# Patient Record
Sex: MALE | Race: WHITE | NOT HISPANIC OR LATINO
[De-identification: names, ages, dates, MRNs, and addresses within clinical notes are randomized per-mention and may not be internally consistent; named-entity substitution may affect disease eponyms.]

---

## 2017-02-21 ENCOUNTER — APPOINTMENT (OUTPATIENT)
Dept: GASTROENTEROLOGY | Facility: CLINIC | Age: 72
End: 2017-02-21

## 2017-02-22 ENCOUNTER — APPOINTMENT (OUTPATIENT)
Dept: ELECTROPHYSIOLOGY | Facility: CLINIC | Age: 72
End: 2017-02-22

## 2017-05-23 ENCOUNTER — APPOINTMENT (OUTPATIENT)
Dept: ELECTROPHYSIOLOGY | Facility: CLINIC | Age: 72
End: 2017-05-23

## 2017-06-29 ENCOUNTER — APPOINTMENT (OUTPATIENT)
Dept: NEUROLOGY | Facility: CLINIC | Age: 72
End: 2017-06-29

## 2017-06-29 VITALS — WEIGHT: 212 LBS | BODY MASS INDEX: 30.42 KG/M2

## 2017-06-29 VITALS
BODY MASS INDEX: 30.78 KG/M2 | HEART RATE: 69 BPM | HEIGHT: 70 IN | WEIGHT: 215 LBS | SYSTOLIC BLOOD PRESSURE: 107 MMHG | DIASTOLIC BLOOD PRESSURE: 71 MMHG

## 2017-06-29 DIAGNOSIS — N40.0 BENIGN PROSTATIC HYPERPLASIA WITHOUT LOWER URINARY TRACT SYMPMS: ICD-10-CM

## 2017-06-29 DIAGNOSIS — Z82.49 FAMILY HISTORY OF ISCHEMIC HEART DISEASE AND OTHER DISEASES OF THE CIRCULATORY SYSTEM: ICD-10-CM

## 2017-06-30 PROBLEM — Z82.49 FAMILY HISTORY OF CORONARY ARTERY DISEASE: Status: ACTIVE | Noted: 2017-06-30

## 2017-06-30 PROBLEM — N40.0 BENIGN PROSTATIC HYPERPLASIA: Status: ACTIVE | Noted: 2017-06-30

## 2017-06-30 PROBLEM — Z82.49 FAMILY HISTORY OF ESSENTIAL HYPERTENSION: Status: ACTIVE | Noted: 2017-06-30

## 2017-07-26 ENCOUNTER — APPOINTMENT (OUTPATIENT)
Dept: GASTROENTEROLOGY | Facility: CLINIC | Age: 72
End: 2017-07-26

## 2017-07-26 VITALS
BODY MASS INDEX: 30.06 KG/M2 | OXYGEN SATURATION: 96 % | HEIGHT: 70 IN | TEMPERATURE: 97.4 F | WEIGHT: 210 LBS | RESPIRATION RATE: 14 BRPM | DIASTOLIC BLOOD PRESSURE: 80 MMHG | SYSTOLIC BLOOD PRESSURE: 110 MMHG | HEART RATE: 79 BPM

## 2017-07-26 DIAGNOSIS — Z80.0 FAMILY HISTORY OF MALIGNANT NEOPLASM OF DIGESTIVE ORGANS: ICD-10-CM

## 2017-07-26 DIAGNOSIS — K22.70 BARRETT'S ESOPHAGUS W/OUT DYSPLASIA: ICD-10-CM

## 2017-07-26 DIAGNOSIS — K63.5 POLYP OF COLON: ICD-10-CM

## 2017-07-26 DIAGNOSIS — D12.6 BENIGN NEOPLASM OF COLON, UNSPECIFIED: ICD-10-CM

## 2017-07-26 DIAGNOSIS — Z12.11 ENCOUNTER FOR SCREENING FOR MALIGNANT NEOPLASM OF COLON: ICD-10-CM

## 2017-08-01 ENCOUNTER — MESSAGE (OUTPATIENT)
Age: 72
End: 2017-08-01

## 2017-08-04 ENCOUNTER — APPOINTMENT (OUTPATIENT)
Dept: NEUROLOGY | Facility: CLINIC | Age: 72
End: 2017-08-04
Payer: MEDICARE

## 2017-08-04 DIAGNOSIS — Z00.00 ENCOUNTER FOR GENERAL ADULT MEDICAL EXAMINATION W/OUT ABNORMAL FINDINGS: ICD-10-CM

## 2017-08-04 PROCEDURE — 99213 OFFICE O/P EST LOW 20 MIN: CPT

## 2017-08-30 ENCOUNTER — APPOINTMENT (OUTPATIENT)
Dept: ELECTROPHYSIOLOGY | Facility: CLINIC | Age: 72
End: 2017-08-30
Payer: MEDICARE

## 2017-08-30 PROCEDURE — 93284 PRGRMG EVAL IMPLANTABLE DFB: CPT

## 2017-08-30 PROCEDURE — 93290 INTERROG DEV EVAL ICPMS IP: CPT | Mod: 26

## 2017-08-30 RX ORDER — BACITRACIN 500 [USP'U]/G
500 OINTMENT OPHTHALMIC
Qty: 35 | Refills: 0 | Status: DISCONTINUED | COMMUNITY
Start: 2017-07-05 | End: 2017-08-30

## 2017-08-30 RX ORDER — CARVEDILOL 12.5 MG/1
12.5 TABLET, FILM COATED ORAL TWICE DAILY
Refills: 0 | Status: ACTIVE | COMMUNITY

## 2017-10-23 ENCOUNTER — MESSAGE (OUTPATIENT)
Age: 72
End: 2017-10-23

## 2017-10-24 ENCOUNTER — MEDICATION RENEWAL (OUTPATIENT)
Age: 72
End: 2017-10-24

## 2017-10-24 RX ORDER — SODIUM SULFATE, POTASSIUM SULFATE, MAGNESIUM SULFATE 17.5; 3.13; 1.6 G/ML; G/ML; G/ML
17.5-3.13-1.6 SOLUTION, CONCENTRATE ORAL
Qty: 1 | Refills: 0 | Status: ACTIVE | COMMUNITY
Start: 2017-10-24 | End: 1900-01-01

## 2017-10-24 RX ORDER — POLYETHYLENE GLYCOL 3350 17 G/17G
17 POWDER, FOR SOLUTION ORAL
Qty: 1 | Refills: 0 | Status: ACTIVE | COMMUNITY
Start: 2017-10-24 | End: 1900-01-01

## 2017-10-24 RX ORDER — POLYETHYLENE GLYCOL 3350, SODIUM SULFATE, SODIUM CHLORIDE, POTASSIUM CHLORIDE, ASCORBIC ACID, SODIUM ASCORBATE 7.5-2.691G
100 KIT ORAL
Qty: 1 | Refills: 0 | Status: DISCONTINUED | COMMUNITY
Start: 2017-10-23 | End: 2017-10-24

## 2017-10-30 ENCOUNTER — OUTPATIENT (OUTPATIENT)
Dept: OUTPATIENT SERVICES | Facility: HOSPITAL | Age: 72
LOS: 1 days | Discharge: HOME | End: 2017-10-30
Payer: MEDICARE

## 2017-10-30 ENCOUNTER — RESULT REVIEW (OUTPATIENT)
Age: 72
End: 2017-10-30

## 2017-10-30 ENCOUNTER — APPOINTMENT (OUTPATIENT)
Dept: GASTROENTEROLOGY | Facility: HOSPITAL | Age: 72
End: 2017-10-30

## 2017-10-30 DIAGNOSIS — Z95.810 PRESENCE OF AUTOMATIC (IMPLANTABLE) CARDIAC DEFIBRILLATOR: Chronic | ICD-10-CM

## 2017-10-30 DIAGNOSIS — K22.70 BARRETT'S ESOPHAGUS WITHOUT DYSPLASIA: ICD-10-CM

## 2017-10-30 PROCEDURE — 43239 EGD BIOPSY SINGLE/MULTIPLE: CPT | Mod: GC

## 2017-10-30 PROCEDURE — 88305 TISSUE EXAM BY PATHOLOGIST: CPT | Mod: 26

## 2017-10-30 PROCEDURE — 45380 COLONOSCOPY AND BIOPSY: CPT | Mod: GC

## 2017-10-30 PROCEDURE — 88312 SPECIAL STAINS GROUP 1: CPT | Mod: 26

## 2017-11-03 LAB — SURGICAL PATHOLOGY STUDY: SIGNIFICANT CHANGE UP

## 2017-12-05 ENCOUNTER — APPOINTMENT (OUTPATIENT)
Dept: ELECTROPHYSIOLOGY | Facility: CLINIC | Age: 72
End: 2017-12-05

## 2017-12-05 ENCOUNTER — APPOINTMENT (OUTPATIENT)
Dept: ELECTROPHYSIOLOGY | Facility: CLINIC | Age: 72
End: 2017-12-05
Payer: MEDICARE

## 2017-12-05 PROCEDURE — 93296 REM INTERROG EVL PM/IDS: CPT

## 2017-12-05 PROCEDURE — 93295 DEV INTERROG REMOTE 1/2/MLT: CPT

## 2017-12-15 ENCOUNTER — APPOINTMENT (OUTPATIENT)
Dept: NEUROLOGY | Facility: CLINIC | Age: 72
End: 2017-12-15
Payer: MEDICARE

## 2017-12-15 VITALS
HEART RATE: 89 BPM | SYSTOLIC BLOOD PRESSURE: 120 MMHG | BODY MASS INDEX: 28.63 KG/M2 | HEIGHT: 70 IN | WEIGHT: 200 LBS | DIASTOLIC BLOOD PRESSURE: 80 MMHG

## 2017-12-15 PROCEDURE — 99214 OFFICE O/P EST MOD 30 MIN: CPT

## 2018-02-16 ENCOUNTER — APPOINTMENT (OUTPATIENT)
Dept: ENDOCRINOLOGY | Facility: CLINIC | Age: 73
End: 2018-02-16

## 2018-03-14 ENCOUNTER — APPOINTMENT (OUTPATIENT)
Dept: ELECTROPHYSIOLOGY | Facility: CLINIC | Age: 73
End: 2018-03-14
Payer: MEDICARE

## 2018-03-14 PROCEDURE — 93290 INTERROG DEV EVAL ICPMS IP: CPT

## 2018-03-14 PROCEDURE — 93284 PRGRMG EVAL IMPLANTABLE DFB: CPT

## 2018-03-14 RX ORDER — DOXYCYCLINE HYCLATE 100 MG/1
100 CAPSULE ORAL
Qty: 14 | Refills: 0 | Status: DISCONTINUED | COMMUNITY
Start: 2017-01-24 | End: 2018-03-14

## 2018-03-14 RX ORDER — PANTOPRAZOLE 40 MG/1
40 TABLET, DELAYED RELEASE ORAL
Qty: 90 | Refills: 3 | Status: DISCONTINUED | COMMUNITY
Start: 2017-07-26 | End: 2018-03-14

## 2018-04-18 ENCOUNTER — APPOINTMENT (OUTPATIENT)
Dept: NEUROLOGY | Facility: CLINIC | Age: 73
End: 2018-04-18
Payer: MEDICARE

## 2018-04-18 VITALS
SYSTOLIC BLOOD PRESSURE: 104 MMHG | WEIGHT: 204 LBS | DIASTOLIC BLOOD PRESSURE: 67 MMHG | BODY MASS INDEX: 29.2 KG/M2 | HEIGHT: 70 IN | HEART RATE: 84 BPM

## 2018-04-18 PROCEDURE — 99214 OFFICE O/P EST MOD 30 MIN: CPT

## 2018-07-09 ENCOUNTER — APPOINTMENT (OUTPATIENT)
Dept: ELECTROPHYSIOLOGY | Facility: CLINIC | Age: 73
End: 2018-07-09
Payer: MEDICARE

## 2018-07-09 PROCEDURE — 93295 DEV INTERROG REMOTE 1/2/MLT: CPT

## 2018-07-09 PROCEDURE — 93296 REM INTERROG EVL PM/IDS: CPT

## 2018-07-27 PROBLEM — Z80.0 FAMILY HISTORY OF COLON CANCER: Status: INACTIVE | Noted: 2017-07-26

## 2018-08-21 ENCOUNTER — APPOINTMENT (OUTPATIENT)
Dept: NEUROLOGY | Facility: CLINIC | Age: 73
End: 2018-08-21
Payer: MEDICARE

## 2018-08-21 VITALS
HEART RATE: 78 BPM | DIASTOLIC BLOOD PRESSURE: 78 MMHG | BODY MASS INDEX: 29.35 KG/M2 | WEIGHT: 205 LBS | HEIGHT: 70 IN | SYSTOLIC BLOOD PRESSURE: 119 MMHG

## 2018-08-21 PROCEDURE — 99214 OFFICE O/P EST MOD 30 MIN: CPT

## 2018-09-12 ENCOUNTER — APPOINTMENT (OUTPATIENT)
Dept: ELECTROPHYSIOLOGY | Facility: CLINIC | Age: 73
End: 2018-09-12
Payer: MEDICARE

## 2018-09-12 PROCEDURE — 93290 INTERROG DEV EVAL ICPMS IP: CPT

## 2018-09-12 PROCEDURE — 93284 PRGRMG EVAL IMPLANTABLE DFB: CPT

## 2019-01-15 ENCOUNTER — APPOINTMENT (OUTPATIENT)
Dept: ELECTROPHYSIOLOGY | Facility: CLINIC | Age: 74
End: 2019-01-15
Payer: MEDICARE

## 2019-01-15 PROCEDURE — 93295 DEV INTERROG REMOTE 1/2/MLT: CPT

## 2019-01-15 PROCEDURE — 93296 REM INTERROG EVL PM/IDS: CPT

## 2019-02-26 ENCOUNTER — APPOINTMENT (OUTPATIENT)
Dept: NEUROLOGY | Facility: CLINIC | Age: 74
End: 2019-02-26
Payer: MEDICARE

## 2019-02-26 VITALS
DIASTOLIC BLOOD PRESSURE: 80 MMHG | HEART RATE: 89 BPM | BODY MASS INDEX: 30.78 KG/M2 | SYSTOLIC BLOOD PRESSURE: 125 MMHG | WEIGHT: 215 LBS | HEIGHT: 70 IN

## 2019-02-26 PROCEDURE — 99214 OFFICE O/P EST MOD 30 MIN: CPT

## 2019-02-27 NOTE — DISCUSSION/SUMMARY
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 I attribute it to be pre diabetic h status and since it is nonprogressive and carefully watch him and was advised that in the event of any change to contact me immediately and blood sugar control was emphasized with moderate exercise vitamin intake and proper nutrition. He will return back for followup in approximately 6 months.

## 2019-02-27 NOTE — PHYSICAL EXAM
[General Appearance - Alert] : alert [General Appearance - In No Acute Distress] : in no acute distress [Oriented To Time, Place, And Person] : oriented to person, place, and time [Impaired Insight] : insight and judgment were intact [Affect] : the affect was normal [Person] : oriented to person [Place] : oriented to place [Time] : oriented to time [Concentration Intact] : normal concentrating ability [Visual Intact] : visual attention was ~T not ~L decreased [Naming Objects] : no difficulty naming common objects [Repeating Phrases] : no difficulty repeating a phrase [Writing A Sentence] : no difficulty writing a sentence [Fluency] : fluency intact [Comprehension] : comprehension intact [Reading] : reading intact [Past History] : adequate knowledge of personal past history [Cranial Nerves Optic (II)] : visual acuity intact bilaterally,  visual fields full to confrontation, pupils equal round and reactive to light [Cranial Nerves Oculomotor (III)] : extraocular motion intact [Cranial Nerves Trigeminal (V)] : facial sensation intact symmetrically [Cranial Nerves Facial (VII)] : face symmetrical [Cranial Nerves Vestibulocochlear (VIII)] : hearing was intact bilaterally [Cranial Nerves Glossopharyngeal (IX)] : tongue and palate midline [Cranial Nerves Accessory (XI - Cranial And Spinal)] : head turning and shoulder shrug symmetric [Cranial Nerves Hypoglossal (XII)] : there was no tongue deviation with protrusion [Motor Tone] : muscle tone was normal in all four extremities [Motor Strength] : muscle strength was normal in all four extremities [No Muscle Atrophy] : normal bulk in all four extremities [Sensation Tactile Decrease] : light touch was intact [Abnormal Walk] : normal gait [Balance] : balance was intact [2+] : Patella left 2+ [0] : Ankle jerk left 0 [FreeTextEntry1] : Gen. examination-vital signs are recorded and unremarkable. There is no carotid bruit, thyromegaly or lymphadenopathy. Chest is clear heart sounds are normal. Pedal pulsations are normal and there is no leg edema. There are no perfect changes in the feet. There is no Tinel's sign behind the medial malleolus and no plantar tenderness.\par \par Neurological examination is completely normal and his ankle reflexes are trace. Sensations are normal to fine touch pinprick position and vibration sense in the feet and there is no hyperpathia hyperalgesia or allodynia. [Past-pointing] : there was no past-pointing [Tremor] : no tremor present [Plantar Reflex Right Only] : normal on the right [Plantar Reflex Left Only] : normal on the left

## 2019-02-27 NOTE — HISTORY OF PRESENT ILLNESS
[FreeTextEntry1] : Mr. Joy returns for six-month followup evaluation and is essentially asymptomatic and his symptoms in the plantar distal surface and distal toes remain essentially unchanged and is attributed to possibly prediabetic status. Meanwhile he denied any low back pain and radicular symptoms or weakness and there is no neuropathic pain and experiences occasional right cough shortness and stretching improves it without any persistent pain. Review of systems is unremarkable. Today he brought his lab tests and I reviewed the entire results and his fasting blood glucose is 158 but he stated that it may not have been a true fasting blood sugar level and his hemoglobin A1c is 5.9 and  remains in a prediabetic status. Blood chemistries are otherwise unremarkable with normal vitamin D, thyroid function tests and CBC.\par \par Review of systems is unremarkable. I reviewed his medications and allergies.

## 2019-02-27 NOTE — REVIEW OF SYSTEMS
[As Noted in HPI] : as noted in HPI [Numbness] : numbness [Tingling] : tingling [Negative] : Endocrine

## 2019-02-27 NOTE — DATA REVIEWED
[de-identified] : I reviewed medical records and the lab tests and noted that his hemoglobin A1c is 5.9 with slightly elevated blood sugar that is CBC and blood chemistries are unremarkable including normal vitamin D level thyroid function tests.

## 2019-03-11 ENCOUNTER — APPOINTMENT (OUTPATIENT)
Dept: ELECTROPHYSIOLOGY | Facility: CLINIC | Age: 74
End: 2019-03-11
Payer: MEDICARE

## 2019-03-11 PROCEDURE — 93284 PRGRMG EVAL IMPLANTABLE DFB: CPT

## 2019-06-11 ENCOUNTER — APPOINTMENT (OUTPATIENT)
Dept: ELECTROPHYSIOLOGY | Facility: CLINIC | Age: 74
End: 2019-06-11
Payer: MEDICARE

## 2019-06-11 PROCEDURE — 93296 REM INTERROG EVL PM/IDS: CPT

## 2019-06-11 PROCEDURE — 93295 DEV INTERROG REMOTE 1/2/MLT: CPT

## 2019-09-16 ENCOUNTER — APPOINTMENT (OUTPATIENT)
Dept: NEUROLOGY | Facility: CLINIC | Age: 74
End: 2019-09-16
Payer: MEDICARE

## 2019-09-16 VITALS — SYSTOLIC BLOOD PRESSURE: 99 MMHG | DIASTOLIC BLOOD PRESSURE: 75 MMHG | HEART RATE: 76 BPM

## 2019-09-16 PROCEDURE — 99214 OFFICE O/P EST MOD 30 MIN: CPT

## 2019-09-17 NOTE — DISCUSSION/SUMMARY
[FreeTextEntry1] : Opinion-the patient has for years history of foot paresthesia without any painful neuropathy and is attributed to possibly diabetes and was advised to have good control and there is no need for any medication says this is no pain and advised good nutrition and vitamin intake and remain under the care of his physicians for cardiac and other issues with good control of diabetes and should there be any progression or any new symptoms or painful paresthesias to contact my office immediately for reevaluation. He understands and will proceed with my advice.

## 2019-09-17 NOTE — HISTORY OF PRESENT ILLNESS
[FreeTextEntry1] : Mr. Joy returns for her usual followup evaluation and there has not been any significant change in his symptoms and occasionally gets numbness in the feet which is reviewed by good fitting shoes without any pain and denied any burning sensation sharp shooting pain focal motor weakness and denied any low back pain or radicular symptoms and no symptoms in the upper extremities. There is no nocturnal burning paresthesias or restless leg syndrome and there are no other neurological symptoms in a detailed review of systems and does his symptoms of chronic non-progressive and not painful. His hemoglobin A1c has been relatively stable to be diabetic and diabetic status. He was prescribed 100 mg of B6 by his internist which she discontinued without any benefit and was advised to continue Centrum Silver once a day. I also discussed the nerve conduction studies that revealed mild neuropathy and noted that his hemoglobin A1c fluctuates from 6.2-6.8.\par \par Review of systems is unremarkable and I reviewed his medications and allergies and is continued medical care.

## 2019-09-17 NOTE — PHYSICAL EXAM
[General Appearance - Alert] : alert [Oriented To Time, Place, And Person] : oriented to person, place, and time [General Appearance - In No Acute Distress] : in no acute distress [Affect] : the affect was normal [Impaired Insight] : insight and judgment were intact [Time] : oriented to time [Person] : oriented to person [Place] : oriented to place [Concentration Intact] : normal concentrating ability [Visual Intact] : visual attention was ~T not ~L decreased [Naming Objects] : no difficulty naming common objects [Writing A Sentence] : no difficulty writing a sentence [Repeating Phrases] : no difficulty repeating a phrase [Fluency] : fluency intact [Comprehension] : comprehension intact [Past History] : adequate knowledge of personal past history [Reading] : reading intact [Cranial Nerves Optic (II)] : visual acuity intact bilaterally,  visual fields full to confrontation, pupils equal round and reactive to light [Cranial Nerves Trigeminal (V)] : facial sensation intact symmetrically [Cranial Nerves Oculomotor (III)] : extraocular motion intact [Cranial Nerves Facial (VII)] : face symmetrical [Cranial Nerves Vestibulocochlear (VIII)] : hearing was intact bilaterally [Cranial Nerves Glossopharyngeal (IX)] : tongue and palate midline [Cranial Nerves Accessory (XI - Cranial And Spinal)] : head turning and shoulder shrug symmetric [Cranial Nerves Hypoglossal (XII)] : there was no tongue deviation with protrusion [Motor Tone] : muscle tone was normal in all four extremities [Motor Strength] : muscle strength was normal in all four extremities [No Muscle Atrophy] : normal bulk in all four extremities [Abnormal Walk] : normal gait [Sensation Tactile Decrease] : light touch was intact [Balance] : balance was intact [2+] : Patella left 2+ [0] : Ankle jerk left 0 [FreeTextEntry1] : Gen. examination is unremarkable. There is no carotid bruit, thyromegaly or lymphadenopathy. Chest is clear heart sounds are normal. Abdomen is soft and there is no tenderness. Pedal pulsations are normal and there is no leg edema. There is no Tinel's sign in the anterior as well as posterior tarsal tunnel area. There is no tenderness over the plantar fascia. Straight leg raising test is negative. There are no trophic changes.\par \par Neurological examination is completely normal except for absent ankle reflexes. [Past-pointing] : there was no past-pointing [Tremor] : no tremor present [Plantar Reflex Right Only] : normal on the right [Plantar Reflex Left Only] : normal on the left

## 2019-09-23 ENCOUNTER — APPOINTMENT (OUTPATIENT)
Dept: ELECTROPHYSIOLOGY | Facility: CLINIC | Age: 74
End: 2019-09-23
Payer: MEDICARE

## 2019-09-23 PROCEDURE — 93284 PRGRMG EVAL IMPLANTABLE DFB: CPT

## 2019-09-23 RX ORDER — ROSUVASTATIN CALCIUM 20 MG/1
20 TABLET, FILM COATED ORAL DAILY
Refills: 0 | Status: ACTIVE | COMMUNITY

## 2019-09-23 RX ORDER — ATORVASTATIN CALCIUM 20 MG/1
20 TABLET, FILM COATED ORAL
Refills: 0 | Status: DISCONTINUED | COMMUNITY
End: 2019-09-23

## 2019-12-23 ENCOUNTER — APPOINTMENT (OUTPATIENT)
Dept: ELECTROPHYSIOLOGY | Facility: CLINIC | Age: 74
End: 2019-12-23
Payer: MEDICARE

## 2019-12-23 PROCEDURE — 93295 DEV INTERROG REMOTE 1/2/MLT: CPT

## 2019-12-23 PROCEDURE — 93296 REM INTERROG EVL PM/IDS: CPT

## 2020-03-16 ENCOUNTER — APPOINTMENT (OUTPATIENT)
Dept: NEUROLOGY | Facility: CLINIC | Age: 75
End: 2020-03-16

## 2020-03-27 ENCOUNTER — APPOINTMENT (OUTPATIENT)
Dept: ELECTROPHYSIOLOGY | Facility: CLINIC | Age: 75
End: 2020-03-27
Payer: MEDICARE

## 2020-03-27 PROCEDURE — 93295 DEV INTERROG REMOTE 1/2/MLT: CPT

## 2020-03-27 PROCEDURE — 93296 REM INTERROG EVL PM/IDS: CPT

## 2020-06-25 ENCOUNTER — APPOINTMENT (OUTPATIENT)
Dept: ELECTROPHYSIOLOGY | Facility: CLINIC | Age: 75
End: 2020-06-25
Payer: MEDICARE

## 2020-06-25 DIAGNOSIS — I50.9 HEART FAILURE, UNSPECIFIED: ICD-10-CM

## 2020-06-25 PROCEDURE — 93290 INTERROG DEV EVAL ICPMS IP: CPT

## 2020-06-25 PROCEDURE — 93284 PRGRMG EVAL IMPLANTABLE DFB: CPT

## 2020-07-24 ENCOUNTER — APPOINTMENT (OUTPATIENT)
Dept: NEUROLOGY | Facility: CLINIC | Age: 75
End: 2020-07-24

## 2020-08-10 ENCOUNTER — APPOINTMENT (OUTPATIENT)
Dept: NEUROLOGY | Facility: CLINIC | Age: 75
End: 2020-08-10

## 2020-09-21 ENCOUNTER — APPOINTMENT (OUTPATIENT)
Dept: NEUROLOGY | Facility: CLINIC | Age: 75
End: 2020-09-21

## 2020-09-25 ENCOUNTER — APPOINTMENT (OUTPATIENT)
Dept: ELECTROPHYSIOLOGY | Facility: CLINIC | Age: 75
End: 2020-09-25
Payer: MEDICARE

## 2020-09-25 PROCEDURE — 93295 DEV INTERROG REMOTE 1/2/MLT: CPT

## 2020-09-25 PROCEDURE — 93296 REM INTERROG EVL PM/IDS: CPT

## 2020-10-08 ENCOUNTER — APPOINTMENT (OUTPATIENT)
Dept: NEUROLOGY | Facility: CLINIC | Age: 75
End: 2020-10-08
Payer: MEDICARE

## 2020-10-08 VITALS
WEIGHT: 210 LBS | DIASTOLIC BLOOD PRESSURE: 68 MMHG | SYSTOLIC BLOOD PRESSURE: 105 MMHG | HEIGHT: 70 IN | BODY MASS INDEX: 30.06 KG/M2 | HEART RATE: 87 BPM

## 2020-10-08 DIAGNOSIS — I42.9 CARDIOMYOPATHY, UNSPECIFIED: ICD-10-CM

## 2020-10-08 PROCEDURE — 99214 OFFICE O/P EST MOD 30 MIN: CPT

## 2020-10-08 NOTE — DATA REVIEWED
[de-identified] : I reviewed his lab tests and it is completely normal mild elevation of hemoglobin A1c of 6.3, elevation of blood glucose whereas all other indices are normal and COVID antibody positive status.  Is entirely asymptomatic other than distal neuropathy due to diabetes.

## 2020-10-08 NOTE — HISTORY OF PRESENT ILLNESS
[FreeTextEntry1] : Mr. Joy returns for follow-up evaluation after an approximately 1 year and was provisionally diagnosed as suffering from mild distal diabetic sensory neuropathy without any focal motor weakness which was not painful and he stated that it remains the same and the numbness is only in the feet without burning paresthesias and there are no other manifestations.  He denied any vision problems has new pair of glasses mild cataract.  There is no history of headache diplopia dysarthria dysphagia dyspnea though he has cardiomyopathy and has a pacemaker and a defibrillator.  I reviewed his medications and allergies and his diabetic control is excellent though sporadic blood glucose has been high.  He was recently evaluated by his internist Dr. Robins and had normal blood testst other than slightly elevated hemoglobin A1c of 6.3 blood glucose whereas all other testing was unremarkable he is COVID-19 antibody positive and recalls that earlier this year he had upper respiratory infection but he self swab was negative.\par \par I reviewed his medications and allergies and there is no interim past medical history.

## 2020-10-08 NOTE — DISCUSSION/SUMMARY
[FreeTextEntry1] : Opinion–the patient has history of mild distal diabetic nonpainful sensory neuropathy and for the last 2 years there has been no history of progression and only fluctuates minimally and was advised to have good control of diabetes vitamin intake good nutrition and exercise and remain under the care of his physicians particularly his cardiologist.  In the event of any pain in the feet he is to call me immediately for appropriate treatment otherwise return back for follow-up in 6 months to 1 year.  Education and counseling was provided.

## 2020-10-08 NOTE — PHYSICAL EXAM
[General Appearance - Alert] : alert [General Appearance - In No Acute Distress] : in no acute distress [Oriented To Time, Place, And Person] : oriented to person, place, and time [Impaired Insight] : insight and judgment were intact [Affect] : the affect was normal [Person] : oriented to person [Place] : oriented to place [Time] : oriented to time [Concentration Intact] : normal concentrating ability [Visual Intact] : visual attention was ~T not ~L decreased [Naming Objects] : no difficulty naming common objects [Repeating Phrases] : no difficulty repeating a phrase [Writing A Sentence] : no difficulty writing a sentence [Fluency] : fluency intact [Comprehension] : comprehension intact [Reading] : reading intact [Past History] : adequate knowledge of personal past history [Cranial Nerves Optic (II)] : visual acuity intact bilaterally,  visual fields full to confrontation, pupils equal round and reactive to light [Cranial Nerves Oculomotor (III)] : extraocular motion intact [Cranial Nerves Trigeminal (V)] : facial sensation intact symmetrically [Cranial Nerves Facial (VII)] : face symmetrical [Cranial Nerves Vestibulocochlear (VIII)] : hearing was intact bilaterally [Cranial Nerves Glossopharyngeal (IX)] : tongue and palate midline [Cranial Nerves Accessory (XI - Cranial And Spinal)] : head turning and shoulder shrug symmetric [Cranial Nerves Hypoglossal (XII)] : there was no tongue deviation with protrusion [Motor Tone] : muscle tone was normal in all four extremities [Motor Strength] : muscle strength was normal in all four extremities [No Muscle Atrophy] : normal bulk in all four extremities [Sensation Tactile Decrease] : light touch was intact [Abnormal Walk] : normal gait [Balance] : balance was intact [2+] : Patella left 2+ [0] : Ankle jerk left 0 [FreeTextEntry1] : General examination is unremarkable.  There is no carotid bruit, thyromegaly or lymphadenopathy.  Heart sounds are normal.  Neck is supple and straight leg raising test is negative.  Dorsalis pedis and posterior tibial artery pulsations are normal.  There is no leg edema and there are no trophic changes in the feet.\par \par Neurological examination is normal except for trace to absent ankle reflexes bilaterally without any discernible sensory loss. [Past-pointing] : there was no past-pointing [Tremor] : no tremor present [Plantar Reflex Right Only] : normal on the right [Plantar Reflex Left Only] : normal on the left

## 2020-10-08 NOTE — REVIEW OF SYSTEMS
[Numbness] : numbness [Tingling] : tingling [As Noted in HPI] : as noted in HPI [Negative] : Heme/Lymph

## 2020-12-15 PROBLEM — Z12.11 ENCOUNTER FOR SCREENING COLONOSCOPY: Status: RESOLVED | Noted: 2017-07-26 | Resolved: 2020-01-01

## 2021-01-01 ENCOUNTER — TRANSCRIPTION ENCOUNTER (OUTPATIENT)
Age: 76
End: 2021-01-01

## 2021-01-01 ENCOUNTER — INPATIENT (INPATIENT)
Facility: HOSPITAL | Age: 76
LOS: 1 days | DRG: 871 | End: 2021-11-12
Attending: STUDENT IN AN ORGANIZED HEALTH CARE EDUCATION/TRAINING PROGRAM | Admitting: STUDENT IN AN ORGANIZED HEALTH CARE EDUCATION/TRAINING PROGRAM
Payer: MEDICARE

## 2021-01-01 ENCOUNTER — OUTPATIENT (OUTPATIENT)
Dept: OUTPATIENT SERVICES | Facility: HOSPITAL | Age: 76
LOS: 1 days | Discharge: HOME | End: 2021-01-01
Payer: MEDICARE

## 2021-01-01 ENCOUNTER — NON-APPOINTMENT (OUTPATIENT)
Age: 76
End: 2021-01-01

## 2021-01-01 ENCOUNTER — RESULT REVIEW (OUTPATIENT)
Age: 76
End: 2021-01-01

## 2021-01-01 ENCOUNTER — INPATIENT (INPATIENT)
Facility: HOSPITAL | Age: 76
LOS: 15 days | Discharge: NURSING, REHAB OR LONG-TERM CARE | End: 2021-09-30
Attending: INTERNAL MEDICINE | Admitting: INTERNAL MEDICINE
Payer: MEDICARE

## 2021-01-01 ENCOUNTER — APPOINTMENT (OUTPATIENT)
Dept: ELECTROPHYSIOLOGY | Facility: CLINIC | Age: 76
End: 2021-01-01
Payer: MEDICARE

## 2021-01-01 ENCOUNTER — OUTPATIENT (OUTPATIENT)
Dept: OUTPATIENT SERVICES | Facility: HOSPITAL | Age: 76
LOS: 1 days | End: 2021-01-01

## 2021-01-01 ENCOUNTER — APPOINTMENT (OUTPATIENT)
Dept: NEUROLOGY | Facility: CLINIC | Age: 76
End: 2021-01-01
Payer: MEDICARE

## 2021-01-01 ENCOUNTER — APPOINTMENT (OUTPATIENT)
Dept: DISASTER EMERGENCY | Facility: CLINIC | Age: 76
End: 2021-01-01

## 2021-01-01 ENCOUNTER — APPOINTMENT (OUTPATIENT)
Dept: ELECTROPHYSIOLOGY | Facility: CLINIC | Age: 76
End: 2021-01-01

## 2021-01-01 ENCOUNTER — INPATIENT (INPATIENT)
Facility: HOSPITAL | Age: 76
LOS: 4 days | Discharge: HOME HEALTH | DRG: 393 | End: 2021-10-11
Attending: INTERNAL MEDICINE | Admitting: INTERNAL MEDICINE
Payer: MEDICARE

## 2021-01-01 ENCOUNTER — APPOINTMENT (OUTPATIENT)
Dept: ULTRASOUND IMAGING | Facility: IMAGING CENTER | Age: 76
End: 2021-01-01

## 2021-01-01 ENCOUNTER — INPATIENT (INPATIENT)
Facility: HOSPITAL | Age: 76
LOS: 3 days | Discharge: HOME | End: 2021-08-09
Attending: INTERNAL MEDICINE | Admitting: INTERNAL MEDICINE
Payer: MEDICARE

## 2021-01-01 VITALS
TEMPERATURE: 99 F | WEIGHT: 212.08 LBS | SYSTOLIC BLOOD PRESSURE: 106 MMHG | DIASTOLIC BLOOD PRESSURE: 76 MMHG | HEIGHT: 70 IN | HEART RATE: 62 BPM | OXYGEN SATURATION: 97 % | RESPIRATION RATE: 14 BRPM

## 2021-01-01 VITALS
HEART RATE: 101 BPM | SYSTOLIC BLOOD PRESSURE: 99 MMHG | OXYGEN SATURATION: 95 % | DIASTOLIC BLOOD PRESSURE: 67 MMHG | TEMPERATURE: 98 F | RESPIRATION RATE: 17 BRPM

## 2021-01-01 VITALS — OXYGEN SATURATION: 94 % | HEART RATE: 109 BPM

## 2021-01-01 VITALS
SYSTOLIC BLOOD PRESSURE: 133 MMHG | DIASTOLIC BLOOD PRESSURE: 85 MMHG | WEIGHT: 213 LBS | HEART RATE: 70 BPM | HEIGHT: 70 IN | BODY MASS INDEX: 30.49 KG/M2

## 2021-01-01 VITALS
SYSTOLIC BLOOD PRESSURE: 116 MMHG | RESPIRATION RATE: 18 BRPM | TEMPERATURE: 98 F | OXYGEN SATURATION: 100 % | DIASTOLIC BLOOD PRESSURE: 79 MMHG | HEART RATE: 86 BPM

## 2021-01-01 VITALS
OXYGEN SATURATION: 98 % | DIASTOLIC BLOOD PRESSURE: 66 MMHG | WEIGHT: 207.01 LBS | TEMPERATURE: 98 F | HEIGHT: 70 IN | SYSTOLIC BLOOD PRESSURE: 102 MMHG | HEART RATE: 80 BPM | RESPIRATION RATE: 20 BRPM

## 2021-01-01 VITALS
SYSTOLIC BLOOD PRESSURE: 122 MMHG | HEART RATE: 80 BPM | TEMPERATURE: 98 F | OXYGEN SATURATION: 98 % | HEIGHT: 70 IN | RESPIRATION RATE: 18 BRPM | DIASTOLIC BLOOD PRESSURE: 77 MMHG

## 2021-01-01 VITALS
WEIGHT: 210 LBS | DIASTOLIC BLOOD PRESSURE: 64 MMHG | SYSTOLIC BLOOD PRESSURE: 99 MMHG | HEART RATE: 61 BPM | BODY MASS INDEX: 30.06 KG/M2 | HEIGHT: 70 IN | OXYGEN SATURATION: 97 %

## 2021-01-01 VITALS — SYSTOLIC BLOOD PRESSURE: 100 MMHG | DIASTOLIC BLOOD PRESSURE: 60 MMHG

## 2021-01-01 VITALS — BODY MASS INDEX: 31.42 KG/M2 | TEMPERATURE: 96.8 F | OXYGEN SATURATION: 96 % | HEIGHT: 70 IN

## 2021-01-01 VITALS
HEART RATE: 95 BPM | OXYGEN SATURATION: 97 % | SYSTOLIC BLOOD PRESSURE: 90 MMHG | RESPIRATION RATE: 18 BRPM | DIASTOLIC BLOOD PRESSURE: 58 MMHG | TEMPERATURE: 98 F

## 2021-01-01 VITALS
SYSTOLIC BLOOD PRESSURE: 119 MMHG | HEIGHT: 70 IN | RESPIRATION RATE: 15 BRPM | DIASTOLIC BLOOD PRESSURE: 96 MMHG | OXYGEN SATURATION: 100 % | HEART RATE: 110 BPM | TEMPERATURE: 98 F

## 2021-01-01 VITALS — HEART RATE: 61 BPM | DIASTOLIC BLOOD PRESSURE: 59 MMHG | SYSTOLIC BLOOD PRESSURE: 98 MMHG

## 2021-01-01 VITALS — WEIGHT: 219 LBS | BODY MASS INDEX: 31.42 KG/M2

## 2021-01-01 VITALS — HEART RATE: 61 BPM | SYSTOLIC BLOOD PRESSURE: 99 MMHG | DIASTOLIC BLOOD PRESSURE: 57 MMHG

## 2021-01-01 VITALS — TEMPERATURE: 97.3 F

## 2021-01-01 VITALS — HEIGHT: 70 IN

## 2021-01-01 DIAGNOSIS — K86.89 OTHER SPECIFIED DISEASES OF PANCREAS: ICD-10-CM

## 2021-01-01 DIAGNOSIS — E11.9 TYPE 2 DIABETES MELLITUS WITHOUT COMPLICATIONS: ICD-10-CM

## 2021-01-01 DIAGNOSIS — D64.9 ANEMIA, UNSPECIFIED: ICD-10-CM

## 2021-01-01 DIAGNOSIS — I10 ESSENTIAL (PRIMARY) HYPERTENSION: ICD-10-CM

## 2021-01-01 DIAGNOSIS — A41.9 SEPSIS, UNSPECIFIED ORGANISM: ICD-10-CM

## 2021-01-01 DIAGNOSIS — I26.99 OTHER PULMONARY EMBOLISM WITHOUT ACUTE COR PULMONALE: ICD-10-CM

## 2021-01-01 DIAGNOSIS — Z95.810 PRESENCE OF AUTOMATIC (IMPLANTABLE) CARDIAC DEFIBRILLATOR: Chronic | ICD-10-CM

## 2021-01-01 DIAGNOSIS — Z95.5 PRESENCE OF CORONARY ANGIOPLASTY IMPLANT AND GRAFT: Chronic | ICD-10-CM

## 2021-01-01 DIAGNOSIS — I50.22 CHRONIC SYSTOLIC (CONGESTIVE) HEART FAILURE: ICD-10-CM

## 2021-01-01 DIAGNOSIS — Z95.810 PRESENCE OF AUTOMATIC (IMPLANTABLE) CARDIAC DEFIBRILLATOR: ICD-10-CM

## 2021-01-01 DIAGNOSIS — R74.01 ELEVATION OF LEVELS OF LIVER TRANSAMINASE LEVELS: ICD-10-CM

## 2021-01-01 DIAGNOSIS — R06.00 DYSPNEA, UNSPECIFIED: ICD-10-CM

## 2021-01-01 DIAGNOSIS — I25.10 ATHEROSCLEROTIC HEART DISEASE OF NATIVE CORONARY ARTERY WITHOUT ANGINA PECTORIS: ICD-10-CM

## 2021-01-01 DIAGNOSIS — R73.9 HYPERGLYCEMIA, UNSPECIFIED: ICD-10-CM

## 2021-01-01 DIAGNOSIS — R94.31 ABNORMAL ELECTROCARDIOGRAM [ECG] [EKG]: ICD-10-CM

## 2021-01-01 DIAGNOSIS — Z29.9 ENCOUNTER FOR PROPHYLACTIC MEASURES, UNSPECIFIED: ICD-10-CM

## 2021-01-01 DIAGNOSIS — I50.23 ACUTE ON CHRONIC SYSTOLIC (CONGESTIVE) HEART FAILURE: ICD-10-CM

## 2021-01-01 DIAGNOSIS — R19.7 DIARRHEA, UNSPECIFIED: ICD-10-CM

## 2021-01-01 DIAGNOSIS — E43 UNSPECIFIED SEVERE PROTEIN-CALORIE MALNUTRITION: ICD-10-CM

## 2021-01-01 DIAGNOSIS — K12.30 ORAL MUCOSITIS (ULCERATIVE), UNSPECIFIED: ICD-10-CM

## 2021-01-01 DIAGNOSIS — E78.49 OTHER HYPERLIPIDEMIA: ICD-10-CM

## 2021-01-01 DIAGNOSIS — C79.9 SECONDARY MALIGNANT NEOPLASM OF UNSPECIFIED SITE: ICD-10-CM

## 2021-01-01 DIAGNOSIS — K66.1 HEMOPERITONEUM: ICD-10-CM

## 2021-01-01 DIAGNOSIS — R13.10 DYSPHAGIA, UNSPECIFIED: ICD-10-CM

## 2021-01-01 DIAGNOSIS — Z86.711 PERSONAL HISTORY OF PULMONARY EMBOLISM: ICD-10-CM

## 2021-01-01 DIAGNOSIS — Z71.89 OTHER SPECIFIED COUNSELING: ICD-10-CM

## 2021-01-01 DIAGNOSIS — E78.5 HYPERLIPIDEMIA, UNSPECIFIED: ICD-10-CM

## 2021-01-01 DIAGNOSIS — E11.42 TYPE 2 DIABETES MELLITUS WITH DIABETIC POLYNEUROPATHY: ICD-10-CM

## 2021-01-01 DIAGNOSIS — Z01.818 ENCOUNTER FOR OTHER PREPROCEDURAL EXAMINATION: ICD-10-CM

## 2021-01-01 DIAGNOSIS — R16.0 HEPATOMEGALY, NOT ELSEWHERE CLASSIFIED: ICD-10-CM

## 2021-01-01 DIAGNOSIS — Z51.5 ENCOUNTER FOR PALLIATIVE CARE: ICD-10-CM

## 2021-01-01 DIAGNOSIS — U07.1 COVID-19: ICD-10-CM

## 2021-01-01 DIAGNOSIS — Z02.9 ENCOUNTER FOR ADMINISTRATIVE EXAMINATIONS, UNSPECIFIED: ICD-10-CM

## 2021-01-01 LAB
-  COAGULASE NEGATIVE STAPHYLOCOCCUS, METHICILLIN RESISTANT: SIGNIFICANT CHANGE UP
A PHAGOCYTOPH DNA BLD QL NAA+PROBE: NEGATIVE — SIGNIFICANT CHANGE UP
A1C WITH ESTIMATED AVERAGE GLUCOSE RESULT: 6.8 % — HIGH (ref 4–5.6)
ALBUMIN SERPL ELPH-MCNC: 2.1 G/DL — LOW (ref 3.3–5)
ALBUMIN SERPL ELPH-MCNC: 2.2 G/DL — LOW (ref 3.3–5)
ALBUMIN SERPL ELPH-MCNC: 2.4 G/DL — LOW (ref 3.3–5)
ALBUMIN SERPL ELPH-MCNC: 2.5 G/DL — LOW (ref 3.3–5)
ALBUMIN SERPL ELPH-MCNC: 2.5 G/DL — LOW (ref 3.3–5)
ALBUMIN SERPL ELPH-MCNC: 2.6 G/DL — LOW (ref 3.3–5)
ALBUMIN SERPL ELPH-MCNC: 2.7 G/DL — LOW (ref 3.3–5)
ALBUMIN SERPL ELPH-MCNC: 2.8 G/DL — LOW (ref 3.3–5)
ALBUMIN SERPL ELPH-MCNC: 2.9 G/DL — LOW (ref 3.3–5)
ALBUMIN SERPL ELPH-MCNC: 3 G/DL — LOW (ref 3.3–5)
ALBUMIN SERPL ELPH-MCNC: 3.1 G/DL — LOW (ref 3.3–5)
ALBUMIN SERPL ELPH-MCNC: 3.6 G/DL — SIGNIFICANT CHANGE UP (ref 3.3–5)
ALBUMIN SERPL ELPH-MCNC: 3.7 G/DL — SIGNIFICANT CHANGE UP (ref 3.3–5)
ALBUMIN SERPL ELPH-MCNC: 3.8 G/DL — SIGNIFICANT CHANGE UP (ref 3.3–5)
ALBUMIN SERPL ELPH-MCNC: 3.8 G/DL — SIGNIFICANT CHANGE UP (ref 3.3–5)
ALBUMIN SERPL ELPH-MCNC: 4.1 G/DL — SIGNIFICANT CHANGE UP (ref 3.3–5)
ALP SERPL-CCNC: 111 U/L — SIGNIFICANT CHANGE UP (ref 40–120)
ALP SERPL-CCNC: 131 U/L — HIGH (ref 40–120)
ALP SERPL-CCNC: 159 U/L — HIGH (ref 40–120)
ALP SERPL-CCNC: 159 U/L — HIGH (ref 40–120)
ALP SERPL-CCNC: 160 U/L — HIGH (ref 40–120)
ALP SERPL-CCNC: 161 U/L — HIGH (ref 40–120)
ALP SERPL-CCNC: 161 U/L — HIGH (ref 40–120)
ALP SERPL-CCNC: 167 U/L — HIGH (ref 40–120)
ALP SERPL-CCNC: 168 U/L — HIGH (ref 40–120)
ALP SERPL-CCNC: 178 U/L — HIGH (ref 40–120)
ALP SERPL-CCNC: 182 U/L — HIGH (ref 40–120)
ALP SERPL-CCNC: 183 U/L — HIGH (ref 40–120)
ALP SERPL-CCNC: 187 U/L — HIGH (ref 40–120)
ALP SERPL-CCNC: 187 U/L — HIGH (ref 40–120)
ALP SERPL-CCNC: 195 U/L — HIGH (ref 40–120)
ALP SERPL-CCNC: 199 U/L — HIGH (ref 40–120)
ALP SERPL-CCNC: 212 U/L — HIGH (ref 40–120)
ALP SERPL-CCNC: 305 U/L — HIGH (ref 40–120)
ALP SERPL-CCNC: 32 U/L — LOW (ref 40–120)
ALP SERPL-CCNC: 341 U/L — HIGH (ref 40–120)
ALP SERPL-CCNC: 77 U/L — SIGNIFICANT CHANGE UP (ref 40–120)
ALP SERPL-CCNC: 81 U/L — SIGNIFICANT CHANGE UP (ref 40–120)
ALP SERPL-CCNC: 83 U/L — SIGNIFICANT CHANGE UP (ref 40–120)
ALT FLD-CCNC: 117 U/L — HIGH (ref 4–41)
ALT FLD-CCNC: 120 U/L — HIGH (ref 4–41)
ALT FLD-CCNC: 123 U/L — HIGH (ref 4–41)
ALT FLD-CCNC: 125 U/L — HIGH (ref 4–41)
ALT FLD-CCNC: 141 U/L — HIGH (ref 4–41)
ALT FLD-CCNC: 143 U/L — HIGH (ref 4–41)
ALT FLD-CCNC: 154 U/L — HIGH (ref 4–41)
ALT FLD-CCNC: 177 U/L — HIGH (ref 4–41)
ALT FLD-CCNC: 179 U/L — HIGH (ref 4–41)
ALT FLD-CCNC: 211 U/L — HIGH (ref 4–41)
ALT FLD-CCNC: 217 U/L — HIGH (ref 4–41)
ALT FLD-CCNC: 30 U/L — SIGNIFICANT CHANGE UP (ref 4–41)
ALT FLD-CCNC: 421 U/L — HIGH (ref 4–41)
ALT FLD-CCNC: 46 U/L — HIGH (ref 4–41)
ALT FLD-CCNC: 46 U/L — HIGH (ref 4–41)
ALT FLD-CCNC: 477 U/L — HIGH (ref 4–41)
ALT FLD-CCNC: 50 U/L — HIGH (ref 4–41)
ALT FLD-CCNC: 523 U/L — HIGH (ref 4–41)
ALT FLD-CCNC: 575 U/L — HIGH (ref 4–41)
ALT FLD-CCNC: 666 U/L — HIGH (ref 4–41)
ALT FLD-CCNC: 75 U/L — HIGH (ref 10–45)
ALT FLD-CCNC: 78 U/L — HIGH (ref 10–45)
ALT FLD-CCNC: 80 U/L — HIGH (ref 10–45)
ANION GAP SERPL CALC-SCNC: 10 MMOL/L — SIGNIFICANT CHANGE UP (ref 5–17)
ANION GAP SERPL CALC-SCNC: 11 MMOL/L — SIGNIFICANT CHANGE UP (ref 5–17)
ANION GAP SERPL CALC-SCNC: 11 MMOL/L — SIGNIFICANT CHANGE UP (ref 7–14)
ANION GAP SERPL CALC-SCNC: 12 MMOL/L — SIGNIFICANT CHANGE UP (ref 5–17)
ANION GAP SERPL CALC-SCNC: 12 MMOL/L — SIGNIFICANT CHANGE UP (ref 5–17)
ANION GAP SERPL CALC-SCNC: 12 MMOL/L — SIGNIFICANT CHANGE UP (ref 7–14)
ANION GAP SERPL CALC-SCNC: 13 MMOL/L — SIGNIFICANT CHANGE UP (ref 5–17)
ANION GAP SERPL CALC-SCNC: 13 MMOL/L — SIGNIFICANT CHANGE UP (ref 5–17)
ANION GAP SERPL CALC-SCNC: 13 MMOL/L — SIGNIFICANT CHANGE UP (ref 7–14)
ANION GAP SERPL CALC-SCNC: 14 MMOL/L — SIGNIFICANT CHANGE UP (ref 7–14)
ANION GAP SERPL CALC-SCNC: 15 MMOL/L — HIGH (ref 7–14)
ANION GAP SERPL CALC-SCNC: 16 MMOL/L — HIGH (ref 7–14)
ANION GAP SERPL CALC-SCNC: 17 MMOL/L — HIGH (ref 7–14)
ANION GAP SERPL CALC-SCNC: 19 MMOL/L — HIGH (ref 5–17)
ANION GAP SERPL CALC-SCNC: 19 MMOL/L — HIGH (ref 7–14)
ANION GAP SERPL CALC-SCNC: 20 MMOL/L — HIGH (ref 5–17)
ANION GAP SERPL CALC-SCNC: 20 MMOL/L — HIGH (ref 7–14)
ANION GAP SERPL CALC-SCNC: 8 MMOL/L — SIGNIFICANT CHANGE UP (ref 7–14)
ANION GAP SERPL CALC-SCNC: 9 MMOL/L — SIGNIFICANT CHANGE UP (ref 7–14)
ANISOCYTOSIS BLD QL: SLIGHT — SIGNIFICANT CHANGE UP
ANISOCYTOSIS BLD QL: SLIGHT — SIGNIFICANT CHANGE UP
APPEARANCE UR: ABNORMAL
APTT BLD: 108.6 SEC — HIGH (ref 27–36.3)
APTT BLD: 26.7 SEC — LOW (ref 27.5–35.5)
APTT BLD: 28.5 SEC — SIGNIFICANT CHANGE UP (ref 27–36.3)
APTT BLD: 31 SEC — SIGNIFICANT CHANGE UP (ref 27–36.3)
APTT BLD: 31.9 SEC — SIGNIFICANT CHANGE UP (ref 27.5–35.5)
APTT BLD: 65 SEC — HIGH (ref 27–36.3)
APTT BLD: 71.7 SEC — HIGH (ref 27–36.3)
APTT BLD: 76.4 SEC — HIGH (ref 27–36.3)
APTT BLD: 86.4 SEC — HIGH (ref 27–36.3)
APTT BLD: >200 SEC — CRITICAL HIGH (ref 27–36.3)
AST SERPL-CCNC: 101 U/L — HIGH (ref 4–40)
AST SERPL-CCNC: 146 U/L — HIGH (ref 4–40)
AST SERPL-CCNC: 154 U/L — HIGH (ref 4–40)
AST SERPL-CCNC: 18 U/L — SIGNIFICANT CHANGE UP (ref 4–40)
AST SERPL-CCNC: 201 U/L — HIGH (ref 4–40)
AST SERPL-CCNC: 210 U/L — HIGH (ref 4–40)
AST SERPL-CCNC: 29 U/L — SIGNIFICANT CHANGE UP (ref 4–40)
AST SERPL-CCNC: 31 U/L — SIGNIFICANT CHANGE UP (ref 4–40)
AST SERPL-CCNC: 31 U/L — SIGNIFICANT CHANGE UP (ref 4–40)
AST SERPL-CCNC: 361 U/L — HIGH (ref 4–40)
AST SERPL-CCNC: 50 U/L — HIGH (ref 10–40)
AST SERPL-CCNC: 52 U/L — HIGH (ref 10–40)
AST SERPL-CCNC: 556 U/L — HIGH (ref 4–40)
AST SERPL-CCNC: 598 U/L — HIGH (ref 4–40)
AST SERPL-CCNC: 610 U/L — HIGH (ref 4–40)
AST SERPL-CCNC: 66 U/L — HIGH (ref 10–40)
AST SERPL-CCNC: 80 U/L — HIGH (ref 4–40)
AST SERPL-CCNC: 82 U/L — HIGH (ref 4–40)
AST SERPL-CCNC: 83 U/L — HIGH (ref 4–40)
AST SERPL-CCNC: 87 U/L — HIGH (ref 4–40)
AST SERPL-CCNC: 90 U/L — HIGH (ref 4–40)
AST SERPL-CCNC: 92 U/L — HIGH (ref 4–40)
AST SERPL-CCNC: 99 U/L — HIGH (ref 4–40)
B-OH-BUTYR SERPL-SCNC: 0.2 MMOL/L — SIGNIFICANT CHANGE UP (ref 0–0.4)
B-OH-BUTYR SERPL-SCNC: 0.3 MMOL/L — SIGNIFICANT CHANGE UP
B-OH-BUTYR SERPL-SCNC: 0.7 MMOL/L — HIGH (ref 0–0.4)
BASE EXCESS BLDV CALC-SCNC: -11.7 MMOL/L — LOW (ref -2–2)
BASE EXCESS BLDV CALC-SCNC: 3.2 MMOL/L — HIGH (ref -2–3)
BASOPHILS # BLD AUTO: 0 K/UL — SIGNIFICANT CHANGE UP (ref 0–0.2)
BASOPHILS # BLD AUTO: 0.01 K/UL — SIGNIFICANT CHANGE UP (ref 0–0.2)
BASOPHILS # BLD AUTO: 0.01 K/UL — SIGNIFICANT CHANGE UP (ref 0–0.2)
BASOPHILS # BLD AUTO: 0.02 K/UL — SIGNIFICANT CHANGE UP (ref 0–0.2)
BASOPHILS # BLD AUTO: 0.03 K/UL — SIGNIFICANT CHANGE UP (ref 0–0.2)
BASOPHILS # BLD AUTO: 0.06 K/UL — SIGNIFICANT CHANGE UP (ref 0–0.2)
BASOPHILS # BLD AUTO: 0.07 K/UL — SIGNIFICANT CHANGE UP (ref 0–0.2)
BASOPHILS # BLD AUTO: 0.07 K/UL — SIGNIFICANT CHANGE UP (ref 0–0.2)
BASOPHILS # BLD AUTO: 0.11 K/UL — SIGNIFICANT CHANGE UP (ref 0–0.2)
BASOPHILS # BLD AUTO: 0.23 K/UL — HIGH (ref 0–0.2)
BASOPHILS NFR BLD AUTO: 0 % — SIGNIFICANT CHANGE UP (ref 0–2)
BASOPHILS NFR BLD AUTO: 0.1 % — SIGNIFICANT CHANGE UP (ref 0–2)
BASOPHILS NFR BLD AUTO: 0.2 % — SIGNIFICANT CHANGE UP (ref 0–2)
BASOPHILS NFR BLD AUTO: 0.3 % — SIGNIFICANT CHANGE UP (ref 0–2)
BASOPHILS NFR BLD AUTO: 0.3 % — SIGNIFICANT CHANGE UP (ref 0–2)
BASOPHILS NFR BLD AUTO: 0.7 % — SIGNIFICANT CHANGE UP (ref 0–2)
BASOPHILS NFR BLD AUTO: 0.7 % — SIGNIFICANT CHANGE UP (ref 0–2)
BASOPHILS NFR BLD AUTO: 0.9 % — SIGNIFICANT CHANGE UP (ref 0–2)
BASOPHILS NFR BLD AUTO: 0.9 % — SIGNIFICANT CHANGE UP (ref 0–2)
BASOPHILS NFR BLD AUTO: 1 % — SIGNIFICANT CHANGE UP (ref 0–2)
BASOPHILS NFR BLD AUTO: 1.7 % — SIGNIFICANT CHANGE UP (ref 0–2)
BASOPHILS NFR BLD AUTO: 2.2 % — HIGH (ref 0–2)
BASOPHILS NFR BLD AUTO: 2.3 % — HIGH (ref 0–2)
BILIRUB DIRECT SERPL-MCNC: 0.5 MG/DL — HIGH (ref 0–0.2)
BILIRUB INDIRECT FLD-MCNC: 0.6 MG/DL — SIGNIFICANT CHANGE UP (ref 0–1)
BILIRUB SERPL-MCNC: 0.3 MG/DL — SIGNIFICANT CHANGE UP (ref 0.2–1.2)
BILIRUB SERPL-MCNC: 0.4 MG/DL — SIGNIFICANT CHANGE UP (ref 0.2–1.2)
BILIRUB SERPL-MCNC: 0.4 MG/DL — SIGNIFICANT CHANGE UP (ref 0.2–1.2)
BILIRUB SERPL-MCNC: 0.5 MG/DL — SIGNIFICANT CHANGE UP (ref 0.2–1.2)
BILIRUB SERPL-MCNC: 0.7 MG/DL — SIGNIFICANT CHANGE UP (ref 0.2–1.2)
BILIRUB SERPL-MCNC: 0.8 MG/DL — SIGNIFICANT CHANGE UP (ref 0.2–1.2)
BILIRUB SERPL-MCNC: 0.9 MG/DL — SIGNIFICANT CHANGE UP (ref 0.2–1.2)
BILIRUB SERPL-MCNC: 0.9 MG/DL — SIGNIFICANT CHANGE UP (ref 0.2–1.2)
BILIRUB SERPL-MCNC: 1 MG/DL — SIGNIFICANT CHANGE UP (ref 0.2–1.2)
BILIRUB SERPL-MCNC: 1.1 MG/DL — SIGNIFICANT CHANGE UP (ref 0.2–1.2)
BILIRUB SERPL-MCNC: 1.2 MG/DL — SIGNIFICANT CHANGE UP (ref 0.2–1.2)
BILIRUB UR-MCNC: NEGATIVE — SIGNIFICANT CHANGE UP
BLASTS # FLD: 0 % — SIGNIFICANT CHANGE UP (ref 0–0)
BLD GP AB SCN SERPL QL: NEGATIVE — SIGNIFICANT CHANGE UP
BLOOD GAS VENOUS COMPREHENSIVE RESULT: SIGNIFICANT CHANGE UP
BLOOD GAS VENOUS COMPREHENSIVE RESULT: SIGNIFICANT CHANGE UP
BODY SURFACE AREA CALCULATION: 1.73 M2 — SIGNIFICANT CHANGE UP
BUN SERPL-MCNC: 18 MG/DL — SIGNIFICANT CHANGE UP (ref 7–23)
BUN SERPL-MCNC: 20 MG/DL — SIGNIFICANT CHANGE UP (ref 7–23)
BUN SERPL-MCNC: 21 MG/DL — SIGNIFICANT CHANGE UP (ref 7–23)
BUN SERPL-MCNC: 22 MG/DL — SIGNIFICANT CHANGE UP (ref 7–23)
BUN SERPL-MCNC: 23 MG/DL — SIGNIFICANT CHANGE UP (ref 7–23)
BUN SERPL-MCNC: 25 MG/DL — HIGH (ref 7–23)
BUN SERPL-MCNC: 25 MG/DL — HIGH (ref 7–23)
BUN SERPL-MCNC: 26 MG/DL — HIGH (ref 7–23)
BUN SERPL-MCNC: 28 MG/DL — HIGH (ref 7–23)
BUN SERPL-MCNC: 28 MG/DL — HIGH (ref 7–23)
BUN SERPL-MCNC: 29 MG/DL — HIGH (ref 7–23)
BUN SERPL-MCNC: 30 MG/DL — HIGH (ref 7–23)
BUN SERPL-MCNC: 30 MG/DL — HIGH (ref 7–23)
BUN SERPL-MCNC: 31 MG/DL — HIGH (ref 7–23)
BUN SERPL-MCNC: 32 MG/DL — HIGH (ref 7–23)
BUN SERPL-MCNC: 33 MG/DL — HIGH (ref 7–23)
BUN SERPL-MCNC: 34 MG/DL — HIGH (ref 7–23)
BUN SERPL-MCNC: 35 MG/DL — HIGH (ref 7–23)
BUN SERPL-MCNC: 36 MG/DL — HIGH (ref 7–23)
BUN SERPL-MCNC: 36 MG/DL — HIGH (ref 7–23)
BUN SERPL-MCNC: 37 MG/DL — HIGH (ref 7–23)
BUN SERPL-MCNC: 38 MG/DL — HIGH (ref 7–23)
BUN SERPL-MCNC: 39 MG/DL — HIGH (ref 7–23)
BUN SERPL-MCNC: 41 MG/DL — HIGH (ref 7–23)
BUN SERPL-MCNC: 41 MG/DL — HIGH (ref 7–23)
BUN SERPL-MCNC: 42 MG/DL — HIGH (ref 7–23)
BUN SERPL-MCNC: 43 MG/DL — HIGH (ref 7–23)
BUN SERPL-MCNC: 45 MG/DL — HIGH (ref 7–23)
BUN SERPL-MCNC: 46 MG/DL — HIGH (ref 7–23)
C DIFF BY PCR RESULT: SIGNIFICANT CHANGE UP
C DIFF TOX GENS STL QL NAA+PROBE: SIGNIFICANT CHANGE UP
C PEPTIDE SERPL-MCNC: 3.9 NG/ML — SIGNIFICANT CHANGE UP (ref 1.1–4.4)
CA-I SERPL-SCNC: 1.26 MMOL/L — SIGNIFICANT CHANGE UP (ref 1.15–1.33)
CALCIUM SERPL-MCNC: 6.5 MG/DL — CRITICAL LOW (ref 8.4–10.5)
CALCIUM SERPL-MCNC: 6.5 MG/DL — CRITICAL LOW (ref 8.4–10.5)
CALCIUM SERPL-MCNC: 6.6 MG/DL — LOW (ref 8.4–10.5)
CALCIUM SERPL-MCNC: 6.6 MG/DL — LOW (ref 8.4–10.5)
CALCIUM SERPL-MCNC: 6.7 MG/DL — LOW (ref 8.4–10.5)
CALCIUM SERPL-MCNC: 6.7 MG/DL — LOW (ref 8.4–10.5)
CALCIUM SERPL-MCNC: 6.8 MG/DL — LOW (ref 8.4–10.5)
CALCIUM SERPL-MCNC: 7.3 MG/DL — LOW (ref 8.4–10.5)
CALCIUM SERPL-MCNC: 7.3 MG/DL — LOW (ref 8.4–10.5)
CALCIUM SERPL-MCNC: 7.4 MG/DL — LOW (ref 8.4–10.5)
CALCIUM SERPL-MCNC: 7.5 MG/DL — LOW (ref 8.4–10.5)
CALCIUM SERPL-MCNC: 7.6 MG/DL — LOW (ref 8.4–10.5)
CALCIUM SERPL-MCNC: 7.7 MG/DL — LOW (ref 8.4–10.5)
CALCIUM SERPL-MCNC: 7.7 MG/DL — LOW (ref 8.4–10.5)
CALCIUM SERPL-MCNC: 7.8 MG/DL — LOW (ref 8.4–10.5)
CALCIUM SERPL-MCNC: 7.8 MG/DL — LOW (ref 8.4–10.5)
CALCIUM SERPL-MCNC: 7.9 MG/DL — LOW (ref 8.4–10.5)
CALCIUM SERPL-MCNC: 7.9 MG/DL — LOW (ref 8.4–10.5)
CALCIUM SERPL-MCNC: 8 MG/DL — LOW (ref 8.4–10.5)
CALCIUM SERPL-MCNC: 8.1 MG/DL — LOW (ref 8.4–10.5)
CALCIUM SERPL-MCNC: 8.1 MG/DL — LOW (ref 8.4–10.5)
CALCIUM SERPL-MCNC: 8.2 MG/DL — LOW (ref 8.4–10.5)
CALCIUM SERPL-MCNC: 8.3 MG/DL — LOW (ref 8.4–10.5)
CALCIUM SERPL-MCNC: 8.3 MG/DL — LOW (ref 8.4–10.5)
CALCIUM SERPL-MCNC: 8.4 MG/DL — SIGNIFICANT CHANGE UP (ref 8.4–10.5)
CALCIUM SERPL-MCNC: 8.6 MG/DL — SIGNIFICANT CHANGE UP (ref 8.4–10.5)
CALCIUM SERPL-MCNC: 8.9 MG/DL — SIGNIFICANT CHANGE UP (ref 8.4–10.5)
CALCIUM SERPL-MCNC: 9 MG/DL — SIGNIFICANT CHANGE UP (ref 8.4–10.5)
CALCIUM SERPL-MCNC: 9 MG/DL — SIGNIFICANT CHANGE UP (ref 8.4–10.5)
CALCIUM SERPL-MCNC: 9.1 MG/DL — SIGNIFICANT CHANGE UP (ref 8.4–10.5)
CALCIUM SERPL-MCNC: 9.3 MG/DL — SIGNIFICANT CHANGE UP (ref 8.4–10.5)
CALCIUM SERPL-MCNC: 9.5 MG/DL — SIGNIFICANT CHANGE UP (ref 8.4–10.5)
CALCIUM SERPL-MCNC: 9.7 MG/DL — SIGNIFICANT CHANGE UP (ref 8.4–10.5)
CALCIUM SERPL-MCNC: 9.7 MG/DL — SIGNIFICANT CHANGE UP (ref 8.4–10.5)
CANCER AG19-9 SERPL-ACNC: HIGH U/ML
CEA SERPL-MCNC: 4472 NG/ML — HIGH (ref 1–3.8)
CEA SERPL-MCNC: 4649 NG/ML — HIGH (ref 1–3.8)
CHLORIDE BLDV-SCNC: 93 MMOL/L — LOW (ref 96–108)
CHLORIDE BLDV-SCNC: 95 MMOL/L — LOW (ref 96–108)
CHLORIDE SERPL-SCNC: 100 MMOL/L — SIGNIFICANT CHANGE UP (ref 98–107)
CHLORIDE SERPL-SCNC: 86 MMOL/L — LOW (ref 96–108)
CHLORIDE SERPL-SCNC: 87 MMOL/L — LOW (ref 98–107)
CHLORIDE SERPL-SCNC: 88 MMOL/L — LOW (ref 98–107)
CHLORIDE SERPL-SCNC: 89 MMOL/L — LOW (ref 98–107)
CHLORIDE SERPL-SCNC: 90 MMOL/L — LOW (ref 96–108)
CHLORIDE SERPL-SCNC: 90 MMOL/L — LOW (ref 96–108)
CHLORIDE SERPL-SCNC: 90 MMOL/L — LOW (ref 98–107)
CHLORIDE SERPL-SCNC: 91 MMOL/L — LOW (ref 96–108)
CHLORIDE SERPL-SCNC: 91 MMOL/L — LOW (ref 98–107)
CHLORIDE SERPL-SCNC: 92 MMOL/L — LOW (ref 98–107)
CHLORIDE SERPL-SCNC: 93 MMOL/L — LOW (ref 96–108)
CHLORIDE SERPL-SCNC: 93 MMOL/L — LOW (ref 98–107)
CHLORIDE SERPL-SCNC: 94 MMOL/L — LOW (ref 96–108)
CHLORIDE SERPL-SCNC: 94 MMOL/L — LOW (ref 96–108)
CHLORIDE SERPL-SCNC: 95 MMOL/L — LOW (ref 96–108)
CHLORIDE SERPL-SCNC: 95 MMOL/L — LOW (ref 96–108)
CHLORIDE SERPL-SCNC: 95 MMOL/L — LOW (ref 98–107)
CHLORIDE SERPL-SCNC: 95 MMOL/L — LOW (ref 98–107)
CHLORIDE SERPL-SCNC: 96 MMOL/L — LOW (ref 98–107)
CHLORIDE SERPL-SCNC: 97 MMOL/L — LOW (ref 98–107)
CHLORIDE SERPL-SCNC: 97 MMOL/L — LOW (ref 98–107)
CHLORIDE SERPL-SCNC: 98 MMOL/L — SIGNIFICANT CHANGE UP (ref 98–107)
CHLORIDE SERPL-SCNC: 99 MMOL/L — SIGNIFICANT CHANGE UP (ref 98–107)
CK SERPL-CCNC: 232 U/L — HIGH (ref 30–200)
CO2 BLDV-SCNC: 21 MMOL/L — LOW (ref 22–26)
CO2 BLDV-SCNC: 29.9 MMOL/L — HIGH (ref 22–26)
CO2 SERPL-SCNC: 17 MMOL/L — LOW (ref 22–31)
CO2 SERPL-SCNC: 18 MMOL/L — LOW (ref 22–31)
CO2 SERPL-SCNC: 19 MMOL/L — LOW (ref 22–31)
CO2 SERPL-SCNC: 19 MMOL/L — LOW (ref 22–31)
CO2 SERPL-SCNC: 20 MMOL/L — LOW (ref 22–31)
CO2 SERPL-SCNC: 21 MMOL/L — LOW (ref 22–31)
CO2 SERPL-SCNC: 22 MMOL/L — SIGNIFICANT CHANGE UP (ref 22–31)
CO2 SERPL-SCNC: 23 MMOL/L — SIGNIFICANT CHANGE UP (ref 22–31)
CO2 SERPL-SCNC: 24 MMOL/L — SIGNIFICANT CHANGE UP (ref 22–31)
CO2 SERPL-SCNC: 25 MMOL/L — SIGNIFICANT CHANGE UP (ref 22–31)
CO2 SERPL-SCNC: 26 MMOL/L — SIGNIFICANT CHANGE UP (ref 22–31)
CO2 SERPL-SCNC: 27 MMOL/L — SIGNIFICANT CHANGE UP (ref 22–31)
CO2 SERPL-SCNC: 28 MMOL/L — SIGNIFICANT CHANGE UP (ref 22–31)
CO2 SERPL-SCNC: 29 MMOL/L — SIGNIFICANT CHANGE UP (ref 22–31)
CO2 SERPL-SCNC: 31 MMOL/L — SIGNIFICANT CHANGE UP (ref 22–31)
COLLECT DURATION TIME UR: SIGNIFICANT CHANGE UP
COLOR SPEC: ABNORMAL
COVID-19 SPIKE DOMAIN AB INTERP: POSITIVE
COVID-19 SPIKE DOMAIN ANTIBODY RESULT: >250 U/ML — HIGH
CREAT CL ?TM UR+SERPL-VRATE: SIGNIFICANT CHANGE UP (ref 85–125)
CREAT SERPL-MCNC: 0.58 MG/DL — SIGNIFICANT CHANGE UP (ref 0.5–1.3)
CREAT SERPL-MCNC: 0.61 MG/DL — SIGNIFICANT CHANGE UP (ref 0.5–1.3)
CREAT SERPL-MCNC: 0.62 MG/DL — SIGNIFICANT CHANGE UP (ref 0.5–1.3)
CREAT SERPL-MCNC: 0.65 MG/DL — SIGNIFICANT CHANGE UP (ref 0.5–1.3)
CREAT SERPL-MCNC: 0.65 MG/DL — SIGNIFICANT CHANGE UP (ref 0.5–1.3)
CREAT SERPL-MCNC: 0.68 MG/DL — SIGNIFICANT CHANGE UP (ref 0.5–1.3)
CREAT SERPL-MCNC: 0.7 MG/DL — SIGNIFICANT CHANGE UP (ref 0.5–1.3)
CREAT SERPL-MCNC: 0.74 MG/DL — SIGNIFICANT CHANGE UP (ref 0.5–1.3)
CREAT SERPL-MCNC: 0.77 MG/DL — SIGNIFICANT CHANGE UP (ref 0.5–1.3)
CREAT SERPL-MCNC: 0.78 MG/DL — SIGNIFICANT CHANGE UP (ref 0.5–1.3)
CREAT SERPL-MCNC: 0.81 MG/DL — SIGNIFICANT CHANGE UP (ref 0.5–1.3)
CREAT SERPL-MCNC: 0.82 MG/DL — SIGNIFICANT CHANGE UP (ref 0.5–1.3)
CREAT SERPL-MCNC: 0.91 MG/DL — SIGNIFICANT CHANGE UP (ref 0.5–1.3)
CREAT SERPL-MCNC: 0.91 MG/DL — SIGNIFICANT CHANGE UP (ref 0.5–1.3)
CREAT SERPL-MCNC: 0.93 MG/DL — SIGNIFICANT CHANGE UP (ref 0.5–1.3)
CREAT SERPL-MCNC: 0.96 MG/DL — SIGNIFICANT CHANGE UP (ref 0.5–1.3)
CREAT SERPL-MCNC: 0.98 MG/DL — SIGNIFICANT CHANGE UP (ref 0.5–1.3)
CREAT SERPL-MCNC: 1 MG/DL — SIGNIFICANT CHANGE UP (ref 0.5–1.3)
CREAT SERPL-MCNC: 1.01 MG/DL — SIGNIFICANT CHANGE UP (ref 0.5–1.3)
CREAT SERPL-MCNC: 1.02 MG/DL — SIGNIFICANT CHANGE UP (ref 0.5–1.3)
CREAT SERPL-MCNC: 1.03 MG/DL — SIGNIFICANT CHANGE UP (ref 0.5–1.3)
CREAT SERPL-MCNC: 1.05 MG/DL — SIGNIFICANT CHANGE UP (ref 0.5–1.3)
CREAT SERPL-MCNC: 1.07 MG/DL — SIGNIFICANT CHANGE UP (ref 0.5–1.3)
CREAT SERPL-MCNC: 1.07 MG/DL — SIGNIFICANT CHANGE UP (ref 0.5–1.3)
CREAT SERPL-MCNC: 1.08 MG/DL — SIGNIFICANT CHANGE UP (ref 0.5–1.3)
CREAT SERPL-MCNC: 1.08 MG/DL — SIGNIFICANT CHANGE UP (ref 0.5–1.3)
CREAT SERPL-MCNC: 1.09 MG/DL — SIGNIFICANT CHANGE UP (ref 0.5–1.3)
CREAT SERPL-MCNC: 1.09 MG/DL — SIGNIFICANT CHANGE UP (ref 0.5–1.3)
CREAT SERPL-MCNC: 1.1 MG/DL — SIGNIFICANT CHANGE UP (ref 0.5–1.3)
CREAT SERPL-MCNC: 1.1 MG/DL — SIGNIFICANT CHANGE UP (ref 0.5–1.3)
CREAT SERPL-MCNC: 1.12 MG/DL — SIGNIFICANT CHANGE UP (ref 0.5–1.3)
CREAT SERPL-MCNC: 1.13 MG/DL — SIGNIFICANT CHANGE UP (ref 0.5–1.3)
CREAT SERPL-MCNC: 1.14 MG/DL — SIGNIFICANT CHANGE UP (ref 0.5–1.3)
CREAT SERPL-MCNC: 1.16 MG/DL — SIGNIFICANT CHANGE UP (ref 0.5–1.3)
CREAT SERPL-MCNC: 1.24 MG/DL — SIGNIFICANT CHANGE UP (ref 0.5–1.3)
CREAT SERPL-MCNC: 1.41 MG/DL — HIGH (ref 0.5–1.3)
CREAT SERPL-MCNC: 1.58 MG/DL — HIGH (ref 0.5–1.3)
CRP SERPL-MCNC: 22 MG/L — HIGH (ref 0–4)
CULTURE RESULTS: SIGNIFICANT CHANGE UP
D DIMER BLD IA.RAPID-MCNC: 2208 NG/ML DDU — HIGH
D DIMER BLD IA.RAPID-MCNC: 5929 NG/ML DDU — HIGH
DIFF PNL FLD: NEGATIVE — SIGNIFICANT CHANGE UP
DIGOXIN SERPL-MCNC: 0.7 NG/ML — LOW (ref 0.8–2)
E CHAFFEENSIS DNA BLD QL NAA+PROBE: NEGATIVE — SIGNIFICANT CHANGE UP
E EWINGII DNA SPEC QL NAA+PROBE: NEGATIVE — SIGNIFICANT CHANGE UP
EHRLICHIA DNA SPEC QL NAA+PROBE: NEGATIVE — SIGNIFICANT CHANGE UP
ELLIPTOCYTES BLD QL SMEAR: SLIGHT — SIGNIFICANT CHANGE UP
EOSINOPHIL # BLD AUTO: 0 K/UL — SIGNIFICANT CHANGE UP (ref 0–0.5)
EOSINOPHIL # BLD AUTO: 0.02 K/UL — SIGNIFICANT CHANGE UP (ref 0–0.5)
EOSINOPHIL # BLD AUTO: 0.02 K/UL — SIGNIFICANT CHANGE UP (ref 0–0.5)
EOSINOPHIL # BLD AUTO: 0.03 K/UL — SIGNIFICANT CHANGE UP (ref 0–0.5)
EOSINOPHIL # BLD AUTO: 0.2 K/UL — SIGNIFICANT CHANGE UP (ref 0–0.5)
EOSINOPHIL # BLD AUTO: 0.29 K/UL — SIGNIFICANT CHANGE UP (ref 0–0.5)
EOSINOPHIL NFR BLD AUTO: 0 % — SIGNIFICANT CHANGE UP (ref 0–6)
EOSINOPHIL NFR BLD AUTO: 0.1 % — SIGNIFICANT CHANGE UP (ref 0–6)
EOSINOPHIL NFR BLD AUTO: 0.1 % — SIGNIFICANT CHANGE UP (ref 0–6)
EOSINOPHIL NFR BLD AUTO: 0.9 % — SIGNIFICANT CHANGE UP (ref 0–6)
EOSINOPHIL NFR BLD AUTO: 2.2 % — SIGNIFICANT CHANGE UP (ref 0–6)
EOSINOPHIL NFR BLD AUTO: 3.1 % — SIGNIFICANT CHANGE UP (ref 0–6)
ESTIMATED AVERAGE GLUCOSE: 148 — SIGNIFICANT CHANGE UP
FERRITIN SERPL-MCNC: 1819 NG/ML — HIGH (ref 30–400)
FERRITIN SERPL-MCNC: 4653 NG/ML — HIGH (ref 30–400)
FERRITIN SERPL-MCNC: 5039 NG/ML — HIGH (ref 30–400)
FOLATE SERPL-MCNC: 7 NG/ML — SIGNIFICANT CHANGE UP
GAS PNL BLDA: SIGNIFICANT CHANGE UP
GAS PNL BLDA: SIGNIFICANT CHANGE UP
GAS PNL BLDV: 123 MMOL/L — LOW (ref 136–145)
GAS PNL BLDV: 128 MMOL/L — LOW (ref 136–145)
GAS PNL BLDV: SIGNIFICANT CHANGE UP
GIANT PLATELETS BLD QL SMEAR: PRESENT — SIGNIFICANT CHANGE UP
GIANT PLATELETS BLD QL SMEAR: PRESENT — SIGNIFICANT CHANGE UP
GLUCOSE BLDC GLUCOMTR-MCNC: 138 MG/DL — HIGH (ref 70–99)
GLUCOSE BLDC GLUCOMTR-MCNC: 140 MG/DL — HIGH (ref 70–99)
GLUCOSE BLDC GLUCOMTR-MCNC: 141 MG/DL — HIGH (ref 70–99)
GLUCOSE BLDC GLUCOMTR-MCNC: 146 MG/DL — HIGH (ref 70–99)
GLUCOSE BLDC GLUCOMTR-MCNC: 148 MG/DL — HIGH (ref 70–99)
GLUCOSE BLDC GLUCOMTR-MCNC: 153 MG/DL — HIGH (ref 70–99)
GLUCOSE BLDC GLUCOMTR-MCNC: 153 MG/DL — HIGH (ref 70–99)
GLUCOSE BLDC GLUCOMTR-MCNC: 155 MG/DL — HIGH (ref 70–99)
GLUCOSE BLDC GLUCOMTR-MCNC: 156 MG/DL — HIGH (ref 70–99)
GLUCOSE BLDC GLUCOMTR-MCNC: 158 MG/DL — HIGH (ref 70–99)
GLUCOSE BLDC GLUCOMTR-MCNC: 161 MG/DL — HIGH (ref 70–99)
GLUCOSE BLDC GLUCOMTR-MCNC: 162 MG/DL — HIGH (ref 70–99)
GLUCOSE BLDC GLUCOMTR-MCNC: 163 MG/DL — HIGH (ref 70–99)
GLUCOSE BLDC GLUCOMTR-MCNC: 165 MG/DL — HIGH (ref 70–99)
GLUCOSE BLDC GLUCOMTR-MCNC: 165 MG/DL — HIGH (ref 70–99)
GLUCOSE BLDC GLUCOMTR-MCNC: 166 MG/DL — HIGH (ref 70–99)
GLUCOSE BLDC GLUCOMTR-MCNC: 166 MG/DL — HIGH (ref 70–99)
GLUCOSE BLDC GLUCOMTR-MCNC: 168 MG/DL — HIGH (ref 70–99)
GLUCOSE BLDC GLUCOMTR-MCNC: 170 MG/DL — HIGH (ref 70–99)
GLUCOSE BLDC GLUCOMTR-MCNC: 171 MG/DL — HIGH (ref 70–99)
GLUCOSE BLDC GLUCOMTR-MCNC: 172 MG/DL — HIGH (ref 70–99)
GLUCOSE BLDC GLUCOMTR-MCNC: 173 MG/DL — HIGH (ref 70–99)
GLUCOSE BLDC GLUCOMTR-MCNC: 174 MG/DL — HIGH (ref 70–99)
GLUCOSE BLDC GLUCOMTR-MCNC: 175 MG/DL — HIGH (ref 70–99)
GLUCOSE BLDC GLUCOMTR-MCNC: 176 MG/DL — HIGH (ref 70–99)
GLUCOSE BLDC GLUCOMTR-MCNC: 177 MG/DL — HIGH (ref 70–99)
GLUCOSE BLDC GLUCOMTR-MCNC: 179 MG/DL — HIGH (ref 70–99)
GLUCOSE BLDC GLUCOMTR-MCNC: 180 MG/DL — HIGH (ref 70–99)
GLUCOSE BLDC GLUCOMTR-MCNC: 181 MG/DL — HIGH (ref 70–99)
GLUCOSE BLDC GLUCOMTR-MCNC: 182 MG/DL — HIGH (ref 70–99)
GLUCOSE BLDC GLUCOMTR-MCNC: 186 MG/DL — HIGH (ref 70–99)
GLUCOSE BLDC GLUCOMTR-MCNC: 189 MG/DL — HIGH (ref 70–99)
GLUCOSE BLDC GLUCOMTR-MCNC: 191 MG/DL — HIGH (ref 70–99)
GLUCOSE BLDC GLUCOMTR-MCNC: 192 MG/DL — HIGH (ref 70–99)
GLUCOSE BLDC GLUCOMTR-MCNC: 192 MG/DL — HIGH (ref 70–99)
GLUCOSE BLDC GLUCOMTR-MCNC: 199 MG/DL — HIGH (ref 70–99)
GLUCOSE BLDC GLUCOMTR-MCNC: 200 MG/DL — HIGH (ref 70–99)
GLUCOSE BLDC GLUCOMTR-MCNC: 202 MG/DL — HIGH (ref 70–99)
GLUCOSE BLDC GLUCOMTR-MCNC: 202 MG/DL — HIGH (ref 70–99)
GLUCOSE BLDC GLUCOMTR-MCNC: 206 MG/DL — HIGH (ref 70–99)
GLUCOSE BLDC GLUCOMTR-MCNC: 207 MG/DL — HIGH (ref 70–99)
GLUCOSE BLDC GLUCOMTR-MCNC: 209 MG/DL — HIGH (ref 70–99)
GLUCOSE BLDC GLUCOMTR-MCNC: 211 MG/DL — HIGH (ref 70–99)
GLUCOSE BLDC GLUCOMTR-MCNC: 211 MG/DL — HIGH (ref 70–99)
GLUCOSE BLDC GLUCOMTR-MCNC: 218 MG/DL — HIGH (ref 70–99)
GLUCOSE BLDC GLUCOMTR-MCNC: 219 MG/DL — HIGH (ref 70–99)
GLUCOSE BLDC GLUCOMTR-MCNC: 222 MG/DL — HIGH (ref 70–99)
GLUCOSE BLDC GLUCOMTR-MCNC: 222 MG/DL — HIGH (ref 70–99)
GLUCOSE BLDC GLUCOMTR-MCNC: 224 MG/DL — HIGH (ref 70–99)
GLUCOSE BLDC GLUCOMTR-MCNC: 228 MG/DL — HIGH (ref 70–99)
GLUCOSE BLDC GLUCOMTR-MCNC: 233 MG/DL — HIGH (ref 70–99)
GLUCOSE BLDC GLUCOMTR-MCNC: 241 MG/DL — HIGH (ref 70–99)
GLUCOSE BLDC GLUCOMTR-MCNC: 247 MG/DL — HIGH (ref 70–99)
GLUCOSE BLDC GLUCOMTR-MCNC: 254 MG/DL — HIGH (ref 70–99)
GLUCOSE BLDC GLUCOMTR-MCNC: 254 MG/DL — HIGH (ref 70–99)
GLUCOSE BLDC GLUCOMTR-MCNC: 256 MG/DL — HIGH (ref 70–99)
GLUCOSE BLDC GLUCOMTR-MCNC: 267 MG/DL — HIGH (ref 70–99)
GLUCOSE BLDC GLUCOMTR-MCNC: 268 MG/DL — HIGH (ref 70–99)
GLUCOSE BLDC GLUCOMTR-MCNC: 277 MG/DL — HIGH (ref 70–99)
GLUCOSE BLDC GLUCOMTR-MCNC: 277 MG/DL — HIGH (ref 70–99)
GLUCOSE BLDV-MCNC: 125 MG/DL — HIGH (ref 70–99)
GLUCOSE BLDV-MCNC: 210 MG/DL — HIGH (ref 70–99)
GLUCOSE SERPL-MCNC: 109 MG/DL — HIGH (ref 70–99)
GLUCOSE SERPL-MCNC: 120 MG/DL — HIGH (ref 70–99)
GLUCOSE SERPL-MCNC: 122 MG/DL — HIGH (ref 70–99)
GLUCOSE SERPL-MCNC: 125 MG/DL — HIGH (ref 70–99)
GLUCOSE SERPL-MCNC: 128 MG/DL — HIGH (ref 70–99)
GLUCOSE SERPL-MCNC: 131 MG/DL — HIGH (ref 70–99)
GLUCOSE SERPL-MCNC: 134 MG/DL — HIGH (ref 70–99)
GLUCOSE SERPL-MCNC: 135 MG/DL — HIGH (ref 70–99)
GLUCOSE SERPL-MCNC: 136 MG/DL — HIGH (ref 70–99)
GLUCOSE SERPL-MCNC: 140 MG/DL — HIGH (ref 70–99)
GLUCOSE SERPL-MCNC: 140 MG/DL — HIGH (ref 70–99)
GLUCOSE SERPL-MCNC: 143 MG/DL — HIGH (ref 70–99)
GLUCOSE SERPL-MCNC: 146 MG/DL — HIGH (ref 70–99)
GLUCOSE SERPL-MCNC: 155 MG/DL — HIGH (ref 70–99)
GLUCOSE SERPL-MCNC: 156 MG/DL — HIGH (ref 70–99)
GLUCOSE SERPL-MCNC: 157 MG/DL — HIGH (ref 70–99)
GLUCOSE SERPL-MCNC: 160 MG/DL — HIGH (ref 70–99)
GLUCOSE SERPL-MCNC: 161 MG/DL — HIGH (ref 70–99)
GLUCOSE SERPL-MCNC: 171 MG/DL — HIGH (ref 70–99)
GLUCOSE SERPL-MCNC: 173 MG/DL — HIGH (ref 70–99)
GLUCOSE SERPL-MCNC: 173 MG/DL — HIGH (ref 70–99)
GLUCOSE SERPL-MCNC: 174 MG/DL — HIGH (ref 70–99)
GLUCOSE SERPL-MCNC: 175 MG/DL — HIGH (ref 70–99)
GLUCOSE SERPL-MCNC: 179 MG/DL — HIGH (ref 70–99)
GLUCOSE SERPL-MCNC: 179 MG/DL — HIGH (ref 70–99)
GLUCOSE SERPL-MCNC: 180 MG/DL — HIGH (ref 70–99)
GLUCOSE SERPL-MCNC: 193 MG/DL — HIGH (ref 70–99)
GLUCOSE SERPL-MCNC: 197 MG/DL — HIGH (ref 70–99)
GLUCOSE SERPL-MCNC: 198 MG/DL — HIGH (ref 70–99)
GLUCOSE SERPL-MCNC: 198 MG/DL — HIGH (ref 70–99)
GLUCOSE SERPL-MCNC: 201 MG/DL — HIGH (ref 70–99)
GLUCOSE SERPL-MCNC: 205 MG/DL — HIGH (ref 70–99)
GLUCOSE SERPL-MCNC: 207 MG/DL — HIGH (ref 70–99)
GLUCOSE SERPL-MCNC: 209 MG/DL — HIGH (ref 70–99)
GLUCOSE SERPL-MCNC: 211 MG/DL — HIGH (ref 70–99)
GLUCOSE SERPL-MCNC: 214 MG/DL — HIGH (ref 70–99)
GLUCOSE SERPL-MCNC: 219 MG/DL — HIGH (ref 70–99)
GLUCOSE SERPL-MCNC: 237 MG/DL — HIGH (ref 70–99)
GLUCOSE SERPL-MCNC: 245 MG/DL — HIGH (ref 70–99)
GLUCOSE SERPL-MCNC: 262 MG/DL — HIGH (ref 70–99)
GLUCOSE SERPL-MCNC: 95 MG/DL — SIGNIFICANT CHANGE UP (ref 70–99)
GLUCOSE UR QL: NEGATIVE — SIGNIFICANT CHANGE UP
GRAM STN FLD: SIGNIFICANT CHANGE UP
HAPTOGLOB SERPL-MCNC: 153 MG/DL — SIGNIFICANT CHANGE UP (ref 34–200)
HAV IGM SER-ACNC: SIGNIFICANT CHANGE UP
HBV CORE IGM SER-ACNC: SIGNIFICANT CHANGE UP
HBV SURFACE AG SER-ACNC: SIGNIFICANT CHANGE UP
HCO3 BLDV-SCNC: 19 MMOL/L — LOW (ref 22–29)
HCO3 BLDV-SCNC: 28 MMOL/L — SIGNIFICANT CHANGE UP (ref 22–29)
HCT VFR BLD CALC: 22.8 % — LOW (ref 39–50)
HCT VFR BLD CALC: 24 % — LOW (ref 39–50)
HCT VFR BLD CALC: 24.9 % — LOW (ref 39–50)
HCT VFR BLD CALC: 25.6 % — LOW (ref 39–50)
HCT VFR BLD CALC: 26 % — LOW (ref 39–50)
HCT VFR BLD CALC: 26.2 % — LOW (ref 39–50)
HCT VFR BLD CALC: 26.5 % — LOW (ref 39–50)
HCT VFR BLD CALC: 26.6 % — LOW (ref 39–50)
HCT VFR BLD CALC: 27.4 % — LOW (ref 39–50)
HCT VFR BLD CALC: 27.5 % — LOW (ref 39–50)
HCT VFR BLD CALC: 27.6 % — LOW (ref 39–50)
HCT VFR BLD CALC: 27.8 % — LOW (ref 39–50)
HCT VFR BLD CALC: 28.4 % — LOW (ref 39–50)
HCT VFR BLD CALC: 28.8 % — LOW (ref 39–50)
HCT VFR BLD CALC: 28.8 % — LOW (ref 39–50)
HCT VFR BLD CALC: 29 % — LOW (ref 39–50)
HCT VFR BLD CALC: 29.2 % — LOW (ref 39–50)
HCT VFR BLD CALC: 29.3 % — LOW (ref 39–50)
HCT VFR BLD CALC: 29.8 % — LOW (ref 39–50)
HCT VFR BLD CALC: 30 % — LOW (ref 39–50)
HCT VFR BLD CALC: 30.2 % — LOW (ref 39–50)
HCT VFR BLD CALC: 30.9 % — LOW (ref 39–50)
HCT VFR BLD CALC: 31.4 % — LOW (ref 39–50)
HCT VFR BLD CALC: 32.7 % — LOW (ref 39–50)
HCT VFR BLD CALC: 35.9 % — LOW (ref 39–50)
HCT VFR BLD CALC: 39.6 % — SIGNIFICANT CHANGE UP (ref 39–50)
HCT VFR BLD CALC: 40.5 % — SIGNIFICANT CHANGE UP (ref 39–50)
HCT VFR BLD CALC: 40.9 % — SIGNIFICANT CHANGE UP (ref 39–50)
HCT VFR BLD CALC: 41.3 % — SIGNIFICANT CHANGE UP (ref 39–50)
HCT VFR BLD CALC: 41.6 % — SIGNIFICANT CHANGE UP (ref 39–50)
HCT VFR BLD CALC: 42 % — SIGNIFICANT CHANGE UP (ref 39–50)
HCT VFR BLD CALC: 42.8 % — SIGNIFICANT CHANGE UP (ref 39–50)
HCT VFR BLD CALC: 43.6 % — SIGNIFICANT CHANGE UP (ref 39–50)
HCT VFR BLD CALC: 46.4 % — SIGNIFICANT CHANGE UP (ref 39–50)
HCT VFR BLD CALC: 49.6 % — SIGNIFICANT CHANGE UP (ref 39–50)
HCT VFR BLDA CALC: 41 % — SIGNIFICANT CHANGE UP (ref 39–51)
HCT VFR BLDA CALC: 44 % — SIGNIFICANT CHANGE UP (ref 39–51)
HCV AB S/CO SERPL IA: 0.07 S/CO — SIGNIFICANT CHANGE UP (ref 0–0.99)
HCV AB SERPL-IMP: SIGNIFICANT CHANGE UP
HGB BLD CALC-MCNC: 13.6 G/DL — SIGNIFICANT CHANGE UP (ref 12.6–17.4)
HGB BLD CALC-MCNC: 14.5 G/DL — SIGNIFICANT CHANGE UP (ref 13–17)
HGB BLD-MCNC: 10 G/DL — LOW (ref 13–17)
HGB BLD-MCNC: 10.2 G/DL — LOW (ref 13–17)
HGB BLD-MCNC: 10.3 G/DL — LOW (ref 13–17)
HGB BLD-MCNC: 10.3 G/DL — LOW (ref 13–17)
HGB BLD-MCNC: 10.4 G/DL — LOW (ref 13–17)
HGB BLD-MCNC: 10.8 G/DL — LOW (ref 13–17)
HGB BLD-MCNC: 11.2 G/DL — LOW (ref 13–17)
HGB BLD-MCNC: 13.1 G/DL — SIGNIFICANT CHANGE UP (ref 13–17)
HGB BLD-MCNC: 13.2 G/DL — SIGNIFICANT CHANGE UP (ref 13–17)
HGB BLD-MCNC: 13.3 G/DL — SIGNIFICANT CHANGE UP (ref 13–17)
HGB BLD-MCNC: 13.3 G/DL — SIGNIFICANT CHANGE UP (ref 13–17)
HGB BLD-MCNC: 13.5 G/DL — SIGNIFICANT CHANGE UP (ref 13–17)
HGB BLD-MCNC: 13.7 G/DL — SIGNIFICANT CHANGE UP (ref 13–17)
HGB BLD-MCNC: 13.9 G/DL — SIGNIFICANT CHANGE UP (ref 13–17)
HGB BLD-MCNC: 14.1 G/DL — SIGNIFICANT CHANGE UP (ref 13–17)
HGB BLD-MCNC: 14.4 G/DL — SIGNIFICANT CHANGE UP (ref 13–17)
HGB BLD-MCNC: 15.7 G/DL — SIGNIFICANT CHANGE UP (ref 13–17)
HGB BLD-MCNC: 7 G/DL — CRITICAL LOW (ref 13–17)
HGB BLD-MCNC: 8.1 G/DL — LOW (ref 13–17)
HGB BLD-MCNC: 8.4 G/DL — LOW (ref 13–17)
HGB BLD-MCNC: 8.7 G/DL — LOW (ref 13–17)
HGB BLD-MCNC: 9 G/DL — LOW (ref 13–17)
HGB BLD-MCNC: 9 G/DL — LOW (ref 13–17)
HGB BLD-MCNC: 9.1 G/DL — LOW (ref 13–17)
HGB BLD-MCNC: 9.1 G/DL — LOW (ref 13–17)
HGB BLD-MCNC: 9.2 G/DL — LOW (ref 13–17)
HGB BLD-MCNC: 9.3 G/DL — LOW (ref 13–17)
HGB BLD-MCNC: 9.4 G/DL — LOW (ref 13–17)
HGB BLD-MCNC: 9.5 G/DL — LOW (ref 13–17)
HGB BLD-MCNC: 9.6 G/DL — LOW (ref 13–17)
HGB BLD-MCNC: 9.7 G/DL — LOW (ref 13–17)
HGB BLD-MCNC: 9.7 G/DL — LOW (ref 13–17)
HGB BLD-MCNC: 9.8 G/DL — LOW (ref 13–17)
HOROWITZ INDEX BLDV+IHG-RTO: SIGNIFICANT CHANGE UP
HSV+VZV DNA SPEC QL NAA+PROBE: SIGNIFICANT CHANGE UP
HYPOCHROMIA BLD QL: SLIGHT — SIGNIFICANT CHANGE UP
IANC: 0.03 K/UL — LOW (ref 1.5–8.5)
IANC: 0.03 K/UL — LOW (ref 1.5–8.5)
IANC: 0.08 K/UL — LOW (ref 1.5–8.5)
IANC: 0.08 K/UL — LOW (ref 1.5–8.5)
IANC: 0.25 K/UL — LOW (ref 1.5–8.5)
IANC: 0.85 K/UL — LOW (ref 1.5–8.5)
IANC: 1.42 K/UL — LOW (ref 1.5–8.5)
IANC: 15.31 K/UL — HIGH (ref 1.5–8.5)
IANC: 16.6 K/UL — HIGH (ref 1.5–8.5)
IANC: 2.28 K/UL — SIGNIFICANT CHANGE UP (ref 1.5–8.5)
IANC: 2.33 K/UL — SIGNIFICANT CHANGE UP (ref 1.5–8.5)
IANC: 30.47 K/UL — HIGH (ref 1.5–8.5)
IANC: 30.92 K/UL — HIGH (ref 1.5–8.5)
IANC: 6.07 K/UL — SIGNIFICANT CHANGE UP (ref 1.5–8.5)
IANC: 6.2 K/UL — SIGNIFICANT CHANGE UP (ref 1.5–8.5)
IANC: 8.89 K/UL — HIGH (ref 1.5–8.5)
IMM GRANULOCYTES NFR BLD AUTO: 0 % — SIGNIFICANT CHANGE UP (ref 0–1.5)
IMM GRANULOCYTES NFR BLD AUTO: 0.3 % — SIGNIFICANT CHANGE UP (ref 0–1.5)
IMM GRANULOCYTES NFR BLD AUTO: 0.4 % — SIGNIFICANT CHANGE UP (ref 0–1.5)
IMM GRANULOCYTES NFR BLD AUTO: 1 % — SIGNIFICANT CHANGE UP (ref 0–1.5)
IMM GRANULOCYTES NFR BLD AUTO: 1.3 % — SIGNIFICANT CHANGE UP (ref 0–1.5)
IMM GRANULOCYTES NFR BLD AUTO: 1.3 % — SIGNIFICANT CHANGE UP (ref 0–1.5)
IMM GRANULOCYTES NFR BLD AUTO: 1.7 % — HIGH (ref 0–1.5)
IMM GRANULOCYTES NFR BLD AUTO: 1.9 % — HIGH (ref 0–1.5)
IMM GRANULOCYTES NFR BLD AUTO: 1.9 % — HIGH (ref 0–1.5)
IMM GRANULOCYTES NFR BLD AUTO: 12.8 % — HIGH (ref 0–1.5)
IMM GRANULOCYTES NFR BLD AUTO: 13.7 % — HIGH (ref 0–1.5)
IMM GRANULOCYTES NFR BLD AUTO: 16.7 % — HIGH (ref 0–1.5)
IMM GRANULOCYTES NFR BLD AUTO: 22.8 % — HIGH (ref 0–1.5)
IMM GRANULOCYTES NFR BLD AUTO: 4.6 % — HIGH (ref 0–1.5)
IMM GRANULOCYTES NFR BLD AUTO: 5.3 % — HIGH (ref 0–1.5)
IMM GRANULOCYTES NFR BLD AUTO: 5.6 % — HIGH (ref 0–1.5)
INR BLD: 1.25 RATIO — HIGH (ref 0.88–1.16)
INR BLD: 1.32 RATIO — HIGH (ref 0.88–1.16)
INR BLD: 1.5 RATIO — HIGH (ref 0.88–1.16)
INR BLD: 2.54 RATIO — HIGH (ref 0.88–1.16)
IRON SATN MFR SERPL: 31 % — SIGNIFICANT CHANGE UP (ref 16–55)
IRON SATN MFR SERPL: 46 % — SIGNIFICANT CHANGE UP (ref 14–50)
IRON SATN MFR SERPL: 60 UG/DL — SIGNIFICANT CHANGE UP (ref 45–165)
IRON SATN MFR SERPL: 67 % — HIGH (ref 14–50)
IRON SATN MFR SERPL: 85 UG/DL — SIGNIFICANT CHANGE UP (ref 45–165)
IRON SATN MFR SERPL: 98 UG/DL — SIGNIFICANT CHANGE UP (ref 45–165)
KETONES UR-MCNC: NEGATIVE — SIGNIFICANT CHANGE UP
LACTATE BLDV-MCNC: 1 MMOL/L — SIGNIFICANT CHANGE UP (ref 0.5–2)
LACTATE BLDV-MCNC: 1.5 MMOL/L — SIGNIFICANT CHANGE UP (ref 0.5–2)
LACTATE BLDV-MCNC: 8.6 MMOL/L — CRITICAL HIGH (ref 0.7–2)
LACTATE SERPL-SCNC: 1.5 MMOL/L — SIGNIFICANT CHANGE UP (ref 0.5–2)
LACTATE SERPL-SCNC: 2.4 MMOL/L — HIGH (ref 0.5–2)
LDH SERPL L TO P-CCNC: 429 U/L — HIGH (ref 50–242)
LDH SERPL L TO P-CCNC: 452 U/L — HIGH (ref 50–242)
LEGIONELLA AG UR QL: NEGATIVE — SIGNIFICANT CHANGE UP
LEUKOCYTE ESTERASE UR-ACNC: NEGATIVE — SIGNIFICANT CHANGE UP
LIDOCAIN IGE QN: 54 U/L — SIGNIFICANT CHANGE UP (ref 7–60)
LIDOCAIN IGE QN: 55 U/L — SIGNIFICANT CHANGE UP (ref 7–60)
LYMPHOCYTES # BLD AUTO: 0.02 K/UL — LOW (ref 1–3.3)
LYMPHOCYTES # BLD AUTO: 0.07 K/UL — LOW (ref 1–3.3)
LYMPHOCYTES # BLD AUTO: 0.08 K/UL — LOW (ref 1–3.3)
LYMPHOCYTES # BLD AUTO: 0.1 K/UL — LOW (ref 1–3.3)
LYMPHOCYTES # BLD AUTO: 0.12 K/UL — LOW (ref 1–3.3)
LYMPHOCYTES # BLD AUTO: 0.15 K/UL — LOW (ref 1–3.3)
LYMPHOCYTES # BLD AUTO: 0.16 K/UL — LOW (ref 1–3.3)
LYMPHOCYTES # BLD AUTO: 0.19 K/UL — LOW (ref 1–3.3)
LYMPHOCYTES # BLD AUTO: 0.59 K/UL — LOW (ref 1–3.3)
LYMPHOCYTES # BLD AUTO: 0.6 K/UL — LOW (ref 1–3.3)
LYMPHOCYTES # BLD AUTO: 0.63 K/UL — LOW (ref 1–3.3)
LYMPHOCYTES # BLD AUTO: 0.68 K/UL — LOW (ref 1–3.3)
LYMPHOCYTES # BLD AUTO: 0.83 K/UL — LOW (ref 1–3.3)
LYMPHOCYTES # BLD AUTO: 0.9 % — LOW (ref 13–44)
LYMPHOCYTES # BLD AUTO: 0.9 K/UL — LOW (ref 1–3.3)
LYMPHOCYTES # BLD AUTO: 1.18 K/UL — SIGNIFICANT CHANGE UP (ref 1–3.3)
LYMPHOCYTES # BLD AUTO: 1.26 K/UL — SIGNIFICANT CHANGE UP (ref 1–3.3)
LYMPHOCYTES # BLD AUTO: 1.46 K/UL — SIGNIFICANT CHANGE UP (ref 1–3.3)
LYMPHOCYTES # BLD AUTO: 1.52 K/UL — SIGNIFICANT CHANGE UP (ref 1–3.3)
LYMPHOCYTES # BLD AUTO: 1.8 K/UL — SIGNIFICANT CHANGE UP (ref 1–3.3)
LYMPHOCYTES # BLD AUTO: 10.7 % — LOW (ref 13–44)
LYMPHOCYTES # BLD AUTO: 14.4 % — SIGNIFICANT CHANGE UP (ref 13–44)
LYMPHOCYTES # BLD AUTO: 16.4 % — SIGNIFICANT CHANGE UP (ref 13–44)
LYMPHOCYTES # BLD AUTO: 18.8 % — SIGNIFICANT CHANGE UP (ref 13–44)
LYMPHOCYTES # BLD AUTO: 2.08 K/UL — SIGNIFICANT CHANGE UP (ref 1–3.3)
LYMPHOCYTES # BLD AUTO: 2.4 % — LOW (ref 13–44)
LYMPHOCYTES # BLD AUTO: 2.6 % — LOW (ref 13–44)
LYMPHOCYTES # BLD AUTO: 21.9 % — SIGNIFICANT CHANGE UP (ref 13–44)
LYMPHOCYTES # BLD AUTO: 26.3 % — SIGNIFICANT CHANGE UP (ref 13–44)
LYMPHOCYTES # BLD AUTO: 3.4 % — LOW (ref 13–44)
LYMPHOCYTES # BLD AUTO: 3.7 % — LOW (ref 13–44)
LYMPHOCYTES # BLD AUTO: 32.8 % — SIGNIFICANT CHANGE UP (ref 13–44)
LYMPHOCYTES # BLD AUTO: 4 % — LOW (ref 13–44)
LYMPHOCYTES # BLD AUTO: 4.6 % — LOW (ref 13–44)
LYMPHOCYTES # BLD AUTO: 51.4 % — HIGH (ref 13–44)
LYMPHOCYTES # BLD AUTO: 58.3 % — HIGH (ref 13–44)
LYMPHOCYTES # BLD AUTO: 6.2 % — LOW (ref 13–44)
LYMPHOCYTES # BLD AUTO: 6.4 % — LOW (ref 13–44)
LYMPHOCYTES # BLD AUTO: 6.9 % — LOW (ref 13–44)
LYMPHOCYTES # BLD AUTO: 66.7 % — HIGH (ref 13–44)
LYMPHOCYTES # SPEC AUTO: 3 % — HIGH (ref 0–0)
MACROCYTES BLD QL: SLIGHT — SIGNIFICANT CHANGE UP
MAGNESIUM SERPL-MCNC: 1.7 MG/DL — SIGNIFICANT CHANGE UP (ref 1.6–2.6)
MAGNESIUM SERPL-MCNC: 1.8 MG/DL — SIGNIFICANT CHANGE UP (ref 1.6–2.6)
MAGNESIUM SERPL-MCNC: 1.9 MG/DL — SIGNIFICANT CHANGE UP (ref 1.6–2.6)
MAGNESIUM SERPL-MCNC: 2 MG/DL — SIGNIFICANT CHANGE UP (ref 1.6–2.6)
MAGNESIUM SERPL-MCNC: 2.1 MG/DL — SIGNIFICANT CHANGE UP (ref 1.6–2.6)
MAGNESIUM SERPL-MCNC: 2.1 MG/DL — SIGNIFICANT CHANGE UP (ref 1.6–2.6)
MAGNESIUM SERPL-MCNC: 2.2 MG/DL — SIGNIFICANT CHANGE UP (ref 1.6–2.6)
MAGNESIUM SERPL-MCNC: 2.3 MG/DL — SIGNIFICANT CHANGE UP (ref 1.6–2.6)
MAGNESIUM SERPL-MCNC: 2.4 MG/DL — SIGNIFICANT CHANGE UP (ref 1.6–2.6)
MAGNESIUM SERPL-MCNC: 2.5 MG/DL — SIGNIFICANT CHANGE UP (ref 1.6–2.6)
MAGNESIUM SERPL-MCNC: 2.5 MG/DL — SIGNIFICANT CHANGE UP (ref 1.6–2.6)
MAGNESIUM SERPL-MCNC: 2.6 MG/DL — SIGNIFICANT CHANGE UP (ref 1.6–2.6)
MAGNESIUM SERPL-MCNC: 2.7 MG/DL — HIGH (ref 1.6–2.6)
MAGNESIUM SERPL-MCNC: 2.7 MG/DL — HIGH (ref 1.6–2.6)
MANUAL SMEAR VERIFICATION: SIGNIFICANT CHANGE UP
MCHC RBC-ENTMCNC: 26.9 PG — LOW (ref 27–34)
MCHC RBC-ENTMCNC: 27.1 PG — SIGNIFICANT CHANGE UP (ref 27–34)
MCHC RBC-ENTMCNC: 27.2 PG — SIGNIFICANT CHANGE UP (ref 27–34)
MCHC RBC-ENTMCNC: 27.3 PG — SIGNIFICANT CHANGE UP (ref 27–34)
MCHC RBC-ENTMCNC: 27.3 PG — SIGNIFICANT CHANGE UP (ref 27–34)
MCHC RBC-ENTMCNC: 27.4 PG — SIGNIFICANT CHANGE UP (ref 27–34)
MCHC RBC-ENTMCNC: 27.5 PG — SIGNIFICANT CHANGE UP (ref 27–34)
MCHC RBC-ENTMCNC: 27.6 PG — SIGNIFICANT CHANGE UP (ref 27–34)
MCHC RBC-ENTMCNC: 27.7 PG — SIGNIFICANT CHANGE UP (ref 27–34)
MCHC RBC-ENTMCNC: 27.8 PG — SIGNIFICANT CHANGE UP (ref 27–34)
MCHC RBC-ENTMCNC: 27.8 PG — SIGNIFICANT CHANGE UP (ref 27–34)
MCHC RBC-ENTMCNC: 27.9 PG — SIGNIFICANT CHANGE UP (ref 27–34)
MCHC RBC-ENTMCNC: 27.9 PG — SIGNIFICANT CHANGE UP (ref 27–34)
MCHC RBC-ENTMCNC: 28.1 PG — SIGNIFICANT CHANGE UP (ref 27–34)
MCHC RBC-ENTMCNC: 28.3 PG — SIGNIFICANT CHANGE UP (ref 27–34)
MCHC RBC-ENTMCNC: 28.4 PG — SIGNIFICANT CHANGE UP (ref 27–34)
MCHC RBC-ENTMCNC: 28.5 PG — SIGNIFICANT CHANGE UP (ref 27–34)
MCHC RBC-ENTMCNC: 28.8 PG — SIGNIFICANT CHANGE UP (ref 27–34)
MCHC RBC-ENTMCNC: 29.3 PG — SIGNIFICANT CHANGE UP (ref 27–34)
MCHC RBC-ENTMCNC: 29.3 PG — SIGNIFICANT CHANGE UP (ref 27–34)
MCHC RBC-ENTMCNC: 30.6 GM/DL — LOW (ref 32–36)
MCHC RBC-ENTMCNC: 30.7 GM/DL — LOW (ref 32–36)
MCHC RBC-ENTMCNC: 31 GM/DL — LOW (ref 32–36)
MCHC RBC-ENTMCNC: 31.2 GM/DL — LOW (ref 32–36)
MCHC RBC-ENTMCNC: 31.4 GM/DL — LOW (ref 32–36)
MCHC RBC-ENTMCNC: 31.7 GM/DL — LOW (ref 32–36)
MCHC RBC-ENTMCNC: 32.3 GM/DL — SIGNIFICANT CHANGE UP (ref 32–36)
MCHC RBC-ENTMCNC: 32.3 GM/DL — SIGNIFICANT CHANGE UP (ref 32–36)
MCHC RBC-ENTMCNC: 32.5 GM/DL — SIGNIFICANT CHANGE UP (ref 32–36)
MCHC RBC-ENTMCNC: 32.5 GM/DL — SIGNIFICANT CHANGE UP (ref 32–36)
MCHC RBC-ENTMCNC: 32.7 GM/DL — SIGNIFICANT CHANGE UP (ref 32–36)
MCHC RBC-ENTMCNC: 32.8 GM/DL — SIGNIFICANT CHANGE UP (ref 32–36)
MCHC RBC-ENTMCNC: 32.8 GM/DL — SIGNIFICANT CHANGE UP (ref 32–36)
MCHC RBC-ENTMCNC: 32.9 GM/DL — SIGNIFICANT CHANGE UP (ref 32–36)
MCHC RBC-ENTMCNC: 32.9 GM/DL — SIGNIFICANT CHANGE UP (ref 32–36)
MCHC RBC-ENTMCNC: 33 GM/DL — SIGNIFICANT CHANGE UP (ref 32–36)
MCHC RBC-ENTMCNC: 33.1 GM/DL — SIGNIFICANT CHANGE UP (ref 32–36)
MCHC RBC-ENTMCNC: 33.3 GM/DL — SIGNIFICANT CHANGE UP (ref 32–36)
MCHC RBC-ENTMCNC: 33.3 GM/DL — SIGNIFICANT CHANGE UP (ref 32–36)
MCHC RBC-ENTMCNC: 33.6 GM/DL — SIGNIFICANT CHANGE UP (ref 32–36)
MCHC RBC-ENTMCNC: 33.8 GM/DL — SIGNIFICANT CHANGE UP (ref 32–36)
MCHC RBC-ENTMCNC: 33.8 GM/DL — SIGNIFICANT CHANGE UP (ref 32–36)
MCHC RBC-ENTMCNC: 34 GM/DL — SIGNIFICANT CHANGE UP (ref 32–36)
MCHC RBC-ENTMCNC: 34 GM/DL — SIGNIFICANT CHANGE UP (ref 32–36)
MCHC RBC-ENTMCNC: 34.2 GM/DL — SIGNIFICANT CHANGE UP (ref 32–36)
MCHC RBC-ENTMCNC: 34.3 GM/DL — SIGNIFICANT CHANGE UP (ref 32–36)
MCHC RBC-ENTMCNC: 34.4 GM/DL — SIGNIFICANT CHANGE UP (ref 32–36)
MCHC RBC-ENTMCNC: 34.4 GM/DL — SIGNIFICANT CHANGE UP (ref 32–36)
MCHC RBC-ENTMCNC: 34.5 GM/DL — SIGNIFICANT CHANGE UP (ref 32–36)
MCHC RBC-ENTMCNC: 34.8 GM/DL — SIGNIFICANT CHANGE UP (ref 32–36)
MCHC RBC-ENTMCNC: 35 GM/DL — SIGNIFICANT CHANGE UP (ref 32–36)
MCHC RBC-ENTMCNC: 35.1 GM/DL — SIGNIFICANT CHANGE UP (ref 32–36)
MCHC RBC-ENTMCNC: 35.3 GM/DL — SIGNIFICANT CHANGE UP (ref 32–36)
MCHC RBC-ENTMCNC: 35.5 GM/DL — SIGNIFICANT CHANGE UP (ref 32–36)
MCV RBC AUTO: 78.4 FL — LOW (ref 80–100)
MCV RBC AUTO: 78.5 FL — LOW (ref 80–100)
MCV RBC AUTO: 78.6 FL — LOW (ref 80–100)
MCV RBC AUTO: 78.7 FL — LOW (ref 80–100)
MCV RBC AUTO: 78.7 FL — LOW (ref 80–100)
MCV RBC AUTO: 79.7 FL — LOW (ref 80–100)
MCV RBC AUTO: 79.8 FL — LOW (ref 80–100)
MCV RBC AUTO: 80.1 FL — SIGNIFICANT CHANGE UP (ref 80–100)
MCV RBC AUTO: 80.1 FL — SIGNIFICANT CHANGE UP (ref 80–100)
MCV RBC AUTO: 80.2 FL — SIGNIFICANT CHANGE UP (ref 80–100)
MCV RBC AUTO: 80.5 FL — SIGNIFICANT CHANGE UP (ref 80–100)
MCV RBC AUTO: 80.5 FL — SIGNIFICANT CHANGE UP (ref 80–100)
MCV RBC AUTO: 80.7 FL — SIGNIFICANT CHANGE UP (ref 80–100)
MCV RBC AUTO: 81.3 FL — SIGNIFICANT CHANGE UP (ref 80–100)
MCV RBC AUTO: 81.3 FL — SIGNIFICANT CHANGE UP (ref 80–100)
MCV RBC AUTO: 81.4 FL — SIGNIFICANT CHANGE UP (ref 80–100)
MCV RBC AUTO: 81.4 FL — SIGNIFICANT CHANGE UP (ref 80–100)
MCV RBC AUTO: 82.4 FL — SIGNIFICANT CHANGE UP (ref 80–100)
MCV RBC AUTO: 82.5 FL — SIGNIFICANT CHANGE UP (ref 80–100)
MCV RBC AUTO: 82.8 FL — SIGNIFICANT CHANGE UP (ref 80–100)
MCV RBC AUTO: 82.8 FL — SIGNIFICANT CHANGE UP (ref 80–100)
MCV RBC AUTO: 83 FL — SIGNIFICANT CHANGE UP (ref 80–100)
MCV RBC AUTO: 84.3 FL — SIGNIFICANT CHANGE UP (ref 80–100)
MCV RBC AUTO: 84.6 FL — SIGNIFICANT CHANGE UP (ref 80–100)
MCV RBC AUTO: 84.7 FL — SIGNIFICANT CHANGE UP (ref 80–100)
MCV RBC AUTO: 85.3 FL — SIGNIFICANT CHANGE UP (ref 80–100)
MCV RBC AUTO: 86.1 FL — SIGNIFICANT CHANGE UP (ref 80–100)
MCV RBC AUTO: 86.5 FL — SIGNIFICANT CHANGE UP (ref 80–100)
MCV RBC AUTO: 87 FL — SIGNIFICANT CHANGE UP (ref 80–100)
MCV RBC AUTO: 87 FL — SIGNIFICANT CHANGE UP (ref 80–100)
MCV RBC AUTO: 87.8 FL — SIGNIFICANT CHANGE UP (ref 80–100)
MCV RBC AUTO: 88.4 FL — SIGNIFICANT CHANGE UP (ref 80–100)
MCV RBC AUTO: 89.5 FL — SIGNIFICANT CHANGE UP (ref 80–100)
MCV RBC AUTO: 90.4 FL — SIGNIFICANT CHANGE UP (ref 80–100)
MCV RBC AUTO: 90.9 FL — SIGNIFICANT CHANGE UP (ref 80–100)
MCV RBC AUTO: 91.1 FL — SIGNIFICANT CHANGE UP (ref 80–100)
MCV RBC AUTO: 92.4 FL — SIGNIFICANT CHANGE UP (ref 80–100)
METAMYELOCYTES # FLD: 6 % — HIGH (ref 0–1)
METHOD TYPE: SIGNIFICANT CHANGE UP
MONOCYTES # BLD AUTO: 0 K/UL — SIGNIFICANT CHANGE UP (ref 0–0.9)
MONOCYTES # BLD AUTO: 0.01 K/UL — SIGNIFICANT CHANGE UP (ref 0–0.9)
MONOCYTES # BLD AUTO: 0.02 K/UL — SIGNIFICANT CHANGE UP (ref 0–0.9)
MONOCYTES # BLD AUTO: 0.03 K/UL — SIGNIFICANT CHANGE UP (ref 0–0.9)
MONOCYTES # BLD AUTO: 0.09 K/UL — SIGNIFICANT CHANGE UP (ref 0–0.9)
MONOCYTES # BLD AUTO: 0.1 K/UL — SIGNIFICANT CHANGE UP (ref 0–0.9)
MONOCYTES # BLD AUTO: 0.48 K/UL — SIGNIFICANT CHANGE UP (ref 0–0.9)
MONOCYTES # BLD AUTO: 0.7 K/UL — SIGNIFICANT CHANGE UP (ref 0–0.9)
MONOCYTES # BLD AUTO: 0.96 K/UL — HIGH (ref 0–0.9)
MONOCYTES # BLD AUTO: 0.96 K/UL — HIGH (ref 0–0.9)
MONOCYTES # BLD AUTO: 0.99 K/UL — HIGH (ref 0–0.9)
MONOCYTES # BLD AUTO: 1.05 K/UL — HIGH (ref 0–0.9)
MONOCYTES # BLD AUTO: 1.08 K/UL — HIGH (ref 0–0.9)
MONOCYTES # BLD AUTO: 1.57 K/UL — HIGH (ref 0–0.9)
MONOCYTES # BLD AUTO: 1.75 K/UL — HIGH (ref 0–0.9)
MONOCYTES # BLD AUTO: 1.83 K/UL — HIGH (ref 0–0.9)
MONOCYTES # BLD AUTO: 2.55 K/UL — HIGH (ref 0–0.9)
MONOCYTES # BLD AUTO: 3.18 K/UL — HIGH (ref 0–0.9)
MONOCYTES NFR BLD AUTO: 0 % — LOW (ref 2–14)
MONOCYTES NFR BLD AUTO: 1 % — LOW (ref 2–14)
MONOCYTES NFR BLD AUTO: 1.2 % — LOW (ref 2–14)
MONOCYTES NFR BLD AUTO: 10.1 % — SIGNIFICANT CHANGE UP (ref 2–14)
MONOCYTES NFR BLD AUTO: 10.8 % — SIGNIFICANT CHANGE UP (ref 2–14)
MONOCYTES NFR BLD AUTO: 11.1 % — SIGNIFICANT CHANGE UP (ref 2–14)
MONOCYTES NFR BLD AUTO: 12.5 % — SIGNIFICANT CHANGE UP (ref 2–14)
MONOCYTES NFR BLD AUTO: 12.9 % — SIGNIFICANT CHANGE UP (ref 2–14)
MONOCYTES NFR BLD AUTO: 2.6 % — SIGNIFICANT CHANGE UP (ref 2–14)
MONOCYTES NFR BLD AUTO: 2.8 % — SIGNIFICANT CHANGE UP (ref 2–14)
MONOCYTES NFR BLD AUTO: 21.9 % — HIGH (ref 2–14)
MONOCYTES NFR BLD AUTO: 27 % — HIGH (ref 2–14)
MONOCYTES NFR BLD AUTO: 3.5 % — SIGNIFICANT CHANGE UP (ref 2–14)
MONOCYTES NFR BLD AUTO: 4.4 % — SIGNIFICANT CHANGE UP (ref 2–14)
MONOCYTES NFR BLD AUTO: 5 % — SIGNIFICANT CHANGE UP (ref 2–14)
MONOCYTES NFR BLD AUTO: 5.2 % — SIGNIFICANT CHANGE UP (ref 2–14)
MONOCYTES NFR BLD AUTO: 6 % — SIGNIFICANT CHANGE UP (ref 2–14)
MONOCYTES NFR BLD AUTO: 6.1 % — SIGNIFICANT CHANGE UP (ref 2–14)
MONOCYTES NFR BLD AUTO: 6.9 % — SIGNIFICANT CHANGE UP (ref 2–14)
MONOCYTES NFR BLD AUTO: 7.6 % — SIGNIFICANT CHANGE UP (ref 2–14)
MYELOCYTES NFR BLD: 1.7 % — HIGH (ref 0–0)
MYELOCYTES NFR BLD: 14.2 % — HIGH (ref 0–0)
NEUTROPHILS # BLD AUTO: 0.03 K/UL — LOW (ref 1.8–7.4)
NEUTROPHILS # BLD AUTO: 0.03 K/UL — LOW (ref 1.8–7.4)
NEUTROPHILS # BLD AUTO: 0.08 K/UL — LOW (ref 1.8–7.4)
NEUTROPHILS # BLD AUTO: 0.13 K/UL — LOW (ref 1.8–7.4)
NEUTROPHILS # BLD AUTO: 0.25 K/UL — LOW (ref 1.8–7.4)
NEUTROPHILS # BLD AUTO: 0.85 K/UL — LOW (ref 1.8–7.4)
NEUTROPHILS # BLD AUTO: 1.42 K/UL — LOW (ref 1.8–7.4)
NEUTROPHILS # BLD AUTO: 12.01 K/UL — HIGH (ref 1.8–7.4)
NEUTROPHILS # BLD AUTO: 14.3 K/UL — HIGH (ref 1.8–7.4)
NEUTROPHILS # BLD AUTO: 15.31 K/UL — HIGH (ref 1.8–7.4)
NEUTROPHILS # BLD AUTO: 15.56 K/UL — HIGH (ref 1.8–7.4)
NEUTROPHILS # BLD AUTO: 16.21 K/UL — HIGH (ref 1.8–7.4)
NEUTROPHILS # BLD AUTO: 2.25 K/UL — SIGNIFICANT CHANGE UP (ref 1.8–7.4)
NEUTROPHILS # BLD AUTO: 2.28 K/UL — SIGNIFICANT CHANGE UP (ref 1.8–7.4)
NEUTROPHILS # BLD AUTO: 21.64 K/UL — HIGH (ref 1.8–7.4)
NEUTROPHILS # BLD AUTO: 30.92 K/UL — HIGH (ref 1.8–7.4)
NEUTROPHILS # BLD AUTO: 32.5 K/UL — HIGH (ref 1.8–7.4)
NEUTROPHILS # BLD AUTO: 6.07 K/UL — SIGNIFICANT CHANGE UP (ref 1.8–7.4)
NEUTROPHILS # BLD AUTO: 6.2 K/UL — SIGNIFICANT CHANGE UP (ref 1.8–7.4)
NEUTROPHILS # BLD AUTO: 8.89 K/UL — HIGH (ref 1.8–7.4)
NEUTROPHILS NFR BLD AUTO: 16.6 % — LOW (ref 43–77)
NEUTROPHILS NFR BLD AUTO: 21.6 % — LOW (ref 43–77)
NEUTROPHILS NFR BLD AUTO: 25 % — LOW (ref 43–77)
NEUTROPHILS NFR BLD AUTO: 44.3 % — SIGNIFICANT CHANGE UP (ref 43–77)
NEUTROPHILS NFR BLD AUTO: 55.2 % — SIGNIFICANT CHANGE UP (ref 43–77)
NEUTROPHILS NFR BLD AUTO: 60 % — SIGNIFICANT CHANGE UP (ref 43–77)
NEUTROPHILS NFR BLD AUTO: 60.4 % — SIGNIFICANT CHANGE UP (ref 43–77)
NEUTROPHILS NFR BLD AUTO: 62.6 % — SIGNIFICANT CHANGE UP (ref 43–77)
NEUTROPHILS NFR BLD AUTO: 63.9 % — SIGNIFICANT CHANGE UP (ref 43–77)
NEUTROPHILS NFR BLD AUTO: 65.8 % — SIGNIFICANT CHANGE UP (ref 43–77)
NEUTROPHILS NFR BLD AUTO: 69.5 % — SIGNIFICANT CHANGE UP (ref 43–77)
NEUTROPHILS NFR BLD AUTO: 81.7 % — HIGH (ref 43–77)
NEUTROPHILS NFR BLD AUTO: 84.8 % — HIGH (ref 43–77)
NEUTROPHILS NFR BLD AUTO: 86.3 % — HIGH (ref 43–77)
NEUTROPHILS NFR BLD AUTO: 87.7 % — HIGH (ref 43–77)
NEUTROPHILS NFR BLD AUTO: 87.7 % — HIGH (ref 43–77)
NEUTROPHILS NFR BLD AUTO: 88.4 % — HIGH (ref 43–77)
NEUTROPHILS NFR BLD AUTO: 88.8 % — HIGH (ref 43–77)
NEUTROPHILS NFR BLD AUTO: 90.9 % — HIGH (ref 43–77)
NEUTROPHILS NFR BLD AUTO: 91.7 % — HIGH (ref 43–77)
NEUTS BAND # BLD: 1.7 % — SIGNIFICANT CHANGE UP (ref 0–6)
NEUTS BAND # BLD: 1.7 % — SIGNIFICANT CHANGE UP (ref 0–6)
NITRITE UR-MCNC: NEGATIVE — SIGNIFICANT CHANGE UP
NRBC # BLD: 0 /100 WBCS — SIGNIFICANT CHANGE UP
NRBC # BLD: 0 /100 WBCS — SIGNIFICANT CHANGE UP (ref 0–0)
NRBC # BLD: 1 /100 WBCS — HIGH (ref 0–0)
NRBC # BLD: 10 /100 WBCS — SIGNIFICANT CHANGE UP
NRBC # BLD: 11 /100 — HIGH (ref 0–0)
NRBC # BLD: 2 /100 WBCS — HIGH (ref 0–0)
NRBC # BLD: 2 /100 WBCS — HIGH (ref 0–0)
NRBC # BLD: 2 /100 WBCS — SIGNIFICANT CHANGE UP
NRBC # BLD: 4 /100 WBCS — SIGNIFICANT CHANGE UP
NRBC # BLD: 4 /100 WBCS — SIGNIFICANT CHANGE UP
NRBC # BLD: 5 /100 WBCS — SIGNIFICANT CHANGE UP
NRBC # BLD: 6 /100 WBCS — SIGNIFICANT CHANGE UP
NRBC # BLD: 7 /100 WBCS — SIGNIFICANT CHANGE UP
NRBC # BLD: 8 /100 WBCS — SIGNIFICANT CHANGE UP
NRBC # BLD: 9 /100 — HIGH (ref 0–0)
NRBC # FLD: 0 K/UL — SIGNIFICANT CHANGE UP
NRBC # FLD: 0.21 K/UL — HIGH
NRBC # FLD: 0.65 K/UL — HIGH
NRBC # FLD: 0.73 K/UL — HIGH
NRBC # FLD: 1.32 K/UL — HIGH
NRBC # FLD: 1.5 K/UL — HIGH
NRBC # FLD: 1.53 K/UL — HIGH
NRBC # FLD: 1.94 K/UL — HIGH
NRBC # FLD: 2.32 K/UL — HIGH
NT-PROBNP SERPL-SCNC: 1084 PG/ML — HIGH
NT-PROBNP SERPL-SCNC: 1126 PG/ML — HIGH
NT-PROBNP SERPL-SCNC: 152 PG/ML — SIGNIFICANT CHANGE UP
NT-PROBNP SERPL-SCNC: 3835 PG/ML — HIGH (ref 0–300)
NT-PROBNP SERPL-SCNC: 733 PG/ML — HIGH
OB PNL STL: POSITIVE
OB PNL STL: POSITIVE
ORGANISM # SPEC MICROSCOPIC CNT: SIGNIFICANT CHANGE UP
ORGANISM # SPEC MICROSCOPIC CNT: SIGNIFICANT CHANGE UP
OSMOLALITY SERPL: 271 MOSM/KG — LOW (ref 275–295)
OSMOLALITY SERPL: 275 MOSM/KG — SIGNIFICANT CHANGE UP (ref 275–295)
OSMOLALITY SERPL: 278 MOSM/KG — SIGNIFICANT CHANGE UP (ref 275–295)
OSMOLALITY UR: 625 MOS/KG — SIGNIFICANT CHANGE UP (ref 300–900)
OSMOLALITY UR: 774 MOSM/KG — SIGNIFICANT CHANGE UP (ref 50–1200)
OSMOLALITY UR: 797 MOSM/KG — SIGNIFICANT CHANGE UP (ref 50–1200)
OSMOLALITY UR: 825 MOSM/KG — SIGNIFICANT CHANGE UP (ref 50–1200)
PCO2 BLDV: 45 MMHG — SIGNIFICANT CHANGE UP (ref 42–55)
PCO2 BLDV: 64 MMHG — HIGH (ref 42–55)
PH BLDV: 7.08 — CRITICAL LOW (ref 7.32–7.43)
PH BLDV: 7.41 — SIGNIFICANT CHANGE UP (ref 7.32–7.43)
PH UR: 6 — SIGNIFICANT CHANGE UP (ref 5–8)
PHOSPHATE SERPL-MCNC: 1.8 MG/DL — LOW (ref 2.5–4.5)
PHOSPHATE SERPL-MCNC: 1.9 MG/DL — LOW (ref 2.5–4.5)
PHOSPHATE SERPL-MCNC: 2 MG/DL — LOW (ref 2.5–4.5)
PHOSPHATE SERPL-MCNC: 2 MG/DL — LOW (ref 2.5–4.5)
PHOSPHATE SERPL-MCNC: 2.1 MG/DL — LOW (ref 2.5–4.5)
PHOSPHATE SERPL-MCNC: 2.2 MG/DL — LOW (ref 2.5–4.5)
PHOSPHATE SERPL-MCNC: 2.2 MG/DL — LOW (ref 2.5–4.5)
PHOSPHATE SERPL-MCNC: 2.3 MG/DL — LOW (ref 2.5–4.5)
PHOSPHATE SERPL-MCNC: 2.4 MG/DL — LOW (ref 2.5–4.5)
PHOSPHATE SERPL-MCNC: 2.5 MG/DL — SIGNIFICANT CHANGE UP (ref 2.5–4.5)
PHOSPHATE SERPL-MCNC: 2.5 MG/DL — SIGNIFICANT CHANGE UP (ref 2.5–4.5)
PHOSPHATE SERPL-MCNC: 2.6 MG/DL — SIGNIFICANT CHANGE UP (ref 2.5–4.5)
PHOSPHATE SERPL-MCNC: 2.8 MG/DL — SIGNIFICANT CHANGE UP (ref 2.5–4.5)
PHOSPHATE SERPL-MCNC: 3 MG/DL — SIGNIFICANT CHANGE UP (ref 2.5–4.5)
PHOSPHATE SERPL-MCNC: 3.1 MG/DL — SIGNIFICANT CHANGE UP (ref 2.5–4.5)
PHOSPHATE SERPL-MCNC: 3.2 MG/DL — SIGNIFICANT CHANGE UP (ref 2.5–4.5)
PHOSPHATE SERPL-MCNC: 8.6 MG/DL — HIGH (ref 2.5–4.5)
PLAT MORPH BLD: ABNORMAL
PLAT MORPH BLD: NORMAL — SIGNIFICANT CHANGE UP
PLATELET # BLD AUTO: 100 K/UL — LOW (ref 150–400)
PLATELET # BLD AUTO: 102 K/UL — LOW (ref 150–400)
PLATELET # BLD AUTO: 112 K/UL — LOW (ref 150–400)
PLATELET # BLD AUTO: 115 K/UL — LOW (ref 150–400)
PLATELET # BLD AUTO: 116 K/UL — LOW (ref 150–400)
PLATELET # BLD AUTO: 134 K/UL — LOW (ref 150–400)
PLATELET # BLD AUTO: 143 K/UL — LOW (ref 150–400)
PLATELET # BLD AUTO: 149 K/UL — LOW (ref 150–400)
PLATELET # BLD AUTO: 163 K/UL — SIGNIFICANT CHANGE UP (ref 150–400)
PLATELET # BLD AUTO: 166 K/UL — SIGNIFICANT CHANGE UP (ref 150–400)
PLATELET # BLD AUTO: 177 K/UL — SIGNIFICANT CHANGE UP (ref 150–400)
PLATELET # BLD AUTO: 183 K/UL — SIGNIFICANT CHANGE UP (ref 150–400)
PLATELET # BLD AUTO: 191 K/UL — SIGNIFICANT CHANGE UP (ref 150–400)
PLATELET # BLD AUTO: 203 K/UL — SIGNIFICANT CHANGE UP (ref 150–400)
PLATELET # BLD AUTO: 203 K/UL — SIGNIFICANT CHANGE UP (ref 150–400)
PLATELET # BLD AUTO: 211 K/UL — SIGNIFICANT CHANGE UP (ref 150–400)
PLATELET # BLD AUTO: 215 K/UL — SIGNIFICANT CHANGE UP (ref 150–400)
PLATELET # BLD AUTO: 218 K/UL — SIGNIFICANT CHANGE UP (ref 150–400)
PLATELET # BLD AUTO: 222 K/UL — SIGNIFICANT CHANGE UP (ref 150–400)
PLATELET # BLD AUTO: 222 K/UL — SIGNIFICANT CHANGE UP (ref 150–400)
PLATELET # BLD AUTO: 224 K/UL — SIGNIFICANT CHANGE UP (ref 150–400)
PLATELET # BLD AUTO: 226 K/UL — SIGNIFICANT CHANGE UP (ref 150–400)
PLATELET # BLD AUTO: 229 K/UL — SIGNIFICANT CHANGE UP (ref 150–400)
PLATELET # BLD AUTO: 229 K/UL — SIGNIFICANT CHANGE UP (ref 150–400)
PLATELET # BLD AUTO: 231 K/UL — SIGNIFICANT CHANGE UP (ref 150–400)
PLATELET # BLD AUTO: 233 K/UL — SIGNIFICANT CHANGE UP (ref 150–400)
PLATELET # BLD AUTO: 245 K/UL — SIGNIFICANT CHANGE UP (ref 150–400)
PLATELET # BLD AUTO: 260 K/UL — SIGNIFICANT CHANGE UP (ref 150–400)
PLATELET # BLD AUTO: 299 K/UL — SIGNIFICANT CHANGE UP (ref 150–400)
PLATELET # BLD AUTO: 40 K/UL — LOW (ref 150–400)
PLATELET # BLD AUTO: 45 K/UL — LOW (ref 150–400)
PLATELET # BLD AUTO: 53 K/UL — LOW (ref 150–400)
PLATELET # BLD AUTO: 59 K/UL — LOW (ref 150–400)
PLATELET # BLD AUTO: 60 K/UL — LOW (ref 150–400)
PLATELET # BLD AUTO: 61 K/UL — LOW (ref 150–400)
PLATELET # BLD AUTO: 79 K/UL — LOW (ref 150–400)
PLATELET # BLD AUTO: 80 K/UL — LOW (ref 150–400)
PLATELET COUNT - ESTIMATE: ABNORMAL
PLATELET COUNT - ESTIMATE: NORMAL — SIGNIFICANT CHANGE UP
PO2 BLDV: 23 MMHG — SIGNIFICANT CHANGE UP
PO2 BLDV: 31 MMHG — SIGNIFICANT CHANGE UP (ref 25–45)
POIKILOCYTOSIS BLD QL AUTO: SLIGHT — SIGNIFICANT CHANGE UP
POIKILOCYTOSIS BLD QL AUTO: SLIGHT — SIGNIFICANT CHANGE UP
POLYCHROMASIA BLD QL SMEAR: SIGNIFICANT CHANGE UP
POLYCHROMASIA BLD QL SMEAR: SLIGHT — SIGNIFICANT CHANGE UP
POTASSIUM BLDV-SCNC: 4 MMOL/L — SIGNIFICANT CHANGE UP (ref 3.5–5.1)
POTASSIUM BLDV-SCNC: 5.9 MMOL/L — HIGH (ref 3.5–5.1)
POTASSIUM SERPL-MCNC: 2.7 MMOL/L — CRITICAL LOW (ref 3.5–5.3)
POTASSIUM SERPL-MCNC: 2.8 MMOL/L — CRITICAL LOW (ref 3.5–5.3)
POTASSIUM SERPL-MCNC: 2.8 MMOL/L — CRITICAL LOW (ref 3.5–5.3)
POTASSIUM SERPL-MCNC: 2.9 MMOL/L — CRITICAL LOW (ref 3.5–5.3)
POTASSIUM SERPL-MCNC: 3.1 MMOL/L — LOW (ref 3.5–5.3)
POTASSIUM SERPL-MCNC: 3.2 MMOL/L — LOW (ref 3.5–5.3)
POTASSIUM SERPL-MCNC: 3.3 MMOL/L — LOW (ref 3.5–5.3)
POTASSIUM SERPL-MCNC: 3.4 MMOL/L — LOW (ref 3.5–5.3)
POTASSIUM SERPL-MCNC: 3.5 MMOL/L — SIGNIFICANT CHANGE UP (ref 3.5–5.3)
POTASSIUM SERPL-MCNC: 3.6 MMOL/L — SIGNIFICANT CHANGE UP (ref 3.5–5.3)
POTASSIUM SERPL-MCNC: 3.7 MMOL/L — SIGNIFICANT CHANGE UP (ref 3.5–5.3)
POTASSIUM SERPL-MCNC: 3.7 MMOL/L — SIGNIFICANT CHANGE UP (ref 3.5–5.3)
POTASSIUM SERPL-MCNC: 3.8 MMOL/L — SIGNIFICANT CHANGE UP (ref 3.5–5.3)
POTASSIUM SERPL-MCNC: 3.9 MMOL/L — SIGNIFICANT CHANGE UP (ref 3.5–5.3)
POTASSIUM SERPL-MCNC: 4 MMOL/L — SIGNIFICANT CHANGE UP (ref 3.5–5.3)
POTASSIUM SERPL-MCNC: 4.1 MMOL/L — SIGNIFICANT CHANGE UP (ref 3.5–5.3)
POTASSIUM SERPL-MCNC: 4.2 MMOL/L — SIGNIFICANT CHANGE UP (ref 3.5–5.3)
POTASSIUM SERPL-MCNC: 4.3 MMOL/L — SIGNIFICANT CHANGE UP (ref 3.5–5.3)
POTASSIUM SERPL-MCNC: 4.4 MMOL/L — SIGNIFICANT CHANGE UP (ref 3.5–5.3)
POTASSIUM SERPL-MCNC: 4.5 MMOL/L — SIGNIFICANT CHANGE UP (ref 3.5–5.3)
POTASSIUM SERPL-MCNC: 4.6 MMOL/L — SIGNIFICANT CHANGE UP (ref 3.5–5.3)
POTASSIUM SERPL-MCNC: 4.7 MMOL/L — SIGNIFICANT CHANGE UP (ref 3.5–5.3)
POTASSIUM SERPL-MCNC: 5 MMOL/L — SIGNIFICANT CHANGE UP (ref 3.5–5.3)
POTASSIUM SERPL-MCNC: 6.4 MMOL/L — CRITICAL HIGH (ref 3.5–5.3)
POTASSIUM SERPL-MCNC: 6.7 MMOL/L — CRITICAL HIGH (ref 3.5–5.3)
POTASSIUM SERPL-SCNC: 2.7 MMOL/L — CRITICAL LOW (ref 3.5–5.3)
POTASSIUM SERPL-SCNC: 2.8 MMOL/L — CRITICAL LOW (ref 3.5–5.3)
POTASSIUM SERPL-SCNC: 2.8 MMOL/L — CRITICAL LOW (ref 3.5–5.3)
POTASSIUM SERPL-SCNC: 2.9 MMOL/L — CRITICAL LOW (ref 3.5–5.3)
POTASSIUM SERPL-SCNC: 3.1 MMOL/L — LOW (ref 3.5–5.3)
POTASSIUM SERPL-SCNC: 3.2 MMOL/L — LOW (ref 3.5–5.3)
POTASSIUM SERPL-SCNC: 3.3 MMOL/L — LOW (ref 3.5–5.3)
POTASSIUM SERPL-SCNC: 3.4 MMOL/L — LOW (ref 3.5–5.3)
POTASSIUM SERPL-SCNC: 3.5 MMOL/L — SIGNIFICANT CHANGE UP (ref 3.5–5.3)
POTASSIUM SERPL-SCNC: 3.6 MMOL/L — SIGNIFICANT CHANGE UP (ref 3.5–5.3)
POTASSIUM SERPL-SCNC: 3.7 MMOL/L — SIGNIFICANT CHANGE UP (ref 3.5–5.3)
POTASSIUM SERPL-SCNC: 3.7 MMOL/L — SIGNIFICANT CHANGE UP (ref 3.5–5.3)
POTASSIUM SERPL-SCNC: 3.8 MMOL/L — SIGNIFICANT CHANGE UP (ref 3.5–5.3)
POTASSIUM SERPL-SCNC: 3.9 MMOL/L — SIGNIFICANT CHANGE UP (ref 3.5–5.3)
POTASSIUM SERPL-SCNC: 4 MMOL/L — SIGNIFICANT CHANGE UP (ref 3.5–5.3)
POTASSIUM SERPL-SCNC: 4.1 MMOL/L — SIGNIFICANT CHANGE UP (ref 3.5–5.3)
POTASSIUM SERPL-SCNC: 4.2 MMOL/L — SIGNIFICANT CHANGE UP (ref 3.5–5.3)
POTASSIUM SERPL-SCNC: 4.3 MMOL/L — SIGNIFICANT CHANGE UP (ref 3.5–5.3)
POTASSIUM SERPL-SCNC: 4.4 MMOL/L — SIGNIFICANT CHANGE UP (ref 3.5–5.3)
POTASSIUM SERPL-SCNC: 4.5 MMOL/L — SIGNIFICANT CHANGE UP (ref 3.5–5.3)
POTASSIUM SERPL-SCNC: 4.6 MMOL/L — SIGNIFICANT CHANGE UP (ref 3.5–5.3)
POTASSIUM SERPL-SCNC: 4.7 MMOL/L — SIGNIFICANT CHANGE UP (ref 3.5–5.3)
POTASSIUM SERPL-SCNC: 5 MMOL/L — SIGNIFICANT CHANGE UP (ref 3.5–5.3)
POTASSIUM SERPL-SCNC: 6.4 MMOL/L — CRITICAL HIGH (ref 3.5–5.3)
POTASSIUM SERPL-SCNC: 6.7 MMOL/L — CRITICAL HIGH (ref 3.5–5.3)
PROMYELOCYTES # FLD: 0.9 % — HIGH (ref 0–0)
PROT SERPL-MCNC: 4.7 G/DL — LOW (ref 6–8.3)
PROT SERPL-MCNC: 5 G/DL — LOW (ref 6–8.3)
PROT SERPL-MCNC: 5.1 G/DL — LOW (ref 6–8.3)
PROT SERPL-MCNC: 5.1 G/DL — LOW (ref 6–8.3)
PROT SERPL-MCNC: 5.2 G/DL — LOW (ref 6–8.3)
PROT SERPL-MCNC: 5.2 G/DL — LOW (ref 6–8.3)
PROT SERPL-MCNC: 5.3 G/DL — LOW (ref 6–8.3)
PROT SERPL-MCNC: 5.4 G/DL — LOW (ref 6–8.3)
PROT SERPL-MCNC: 5.4 G/DL — LOW (ref 6–8.3)
PROT SERPL-MCNC: 5.5 G/DL — LOW (ref 6–8.3)
PROT SERPL-MCNC: 5.6 G/DL — LOW (ref 6–8.3)
PROT SERPL-MCNC: 5.7 G/DL — LOW (ref 6–8.3)
PROT SERPL-MCNC: 5.7 G/DL — LOW (ref 6–8.3)
PROT SERPL-MCNC: 5.8 G/DL — LOW (ref 6–8.3)
PROT SERPL-MCNC: 5.8 G/DL — LOW (ref 6–8.3)
PROT SERPL-MCNC: 5.9 G/DL — LOW (ref 6–8.3)
PROT SERPL-MCNC: 6.4 G/DL — SIGNIFICANT CHANGE UP (ref 6–8.3)
PROT SERPL-MCNC: 6.5 G/DL — SIGNIFICANT CHANGE UP (ref 6–8.3)
PROT SERPL-MCNC: 6.6 G/DL — SIGNIFICANT CHANGE UP (ref 6–8.3)
PROT SERPL-MCNC: 7 G/DL — SIGNIFICANT CHANGE UP (ref 6–8.3)
PROT SERPL-MCNC: 7.1 G/DL — SIGNIFICANT CHANGE UP (ref 6–8.3)
PROT UR-MCNC: ABNORMAL
PROTHROM AB SERPL-ACNC: 14.1 SEC — HIGH (ref 10.6–13.6)
PROTHROM AB SERPL-ACNC: 15 SEC — HIGH (ref 10.6–13.6)
PROTHROM AB SERPL-ACNC: 17.6 SEC — HIGH (ref 10.6–13.6)
PROTHROM AB SERPL-ACNC: 27.9 SEC — HIGH (ref 10.6–13.6)
RAPID RVP RESULT: SIGNIFICANT CHANGE UP
RBC # BLD: 2.58 M/UL — LOW (ref 4.2–5.8)
RBC # BLD: 2.88 M/UL — LOW (ref 4.2–5.8)
RBC # BLD: 3.06 M/UL — LOW (ref 4.2–5.8)
RBC # BLD: 3.12 M/UL — LOW (ref 4.2–5.8)
RBC # BLD: 3.15 M/UL — LOW (ref 4.2–5.8)
RBC # BLD: 3.26 M/UL — LOW (ref 4.2–5.8)
RBC # BLD: 3.27 M/UL — LOW (ref 4.2–5.8)
RBC # BLD: 3.28 M/UL — LOW (ref 4.2–5.8)
RBC # BLD: 3.31 M/UL — LOW (ref 4.2–5.8)
RBC # BLD: 3.33 M/UL — LOW (ref 4.2–5.8)
RBC # BLD: 3.39 M/UL — LOW (ref 4.2–5.8)
RBC # BLD: 3.39 M/UL — LOW (ref 4.2–5.8)
RBC # BLD: 3.4 M/UL — LOW (ref 4.2–5.8)
RBC # BLD: 3.43 M/UL — LOW (ref 4.2–5.8)
RBC # BLD: 3.43 M/UL — LOW (ref 4.2–5.8)
RBC # BLD: 3.45 M/UL — LOW (ref 4.2–5.8)
RBC # BLD: 3.48 M/UL — LOW (ref 4.2–5.8)
RBC # BLD: 3.49 M/UL — LOW (ref 4.2–5.8)
RBC # BLD: 3.51 M/UL — LOW (ref 4.2–5.8)
RBC # BLD: 3.53 M/UL — LOW (ref 4.2–5.8)
RBC # BLD: 3.57 M/UL — LOW (ref 4.2–5.8)
RBC # BLD: 3.6 M/UL — LOW (ref 4.2–5.8)
RBC # BLD: 3.66 M/UL — LOW (ref 4.2–5.8)
RBC # BLD: 3.69 M/UL — LOW (ref 4.2–5.8)
RBC # BLD: 3.77 M/UL — LOW (ref 4.2–5.8)
RBC # BLD: 3.94 M/UL — LOW (ref 4.2–5.8)
RBC # BLD: 3.94 M/UL — LOW (ref 4.2–5.8)
RBC # BLD: 4.63 M/UL — SIGNIFICANT CHANGE UP (ref 4.2–5.8)
RBC # BLD: 4.78 M/UL — SIGNIFICANT CHANGE UP (ref 4.2–5.8)
RBC # BLD: 4.79 M/UL — SIGNIFICANT CHANGE UP (ref 4.2–5.8)
RBC # BLD: 4.83 M/UL — SIGNIFICANT CHANGE UP (ref 4.2–5.8)
RBC # BLD: 5.01 M/UL — SIGNIFICANT CHANGE UP (ref 4.2–5.8)
RBC # BLD: 5.04 M/UL — SIGNIFICANT CHANGE UP (ref 4.2–5.8)
RBC # BLD: 5.07 M/UL — SIGNIFICANT CHANGE UP (ref 4.2–5.8)
RBC # BLD: 5.13 M/UL — SIGNIFICANT CHANGE UP (ref 4.2–5.8)
RBC # BLD: 5.17 M/UL — SIGNIFICANT CHANGE UP (ref 4.2–5.8)
RBC # BLD: 5.7 M/UL — SIGNIFICANT CHANGE UP (ref 4.2–5.8)
RBC # FLD: 13.5 % — SIGNIFICANT CHANGE UP (ref 10.3–14.5)
RBC # FLD: 13.6 % — SIGNIFICANT CHANGE UP (ref 10.3–14.5)
RBC # FLD: 13.6 % — SIGNIFICANT CHANGE UP (ref 10.3–14.5)
RBC # FLD: 13.7 % — SIGNIFICANT CHANGE UP (ref 10.3–14.5)
RBC # FLD: 13.8 % — SIGNIFICANT CHANGE UP (ref 10.3–14.5)
RBC # FLD: 13.9 % — SIGNIFICANT CHANGE UP (ref 10.3–14.5)
RBC # FLD: 14 % — SIGNIFICANT CHANGE UP (ref 10.3–14.5)
RBC # FLD: 14.1 % — SIGNIFICANT CHANGE UP (ref 10.3–14.5)
RBC # FLD: 15.1 % — HIGH (ref 10.3–14.5)
RBC # FLD: 16.3 % — HIGH (ref 10.3–14.5)
RBC # FLD: 17 % — HIGH (ref 10.3–14.5)
RBC # FLD: 17 % — HIGH (ref 10.3–14.5)
RBC # FLD: 17.4 % — HIGH (ref 10.3–14.5)
RBC # FLD: 18.5 % — HIGH (ref 10.3–14.5)
RBC # FLD: 19.4 % — HIGH (ref 10.3–14.5)
RBC # FLD: 19.6 % — HIGH (ref 10.3–14.5)
RBC # FLD: 19.9 % — HIGH (ref 10.3–14.5)
RBC # FLD: 20.4 % — HIGH (ref 10.3–14.5)
RBC # FLD: 20.4 % — HIGH (ref 10.3–14.5)
RBC # FLD: 20.5 % — HIGH (ref 10.3–14.5)
RBC # FLD: 20.7 % — HIGH (ref 10.3–14.5)
RBC # FLD: 21.3 % — HIGH (ref 10.3–14.5)
RBC # FLD: 21.6 % — HIGH (ref 10.3–14.5)
RBC BLD AUTO: ABNORMAL
RBC BLD AUTO: ABNORMAL
RH IG SCN BLD-IMP: POSITIVE — SIGNIFICANT CHANGE UP
SAO2 % BLDV: 34.7 % — SIGNIFICANT CHANGE UP
SAO2 % BLDV: 36.6 % — LOW (ref 67–88)
SARS-COV-2 IGG+IGM SERPL QL IA: >250 U/ML — HIGH
SARS-COV-2 IGG+IGM SERPL QL IA: POSITIVE
SARS-COV-2 N GENE NPH QL NAA+PROBE: NOT DETECTED
SARS-COV-2 RNA SPEC QL NAA+PROBE: DETECTED
SARS-COV-2 RNA SPEC QL NAA+PROBE: SIGNIFICANT CHANGE UP
SODIUM SERPL-SCNC: 122 MMOL/L — LOW (ref 135–145)
SODIUM SERPL-SCNC: 123 MMOL/L — LOW (ref 135–145)
SODIUM SERPL-SCNC: 124 MMOL/L — LOW (ref 135–145)
SODIUM SERPL-SCNC: 125 MMOL/L — LOW (ref 135–145)
SODIUM SERPL-SCNC: 126 MMOL/L — LOW (ref 135–145)
SODIUM SERPL-SCNC: 127 MMOL/L — LOW (ref 135–145)
SODIUM SERPL-SCNC: 127 MMOL/L — LOW (ref 135–145)
SODIUM SERPL-SCNC: 128 MMOL/L — LOW (ref 135–145)
SODIUM SERPL-SCNC: 129 MMOL/L — LOW (ref 135–145)
SODIUM SERPL-SCNC: 130 MMOL/L — LOW (ref 135–145)
SODIUM SERPL-SCNC: 130 MMOL/L — LOW (ref 135–145)
SODIUM SERPL-SCNC: 131 MMOL/L — LOW (ref 135–145)
SODIUM SERPL-SCNC: 132 MMOL/L — LOW (ref 135–145)
SODIUM SERPL-SCNC: 133 MMOL/L — LOW (ref 135–145)
SODIUM SERPL-SCNC: 137 MMOL/L — SIGNIFICANT CHANGE UP (ref 135–145)
SODIUM SERPL-SCNC: 139 MMOL/L — SIGNIFICANT CHANGE UP (ref 135–145)
SODIUM UR-SCNC: 34 MMOL/L — SIGNIFICANT CHANGE UP
SODIUM UR-SCNC: <20 MMOL/L — SIGNIFICANT CHANGE UP
SODIUM UR-SCNC: <20 MMOL/L — SIGNIFICANT CHANGE UP
SP GR SPEC: 1.03 — SIGNIFICANT CHANGE UP (ref 1–1.05)
SPECIMEN SOURCE: SIGNIFICANT CHANGE UP
TIBC SERPL-MCNC: 147 UG/DL — LOW (ref 220–430)
TIBC SERPL-MCNC: 186 UG/DL — LOW (ref 220–430)
TIBC SERPL-MCNC: 196 UG/DL — LOW (ref 220–430)
TOTAL VOLUME - 24 HOUR: SIGNIFICANT CHANGE UP
TROPONIN T, HIGH SENSITIVITY RESULT: 13 NG/L — SIGNIFICANT CHANGE UP
TROPONIN T, HIGH SENSITIVITY RESULT: 13 NG/L — SIGNIFICANT CHANGE UP
TROPONIN T, HIGH SENSITIVITY RESULT: 135 NG/L — HIGH (ref 0–51)
TROPONIN T, HIGH SENSITIVITY RESULT: 60 NG/L — HIGH (ref 0–51)
TROPONIN T, HIGH SENSITIVITY RESULT: 90 NG/L — HIGH (ref 0–51)
UIBC SERPL-MCNC: 101 UG/DL — LOW (ref 110–370)
UIBC SERPL-MCNC: 135 UG/DL — SIGNIFICANT CHANGE UP (ref 110–370)
UIBC SERPL-MCNC: 49 UG/DL — LOW (ref 110–370)
URATE SERPL-MCNC: 5.6 MG/DL — SIGNIFICANT CHANGE UP (ref 3.4–8.8)
URINE CREATININE CALCULATION: SIGNIFICANT CHANGE UP G/24 H (ref 1–2)
UROBILINOGEN FLD QL: ABNORMAL
VIT B12 SERPL-MCNC: >2000 PG/ML — HIGH (ref 232–1245)
WBC # BLD: 0.12 K/UL — CRITICAL LOW (ref 3.8–10.5)
WBC # BLD: 0.18 K/UL — CRITICAL LOW (ref 3.8–10.5)
WBC # BLD: 0.23 K/UL — CRITICAL LOW (ref 3.8–10.5)
WBC # BLD: 0.37 K/UL — CRITICAL LOW (ref 3.8–10.5)
WBC # BLD: 0.38 K/UL — CRITICAL LOW (ref 3.8–10.5)
WBC # BLD: 1.04 K/UL — LOW (ref 3.8–10.5)
WBC # BLD: 12.94 K/UL — HIGH (ref 3.8–10.5)
WBC # BLD: 13.75 K/UL — HIGH (ref 3.8–10.5)
WBC # BLD: 14.15 K/UL — HIGH (ref 3.8–10.5)
WBC # BLD: 14.15 K/UL — HIGH (ref 3.8–10.5)
WBC # BLD: 14.16 K/UL — HIGH (ref 3.8–10.5)
WBC # BLD: 14.31 K/UL — HIGH (ref 3.8–10.5)
WBC # BLD: 14.32 K/UL — HIGH (ref 3.8–10.5)
WBC # BLD: 14.47 K/UL — HIGH (ref 3.8–10.5)
WBC # BLD: 15.85 K/UL — HIGH (ref 3.8–10.5)
WBC # BLD: 16.11 K/UL — HIGH (ref 3.8–10.5)
WBC # BLD: 16.97 K/UL — HIGH (ref 3.8–10.5)
WBC # BLD: 2.52 K/UL — LOW (ref 3.8–10.5)
WBC # BLD: 2.58 K/UL — LOW (ref 3.8–10.5)
WBC # BLD: 22.85 K/UL — HIGH (ref 3.8–10.5)
WBC # BLD: 23.82 K/UL — HIGH (ref 3.8–10.5)
WBC # BLD: 24.61 K/UL — HIGH (ref 3.8–10.5)
WBC # BLD: 25.5 K/UL — HIGH (ref 3.8–10.5)
WBC # BLD: 26.11 K/UL — HIGH (ref 3.8–10.5)
WBC # BLD: 3.2 K/UL — LOW (ref 3.8–10.5)
WBC # BLD: 35.13 K/UL — HIGH (ref 3.8–10.5)
WBC # BLD: 35.24 K/UL — HIGH (ref 3.8–10.5)
WBC # BLD: 36.79 K/UL — HIGH (ref 3.8–10.5)
WBC # BLD: 36.93 K/UL — HIGH (ref 3.8–10.5)
WBC # BLD: 6.65 K/UL — SIGNIFICANT CHANGE UP (ref 3.8–10.5)
WBC # BLD: 7.67 K/UL — SIGNIFICANT CHANGE UP (ref 3.8–10.5)
WBC # BLD: 7.88 K/UL — SIGNIFICANT CHANGE UP (ref 3.8–10.5)
WBC # BLD: 7.9 K/UL — SIGNIFICANT CHANGE UP (ref 3.8–10.5)
WBC # BLD: 8.03 K/UL — SIGNIFICANT CHANGE UP (ref 3.8–10.5)
WBC # BLD: 8.92 K/UL — SIGNIFICANT CHANGE UP (ref 3.8–10.5)
WBC # BLD: 9.39 K/UL — SIGNIFICANT CHANGE UP (ref 3.8–10.5)
WBC # BLD: 9.5 K/UL — SIGNIFICANT CHANGE UP (ref 3.8–10.5)
WBC # FLD AUTO: 0.12 K/UL — CRITICAL LOW (ref 3.8–10.5)
WBC # FLD AUTO: 0.18 K/UL — CRITICAL LOW (ref 3.8–10.5)
WBC # FLD AUTO: 0.23 K/UL — CRITICAL LOW (ref 3.8–10.5)
WBC # FLD AUTO: 0.37 K/UL — CRITICAL LOW (ref 3.8–10.5)
WBC # FLD AUTO: 0.38 K/UL — CRITICAL LOW (ref 3.8–10.5)
WBC # FLD AUTO: 1.04 K/UL — LOW (ref 3.8–10.5)
WBC # FLD AUTO: 12.94 K/UL — HIGH (ref 3.8–10.5)
WBC # FLD AUTO: 13.75 K/UL — HIGH (ref 3.8–10.5)
WBC # FLD AUTO: 14.15 K/UL — HIGH (ref 3.8–10.5)
WBC # FLD AUTO: 14.15 K/UL — HIGH (ref 3.8–10.5)
WBC # FLD AUTO: 14.16 K/UL — HIGH (ref 3.8–10.5)
WBC # FLD AUTO: 14.31 K/UL — HIGH (ref 3.8–10.5)
WBC # FLD AUTO: 14.32 K/UL — HIGH (ref 3.8–10.5)
WBC # FLD AUTO: 14.47 K/UL — HIGH (ref 3.8–10.5)
WBC # FLD AUTO: 15.85 K/UL — HIGH (ref 3.8–10.5)
WBC # FLD AUTO: 16.11 K/UL — HIGH (ref 3.8–10.5)
WBC # FLD AUTO: 16.97 K/UL — HIGH (ref 3.8–10.5)
WBC # FLD AUTO: 2.52 K/UL — LOW (ref 3.8–10.5)
WBC # FLD AUTO: 2.58 K/UL — LOW (ref 3.8–10.5)
WBC # FLD AUTO: 22.85 K/UL — HIGH (ref 3.8–10.5)
WBC # FLD AUTO: 23.82 K/UL — HIGH (ref 3.8–10.5)
WBC # FLD AUTO: 24.61 K/UL — HIGH (ref 3.8–10.5)
WBC # FLD AUTO: 25.5 K/UL — HIGH (ref 3.8–10.5)
WBC # FLD AUTO: 26.11 K/UL — HIGH (ref 3.8–10.5)
WBC # FLD AUTO: 3.2 K/UL — LOW (ref 3.8–10.5)
WBC # FLD AUTO: 35.13 K/UL — HIGH (ref 3.8–10.5)
WBC # FLD AUTO: 35.24 K/UL — HIGH (ref 3.8–10.5)
WBC # FLD AUTO: 36.79 K/UL — HIGH (ref 3.8–10.5)
WBC # FLD AUTO: 36.93 K/UL — HIGH (ref 3.8–10.5)
WBC # FLD AUTO: 6.65 K/UL — SIGNIFICANT CHANGE UP (ref 3.8–10.5)
WBC # FLD AUTO: 7.67 K/UL — SIGNIFICANT CHANGE UP (ref 3.8–10.5)
WBC # FLD AUTO: 7.88 K/UL — SIGNIFICANT CHANGE UP (ref 3.8–10.5)
WBC # FLD AUTO: 7.9 K/UL — SIGNIFICANT CHANGE UP (ref 3.8–10.5)
WBC # FLD AUTO: 8.03 K/UL — SIGNIFICANT CHANGE UP (ref 3.8–10.5)
WBC # FLD AUTO: 8.92 K/UL — SIGNIFICANT CHANGE UP (ref 3.8–10.5)
WBC # FLD AUTO: 9.39 K/UL — SIGNIFICANT CHANGE UP (ref 3.8–10.5)
WBC # FLD AUTO: 9.5 K/UL — SIGNIFICANT CHANGE UP (ref 3.8–10.5)

## 2021-01-01 PROCEDURE — 99291 CRITICAL CARE FIRST HOUR: CPT | Mod: 25

## 2021-01-01 PROCEDURE — 99232 SBSQ HOSP IP/OBS MODERATE 35: CPT | Mod: GC

## 2021-01-01 PROCEDURE — 83935 ASSAY OF URINE OSMOLALITY: CPT

## 2021-01-01 PROCEDURE — 85610 PROTHROMBIN TIME: CPT

## 2021-01-01 PROCEDURE — 99024 POSTOP FOLLOW-UP VISIT: CPT

## 2021-01-01 PROCEDURE — 99291 CRITICAL CARE FIRST HOUR: CPT | Mod: CS,25,GC

## 2021-01-01 PROCEDURE — 99232 SBSQ HOSP IP/OBS MODERATE 35: CPT

## 2021-01-01 PROCEDURE — 36600 WITHDRAWAL OF ARTERIAL BLOOD: CPT

## 2021-01-01 PROCEDURE — 99222 1ST HOSP IP/OBS MODERATE 55: CPT

## 2021-01-01 PROCEDURE — 93000 ELECTROCARDIOGRAM COMPLETE: CPT

## 2021-01-01 PROCEDURE — 99223 1ST HOSP IP/OBS HIGH 75: CPT

## 2021-01-01 PROCEDURE — 99215 OFFICE O/P EST HI 40 MIN: CPT

## 2021-01-01 PROCEDURE — 86769 SARS-COV-2 COVID-19 ANTIBODY: CPT

## 2021-01-01 PROCEDURE — 85018 HEMOGLOBIN: CPT

## 2021-01-01 PROCEDURE — 92610 EVALUATE SWALLOWING FUNCTION: CPT

## 2021-01-01 PROCEDURE — 85730 THROMBOPLASTIN TIME PARTIAL: CPT

## 2021-01-01 PROCEDURE — 85379 FIBRIN DEGRADATION QUANT: CPT

## 2021-01-01 PROCEDURE — 84295 ASSAY OF SERUM SODIUM: CPT

## 2021-01-01 PROCEDURE — 74018 RADEX ABDOMEN 1 VIEW: CPT | Mod: 26

## 2021-01-01 PROCEDURE — 99497 ADVNCD CARE PLAN 30 MIN: CPT | Mod: 25

## 2021-01-01 PROCEDURE — 99285 EMERGENCY DEPT VISIT HI MDM: CPT

## 2021-01-01 PROCEDURE — 93284 PRGRMG EVAL IMPLANTABLE DFB: CPT | Mod: 26

## 2021-01-01 PROCEDURE — 82746 ASSAY OF FOLIC ACID SERUM: CPT

## 2021-01-01 PROCEDURE — 74176 CT ABD & PELVIS W/O CONTRAST: CPT | Mod: QQ

## 2021-01-01 PROCEDURE — 84100 ASSAY OF PHOSPHORUS: CPT

## 2021-01-01 PROCEDURE — 87150 DNA/RNA AMPLIFIED PROBE: CPT

## 2021-01-01 PROCEDURE — 99233 SBSQ HOSP IP/OBS HIGH 50: CPT | Mod: GC

## 2021-01-01 PROCEDURE — 87798 DETECT AGENT NOS DNA AMP: CPT

## 2021-01-01 PROCEDURE — 84484 ASSAY OF TROPONIN QUANT: CPT

## 2021-01-01 PROCEDURE — 93284 PRGRMG EVAL IMPLANTABLE DFB: CPT

## 2021-01-01 PROCEDURE — 82728 ASSAY OF FERRITIN: CPT

## 2021-01-01 PROCEDURE — 99233 SBSQ HOSP IP/OBS HIGH 50: CPT

## 2021-01-01 PROCEDURE — 94660 CPAP INITIATION&MGMT: CPT

## 2021-01-01 PROCEDURE — 74177 CT ABD & PELVIS W/CONTRAST: CPT | Mod: 26

## 2021-01-01 PROCEDURE — 71045 X-RAY EXAM CHEST 1 VIEW: CPT | Mod: 26

## 2021-01-01 PROCEDURE — 74176 CT ABD & PELVIS W/O CONTRAST: CPT | Mod: 26,QQ

## 2021-01-01 PROCEDURE — 93296 REM INTERROG EVL PM/IDS: CPT

## 2021-01-01 PROCEDURE — 99285 EMERGENCY DEPT VISIT HI MDM: CPT | Mod: CS

## 2021-01-01 PROCEDURE — 86900 BLOOD TYPING SEROLOGIC ABO: CPT

## 2021-01-01 PROCEDURE — 83540 ASSAY OF IRON: CPT

## 2021-01-01 PROCEDURE — 87635 SARS-COV-2 COVID-19 AMP PRB: CPT

## 2021-01-01 PROCEDURE — 86923 COMPATIBILITY TEST ELECTRIC: CPT

## 2021-01-01 PROCEDURE — 82962 GLUCOSE BLOOD TEST: CPT

## 2021-01-01 PROCEDURE — 74018 RADEX ABDOMEN 1 VIEW: CPT

## 2021-01-01 PROCEDURE — 87186 SC STD MICRODIL/AGAR DIL: CPT

## 2021-01-01 PROCEDURE — G1004: CPT

## 2021-01-01 PROCEDURE — 71275 CT ANGIOGRAPHY CHEST: CPT | Mod: 26,MA

## 2021-01-01 PROCEDURE — 74177 CT ABD & PELVIS W/CONTRAST: CPT | Mod: MG

## 2021-01-01 PROCEDURE — 93005 ELECTROCARDIOGRAM TRACING: CPT

## 2021-01-01 PROCEDURE — 99291 CRITICAL CARE FIRST HOUR: CPT

## 2021-01-01 PROCEDURE — 93295 DEV INTERROG REMOTE 1/2/MLT: CPT

## 2021-01-01 PROCEDURE — 82272 OCCULT BLD FECES 1-3 TESTS: CPT

## 2021-01-01 PROCEDURE — 83690 ASSAY OF LIPASE: CPT

## 2021-01-01 PROCEDURE — 82550 ASSAY OF CK (CPK): CPT

## 2021-01-01 PROCEDURE — 87529 HSV DNA AMP PROBE: CPT

## 2021-01-01 PROCEDURE — 83735 ASSAY OF MAGNESIUM: CPT

## 2021-01-01 PROCEDURE — 84300 ASSAY OF URINE SODIUM: CPT

## 2021-01-01 PROCEDURE — 85025 COMPLETE CBC W/AUTO DIFF WBC: CPT

## 2021-01-01 PROCEDURE — 99223 1ST HOSP IP/OBS HIGH 75: CPT | Mod: 25

## 2021-01-01 PROCEDURE — 83010 ASSAY OF HAPTOGLOBIN QUANT: CPT

## 2021-01-01 PROCEDURE — 93970 EXTREMITY STUDY: CPT | Mod: 26

## 2021-01-01 PROCEDURE — 93306 TTE W/DOPPLER COMPLETE: CPT | Mod: 26

## 2021-01-01 PROCEDURE — 83550 IRON BINDING TEST: CPT

## 2021-01-01 PROCEDURE — 86140 C-REACTIVE PROTEIN: CPT

## 2021-01-01 PROCEDURE — 71045 X-RAY EXAM CHEST 1 VIEW: CPT

## 2021-01-01 PROCEDURE — U0003: CPT

## 2021-01-01 PROCEDURE — 93010 ELECTROCARDIOGRAM REPORT: CPT

## 2021-01-01 PROCEDURE — 96375 TX/PRO/DX INJ NEW DRUG ADDON: CPT

## 2021-01-01 PROCEDURE — 96374 THER/PROPH/DIAG INJ IV PUSH: CPT | Mod: XU

## 2021-01-01 PROCEDURE — 94003 VENT MGMT INPAT SUBQ DAY: CPT

## 2021-01-01 PROCEDURE — 82607 VITAMIN B-12: CPT

## 2021-01-01 PROCEDURE — 82010 KETONE BODYS QUAN: CPT

## 2021-01-01 PROCEDURE — 71275 CT ANGIOGRAPHY CHEST: CPT | Mod: 26

## 2021-01-01 PROCEDURE — 80162 ASSAY OF DIGOXIN TOTAL: CPT

## 2021-01-01 PROCEDURE — 80048 BASIC METABOLIC PNL TOTAL CA: CPT

## 2021-01-01 PROCEDURE — 85027 COMPLETE CBC AUTOMATED: CPT

## 2021-01-01 PROCEDURE — 87077 CULTURE AEROBIC IDENTIFY: CPT

## 2021-01-01 PROCEDURE — 36556 INSERT NON-TUNNEL CV CATH: CPT | Mod: GC

## 2021-01-01 PROCEDURE — 0225U NFCT DS DNA&RNA 21 SARSCOV2: CPT

## 2021-01-01 PROCEDURE — 82330 ASSAY OF CALCIUM: CPT

## 2021-01-01 PROCEDURE — P9016: CPT

## 2021-01-01 PROCEDURE — 74177 CT ABD & PELVIS W/CONTRAST: CPT | Mod: 26,MG

## 2021-01-01 PROCEDURE — 86850 RBC ANTIBODY SCREEN: CPT

## 2021-01-01 PROCEDURE — 36415 COLL VENOUS BLD VENIPUNCTURE: CPT

## 2021-01-01 PROCEDURE — 99213 OFFICE O/P EST LOW 20 MIN: CPT

## 2021-01-01 PROCEDURE — 97163 PT EVAL HIGH COMPLEX 45 MIN: CPT

## 2021-01-01 PROCEDURE — U0005: CPT

## 2021-01-01 PROCEDURE — 31500 INSERT EMERGENCY AIRWAY: CPT | Mod: GC

## 2021-01-01 PROCEDURE — 76604 US EXAM CHEST: CPT | Mod: 26

## 2021-01-01 PROCEDURE — 83605 ASSAY OF LACTIC ACID: CPT

## 2021-01-01 PROCEDURE — 99233 SBSQ HOSP IP/OBS HIGH 50: CPT | Mod: 25

## 2021-01-01 PROCEDURE — 82435 ASSAY OF BLOOD CHLORIDE: CPT

## 2021-01-01 PROCEDURE — 83615 LACTATE (LD) (LDH) ENZYME: CPT

## 2021-01-01 PROCEDURE — 84132 ASSAY OF SERUM POTASSIUM: CPT

## 2021-01-01 PROCEDURE — 93000 ELECTROCARDIOGRAM COMPLETE: CPT | Mod: 59

## 2021-01-01 PROCEDURE — 80053 COMPREHEN METABOLIC PANEL: CPT

## 2021-01-01 PROCEDURE — 99223 1ST HOSP IP/OBS HIGH 75: CPT | Mod: GC

## 2021-01-01 PROCEDURE — 87040 BLOOD CULTURE FOR BACTERIA: CPT

## 2021-01-01 PROCEDURE — 86901 BLOOD TYPING SEROLOGIC RH(D): CPT

## 2021-01-01 PROCEDURE — 85014 HEMATOCRIT: CPT

## 2021-01-01 PROCEDURE — 93308 TTE F-UP OR LMTD: CPT | Mod: 26

## 2021-01-01 PROCEDURE — 76705 ECHO EXAM OF ABDOMEN: CPT | Mod: 26

## 2021-01-01 PROCEDURE — 82803 BLOOD GASES ANY COMBINATION: CPT

## 2021-01-01 PROCEDURE — 84550 ASSAY OF BLOOD/URIC ACID: CPT

## 2021-01-01 PROCEDURE — 71275 CT ANGIOGRAPHY CHEST: CPT | Mod: MA

## 2021-01-01 PROCEDURE — 71045 X-RAY EXAM CHEST 1 VIEW: CPT | Mod: 26,77

## 2021-01-01 PROCEDURE — 94002 VENT MGMT INPAT INIT DAY: CPT

## 2021-01-01 PROCEDURE — 99292 CRITICAL CARE ADDL 30 MIN: CPT | Mod: 25

## 2021-01-01 PROCEDURE — 83880 ASSAY OF NATRIURETIC PEPTIDE: CPT

## 2021-01-01 PROCEDURE — 33264 RMVL & RPLCMT DFB GEN MLT LD: CPT

## 2021-01-01 PROCEDURE — 93975 VASCULAR STUDY: CPT | Mod: 26

## 2021-01-01 PROCEDURE — 36430 TRANSFUSION BLD/BLD COMPNT: CPT

## 2021-01-01 PROCEDURE — 82947 ASSAY GLUCOSE BLOOD QUANT: CPT

## 2021-01-01 RX ORDER — SODIUM CHLORIDE 0.65 %
1 AEROSOL, SPRAY (ML) NASAL THREE TIMES A DAY
Refills: 0 | Status: DISCONTINUED | OUTPATIENT
Start: 2021-01-01 | End: 2021-01-01

## 2021-01-01 RX ORDER — POTASSIUM CHLORIDE 20 MEQ
40 PACKET (EA) ORAL EVERY 4 HOURS
Refills: 0 | Status: COMPLETED | OUTPATIENT
Start: 2021-01-01 | End: 2021-01-01

## 2021-01-01 RX ORDER — INFLUENZA VIRUS VACCINE 15; 15; 15; 15 UG/.5ML; UG/.5ML; UG/.5ML; UG/.5ML
0.5 SUSPENSION INTRAMUSCULAR ONCE
Refills: 0 | Status: DISCONTINUED | OUTPATIENT
Start: 2021-01-01 | End: 2021-01-01

## 2021-01-01 RX ORDER — POTASSIUM CHLORIDE 20 MEQ
10 PACKET (EA) ORAL
Refills: 0 | Status: COMPLETED | OUTPATIENT
Start: 2021-01-01 | End: 2021-01-01

## 2021-01-01 RX ORDER — INFLUENZA VIRUS VACCINE 15; 15; 15; 15 UG/.5ML; UG/.5ML; UG/.5ML; UG/.5ML
0.7 SUSPENSION INTRAMUSCULAR ONCE
Refills: 0 | Status: DISCONTINUED | OUTPATIENT
Start: 2021-01-01 | End: 2021-01-01

## 2021-01-01 RX ORDER — CALCIUM CARBONATE 500(1250)
1 TABLET ORAL
Refills: 0 | Status: DISCONTINUED | OUTPATIENT
Start: 2021-01-01 | End: 2021-01-01

## 2021-01-01 RX ORDER — NYSTATIN 500MM UNIT
5 POWDER (EA) MISCELLANEOUS
Qty: 0 | Refills: 0 | DISCHARGE
Start: 2021-01-01

## 2021-01-01 RX ORDER — ETOMIDATE 2 MG/ML
30 INJECTION INTRAVENOUS ONCE
Refills: 0 | Status: COMPLETED | OUTPATIENT
Start: 2021-01-01 | End: 2021-01-01

## 2021-01-01 RX ORDER — MULTIVIT-MIN/FERROUS GLUCONATE 9 MG/15 ML
1 LIQUID (ML) ORAL DAILY
Refills: 0 | Status: DISCONTINUED | OUTPATIENT
Start: 2021-01-01 | End: 2021-01-01

## 2021-01-01 RX ORDER — CALCIUM GLUCONATE 100 MG/ML
1 VIAL (ML) INTRAVENOUS ONCE
Refills: 0 | Status: COMPLETED | OUTPATIENT
Start: 2021-01-01 | End: 2021-01-01

## 2021-01-01 RX ORDER — BUMETANIDE 0.25 MG/ML
0.5 INJECTION INTRAMUSCULAR; INTRAVENOUS
Qty: 20 | Refills: 0 | Status: DISCONTINUED | OUTPATIENT
Start: 2021-01-01 | End: 2021-01-01

## 2021-01-01 RX ORDER — POTASSIUM CHLORIDE 20 MEQ
40 PACKET (EA) ORAL ONCE
Refills: 0 | Status: DISCONTINUED | OUTPATIENT
Start: 2021-01-01 | End: 2021-01-01

## 2021-01-01 RX ORDER — FILGRASTIM 480MCG/1.6
480 VIAL (ML) INJECTION DAILY
Refills: 0 | Status: COMPLETED | OUTPATIENT
Start: 2021-01-01 | End: 2021-01-01

## 2021-01-01 RX ORDER — NYSTATIN 500MM UNIT
500000 POWDER (EA) MISCELLANEOUS
Refills: 0 | Status: DISCONTINUED | OUTPATIENT
Start: 2021-01-01 | End: 2021-01-01

## 2021-01-01 RX ORDER — FENTANYL CITRATE 50 UG/ML
100 INJECTION INTRAVENOUS ONCE
Refills: 0 | Status: DISCONTINUED | OUTPATIENT
Start: 2021-01-01 | End: 2021-01-01

## 2021-01-01 RX ORDER — METOPROLOL TARTRATE 50 MG
2.5 TABLET ORAL ONCE
Refills: 0 | Status: COMPLETED | OUTPATIENT
Start: 2021-01-01 | End: 2021-01-01

## 2021-01-01 RX ORDER — FUROSEMIDE 40 MG
40 TABLET ORAL
Refills: 0 | Status: DISCONTINUED | OUTPATIENT
Start: 2021-01-01 | End: 2021-01-01

## 2021-01-01 RX ORDER — INSULIN LISPRO 100/ML
VIAL (ML) SUBCUTANEOUS AT BEDTIME
Refills: 0 | Status: DISCONTINUED | OUTPATIENT
Start: 2021-01-01 | End: 2021-01-01

## 2021-01-01 RX ORDER — LANOLIN ALCOHOL/MO/W.PET/CERES
3 CREAM (GRAM) TOPICAL AT BEDTIME
Refills: 0 | Status: DISCONTINUED | OUTPATIENT
Start: 2021-01-01 | End: 2021-01-01

## 2021-01-01 RX ORDER — DRONABINOL 2.5 MG
2.5 CAPSULE ORAL AT BEDTIME
Refills: 0 | Status: DISCONTINUED | OUTPATIENT
Start: 2021-01-01 | End: 2021-01-01

## 2021-01-01 RX ORDER — LANOLIN ALCOHOL/MO/W.PET/CERES
1 CREAM (GRAM) TOPICAL
Qty: 0 | Refills: 0 | DISCHARGE
Start: 2021-01-01

## 2021-01-01 RX ORDER — POTASSIUM CHLORIDE 20 MEQ
40 PACKET (EA) ORAL ONCE
Refills: 0 | Status: COMPLETED | OUTPATIENT
Start: 2021-01-01 | End: 2021-01-01

## 2021-01-01 RX ORDER — ONDANSETRON 8 MG/1
1 TABLET, FILM COATED ORAL
Qty: 0 | Refills: 0 | DISCHARGE

## 2021-01-01 RX ORDER — ACETAMINOPHEN 500 MG
1000 TABLET ORAL ONCE
Refills: 0 | Status: COMPLETED | OUTPATIENT
Start: 2021-01-01 | End: 2021-01-01

## 2021-01-01 RX ORDER — SODIUM CHLORIDE 9 MG/ML
1000 INJECTION, SOLUTION INTRAVENOUS
Refills: 0 | Status: DISCONTINUED | OUTPATIENT
Start: 2021-01-01 | End: 2021-01-01

## 2021-01-01 RX ORDER — METOCLOPRAMIDE HCL 10 MG
5 TABLET ORAL ONCE
Refills: 0 | Status: DISCONTINUED | OUTPATIENT
Start: 2021-01-01 | End: 2021-01-01

## 2021-01-01 RX ORDER — INSULIN GLARGINE 100 [IU]/ML
3 INJECTION, SOLUTION SUBCUTANEOUS AT BEDTIME
Refills: 0 | Status: DISCONTINUED | OUTPATIENT
Start: 2021-01-01 | End: 2021-01-01

## 2021-01-01 RX ORDER — PANTOPRAZOLE SODIUM 20 MG/1
0.5 TABLET, DELAYED RELEASE ORAL
Qty: 0 | Refills: 0 | DISCHARGE

## 2021-01-01 RX ORDER — SODIUM CHLORIDE 0.65 %
1 AEROSOL, SPRAY (ML) NASAL
Refills: 0 | Status: DISCONTINUED | OUTPATIENT
Start: 2021-01-01 | End: 2021-01-01

## 2021-01-01 RX ORDER — METOPROLOL TARTRATE 50 MG
0.5 TABLET ORAL
Qty: 0 | Refills: 0 | DISCHARGE

## 2021-01-01 RX ORDER — PIPERACILLIN AND TAZOBACTAM 4; .5 G/20ML; G/20ML
3.38 INJECTION, POWDER, LYOPHILIZED, FOR SOLUTION INTRAVENOUS ONCE
Refills: 0 | Status: COMPLETED | OUTPATIENT
Start: 2021-01-01 | End: 2021-01-01

## 2021-01-01 RX ORDER — DEXAMETHASONE 0.5 MG/5ML
10 ELIXIR ORAL
Qty: 0 | Refills: 0 | DISCHARGE

## 2021-01-01 RX ORDER — GABAPENTIN 400 MG/1
100 CAPSULE ORAL AT BEDTIME
Refills: 0 | Status: DISCONTINUED | OUTPATIENT
Start: 2021-01-01 | End: 2021-01-01

## 2021-01-01 RX ORDER — SODIUM CHLORIDE 9 MG/ML
1000 INJECTION INTRAMUSCULAR; INTRAVENOUS; SUBCUTANEOUS
Refills: 0 | Status: DISCONTINUED | OUTPATIENT
Start: 2021-01-01 | End: 2021-01-01

## 2021-01-01 RX ORDER — PANTOPRAZOLE SODIUM 20 MG/1
1 TABLET, DELAYED RELEASE ORAL
Qty: 0 | Refills: 0 | DISCHARGE

## 2021-01-01 RX ORDER — ACETAMINOPHEN 500 MG
2 TABLET ORAL
Qty: 0 | Refills: 0 | DISCHARGE
Start: 2021-01-01

## 2021-01-01 RX ORDER — PANTOPRAZOLE SODIUM 20 MG/1
40 TABLET, DELAYED RELEASE ORAL
Refills: 0 | Status: DISCONTINUED | OUTPATIENT
Start: 2021-01-01 | End: 2021-01-01

## 2021-01-01 RX ORDER — CALCIUM CARBONATE 500(1250)
1 TABLET ORAL THREE TIMES A DAY
Refills: 0 | Status: DISCONTINUED | OUTPATIENT
Start: 2021-01-01 | End: 2021-01-01

## 2021-01-01 RX ORDER — INSULIN GLARGINE 100 [IU]/ML
5 INJECTION, SOLUTION SUBCUTANEOUS
Qty: 0 | Refills: 0 | DISCHARGE

## 2021-01-01 RX ORDER — SODIUM CHLORIDE 0.65 %
1 AEROSOL, SPRAY (ML) NASAL
Qty: 0 | Refills: 0 | DISCHARGE
Start: 2021-01-01

## 2021-01-01 RX ORDER — PIPERACILLIN AND TAZOBACTAM 4; .5 G/20ML; G/20ML
3.38 INJECTION, POWDER, LYOPHILIZED, FOR SOLUTION INTRAVENOUS EVERY 8 HOURS
Refills: 0 | Status: DISCONTINUED | OUTPATIENT
Start: 2021-01-01 | End: 2021-01-01

## 2021-01-01 RX ORDER — BACLOFEN 100 %
1 POWDER (GRAM) MISCELLANEOUS
Qty: 0 | Refills: 0 | DISCHARGE

## 2021-01-01 RX ORDER — SENNA PLUS 8.6 MG/1
2 TABLET ORAL AT BEDTIME
Refills: 0 | Status: DISCONTINUED | OUTPATIENT
Start: 2021-01-01 | End: 2021-01-01

## 2021-01-01 RX ORDER — HEPARIN SODIUM 5000 [USP'U]/ML
INJECTION INTRAVENOUS; SUBCUTANEOUS
Qty: 25000 | Refills: 0 | Status: DISCONTINUED | OUTPATIENT
Start: 2021-01-01 | End: 2021-01-01

## 2021-01-01 RX ORDER — GLUCAGON INJECTION, SOLUTION 0.5 MG/.1ML
1 INJECTION, SOLUTION SUBCUTANEOUS ONCE
Refills: 0 | Status: DISCONTINUED | OUTPATIENT
Start: 2021-01-01 | End: 2021-01-01

## 2021-01-01 RX ORDER — NOREPINEPHRINE BITARTRATE/D5W 8 MG/250ML
0.1 PLASTIC BAG, INJECTION (ML) INTRAVENOUS
Qty: 8 | Refills: 0 | Status: DISCONTINUED | OUTPATIENT
Start: 2021-01-01 | End: 2021-01-01

## 2021-01-01 RX ORDER — ALBUMIN HUMAN 25 %
50 VIAL (ML) INTRAVENOUS ONCE
Refills: 0 | Status: COMPLETED | OUTPATIENT
Start: 2021-01-01 | End: 2021-01-01

## 2021-01-01 RX ORDER — DEXTROSE 50 % IN WATER 50 %
25 SYRINGE (ML) INTRAVENOUS ONCE
Refills: 0 | Status: DISCONTINUED | OUTPATIENT
Start: 2021-01-01 | End: 2021-01-01

## 2021-01-01 RX ORDER — PANTOPRAZOLE SODIUM 20 MG/1
40 TABLET, DELAYED RELEASE ORAL ONCE
Refills: 0 | Status: DISCONTINUED | OUTPATIENT
Start: 2021-01-01 | End: 2021-01-01

## 2021-01-01 RX ORDER — APIXABAN 2.5 MG/1
5 TABLET, FILM COATED ORAL EVERY 12 HOURS
Refills: 0 | Status: DISCONTINUED | OUTPATIENT
Start: 2021-01-01 | End: 2021-01-01

## 2021-01-01 RX ORDER — CHLORHEXIDINE GLUCONATE 213 G/1000ML
1 SOLUTION TOPICAL
Refills: 0 | Status: DISCONTINUED | OUTPATIENT
Start: 2021-01-01 | End: 2021-01-01

## 2021-01-01 RX ORDER — METOPROLOL TARTRATE 50 MG
12.5 TABLET ORAL DAILY
Refills: 0 | Status: DISCONTINUED | OUTPATIENT
Start: 2021-01-01 | End: 2021-01-01

## 2021-01-01 RX ORDER — SODIUM CHLORIDE 9 MG/ML
1 INJECTION INTRAMUSCULAR; INTRAVENOUS; SUBCUTANEOUS
Refills: 0 | Status: DISCONTINUED | OUTPATIENT
Start: 2021-01-01 | End: 2021-01-01

## 2021-01-01 RX ORDER — POTASSIUM CHLORIDE 20 MEQ
20 PACKET (EA) ORAL ONCE
Refills: 0 | Status: COMPLETED | OUTPATIENT
Start: 2021-01-01 | End: 2021-01-01

## 2021-01-01 RX ORDER — DEXAMETHASONE 0.5 MG/5ML
1 ELIXIR ORAL
Refills: 0 | Status: DISCONTINUED | OUTPATIENT
Start: 2021-01-01 | End: 2021-01-01

## 2021-01-01 RX ORDER — FUROSEMIDE 40 MG
20 TABLET ORAL ONCE
Refills: 0 | Status: COMPLETED | OUTPATIENT
Start: 2021-01-01 | End: 2021-01-01

## 2021-01-01 RX ORDER — FINASTERIDE 5 MG/1
5 TABLET, FILM COATED ORAL DAILY
Refills: 0 | Status: DISCONTINUED | OUTPATIENT
Start: 2021-01-01 | End: 2021-01-01

## 2021-01-01 RX ORDER — ENOXAPARIN SODIUM 100 MG/ML
40 INJECTION SUBCUTANEOUS
Qty: 1 | Refills: 0
Start: 2021-01-01 | End: 2021-01-01

## 2021-01-01 RX ORDER — ASPIRIN/CALCIUM CARB/MAGNESIUM 324 MG
81 TABLET ORAL DAILY
Refills: 0 | Status: DISCONTINUED | OUTPATIENT
Start: 2021-01-01 | End: 2021-01-01

## 2021-01-01 RX ORDER — FENTANYL CITRATE 50 UG/ML
50 INJECTION INTRAVENOUS ONCE
Refills: 0 | Status: DISCONTINUED | OUTPATIENT
Start: 2021-01-01 | End: 2021-01-01

## 2021-01-01 RX ORDER — VANCOMYCIN HCL 1 G
1000 VIAL (EA) INTRAVENOUS ONCE
Refills: 0 | Status: COMPLETED | OUTPATIENT
Start: 2021-01-01 | End: 2021-01-01

## 2021-01-01 RX ORDER — CHLORHEXIDINE GLUCONATE 213 G/1000ML
15 SOLUTION TOPICAL EVERY 12 HOURS
Refills: 0 | Status: DISCONTINUED | OUTPATIENT
Start: 2021-01-01 | End: 2021-01-01

## 2021-01-01 RX ORDER — CARVEDILOL PHOSPHATE 80 MG/1
6.25 CAPSULE, EXTENDED RELEASE ORAL EVERY 12 HOURS
Refills: 0 | Status: DISCONTINUED | OUTPATIENT
Start: 2021-01-01 | End: 2021-01-01

## 2021-01-01 RX ORDER — SODIUM CHLORIDE 0.65 %
2 AEROSOL, SPRAY (ML) NASAL
Qty: 0 | Refills: 0 | DISCHARGE
Start: 2021-01-01

## 2021-01-01 RX ORDER — CARVEDILOL PHOSPHATE 80 MG/1
1 CAPSULE, EXTENDED RELEASE ORAL
Qty: 0 | Refills: 0 | DISCHARGE

## 2021-01-01 RX ORDER — TOLVAPTAN 15 MG/1
15 TABLET ORAL ONCE
Refills: 0 | Status: DISCONTINUED | OUTPATIENT
Start: 2021-01-01 | End: 2021-01-01

## 2021-01-01 RX ORDER — INSULIN GLARGINE 100 [IU]/ML
5 INJECTION, SOLUTION SUBCUTANEOUS AT BEDTIME
Refills: 0 | Status: DISCONTINUED | OUTPATIENT
Start: 2021-01-01 | End: 2021-01-01

## 2021-01-01 RX ORDER — FENTANYL CITRATE 50 UG/ML
0.5 INJECTION INTRAVENOUS
Qty: 5000 | Refills: 0 | Status: DISCONTINUED | OUTPATIENT
Start: 2021-01-01 | End: 2021-01-01

## 2021-01-01 RX ORDER — FINASTERIDE 5 MG/1
1 TABLET, FILM COATED ORAL
Qty: 0 | Refills: 0 | DISCHARGE

## 2021-01-01 RX ORDER — ROSUVASTATIN CALCIUM 5 MG/1
1 TABLET ORAL
Qty: 0 | Refills: 0 | DISCHARGE

## 2021-01-01 RX ORDER — INSULIN LISPRO 100/ML
VIAL (ML) SUBCUTANEOUS
Refills: 0 | Status: DISCONTINUED | OUTPATIENT
Start: 2021-01-01 | End: 2021-01-01

## 2021-01-01 RX ORDER — ONDANSETRON 8 MG/1
4 TABLET, FILM COATED ORAL ONCE
Refills: 0 | Status: COMPLETED | OUTPATIENT
Start: 2021-01-01 | End: 2021-01-01

## 2021-01-01 RX ORDER — ATORVASTATIN CALCIUM 80 MG/1
80 TABLET, FILM COATED ORAL AT BEDTIME
Refills: 0 | Status: DISCONTINUED | OUTPATIENT
Start: 2021-01-01 | End: 2021-01-01

## 2021-01-01 RX ORDER — LOSARTAN POTASSIUM 100 MG/1
0.5 TABLET, FILM COATED ORAL
Qty: 0 | Refills: 0 | DISCHARGE

## 2021-01-01 RX ORDER — VANCOMYCIN HCL 1 G
125 VIAL (EA) INTRAVENOUS EVERY 6 HOURS
Refills: 0 | Status: DISCONTINUED | OUTPATIENT
Start: 2021-01-01 | End: 2021-01-01

## 2021-01-01 RX ORDER — ENOXAPARIN SODIUM 100 MG/ML
100 INJECTION SUBCUTANEOUS
Qty: 1 | Refills: 0
Start: 2021-01-01 | End: 2021-01-01

## 2021-01-01 RX ORDER — PANTOPRAZOLE SODIUM 20 MG/1
40 TABLET, DELAYED RELEASE ORAL DAILY
Refills: 0 | Status: DISCONTINUED | OUTPATIENT
Start: 2021-01-01 | End: 2021-01-01

## 2021-01-01 RX ORDER — FUROSEMIDE 40 MG
80 TABLET ORAL
Refills: 0 | Status: DISCONTINUED | OUTPATIENT
Start: 2021-01-01 | End: 2021-01-01

## 2021-01-01 RX ORDER — ENOXAPARIN SODIUM 100 MG/ML
70 INJECTION SUBCUTANEOUS
Refills: 0 | Status: DISCONTINUED | OUTPATIENT
Start: 2021-01-01 | End: 2021-01-01

## 2021-01-01 RX ORDER — SODIUM,POTASSIUM PHOSPHATES 278-250MG
1 POWDER IN PACKET (EA) ORAL
Refills: 0 | Status: COMPLETED | OUTPATIENT
Start: 2021-01-01 | End: 2021-01-01

## 2021-01-01 RX ORDER — SODIUM BICARBONATE 1 MEQ/ML
50 SYRINGE (ML) INTRAVENOUS ONCE
Refills: 0 | Status: COMPLETED | OUTPATIENT
Start: 2021-01-01 | End: 2021-01-01

## 2021-01-01 RX ORDER — CEFEPIME 1 G/1
1000 INJECTION, POWDER, FOR SOLUTION INTRAMUSCULAR; INTRAVENOUS EVERY 12 HOURS
Refills: 0 | Status: DISCONTINUED | OUTPATIENT
Start: 2021-01-01 | End: 2021-01-01

## 2021-01-01 RX ORDER — DEXTROSE 50 % IN WATER 50 %
12.5 SYRINGE (ML) INTRAVENOUS ONCE
Refills: 0 | Status: DISCONTINUED | OUTPATIENT
Start: 2021-01-01 | End: 2021-01-01

## 2021-01-01 RX ORDER — SIMETHICONE 80 MG/1
80 TABLET, CHEWABLE ORAL ONCE
Refills: 0 | Status: COMPLETED | OUTPATIENT
Start: 2021-01-01 | End: 2021-01-01

## 2021-01-01 RX ORDER — CARVEDILOL PHOSPHATE 80 MG/1
12.5 CAPSULE, EXTENDED RELEASE ORAL EVERY 12 HOURS
Refills: 0 | Status: DISCONTINUED | OUTPATIENT
Start: 2021-01-01 | End: 2021-01-01

## 2021-01-01 RX ORDER — DRONABINOL 2.5 MG
2.5 CAPSULE ORAL DAILY
Refills: 0 | Status: DISCONTINUED | OUTPATIENT
Start: 2021-01-01 | End: 2021-01-01

## 2021-01-01 RX ORDER — LOSARTAN POTASSIUM 100 MG/1
12.5 TABLET, FILM COATED ORAL DAILY
Refills: 0 | Status: DISCONTINUED | OUTPATIENT
Start: 2021-01-01 | End: 2021-01-01

## 2021-01-01 RX ORDER — INSULIN LISPRO 100/ML
0 VIAL (ML) SUBCUTANEOUS
Qty: 0 | Refills: 0 | DISCHARGE

## 2021-01-01 RX ORDER — INSULIN LISPRO 100/ML
3 VIAL (ML) SUBCUTANEOUS
Refills: 0 | Status: DISCONTINUED | OUTPATIENT
Start: 2021-01-01 | End: 2021-01-01

## 2021-01-01 RX ORDER — PROPOFOL 10 MG/ML
10 INJECTION, EMULSION INTRAVENOUS
Qty: 500 | Refills: 0 | Status: DISCONTINUED | OUTPATIENT
Start: 2021-01-01 | End: 2021-01-01

## 2021-01-01 RX ORDER — CEFEPIME 1 G/1
2000 INJECTION, POWDER, FOR SOLUTION INTRAMUSCULAR; INTRAVENOUS ONCE
Refills: 0 | Status: COMPLETED | OUTPATIENT
Start: 2021-01-01 | End: 2021-01-01

## 2021-01-01 RX ORDER — LIPASE/PROTEASE/AMYLASE 16-48-48K
1 CAPSULE,DELAYED RELEASE (ENTERIC COATED) ORAL
Qty: 0 | Refills: 0 | DISCHARGE
Start: 2021-01-01

## 2021-01-01 RX ORDER — POTASSIUM PHOSPHATE, MONOBASIC POTASSIUM PHOSPHATE, DIBASIC 236; 224 MG/ML; MG/ML
15 INJECTION, SOLUTION INTRAVENOUS ONCE
Refills: 0 | Status: COMPLETED | OUTPATIENT
Start: 2021-01-01 | End: 2021-01-01

## 2021-01-01 RX ORDER — FENTANYL CITRATE 50 UG/ML
0.5 INJECTION INTRAVENOUS
Qty: 2500 | Refills: 0 | Status: DISCONTINUED | OUTPATIENT
Start: 2021-01-01 | End: 2021-01-01

## 2021-01-01 RX ORDER — HEPARIN SODIUM 5000 [USP'U]/ML
4000 INJECTION INTRAVENOUS; SUBCUTANEOUS EVERY 6 HOURS
Refills: 0 | Status: DISCONTINUED | OUTPATIENT
Start: 2021-01-01 | End: 2021-01-01

## 2021-01-01 RX ORDER — ONDANSETRON 8 MG/1
4 TABLET, FILM COATED ORAL EVERY 8 HOURS
Refills: 0 | Status: DISCONTINUED | OUTPATIENT
Start: 2021-01-01 | End: 2021-01-01

## 2021-01-01 RX ORDER — MULTIVIT-MIN/FERROUS GLUCONATE 9 MG/15 ML
1 LIQUID (ML) ORAL
Qty: 0 | Refills: 0 | DISCHARGE

## 2021-01-01 RX ORDER — ENOXAPARIN SODIUM 100 MG/ML
140 INJECTION SUBCUTANEOUS DAILY
Refills: 0 | Status: DISCONTINUED | OUTPATIENT
Start: 2021-01-01 | End: 2021-01-01

## 2021-01-01 RX ORDER — APIXABAN 2.5 MG/1
2 TABLET, FILM COATED ORAL
Qty: 28 | Refills: 0
Start: 2021-01-01 | End: 2021-01-01

## 2021-01-01 RX ORDER — ALBUMIN HUMAN 25 %
50 VIAL (ML) INTRAVENOUS EVERY 6 HOURS
Refills: 0 | Status: DISCONTINUED | OUTPATIENT
Start: 2021-01-01 | End: 2021-01-01

## 2021-01-01 RX ORDER — BUMETANIDE 0.25 MG/ML
2 INJECTION INTRAMUSCULAR; INTRAVENOUS ONCE
Refills: 0 | Status: COMPLETED | OUTPATIENT
Start: 2021-01-01 | End: 2021-01-01

## 2021-01-01 RX ORDER — SPIRONOLACT/HYDROCHLOROTHIAZID 25 MG-25MG
1 TABLET ORAL
Qty: 0 | Refills: 0 | DISCHARGE

## 2021-01-01 RX ORDER — ACETAMINOPHEN 500 MG
650 TABLET ORAL EVERY 6 HOURS
Refills: 0 | Status: DISCONTINUED | OUTPATIENT
Start: 2021-01-01 | End: 2021-01-01

## 2021-01-01 RX ORDER — LOSARTAN POTASSIUM 100 MG/1
1 TABLET, FILM COATED ORAL
Qty: 0 | Refills: 0 | DISCHARGE

## 2021-01-01 RX ORDER — ENOXAPARIN SODIUM 100 MG/ML
100 INJECTION SUBCUTANEOUS EVERY 12 HOURS
Refills: 0 | Status: DISCONTINUED | OUTPATIENT
Start: 2021-01-01 | End: 2021-01-01

## 2021-01-01 RX ORDER — SODIUM CHLORIDE 9 MG/ML
3 INJECTION INTRAMUSCULAR; INTRAVENOUS; SUBCUTANEOUS EVERY 8 HOURS
Refills: 0 | Status: DISCONTINUED | OUTPATIENT
Start: 2021-01-01 | End: 2021-01-01

## 2021-01-01 RX ORDER — LIPASE/PROTEASE/AMYLASE 16-48-48K
1 CAPSULE,DELAYED RELEASE (ENTERIC COATED) ORAL
Refills: 0 | Status: DISCONTINUED | OUTPATIENT
Start: 2021-01-01 | End: 2021-01-01

## 2021-01-01 RX ORDER — SODIUM CHLORIDE 9 MG/ML
1 INJECTION INTRAMUSCULAR; INTRAVENOUS; SUBCUTANEOUS
Qty: 0 | Refills: 0 | DISCHARGE

## 2021-01-01 RX ORDER — HEPARIN SODIUM 5000 [USP'U]/ML
8000 INJECTION INTRAVENOUS; SUBCUTANEOUS ONCE
Refills: 0 | Status: DISCONTINUED | OUTPATIENT
Start: 2021-01-01 | End: 2021-01-01

## 2021-01-01 RX ORDER — MAGNESIUM SULFATE 500 MG/ML
2 VIAL (ML) INJECTION ONCE
Refills: 0 | Status: COMPLETED | OUTPATIENT
Start: 2021-01-01 | End: 2021-01-01

## 2021-01-01 RX ORDER — SODIUM CHLORIDE 9 MG/ML
500 INJECTION, SOLUTION INTRAVENOUS
Refills: 0 | Status: DISCONTINUED | OUTPATIENT
Start: 2021-01-01 | End: 2021-01-01

## 2021-01-01 RX ORDER — OXYMETAZOLINE HYDROCHLORIDE 0.5 MG/ML
1 SPRAY NASAL EVERY 12 HOURS
Refills: 0 | Status: DISCONTINUED | OUTPATIENT
Start: 2021-01-01 | End: 2021-01-01

## 2021-01-01 RX ORDER — POTASSIUM CHLORIDE 20 MEQ
20 PACKET (EA) ORAL
Refills: 0 | Status: DISCONTINUED | OUTPATIENT
Start: 2021-01-01 | End: 2021-01-01

## 2021-01-01 RX ORDER — GABAPENTIN 400 MG/1
300 CAPSULE ORAL AT BEDTIME
Refills: 0 | Status: DISCONTINUED | OUTPATIENT
Start: 2021-01-01 | End: 2021-01-01

## 2021-01-01 RX ORDER — CALCIUM CARBONATE 500(1250)
1 TABLET ORAL
Qty: 0 | Refills: 0 | DISCHARGE
Start: 2021-01-01

## 2021-01-01 RX ORDER — ASCORBIC ACID 60 MG
500 TABLET,CHEWABLE ORAL DAILY
Refills: 0 | Status: DISCONTINUED | OUTPATIENT
Start: 2021-01-01 | End: 2021-01-01

## 2021-01-01 RX ORDER — DEXAMETHASONE 0.5 MG/5ML
1 ELIXIR ORAL
Qty: 0 | Refills: 0 | DISCHARGE
Start: 2021-01-01

## 2021-01-01 RX ORDER — MAGNESIUM SULFATE 500 MG/ML
1 VIAL (ML) INJECTION ONCE
Refills: 0 | Status: COMPLETED | OUTPATIENT
Start: 2021-01-01 | End: 2021-01-01

## 2021-01-01 RX ORDER — POTASSIUM PHOSPHATE, MONOBASIC POTASSIUM PHOSPHATE, DIBASIC 236; 224 MG/ML; MG/ML
30 INJECTION, SOLUTION INTRAVENOUS ONCE
Refills: 0 | Status: COMPLETED | OUTPATIENT
Start: 2021-01-01 | End: 2021-01-01

## 2021-01-01 RX ORDER — POTASSIUM CHLORIDE 20 MEQ
80 PACKET (EA) ORAL ONCE
Refills: 0 | Status: DISCONTINUED | OUTPATIENT
Start: 2021-01-01 | End: 2021-01-01

## 2021-01-01 RX ORDER — HEPARIN SODIUM 5000 [USP'U]/ML
8000 INJECTION INTRAVENOUS; SUBCUTANEOUS EVERY 6 HOURS
Refills: 0 | Status: DISCONTINUED | OUTPATIENT
Start: 2021-01-01 | End: 2021-01-01

## 2021-01-01 RX ORDER — ASCORBIC ACID 60 MG
1 TABLET,CHEWABLE ORAL
Qty: 0 | Refills: 0 | DISCHARGE
Start: 2021-01-01

## 2021-01-01 RX ORDER — DRONABINOL 2.5 MG
1 CAPSULE ORAL
Qty: 0 | Refills: 0 | DISCHARGE
Start: 2021-01-01

## 2021-01-01 RX ORDER — BACLOFEN 100 %
1 POWDER (GRAM) MISCELLANEOUS
Qty: 0 | Refills: 0 | DISCHARGE
Start: 2021-01-01

## 2021-01-01 RX ORDER — SIMETHICONE 80 MG/1
80 TABLET, CHEWABLE ORAL
Refills: 0 | Status: DISCONTINUED | OUTPATIENT
Start: 2021-01-01 | End: 2021-01-01

## 2021-01-01 RX ORDER — BUMETANIDE 0.25 MG/ML
1 INJECTION INTRAMUSCULAR; INTRAVENOUS ONCE
Refills: 0 | Status: COMPLETED | OUTPATIENT
Start: 2021-01-01 | End: 2021-01-01

## 2021-01-01 RX ORDER — FUROSEMIDE 40 MG
40 TABLET ORAL ONCE
Refills: 0 | Status: COMPLETED | OUTPATIENT
Start: 2021-01-01 | End: 2021-01-01

## 2021-01-01 RX ORDER — APIXABAN 2.5 MG/1
1 TABLET, FILM COATED ORAL
Qty: 0 | Refills: 0 | DISCHARGE

## 2021-01-01 RX ORDER — ROCURONIUM BROMIDE 10 MG/ML
80 VIAL (ML) INTRAVENOUS ONCE
Refills: 0 | Status: COMPLETED | OUTPATIENT
Start: 2021-01-01 | End: 2021-01-01

## 2021-01-01 RX ORDER — LIDOCAINE 4 G/100G
15 CREAM TOPICAL EVERY 4 HOURS
Refills: 0 | Status: DISCONTINUED | OUTPATIENT
Start: 2021-01-01 | End: 2021-01-01

## 2021-01-01 RX ORDER — LIPASE/PROTEASE/AMYLASE 16-48-48K
2 CAPSULE,DELAYED RELEASE (ENTERIC COATED) ORAL
Refills: 0 | Status: DISCONTINUED | OUTPATIENT
Start: 2021-01-01 | End: 2021-01-01

## 2021-01-01 RX ORDER — SODIUM CHLORIDE 9 MG/ML
1000 INJECTION INTRAMUSCULAR; INTRAVENOUS; SUBCUTANEOUS ONCE
Refills: 0 | Status: COMPLETED | OUTPATIENT
Start: 2021-01-01 | End: 2021-01-01

## 2021-01-01 RX ORDER — DEXTROSE 50 % IN WATER 50 %
15 SYRINGE (ML) INTRAVENOUS ONCE
Refills: 0 | Status: DISCONTINUED | OUTPATIENT
Start: 2021-01-01 | End: 2021-01-01

## 2021-01-01 RX ORDER — MORPHINE SULFATE 50 MG/1
2 CAPSULE, EXTENDED RELEASE ORAL EVERY 4 HOURS
Refills: 0 | Status: DISCONTINUED | OUTPATIENT
Start: 2021-01-01 | End: 2021-01-01

## 2021-01-01 RX ORDER — PIPERACILLIN AND TAZOBACTAM 4; .5 G/20ML; G/20ML
3.38 INJECTION, POWDER, LYOPHILIZED, FOR SOLUTION INTRAVENOUS ONCE
Refills: 0 | Status: DISCONTINUED | OUTPATIENT
Start: 2021-01-01 | End: 2021-01-01

## 2021-01-01 RX ORDER — LIDOCAINE 4 G/100G
15 CREAM TOPICAL
Qty: 0 | Refills: 0 | DISCHARGE
Start: 2021-01-01

## 2021-01-01 RX ORDER — FENTANYL CITRATE 50 UG/ML
50 INJECTION INTRAVENOUS EVERY 6 HOURS
Refills: 0 | Status: DISCONTINUED | OUTPATIENT
Start: 2021-01-01 | End: 2021-01-01

## 2021-01-01 RX ORDER — LIDOCAINE 4 G/100G
1 CREAM TOPICAL ONCE
Refills: 0 | Status: COMPLETED | OUTPATIENT
Start: 2021-01-01 | End: 2021-01-01

## 2021-01-01 RX ORDER — CARVEDILOL PHOSPHATE 80 MG/1
3.12 CAPSULE, EXTENDED RELEASE ORAL EVERY 12 HOURS
Refills: 0 | Status: DISCONTINUED | OUTPATIENT
Start: 2021-01-01 | End: 2021-01-01

## 2021-01-01 RX ORDER — SODIUM,POTASSIUM PHOSPHATES 278-250MG
1 POWDER IN PACKET (EA) ORAL ONCE
Refills: 0 | Status: COMPLETED | OUTPATIENT
Start: 2021-01-01 | End: 2021-01-01

## 2021-01-01 RX ORDER — INSULIN HUMAN 100 [IU]/ML
5 INJECTION, SOLUTION SUBCUTANEOUS ONCE
Refills: 0 | Status: COMPLETED | OUTPATIENT
Start: 2021-01-01 | End: 2021-01-01

## 2021-01-01 RX ORDER — HYDROCORTISONE 20 MG
100 TABLET ORAL EVERY 8 HOURS
Refills: 0 | Status: DISCONTINUED | OUTPATIENT
Start: 2021-01-01 | End: 2021-01-01

## 2021-01-01 RX ORDER — DEXTROSE 50 % IN WATER 50 %
30 SYRINGE (ML) INTRAVENOUS ONCE
Refills: 0 | Status: COMPLETED | OUTPATIENT
Start: 2021-01-01 | End: 2021-01-01

## 2021-01-01 RX ORDER — DIPHENOXYLATE HCL/ATROPINE 2.5-.025MG
1 TABLET ORAL DAILY
Refills: 0 | Status: DISCONTINUED | OUTPATIENT
Start: 2021-01-01 | End: 2021-01-01

## 2021-01-01 RX ORDER — ALBUMIN HUMAN 25 %
50 VIAL (ML) INTRAVENOUS EVERY 6 HOURS
Refills: 0 | Status: COMPLETED | OUTPATIENT
Start: 2021-01-01 | End: 2021-01-01

## 2021-01-01 RX ORDER — POTASSIUM CHLORIDE 20 MEQ
10 PACKET (EA) ORAL ONCE
Refills: 0 | Status: COMPLETED | OUTPATIENT
Start: 2021-01-01 | End: 2021-01-01

## 2021-01-01 RX ORDER — LOSARTAN POTASSIUM 100 MG/1
50 TABLET, FILM COATED ORAL DAILY
Refills: 0 | Status: DISCONTINUED | OUTPATIENT
Start: 2021-01-01 | End: 2021-01-01

## 2021-01-01 RX ORDER — GABAPENTIN 400 MG/1
1 CAPSULE ORAL
Qty: 0 | Refills: 0 | DISCHARGE
Start: 2021-01-01

## 2021-01-01 RX ORDER — BACLOFEN 100 %
5 POWDER (GRAM) MISCELLANEOUS THREE TIMES A DAY
Refills: 0 | Status: DISCONTINUED | OUTPATIENT
Start: 2021-01-01 | End: 2021-01-01

## 2021-01-01 RX ORDER — UREA 15 G
2 POWDER IN PACKET (EA) ORAL
Qty: 0 | Refills: 0 | DISCHARGE
Start: 2021-01-01

## 2021-01-01 RX ORDER — DOBUTAMINE HCL 250MG/20ML
1.25 VIAL (ML) INTRAVENOUS
Qty: 500 | Refills: 0 | Status: DISCONTINUED | OUTPATIENT
Start: 2021-01-01 | End: 2021-01-01

## 2021-01-01 RX ORDER — UREA 15 G
30 POWDER IN PACKET (EA) ORAL
Refills: 0 | Status: DISCONTINUED | OUTPATIENT
Start: 2021-01-01 | End: 2021-01-01

## 2021-01-01 RX ORDER — POLYETHYLENE GLYCOL 3350 17 G/17G
17 POWDER, FOR SOLUTION ORAL DAILY
Refills: 0 | Status: DISCONTINUED | OUTPATIENT
Start: 2021-01-01 | End: 2021-01-01

## 2021-01-01 RX ORDER — ENOXAPARIN SODIUM 100 MG/ML
140 INJECTION SUBCUTANEOUS
Qty: 1 | Refills: 0
Start: 2021-01-01 | End: 2021-01-01

## 2021-01-01 RX ORDER — FINASTERIDE 5 MG/1
5 TABLET, FILM COATED ORAL AT BEDTIME
Refills: 0 | Status: DISCONTINUED | OUTPATIENT
Start: 2021-01-01 | End: 2021-01-01

## 2021-01-01 RX ORDER — DIPHENHYDRAMINE HYDROCHLORIDE AND LIDOCAINE HYDROCHLORIDE AND ALUMINUM HYDROXIDE AND MAGNESIUM HYDRO
30 KIT EVERY 6 HOURS
Refills: 0 | Status: DISCONTINUED | OUTPATIENT
Start: 2021-01-01 | End: 2021-01-01

## 2021-01-01 RX ORDER — INSULIN LISPRO 100/ML
3 VIAL (ML) SUBCUTANEOUS
Qty: 0 | Refills: 0 | DISCHARGE

## 2021-01-01 RX ORDER — DOBUTAMINE HCL 250MG/20ML
2.6 VIAL (ML) INTRAVENOUS
Qty: 500 | Refills: 0 | Status: DISCONTINUED | OUTPATIENT
Start: 2021-01-01 | End: 2021-01-01

## 2021-01-01 RX ORDER — VANCOMYCIN HCL 1 G
1400 VIAL (EA) INTRAVENOUS ONCE
Refills: 0 | Status: COMPLETED | OUTPATIENT
Start: 2021-01-01 | End: 2021-01-01

## 2021-01-01 RX ORDER — LOSARTAN POTASSIUM 100 MG/1
25 TABLET, FILM COATED ORAL DAILY
Refills: 0 | Status: DISCONTINUED | OUTPATIENT
Start: 2021-01-01 | End: 2021-01-01

## 2021-01-01 RX ADMIN — Medication 100 MILLIEQUIVALENT(S): at 12:39

## 2021-01-01 RX ADMIN — Medication 100 MILLIEQUIVALENT(S): at 22:58

## 2021-01-01 RX ADMIN — ENOXAPARIN SODIUM 70 MILLIGRAM(S): 100 INJECTION SUBCUTANEOUS at 17:39

## 2021-01-01 RX ADMIN — LOSARTAN POTASSIUM 50 MILLIGRAM(S): 100 TABLET, FILM COATED ORAL at 14:27

## 2021-01-01 RX ADMIN — CARVEDILOL PHOSPHATE 6.25 MILLIGRAM(S): 80 CAPSULE, EXTENDED RELEASE ORAL at 06:29

## 2021-01-01 RX ADMIN — Medication 5 MILLIGRAM(S): at 14:00

## 2021-01-01 RX ADMIN — Medication 1 CAPSULE(S): at 17:35

## 2021-01-01 RX ADMIN — Medication 1 TABLET(S): at 06:42

## 2021-01-01 RX ADMIN — Medication 40 MILLIGRAM(S): at 11:55

## 2021-01-01 RX ADMIN — PANTOPRAZOLE SODIUM 40 MILLIGRAM(S): 20 TABLET, DELAYED RELEASE ORAL at 06:40

## 2021-01-01 RX ADMIN — Medication 1 CAPSULE(S): at 09:23

## 2021-01-01 RX ADMIN — Medication 2.5 MILLIGRAM(S): at 21:52

## 2021-01-01 RX ADMIN — Medication 100 MILLIEQUIVALENT(S): at 21:07

## 2021-01-01 RX ADMIN — Medication 400 MILLIGRAM(S): at 06:55

## 2021-01-01 RX ADMIN — FENTANYL CITRATE 50 MICROGRAM(S): 50 INJECTION INTRAVENOUS at 07:08

## 2021-01-01 RX ADMIN — INSULIN GLARGINE 5 UNIT(S): 100 INJECTION, SOLUTION SUBCUTANEOUS at 22:36

## 2021-01-01 RX ADMIN — Medication 2: at 19:09

## 2021-01-01 RX ADMIN — DIPHENHYDRAMINE HYDROCHLORIDE AND LIDOCAINE HYDROCHLORIDE AND ALUMINUM HYDROXIDE AND MAGNESIUM HYDRO 30 MILLILITER(S): KIT at 23:53

## 2021-01-01 RX ADMIN — PIPERACILLIN AND TAZOBACTAM 25 GRAM(S): 4; .5 INJECTION, POWDER, LYOPHILIZED, FOR SOLUTION INTRAVENOUS at 02:26

## 2021-01-01 RX ADMIN — Medication 2: at 12:30

## 2021-01-01 RX ADMIN — Medication 1: at 09:52

## 2021-01-01 RX ADMIN — Medication 650 MILLIGRAM(S): at 15:26

## 2021-01-01 RX ADMIN — ATORVASTATIN CALCIUM 80 MILLIGRAM(S): 80 TABLET, FILM COATED ORAL at 22:02

## 2021-01-01 RX ADMIN — ATORVASTATIN CALCIUM 80 MILLIGRAM(S): 80 TABLET, FILM COATED ORAL at 21:56

## 2021-01-01 RX ADMIN — Medication 5 MILLIGRAM(S): at 06:28

## 2021-01-01 RX ADMIN — Medication 50 MILLILITER(S): at 18:17

## 2021-01-01 RX ADMIN — Medication 2 CAPSULE(S): at 13:04

## 2021-01-01 RX ADMIN — Medication 500000 UNIT(S): at 06:32

## 2021-01-01 RX ADMIN — PIPERACILLIN AND TAZOBACTAM 25 GRAM(S): 4; .5 INJECTION, POWDER, LYOPHILIZED, FOR SOLUTION INTRAVENOUS at 03:23

## 2021-01-01 RX ADMIN — Medication 1 CAPSULE(S): at 12:58

## 2021-01-01 RX ADMIN — Medication 30 GRAM(S): at 05:59

## 2021-01-01 RX ADMIN — SODIUM CHLORIDE 1 GRAM(S): 9 INJECTION INTRAMUSCULAR; INTRAVENOUS; SUBCUTANEOUS at 06:40

## 2021-01-01 RX ADMIN — FINASTERIDE 5 MILLIGRAM(S): 5 TABLET, FILM COATED ORAL at 12:52

## 2021-01-01 RX ADMIN — DIPHENHYDRAMINE HYDROCHLORIDE AND LIDOCAINE HYDROCHLORIDE AND ALUMINUM HYDROXIDE AND MAGNESIUM HYDRO 30 MILLILITER(S): KIT at 07:22

## 2021-01-01 RX ADMIN — LIDOCAINE 1 PATCH: 4 CREAM TOPICAL at 14:49

## 2021-01-01 RX ADMIN — Medication 81 MILLIGRAM(S): at 12:50

## 2021-01-01 RX ADMIN — Medication 1 MILLIGRAM(S): at 12:22

## 2021-01-01 RX ADMIN — Medication 5 MILLIGRAM(S): at 07:05

## 2021-01-01 RX ADMIN — Medication 6: at 13:10

## 2021-01-01 RX ADMIN — SIMETHICONE 80 MILLIGRAM(S): 80 TABLET, CHEWABLE ORAL at 12:27

## 2021-01-01 RX ADMIN — INSULIN GLARGINE 5 UNIT(S): 100 INJECTION, SOLUTION SUBCUTANEOUS at 21:29

## 2021-01-01 RX ADMIN — Medication 1 CAPSULE(S): at 09:27

## 2021-01-01 RX ADMIN — DIPHENHYDRAMINE HYDROCHLORIDE AND LIDOCAINE HYDROCHLORIDE AND ALUMINUM HYDROXIDE AND MAGNESIUM HYDRO 30 MILLILITER(S): KIT at 11:59

## 2021-01-01 RX ADMIN — Medication 500000 UNIT(S): at 06:27

## 2021-01-01 RX ADMIN — Medication 50 MILLILITER(S): at 23:33

## 2021-01-01 RX ADMIN — Medication 5 MILLIGRAM(S): at 22:56

## 2021-01-01 RX ADMIN — ATORVASTATIN CALCIUM 80 MILLIGRAM(S): 80 TABLET, FILM COATED ORAL at 23:52

## 2021-01-01 RX ADMIN — Medication 480 MICROGRAM(S): at 15:03

## 2021-01-01 RX ADMIN — Medication 5 MILLIGRAM(S): at 21:27

## 2021-01-01 RX ADMIN — Medication 500000 UNIT(S): at 17:23

## 2021-01-01 RX ADMIN — ONDANSETRON 4 MILLIGRAM(S): 8 TABLET, FILM COATED ORAL at 03:52

## 2021-01-01 RX ADMIN — DIPHENHYDRAMINE HYDROCHLORIDE AND LIDOCAINE HYDROCHLORIDE AND ALUMINUM HYDROXIDE AND MAGNESIUM HYDRO 30 MILLILITER(S): KIT at 23:31

## 2021-01-01 RX ADMIN — Medication 5 MILLIGRAM(S): at 13:04

## 2021-01-01 RX ADMIN — INSULIN GLARGINE 3 UNIT(S): 100 INJECTION, SOLUTION SUBCUTANEOUS at 22:45

## 2021-01-01 RX ADMIN — CARVEDILOL PHOSPHATE 6.25 MILLIGRAM(S): 80 CAPSULE, EXTENDED RELEASE ORAL at 19:38

## 2021-01-01 RX ADMIN — Medication 80 MILLIGRAM(S): at 07:06

## 2021-01-01 RX ADMIN — Medication 7.5 MICROGRAM(S)/KG/MIN: at 07:04

## 2021-01-01 RX ADMIN — Medication 650 MILLIGRAM(S): at 01:39

## 2021-01-01 RX ADMIN — Medication 1 CAPSULE(S): at 21:32

## 2021-01-01 RX ADMIN — FENTANYL CITRATE 100 MICROGRAM(S): 50 INJECTION INTRAVENOUS at 12:52

## 2021-01-01 RX ADMIN — DIPHENHYDRAMINE HYDROCHLORIDE AND LIDOCAINE HYDROCHLORIDE AND ALUMINUM HYDROXIDE AND MAGNESIUM HYDRO 30 MILLILITER(S): KIT at 18:26

## 2021-01-01 RX ADMIN — BUMETANIDE 2.5 MG/HR: 0.25 INJECTION INTRAMUSCULAR; INTRAVENOUS at 21:59

## 2021-01-01 RX ADMIN — Medication 100 GRAM(S): at 04:35

## 2021-01-01 RX ADMIN — Medication 1 TABLET(S): at 17:18

## 2021-01-01 RX ADMIN — Medication 1 TABLET(S): at 11:19

## 2021-01-01 RX ADMIN — Medication 5 MILLIGRAM(S): at 06:24

## 2021-01-01 RX ADMIN — Medication 19.7 MICROGRAM(S)/KG/MIN: at 21:15

## 2021-01-01 RX ADMIN — Medication 2: at 17:54

## 2021-01-01 RX ADMIN — Medication 3: at 12:36

## 2021-01-01 RX ADMIN — INSULIN GLARGINE 5 UNIT(S): 100 INJECTION, SOLUTION SUBCUTANEOUS at 22:56

## 2021-01-01 RX ADMIN — Medication 1 TABLET(S): at 13:46

## 2021-01-01 RX ADMIN — FINASTERIDE 5 MILLIGRAM(S): 5 TABLET, FILM COATED ORAL at 22:02

## 2021-01-01 RX ADMIN — FINASTERIDE 5 MILLIGRAM(S): 5 TABLET, FILM COATED ORAL at 22:01

## 2021-01-01 RX ADMIN — DIPHENHYDRAMINE HYDROCHLORIDE AND LIDOCAINE HYDROCHLORIDE AND ALUMINUM HYDROXIDE AND MAGNESIUM HYDRO 30 MILLILITER(S): KIT at 18:12

## 2021-01-01 RX ADMIN — ENOXAPARIN SODIUM 70 MILLIGRAM(S): 100 INJECTION SUBCUTANEOUS at 06:49

## 2021-01-01 RX ADMIN — PANTOPRAZOLE SODIUM 40 MILLIGRAM(S): 20 TABLET, DELAYED RELEASE ORAL at 05:28

## 2021-01-01 RX ADMIN — DIPHENHYDRAMINE HYDROCHLORIDE AND LIDOCAINE HYDROCHLORIDE AND ALUMINUM HYDROXIDE AND MAGNESIUM HYDRO 30 MILLILITER(S): KIT at 06:21

## 2021-01-01 RX ADMIN — Medication 2: at 12:07

## 2021-01-01 RX ADMIN — Medication 100 MILLIEQUIVALENT(S): at 22:23

## 2021-01-01 RX ADMIN — ATORVASTATIN CALCIUM 80 MILLIGRAM(S): 80 TABLET, FILM COATED ORAL at 22:00

## 2021-01-01 RX ADMIN — Medication 1 MILLIGRAM(S): at 11:43

## 2021-01-01 RX ADMIN — HEPARIN SODIUM 1300 UNIT(S)/HR: 5000 INJECTION INTRAVENOUS; SUBCUTANEOUS at 08:35

## 2021-01-01 RX ADMIN — Medication 100 MILLIEQUIVALENT(S): at 05:35

## 2021-01-01 RX ADMIN — INSULIN HUMAN 5 UNIT(S): 100 INJECTION, SOLUTION SUBCUTANEOUS at 04:33

## 2021-01-01 RX ADMIN — Medication 125 MILLIGRAM(S): at 05:00

## 2021-01-01 RX ADMIN — SODIUM CHLORIDE 100 MILLILITER(S): 9 INJECTION INTRAMUSCULAR; INTRAVENOUS; SUBCUTANEOUS at 02:34

## 2021-01-01 RX ADMIN — Medication 2 CAPSULE(S): at 22:19

## 2021-01-01 RX ADMIN — Medication 1 CAPSULE(S): at 13:07

## 2021-01-01 RX ADMIN — FENTANYL CITRATE 2.63 MICROGRAM(S)/KG/HR: 50 INJECTION INTRAVENOUS at 21:14

## 2021-01-01 RX ADMIN — CEFEPIME 100 MILLIGRAM(S): 1 INJECTION, POWDER, FOR SOLUTION INTRAMUSCULAR; INTRAVENOUS at 23:30

## 2021-01-01 RX ADMIN — Medication 12.5 MILLIGRAM(S): at 08:28

## 2021-01-01 RX ADMIN — Medication 1 MILLIGRAM(S): at 06:20

## 2021-01-01 RX ADMIN — DIPHENHYDRAMINE HYDROCHLORIDE AND LIDOCAINE HYDROCHLORIDE AND ALUMINUM HYDROXIDE AND MAGNESIUM HYDRO 30 MILLILITER(S): KIT at 23:28

## 2021-01-01 RX ADMIN — Medication 40 MILLIEQUIVALENT(S): at 20:02

## 2021-01-01 RX ADMIN — Medication 10 MILLIGRAM(S): at 05:59

## 2021-01-01 RX ADMIN — Medication 1 CAPSULE(S): at 22:39

## 2021-01-01 RX ADMIN — GABAPENTIN 300 MILLIGRAM(S): 400 CAPSULE ORAL at 21:52

## 2021-01-01 RX ADMIN — PANTOPRAZOLE SODIUM 40 MILLIGRAM(S): 20 TABLET, DELAYED RELEASE ORAL at 12:41

## 2021-01-01 RX ADMIN — LOSARTAN POTASSIUM 25 MILLIGRAM(S): 100 TABLET, FILM COATED ORAL at 06:52

## 2021-01-01 RX ADMIN — Medication 1 MILLIGRAM(S): at 11:00

## 2021-01-01 RX ADMIN — Medication 2 CAPSULE(S): at 08:30

## 2021-01-01 RX ADMIN — Medication 20 MILLIEQUIVALENT(S): at 21:22

## 2021-01-01 RX ADMIN — Medication 1: at 09:24

## 2021-01-01 RX ADMIN — Medication 1 MILLIGRAM(S): at 06:00

## 2021-01-01 RX ADMIN — Medication 60 MILLIGRAM(S): at 06:27

## 2021-01-01 RX ADMIN — ENOXAPARIN SODIUM 70 MILLIGRAM(S): 100 INJECTION SUBCUTANEOUS at 06:32

## 2021-01-01 RX ADMIN — DIPHENHYDRAMINE HYDROCHLORIDE AND LIDOCAINE HYDROCHLORIDE AND ALUMINUM HYDROXIDE AND MAGNESIUM HYDRO 30 MILLILITER(S): KIT at 11:54

## 2021-01-01 RX ADMIN — DIPHENHYDRAMINE HYDROCHLORIDE AND LIDOCAINE HYDROCHLORIDE AND ALUMINUM HYDROXIDE AND MAGNESIUM HYDRO 30 MILLILITER(S): KIT at 19:20

## 2021-01-01 RX ADMIN — Medication 1 MILLIGRAM(S): at 23:42

## 2021-01-01 RX ADMIN — PANTOPRAZOLE SODIUM 40 MILLIGRAM(S): 20 TABLET, DELAYED RELEASE ORAL at 13:00

## 2021-01-01 RX ADMIN — Medication 1 CAPSULE(S): at 19:20

## 2021-01-01 RX ADMIN — Medication 2 CAPSULE(S): at 12:05

## 2021-01-01 RX ADMIN — Medication 50 MILLILITER(S): at 05:18

## 2021-01-01 RX ADMIN — Medication 100 MILLIEQUIVALENT(S): at 12:48

## 2021-01-01 RX ADMIN — Medication 650 MILLIGRAM(S): at 09:54

## 2021-01-01 RX ADMIN — Medication 2 CAPSULE(S): at 18:24

## 2021-01-01 RX ADMIN — ENOXAPARIN SODIUM 70 MILLIGRAM(S): 100 INJECTION SUBCUTANEOUS at 19:10

## 2021-01-01 RX ADMIN — Medication 2 CAPSULE(S): at 10:03

## 2021-01-01 RX ADMIN — LOSARTAN POTASSIUM 50 MILLIGRAM(S): 100 TABLET, FILM COATED ORAL at 05:28

## 2021-01-01 RX ADMIN — Medication 500000 UNIT(S): at 17:18

## 2021-01-01 RX ADMIN — Medication 1 TABLET(S): at 18:24

## 2021-01-01 RX ADMIN — PANTOPRAZOLE SODIUM 40 MILLIGRAM(S): 20 TABLET, DELAYED RELEASE ORAL at 06:31

## 2021-01-01 RX ADMIN — ONDANSETRON 4 MILLIGRAM(S): 8 TABLET, FILM COATED ORAL at 14:00

## 2021-01-01 RX ADMIN — OXYMETAZOLINE HYDROCHLORIDE 1 SPRAY(S): 0.5 SPRAY NASAL at 06:51

## 2021-01-01 RX ADMIN — Medication 1 CAPSULE(S): at 14:00

## 2021-01-01 RX ADMIN — Medication 5 MILLIGRAM(S): at 13:08

## 2021-01-01 RX ADMIN — Medication 5 MILLIGRAM(S): at 22:04

## 2021-01-01 RX ADMIN — Medication 2.5 MILLIGRAM(S): at 22:55

## 2021-01-01 RX ADMIN — FINASTERIDE 5 MILLIGRAM(S): 5 TABLET, FILM COATED ORAL at 21:10

## 2021-01-01 RX ADMIN — Medication 1 TABLET(S): at 17:57

## 2021-01-01 RX ADMIN — Medication 1 CAPSULE(S): at 09:53

## 2021-01-01 RX ADMIN — CHLORHEXIDINE GLUCONATE 15 MILLILITER(S): 213 SOLUTION TOPICAL at 17:18

## 2021-01-01 RX ADMIN — Medication 1: at 09:27

## 2021-01-01 RX ADMIN — DIPHENHYDRAMINE HYDROCHLORIDE AND LIDOCAINE HYDROCHLORIDE AND ALUMINUM HYDROXIDE AND MAGNESIUM HYDRO 30 MILLILITER(S): KIT at 18:56

## 2021-01-01 RX ADMIN — SODIUM CHLORIDE 100 MILLILITER(S): 9 INJECTION INTRAMUSCULAR; INTRAVENOUS; SUBCUTANEOUS at 18:19

## 2021-01-01 RX ADMIN — Medication 125 MILLIGRAM(S): at 17:27

## 2021-01-01 RX ADMIN — CARVEDILOL PHOSPHATE 12.5 MILLIGRAM(S): 80 CAPSULE, EXTENDED RELEASE ORAL at 14:27

## 2021-01-01 RX ADMIN — Medication 7.5 MICROGRAM(S)/KG/MIN: at 09:31

## 2021-01-01 RX ADMIN — Medication 1 MILLIGRAM(S): at 23:20

## 2021-01-01 RX ADMIN — Medication 2: at 09:18

## 2021-01-01 RX ADMIN — FINASTERIDE 5 MILLIGRAM(S): 5 TABLET, FILM COATED ORAL at 12:33

## 2021-01-01 RX ADMIN — Medication 1 TABLET(S): at 17:30

## 2021-01-01 RX ADMIN — Medication 100 MILLIEQUIVALENT(S): at 16:02

## 2021-01-01 RX ADMIN — Medication 1 CAPSULE(S): at 12:27

## 2021-01-01 RX ADMIN — Medication 4: at 18:15

## 2021-01-01 RX ADMIN — Medication 50 MILLILITER(S): at 00:01

## 2021-01-01 RX ADMIN — Medication 3: at 12:57

## 2021-01-01 RX ADMIN — Medication 500000 UNIT(S): at 17:31

## 2021-01-01 RX ADMIN — PIPERACILLIN AND TAZOBACTAM 25 GRAM(S): 4; .5 INJECTION, POWDER, LYOPHILIZED, FOR SOLUTION INTRAVENOUS at 17:22

## 2021-01-01 RX ADMIN — Medication 500000 UNIT(S): at 17:17

## 2021-01-01 RX ADMIN — Medication 20 MILLIGRAM(S): at 15:45

## 2021-01-01 RX ADMIN — Medication 2: at 18:30

## 2021-01-01 RX ADMIN — FINASTERIDE 5 MILLIGRAM(S): 5 TABLET, FILM COATED ORAL at 22:56

## 2021-01-01 RX ADMIN — HEPARIN SODIUM 1300 UNIT(S)/HR: 5000 INJECTION INTRAVENOUS; SUBCUTANEOUS at 08:59

## 2021-01-01 RX ADMIN — Medication 5 MILLIGRAM(S): at 16:01

## 2021-01-01 RX ADMIN — SIMETHICONE 80 MILLIGRAM(S): 80 TABLET, CHEWABLE ORAL at 17:31

## 2021-01-01 RX ADMIN — Medication 30 MILLILITER(S): at 23:10

## 2021-01-01 RX ADMIN — FINASTERIDE 5 MILLIGRAM(S): 5 TABLET, FILM COATED ORAL at 11:42

## 2021-01-01 RX ADMIN — PIPERACILLIN AND TAZOBACTAM 25 GRAM(S): 4; .5 INJECTION, POWDER, LYOPHILIZED, FOR SOLUTION INTRAVENOUS at 09:39

## 2021-01-01 RX ADMIN — Medication 5 MILLIGRAM(S): at 06:32

## 2021-01-01 RX ADMIN — ONDANSETRON 4 MILLIGRAM(S): 8 TABLET, FILM COATED ORAL at 00:44

## 2021-01-01 RX ADMIN — Medication 5 MILLIGRAM(S): at 06:57

## 2021-01-01 RX ADMIN — Medication 30 MILLILITER(S): at 01:07

## 2021-01-01 RX ADMIN — Medication 50 MILLILITER(S): at 12:11

## 2021-01-01 RX ADMIN — Medication 100 MILLIEQUIVALENT(S): at 03:14

## 2021-01-01 RX ADMIN — HEPARIN SODIUM 8000 UNIT(S): 5000 INJECTION INTRAVENOUS; SUBCUTANEOUS at 22:19

## 2021-01-01 RX ADMIN — Medication 1 MILLIGRAM(S): at 18:38

## 2021-01-01 RX ADMIN — Medication 2: at 13:35

## 2021-01-01 RX ADMIN — PIPERACILLIN AND TAZOBACTAM 25 GRAM(S): 4; .5 INJECTION, POWDER, LYOPHILIZED, FOR SOLUTION INTRAVENOUS at 17:27

## 2021-01-01 RX ADMIN — Medication 5 MILLIGRAM(S): at 22:38

## 2021-01-01 RX ADMIN — Medication 1 MILLIGRAM(S): at 06:50

## 2021-01-01 RX ADMIN — Medication 1 TABLET(S): at 07:07

## 2021-01-01 RX ADMIN — Medication 5 MILLIGRAM(S): at 12:50

## 2021-01-01 RX ADMIN — CEFEPIME 100 MILLIGRAM(S): 1 INJECTION, POWDER, FOR SOLUTION INTRAMUSCULAR; INTRAVENOUS at 05:51

## 2021-01-01 RX ADMIN — Medication 650 MILLIGRAM(S): at 10:05

## 2021-01-01 RX ADMIN — Medication 12.5 MILLIGRAM(S): at 06:50

## 2021-01-01 RX ADMIN — GABAPENTIN 100 MILLIGRAM(S): 400 CAPSULE ORAL at 22:38

## 2021-01-01 RX ADMIN — Medication 50 MILLILITER(S): at 21:15

## 2021-01-01 RX ADMIN — ENOXAPARIN SODIUM 70 MILLIGRAM(S): 100 INJECTION SUBCUTANEOUS at 17:52

## 2021-01-01 RX ADMIN — INSULIN GLARGINE 3 UNIT(S): 100 INJECTION, SOLUTION SUBCUTANEOUS at 20:46

## 2021-01-01 RX ADMIN — PANTOPRAZOLE SODIUM 40 MILLIGRAM(S): 20 TABLET, DELAYED RELEASE ORAL at 06:52

## 2021-01-01 RX ADMIN — ONDANSETRON 4 MILLIGRAM(S): 8 TABLET, FILM COATED ORAL at 02:14

## 2021-01-01 RX ADMIN — Medication 650 MILLIGRAM(S): at 23:01

## 2021-01-01 RX ADMIN — Medication 1: at 07:46

## 2021-01-01 RX ADMIN — Medication 5 MILLIGRAM(S): at 13:16

## 2021-01-01 RX ADMIN — Medication 5 MILLIGRAM(S): at 00:51

## 2021-01-01 RX ADMIN — CEFEPIME 100 MILLIGRAM(S): 1 INJECTION, POWDER, FOR SOLUTION INTRAMUSCULAR; INTRAVENOUS at 17:04

## 2021-01-01 RX ADMIN — Medication 100 MILLIEQUIVALENT(S): at 10:47

## 2021-01-01 RX ADMIN — OXYMETAZOLINE HYDROCHLORIDE 1 SPRAY(S): 0.5 SPRAY NASAL at 19:11

## 2021-01-01 RX ADMIN — Medication 1 TABLET(S): at 12:00

## 2021-01-01 RX ADMIN — ONDANSETRON 4 MILLIGRAM(S): 8 TABLET, FILM COATED ORAL at 09:42

## 2021-01-01 RX ADMIN — Medication 1 CAPSULE(S): at 10:37

## 2021-01-01 RX ADMIN — Medication 2: at 09:27

## 2021-01-01 RX ADMIN — Medication 650 MILLIGRAM(S): at 17:11

## 2021-01-01 RX ADMIN — Medication 40 MILLIEQUIVALENT(S): at 12:50

## 2021-01-01 RX ADMIN — Medication 1 TABLET(S): at 03:37

## 2021-01-01 RX ADMIN — Medication 480 MICROGRAM(S): at 17:11

## 2021-01-01 RX ADMIN — Medication 81 MILLIGRAM(S): at 12:59

## 2021-01-01 RX ADMIN — Medication 500000 UNIT(S): at 06:09

## 2021-01-01 RX ADMIN — Medication 100 MILLIEQUIVALENT(S): at 20:01

## 2021-01-01 RX ADMIN — Medication 1 CAPSULE(S): at 18:26

## 2021-01-01 RX ADMIN — SIMETHICONE 80 MILLIGRAM(S): 80 TABLET, CHEWABLE ORAL at 23:09

## 2021-01-01 RX ADMIN — ENOXAPARIN SODIUM 70 MILLIGRAM(S): 100 INJECTION SUBCUTANEOUS at 19:19

## 2021-01-01 RX ADMIN — Medication 2 CAPSULE(S): at 08:51

## 2021-01-01 RX ADMIN — FINASTERIDE 5 MILLIGRAM(S): 5 TABLET, FILM COATED ORAL at 22:57

## 2021-01-01 RX ADMIN — CARVEDILOL PHOSPHATE 12.5 MILLIGRAM(S): 80 CAPSULE, EXTENDED RELEASE ORAL at 18:19

## 2021-01-01 RX ADMIN — Medication 1 TABLET(S): at 15:01

## 2021-01-01 RX ADMIN — ONDANSETRON 4 MILLIGRAM(S): 8 TABLET, FILM COATED ORAL at 17:16

## 2021-01-01 RX ADMIN — Medication 500000 UNIT(S): at 06:39

## 2021-01-01 RX ADMIN — Medication 1 TABLET(S): at 12:46

## 2021-01-01 RX ADMIN — Medication 2.5 MILLIGRAM(S): at 21:32

## 2021-01-01 RX ADMIN — Medication 5 MILLIGRAM(S): at 21:10

## 2021-01-01 RX ADMIN — Medication 125 MILLIGRAM(S): at 21:53

## 2021-01-01 RX ADMIN — ATORVASTATIN CALCIUM 80 MILLIGRAM(S): 80 TABLET, FILM COATED ORAL at 21:53

## 2021-01-01 RX ADMIN — Medication 50 MILLILITER(S): at 13:08

## 2021-01-01 RX ADMIN — Medication 1 MILLIGRAM(S): at 17:23

## 2021-01-01 RX ADMIN — Medication 2 CAPSULE(S): at 21:32

## 2021-01-01 RX ADMIN — CARVEDILOL PHOSPHATE 12.5 MILLIGRAM(S): 80 CAPSULE, EXTENDED RELEASE ORAL at 17:09

## 2021-01-01 RX ADMIN — Medication 500 MILLIGRAM(S): at 11:25

## 2021-01-01 RX ADMIN — Medication 80 MILLIGRAM(S): at 22:43

## 2021-01-01 RX ADMIN — Medication 1 TABLET(S): at 18:00

## 2021-01-01 RX ADMIN — ENOXAPARIN SODIUM 70 MILLIGRAM(S): 100 INJECTION SUBCUTANEOUS at 18:49

## 2021-01-01 RX ADMIN — PIPERACILLIN AND TAZOBACTAM 25 GRAM(S): 4; .5 INJECTION, POWDER, LYOPHILIZED, FOR SOLUTION INTRAVENOUS at 17:31

## 2021-01-01 RX ADMIN — DIPHENHYDRAMINE HYDROCHLORIDE AND LIDOCAINE HYDROCHLORIDE AND ALUMINUM HYDROXIDE AND MAGNESIUM HYDRO 30 MILLILITER(S): KIT at 12:54

## 2021-01-01 RX ADMIN — Medication 1 TABLET(S): at 12:33

## 2021-01-01 RX ADMIN — Medication 80 MILLIGRAM(S): at 05:16

## 2021-01-01 RX ADMIN — OXYMETAZOLINE HYDROCHLORIDE 1 SPRAY(S): 0.5 SPRAY NASAL at 17:52

## 2021-01-01 RX ADMIN — FINASTERIDE 5 MILLIGRAM(S): 5 TABLET, FILM COATED ORAL at 11:30

## 2021-01-01 RX ADMIN — Medication 50 GRAM(S): at 11:14

## 2021-01-01 RX ADMIN — Medication 81 MILLIGRAM(S): at 12:06

## 2021-01-01 RX ADMIN — Medication 81 MILLIGRAM(S): at 15:01

## 2021-01-01 RX ADMIN — Medication 40 MILLIGRAM(S): at 06:29

## 2021-01-01 RX ADMIN — Medication 300 MILLIGRAM(S): at 03:34

## 2021-01-01 RX ADMIN — Medication 81 MILLIGRAM(S): at 12:26

## 2021-01-01 RX ADMIN — ENOXAPARIN SODIUM 70 MILLIGRAM(S): 100 INJECTION SUBCUTANEOUS at 05:20

## 2021-01-01 RX ADMIN — Medication 1 CAPSULE(S): at 21:28

## 2021-01-01 RX ADMIN — Medication 5 MILLIGRAM(S): at 21:56

## 2021-01-01 RX ADMIN — ONDANSETRON 4 MILLIGRAM(S): 8 TABLET, FILM COATED ORAL at 03:59

## 2021-01-01 RX ADMIN — Medication 1 CAPSULE(S): at 22:56

## 2021-01-01 RX ADMIN — Medication 100 MILLIEQUIVALENT(S): at 14:40

## 2021-01-01 RX ADMIN — Medication 20 MILLIEQUIVALENT(S): at 12:36

## 2021-01-01 RX ADMIN — ONDANSETRON 4 MILLIGRAM(S): 8 TABLET, FILM COATED ORAL at 15:09

## 2021-01-01 RX ADMIN — ATORVASTATIN CALCIUM 80 MILLIGRAM(S): 80 TABLET, FILM COATED ORAL at 23:09

## 2021-01-01 RX ADMIN — ATORVASTATIN CALCIUM 80 MILLIGRAM(S): 80 TABLET, FILM COATED ORAL at 22:13

## 2021-01-01 RX ADMIN — Medication 1 CAPSULE(S): at 17:33

## 2021-01-01 RX ADMIN — SODIUM CHLORIDE 250 MILLILITER(S): 9 INJECTION, SOLUTION INTRAVENOUS at 22:11

## 2021-01-01 RX ADMIN — Medication 2.5 MILLIGRAM(S): at 22:38

## 2021-01-01 RX ADMIN — GABAPENTIN 100 MILLIGRAM(S): 400 CAPSULE ORAL at 22:02

## 2021-01-01 RX ADMIN — APIXABAN 5 MILLIGRAM(S): 2.5 TABLET, FILM COATED ORAL at 21:52

## 2021-01-01 RX ADMIN — ATORVASTATIN CALCIUM 80 MILLIGRAM(S): 80 TABLET, FILM COATED ORAL at 21:09

## 2021-01-01 RX ADMIN — CARVEDILOL PHOSPHATE 6.25 MILLIGRAM(S): 80 CAPSULE, EXTENDED RELEASE ORAL at 06:04

## 2021-01-01 RX ADMIN — Medication 1: at 12:10

## 2021-01-01 RX ADMIN — Medication 30 GRAM(S): at 06:56

## 2021-01-01 RX ADMIN — Medication 2: at 12:54

## 2021-01-01 RX ADMIN — Medication 1 TABLET(S): at 11:52

## 2021-01-01 RX ADMIN — Medication 81 MILLIGRAM(S): at 11:19

## 2021-01-01 RX ADMIN — PIPERACILLIN AND TAZOBACTAM 25 GRAM(S): 4; .5 INJECTION, POWDER, LYOPHILIZED, FOR SOLUTION INTRAVENOUS at 10:12

## 2021-01-01 RX ADMIN — CARVEDILOL PHOSPHATE 12.5 MILLIGRAM(S): 80 CAPSULE, EXTENDED RELEASE ORAL at 22:00

## 2021-01-01 RX ADMIN — Medication 1 MILLIGRAM(S): at 22:02

## 2021-01-01 RX ADMIN — ENOXAPARIN SODIUM 70 MILLIGRAM(S): 100 INJECTION SUBCUTANEOUS at 17:48

## 2021-01-01 RX ADMIN — Medication 650 MILLIGRAM(S): at 02:00

## 2021-01-01 RX ADMIN — PIPERACILLIN AND TAZOBACTAM 25 GRAM(S): 4; .5 INJECTION, POWDER, LYOPHILIZED, FOR SOLUTION INTRAVENOUS at 10:37

## 2021-01-01 RX ADMIN — Medication 50 MILLILITER(S): at 15:45

## 2021-01-01 RX ADMIN — ENOXAPARIN SODIUM 70 MILLIGRAM(S): 100 INJECTION SUBCUTANEOUS at 18:22

## 2021-01-01 RX ADMIN — Medication 30 MILLILITER(S): at 04:36

## 2021-01-01 RX ADMIN — Medication 1: at 17:21

## 2021-01-01 RX ADMIN — Medication 100 GRAM(S): at 07:04

## 2021-01-01 RX ADMIN — DIPHENHYDRAMINE HYDROCHLORIDE AND LIDOCAINE HYDROCHLORIDE AND ALUMINUM HYDROXIDE AND MAGNESIUM HYDRO 30 MILLILITER(S): KIT at 18:07

## 2021-01-01 RX ADMIN — Medication 2: at 12:46

## 2021-01-01 RX ADMIN — DIPHENHYDRAMINE HYDROCHLORIDE AND LIDOCAINE HYDROCHLORIDE AND ALUMINUM HYDROXIDE AND MAGNESIUM HYDRO 30 MILLILITER(S): KIT at 05:40

## 2021-01-01 RX ADMIN — Medication 1: at 18:09

## 2021-01-01 RX ADMIN — CARVEDILOL PHOSPHATE 12.5 MILLIGRAM(S): 80 CAPSULE, EXTENDED RELEASE ORAL at 05:46

## 2021-01-01 RX ADMIN — Medication 30 GRAM(S): at 17:23

## 2021-01-01 RX ADMIN — Medication 2 CAPSULE(S): at 08:48

## 2021-01-01 RX ADMIN — PIPERACILLIN AND TAZOBACTAM 25 GRAM(S): 4; .5 INJECTION, POWDER, LYOPHILIZED, FOR SOLUTION INTRAVENOUS at 01:39

## 2021-01-01 RX ADMIN — Medication 1 CAPSULE(S): at 09:36

## 2021-01-01 RX ADMIN — Medication 1 CAPSULE(S): at 09:31

## 2021-01-01 RX ADMIN — PANTOPRAZOLE SODIUM 40 MILLIGRAM(S): 20 TABLET, DELAYED RELEASE ORAL at 12:06

## 2021-01-01 RX ADMIN — Medication 100 MILLIEQUIVALENT(S): at 04:16

## 2021-01-01 RX ADMIN — PANTOPRAZOLE SODIUM 40 MILLIGRAM(S): 20 TABLET, DELAYED RELEASE ORAL at 12:52

## 2021-01-01 RX ADMIN — CHLORHEXIDINE GLUCONATE 15 MILLILITER(S): 213 SOLUTION TOPICAL at 18:01

## 2021-01-01 RX ADMIN — Medication 1: at 17:14

## 2021-01-01 RX ADMIN — Medication 5 MILLIGRAM(S): at 13:17

## 2021-01-01 RX ADMIN — PIPERACILLIN AND TAZOBACTAM 200 GRAM(S): 4; .5 INJECTION, POWDER, LYOPHILIZED, FOR SOLUTION INTRAVENOUS at 02:41

## 2021-01-01 RX ADMIN — SIMETHICONE 80 MILLIGRAM(S): 80 TABLET, CHEWABLE ORAL at 05:44

## 2021-01-01 RX ADMIN — Medication 50 MILLIEQUIVALENT(S): at 07:00

## 2021-01-01 RX ADMIN — Medication 1: at 08:55

## 2021-01-01 RX ADMIN — ENOXAPARIN SODIUM 70 MILLIGRAM(S): 100 INJECTION SUBCUTANEOUS at 06:07

## 2021-01-01 RX ADMIN — DIPHENHYDRAMINE HYDROCHLORIDE AND LIDOCAINE HYDROCHLORIDE AND ALUMINUM HYDROXIDE AND MAGNESIUM HYDRO 30 MILLILITER(S): KIT at 00:00

## 2021-01-01 RX ADMIN — DIPHENHYDRAMINE HYDROCHLORIDE AND LIDOCAINE HYDROCHLORIDE AND ALUMINUM HYDROXIDE AND MAGNESIUM HYDRO 30 MILLILITER(S): KIT at 23:06

## 2021-01-01 RX ADMIN — Medication 3 UNIT(S): at 09:21

## 2021-01-01 RX ADMIN — Medication 2 CAPSULE(S): at 21:52

## 2021-01-01 RX ADMIN — POLYETHYLENE GLYCOL 3350 17 GRAM(S): 17 POWDER, FOR SOLUTION ORAL at 12:27

## 2021-01-01 RX ADMIN — Medication 1 MILLIGRAM(S): at 09:19

## 2021-01-01 RX ADMIN — Medication 12.5 MILLIGRAM(S): at 06:28

## 2021-01-01 RX ADMIN — Medication 125 MILLIGRAM(S): at 13:50

## 2021-01-01 RX ADMIN — Medication 1 MILLIGRAM(S): at 06:54

## 2021-01-01 RX ADMIN — ENOXAPARIN SODIUM 70 MILLIGRAM(S): 100 INJECTION SUBCUTANEOUS at 06:48

## 2021-01-01 RX ADMIN — Medication 20 MILLIGRAM(S): at 06:53

## 2021-01-01 RX ADMIN — FINASTERIDE 5 MILLIGRAM(S): 5 TABLET, FILM COATED ORAL at 22:03

## 2021-01-01 RX ADMIN — Medication 81 MILLIGRAM(S): at 12:45

## 2021-01-01 RX ADMIN — Medication 80 MILLIGRAM(S): at 06:04

## 2021-01-01 RX ADMIN — Medication 1 MILLIGRAM(S): at 17:30

## 2021-01-01 RX ADMIN — APIXABAN 5 MILLIGRAM(S): 2.5 TABLET, FILM COATED ORAL at 10:37

## 2021-01-01 RX ADMIN — Medication 2 CAPSULE(S): at 22:53

## 2021-01-01 RX ADMIN — Medication 125 MILLIGRAM(S): at 10:20

## 2021-01-01 RX ADMIN — Medication 1: at 13:05

## 2021-01-01 RX ADMIN — Medication 30 GRAM(S): at 22:52

## 2021-01-01 RX ADMIN — Medication 1 CAPSULE(S): at 22:04

## 2021-01-01 RX ADMIN — Medication 12.5 MILLIGRAM(S): at 06:40

## 2021-01-01 RX ADMIN — BUMETANIDE 2.5 MG/HR: 0.25 INJECTION INTRAMUSCULAR; INTRAVENOUS at 14:13

## 2021-01-01 RX ADMIN — Medication 7.5 MICROGRAM(S)/KG/MIN: at 11:17

## 2021-01-01 RX ADMIN — Medication 1: at 09:30

## 2021-01-01 RX ADMIN — Medication 5 MILLIGRAM(S): at 22:03

## 2021-01-01 RX ADMIN — Medication 100 MILLIEQUIVALENT(S): at 14:08

## 2021-01-01 RX ADMIN — ATORVASTATIN CALCIUM 80 MILLIGRAM(S): 80 TABLET, FILM COATED ORAL at 22:03

## 2021-01-01 RX ADMIN — Medication 1 PACKET(S): at 11:18

## 2021-01-01 RX ADMIN — Medication 3 UNIT(S): at 17:54

## 2021-01-01 RX ADMIN — SIMETHICONE 80 MILLIGRAM(S): 80 TABLET, CHEWABLE ORAL at 10:39

## 2021-01-01 RX ADMIN — FINASTERIDE 5 MILLIGRAM(S): 5 TABLET, FILM COATED ORAL at 23:19

## 2021-01-01 RX ADMIN — PIPERACILLIN AND TAZOBACTAM 25 GRAM(S): 4; .5 INJECTION, POWDER, LYOPHILIZED, FOR SOLUTION INTRAVENOUS at 19:20

## 2021-01-01 RX ADMIN — PIPERACILLIN AND TAZOBACTAM 25 GRAM(S): 4; .5 INJECTION, POWDER, LYOPHILIZED, FOR SOLUTION INTRAVENOUS at 09:41

## 2021-01-01 RX ADMIN — BUMETANIDE 2.5 MG/HR: 0.25 INJECTION INTRAMUSCULAR; INTRAVENOUS at 14:36

## 2021-01-01 RX ADMIN — Medication 81 MILLIGRAM(S): at 14:27

## 2021-01-01 RX ADMIN — Medication 1 MILLIGRAM(S): at 13:00

## 2021-01-01 RX ADMIN — Medication 2.5 MILLIGRAM(S): at 17:32

## 2021-01-01 RX ADMIN — ENOXAPARIN SODIUM 70 MILLIGRAM(S): 100 INJECTION SUBCUTANEOUS at 17:26

## 2021-01-01 RX ADMIN — ONDANSETRON 4 MILLIGRAM(S): 8 TABLET, FILM COATED ORAL at 14:44

## 2021-01-01 RX ADMIN — DIPHENHYDRAMINE HYDROCHLORIDE AND LIDOCAINE HYDROCHLORIDE AND ALUMINUM HYDROXIDE AND MAGNESIUM HYDRO 30 MILLILITER(S): KIT at 17:24

## 2021-01-01 RX ADMIN — Medication 250 MILLIGRAM(S): at 08:12

## 2021-01-01 RX ADMIN — Medication 3 UNIT(S): at 09:52

## 2021-01-01 RX ADMIN — Medication 1 TABLET(S): at 06:50

## 2021-01-01 RX ADMIN — Medication 1 MILLIGRAM(S): at 19:39

## 2021-01-01 RX ADMIN — ENOXAPARIN SODIUM 70 MILLIGRAM(S): 100 INJECTION SUBCUTANEOUS at 05:22

## 2021-01-01 RX ADMIN — Medication 10 MILLIGRAM(S): at 06:32

## 2021-01-01 RX ADMIN — LOSARTAN POTASSIUM 50 MILLIGRAM(S): 100 TABLET, FILM COATED ORAL at 05:46

## 2021-01-01 RX ADMIN — SODIUM CHLORIDE 1000 MILLILITER(S): 9 INJECTION INTRAMUSCULAR; INTRAVENOUS; SUBCUTANEOUS at 00:58

## 2021-01-01 RX ADMIN — Medication 1: at 08:16

## 2021-01-01 RX ADMIN — Medication 1 TABLET(S): at 06:54

## 2021-01-01 RX ADMIN — Medication 1 TABLET(S): at 13:01

## 2021-01-01 RX ADMIN — Medication 1 TABLET(S): at 06:08

## 2021-01-01 RX ADMIN — Medication 81 MILLIGRAM(S): at 14:32

## 2021-01-01 RX ADMIN — FINASTERIDE 5 MILLIGRAM(S): 5 TABLET, FILM COATED ORAL at 23:10

## 2021-01-01 RX ADMIN — Medication 1 CAPSULE(S): at 12:51

## 2021-01-01 RX ADMIN — LOSARTAN POTASSIUM 25 MILLIGRAM(S): 100 TABLET, FILM COATED ORAL at 06:40

## 2021-01-01 RX ADMIN — Medication 500000 UNIT(S): at 18:24

## 2021-01-01 RX ADMIN — Medication 2.5 MILLIGRAM(S): at 23:04

## 2021-01-01 RX ADMIN — Medication 30 MILLILITER(S): at 18:02

## 2021-01-01 RX ADMIN — GABAPENTIN 100 MILLIGRAM(S): 400 CAPSULE ORAL at 21:32

## 2021-01-01 RX ADMIN — Medication 1 CAPSULE(S): at 22:02

## 2021-01-01 RX ADMIN — Medication 2.5 MILLIGRAM(S): at 21:26

## 2021-01-01 RX ADMIN — FINASTERIDE 5 MILLIGRAM(S): 5 TABLET, FILM COATED ORAL at 21:53

## 2021-01-01 RX ADMIN — CARVEDILOL PHOSPHATE 6.25 MILLIGRAM(S): 80 CAPSULE, EXTENDED RELEASE ORAL at 17:31

## 2021-01-01 RX ADMIN — DIPHENHYDRAMINE HYDROCHLORIDE AND LIDOCAINE HYDROCHLORIDE AND ALUMINUM HYDROXIDE AND MAGNESIUM HYDRO 30 MILLILITER(S): KIT at 13:10

## 2021-01-01 RX ADMIN — BUMETANIDE 1 MILLIGRAM(S): 0.25 INJECTION INTRAMUSCULAR; INTRAVENOUS at 14:35

## 2021-01-01 RX ADMIN — ATORVASTATIN CALCIUM 80 MILLIGRAM(S): 80 TABLET, FILM COATED ORAL at 22:04

## 2021-01-01 RX ADMIN — Medication 100 MILLIEQUIVALENT(S): at 11:51

## 2021-01-01 RX ADMIN — Medication 81 MILLIGRAM(S): at 13:01

## 2021-01-01 RX ADMIN — LIDOCAINE 1 PATCH: 4 CREAM TOPICAL at 04:36

## 2021-01-01 RX ADMIN — PIPERACILLIN AND TAZOBACTAM 25 GRAM(S): 4; .5 INJECTION, POWDER, LYOPHILIZED, FOR SOLUTION INTRAVENOUS at 09:03

## 2021-01-01 RX ADMIN — Medication 1 CAPSULE(S): at 12:03

## 2021-01-01 RX ADMIN — Medication 1 TABLET(S): at 13:02

## 2021-01-01 RX ADMIN — Medication 7.5 MICROGRAM(S)/KG/MIN: at 21:59

## 2021-01-01 RX ADMIN — Medication 1 TABLET(S): at 12:07

## 2021-01-01 RX ADMIN — ENOXAPARIN SODIUM 70 MILLIGRAM(S): 100 INJECTION SUBCUTANEOUS at 11:12

## 2021-01-01 RX ADMIN — Medication 650 MILLIGRAM(S): at 10:50

## 2021-01-01 RX ADMIN — CARVEDILOL PHOSPHATE 6.25 MILLIGRAM(S): 80 CAPSULE, EXTENDED RELEASE ORAL at 06:31

## 2021-01-01 RX ADMIN — Medication 1 TABLET(S): at 05:59

## 2021-01-01 RX ADMIN — Medication 1: at 13:06

## 2021-01-01 RX ADMIN — PANTOPRAZOLE SODIUM 40 MILLIGRAM(S): 20 TABLET, DELAYED RELEASE ORAL at 22:44

## 2021-01-01 RX ADMIN — FENTANYL CITRATE 2.63 MICROGRAM(S)/KG/HR: 50 INJECTION INTRAVENOUS at 16:37

## 2021-01-01 RX ADMIN — Medication 650 MILLIGRAM(S): at 10:24

## 2021-01-01 RX ADMIN — Medication 1: at 18:14

## 2021-01-01 RX ADMIN — DIPHENHYDRAMINE HYDROCHLORIDE AND LIDOCAINE HYDROCHLORIDE AND ALUMINUM HYDROXIDE AND MAGNESIUM HYDRO 30 MILLILITER(S): KIT at 07:04

## 2021-01-01 RX ADMIN — ENOXAPARIN SODIUM 70 MILLIGRAM(S): 100 INJECTION SUBCUTANEOUS at 17:37

## 2021-01-01 RX ADMIN — Medication 100 MILLIEQUIVALENT(S): at 16:30

## 2021-01-01 RX ADMIN — Medication 1 TABLET(S): at 14:00

## 2021-01-01 RX ADMIN — Medication 1 MILLIGRAM(S): at 00:09

## 2021-01-01 RX ADMIN — Medication 500 MILLIGRAM(S): at 12:33

## 2021-01-01 RX ADMIN — Medication 1 TABLET(S): at 02:59

## 2021-01-01 RX ADMIN — Medication 19.7 MICROGRAM(S)/KG/MIN: at 07:57

## 2021-01-01 RX ADMIN — INSULIN GLARGINE 3 UNIT(S): 100 INJECTION, SOLUTION SUBCUTANEOUS at 22:38

## 2021-01-01 RX ADMIN — Medication 5 MILLIGRAM(S): at 06:49

## 2021-01-01 RX ADMIN — Medication 50 MILLILITER(S): at 06:05

## 2021-01-01 RX ADMIN — Medication 1 CAPSULE(S): at 11:54

## 2021-01-01 RX ADMIN — Medication 1 MILLIGRAM(S): at 12:06

## 2021-01-01 RX ADMIN — Medication 2.5 MILLIGRAM(S): at 22:19

## 2021-01-01 RX ADMIN — APIXABAN 5 MILLIGRAM(S): 2.5 TABLET, FILM COATED ORAL at 11:26

## 2021-01-01 RX ADMIN — Medication 650 MILLIGRAM(S): at 16:10

## 2021-01-01 RX ADMIN — Medication 125 MILLIGRAM(S): at 13:01

## 2021-01-01 RX ADMIN — DIPHENHYDRAMINE HYDROCHLORIDE AND LIDOCAINE HYDROCHLORIDE AND ALUMINUM HYDROXIDE AND MAGNESIUM HYDRO 30 MILLILITER(S): KIT at 12:11

## 2021-01-01 RX ADMIN — Medication 30 MILLILITER(S): at 21:33

## 2021-01-01 RX ADMIN — ENOXAPARIN SODIUM 70 MILLIGRAM(S): 100 INJECTION SUBCUTANEOUS at 06:56

## 2021-01-01 RX ADMIN — Medication 3 MILLIGRAM(S): at 21:27

## 2021-01-01 RX ADMIN — Medication 50 MILLILITER(S): at 11:17

## 2021-01-01 RX ADMIN — Medication 40 MILLIGRAM(S): at 19:19

## 2021-01-01 RX ADMIN — Medication 5 MILLIGRAM(S): at 23:19

## 2021-01-01 RX ADMIN — Medication 1 MILLIGRAM(S): at 23:30

## 2021-01-01 RX ADMIN — ENOXAPARIN SODIUM 70 MILLIGRAM(S): 100 INJECTION SUBCUTANEOUS at 18:10

## 2021-01-01 RX ADMIN — Medication 1 SPRAY(S): at 00:47

## 2021-01-01 RX ADMIN — Medication 1 MILLIGRAM(S): at 07:05

## 2021-01-01 RX ADMIN — FINASTERIDE 5 MILLIGRAM(S): 5 TABLET, FILM COATED ORAL at 22:38

## 2021-01-01 RX ADMIN — ENOXAPARIN SODIUM 70 MILLIGRAM(S): 100 INJECTION SUBCUTANEOUS at 17:30

## 2021-01-01 RX ADMIN — SIMETHICONE 80 MILLIGRAM(S): 80 TABLET, CHEWABLE ORAL at 17:09

## 2021-01-01 RX ADMIN — PIPERACILLIN AND TAZOBACTAM 25 GRAM(S): 4; .5 INJECTION, POWDER, LYOPHILIZED, FOR SOLUTION INTRAVENOUS at 11:12

## 2021-01-01 RX ADMIN — CHLORHEXIDINE GLUCONATE 15 MILLILITER(S): 213 SOLUTION TOPICAL at 06:58

## 2021-01-01 RX ADMIN — Medication 50 MILLILITER(S): at 15:46

## 2021-01-01 RX ADMIN — Medication 3: at 18:23

## 2021-01-01 RX ADMIN — OXYMETAZOLINE HYDROCHLORIDE 1 SPRAY(S): 0.5 SPRAY NASAL at 06:27

## 2021-01-01 RX ADMIN — Medication 5 MILLIGRAM(S): at 22:57

## 2021-01-01 RX ADMIN — ATORVASTATIN CALCIUM 80 MILLIGRAM(S): 80 TABLET, FILM COATED ORAL at 23:32

## 2021-01-01 RX ADMIN — GABAPENTIN 100 MILLIGRAM(S): 400 CAPSULE ORAL at 22:03

## 2021-01-01 RX ADMIN — CARVEDILOL PHOSPHATE 12.5 MILLIGRAM(S): 80 CAPSULE, EXTENDED RELEASE ORAL at 05:28

## 2021-01-01 RX ADMIN — Medication 125 MILLIGRAM(S): at 22:13

## 2021-01-01 RX ADMIN — Medication 80 MILLIGRAM(S): at 07:05

## 2021-01-01 RX ADMIN — Medication 1 CAPSULE(S): at 09:33

## 2021-01-01 RX ADMIN — Medication 40 MILLIGRAM(S): at 06:08

## 2021-01-01 RX ADMIN — PIPERACILLIN AND TAZOBACTAM 25 GRAM(S): 4; .5 INJECTION, POWDER, LYOPHILIZED, FOR SOLUTION INTRAVENOUS at 03:00

## 2021-01-01 RX ADMIN — Medication 5 MILLIGRAM(S): at 06:50

## 2021-01-01 RX ADMIN — Medication 1 TABLET(S): at 19:10

## 2021-01-01 RX ADMIN — Medication 1 CAPSULE(S): at 21:55

## 2021-01-01 RX ADMIN — Medication 80 MILLIGRAM(S): at 18:11

## 2021-01-01 RX ADMIN — Medication 30 GRAM(S): at 06:09

## 2021-01-01 RX ADMIN — Medication 5 MILLIGRAM(S): at 13:39

## 2021-01-01 RX ADMIN — Medication 1: at 16:37

## 2021-01-01 RX ADMIN — CARVEDILOL PHOSPHATE 3.12 MILLIGRAM(S): 80 CAPSULE, EXTENDED RELEASE ORAL at 05:19

## 2021-01-01 RX ADMIN — ENOXAPARIN SODIUM 70 MILLIGRAM(S): 100 INJECTION SUBCUTANEOUS at 07:05

## 2021-01-01 RX ADMIN — Medication 1 CAPSULE(S): at 22:03

## 2021-01-01 RX ADMIN — Medication 1 CAPSULE(S): at 18:10

## 2021-01-01 RX ADMIN — Medication 5 MILLIGRAM(S): at 05:19

## 2021-01-01 RX ADMIN — Medication 1 CAPSULE(S): at 19:10

## 2021-01-01 RX ADMIN — ENOXAPARIN SODIUM 70 MILLIGRAM(S): 100 INJECTION SUBCUTANEOUS at 17:33

## 2021-01-01 RX ADMIN — Medication 1 TABLET(S): at 12:31

## 2021-01-01 RX ADMIN — FINASTERIDE 5 MILLIGRAM(S): 5 TABLET, FILM COATED ORAL at 21:56

## 2021-01-01 RX ADMIN — PIPERACILLIN AND TAZOBACTAM 25 GRAM(S): 4; .5 INJECTION, POWDER, LYOPHILIZED, FOR SOLUTION INTRAVENOUS at 01:10

## 2021-01-01 RX ADMIN — Medication 1 SPRAY(S): at 13:08

## 2021-01-01 RX ADMIN — Medication 5 MILLIGRAM(S): at 06:04

## 2021-01-01 RX ADMIN — DIPHENHYDRAMINE HYDROCHLORIDE AND LIDOCAINE HYDROCHLORIDE AND ALUMINUM HYDROXIDE AND MAGNESIUM HYDRO 30 MILLILITER(S): KIT at 07:31

## 2021-01-01 RX ADMIN — ENOXAPARIN SODIUM 70 MILLIGRAM(S): 100 INJECTION SUBCUTANEOUS at 06:05

## 2021-01-01 RX ADMIN — ATORVASTATIN CALCIUM 80 MILLIGRAM(S): 80 TABLET, FILM COATED ORAL at 22:54

## 2021-01-01 RX ADMIN — ENOXAPARIN SODIUM 70 MILLIGRAM(S): 100 INJECTION SUBCUTANEOUS at 05:15

## 2021-01-01 RX ADMIN — Medication 50 MILLILITER(S): at 02:09

## 2021-01-01 RX ADMIN — FINASTERIDE 5 MILLIGRAM(S): 5 TABLET, FILM COATED ORAL at 14:27

## 2021-01-01 RX ADMIN — ENOXAPARIN SODIUM 70 MILLIGRAM(S): 100 INJECTION SUBCUTANEOUS at 22:13

## 2021-01-01 RX ADMIN — Medication 12.5 MILLIGRAM(S): at 06:53

## 2021-01-01 RX ADMIN — Medication 5 MILLIGRAM(S): at 06:08

## 2021-01-01 RX ADMIN — Medication 100 MILLIEQUIVALENT(S): at 16:56

## 2021-01-01 RX ADMIN — Medication 1 TABLET(S): at 12:48

## 2021-01-01 RX ADMIN — Medication 1 TABLET(S): at 06:33

## 2021-01-01 RX ADMIN — Medication 40 MILLIGRAM(S): at 02:25

## 2021-01-01 RX ADMIN — LOSARTAN POTASSIUM 12.5 MILLIGRAM(S): 100 TABLET, FILM COATED ORAL at 06:28

## 2021-01-01 RX ADMIN — INSULIN GLARGINE 5 UNIT(S): 100 INJECTION, SOLUTION SUBCUTANEOUS at 23:33

## 2021-01-01 RX ADMIN — Medication 1 MILLIGRAM(S): at 13:10

## 2021-01-01 RX ADMIN — ONDANSETRON 4 MILLIGRAM(S): 8 TABLET, FILM COATED ORAL at 11:19

## 2021-01-01 RX ADMIN — ETOMIDATE 30 MILLIGRAM(S): 2 INJECTION INTRAVENOUS at 07:04

## 2021-01-01 RX ADMIN — ENOXAPARIN SODIUM 70 MILLIGRAM(S): 100 INJECTION SUBCUTANEOUS at 18:25

## 2021-01-01 RX ADMIN — DIPHENHYDRAMINE HYDROCHLORIDE AND LIDOCAINE HYDROCHLORIDE AND ALUMINUM HYDROXIDE AND MAGNESIUM HYDRO 30 MILLILITER(S): KIT at 06:07

## 2021-01-01 RX ADMIN — PIPERACILLIN AND TAZOBACTAM 25 GRAM(S): 4; .5 INJECTION, POWDER, LYOPHILIZED, FOR SOLUTION INTRAVENOUS at 18:48

## 2021-01-01 RX ADMIN — Medication 500000 UNIT(S): at 05:59

## 2021-01-01 RX ADMIN — Medication 100 MILLIEQUIVALENT(S): at 23:50

## 2021-01-01 RX ADMIN — Medication 500000 UNIT(S): at 23:08

## 2021-01-01 RX ADMIN — Medication 6: at 18:52

## 2021-01-01 RX ADMIN — Medication 2 CAPSULE(S): at 13:46

## 2021-01-01 RX ADMIN — Medication 81 MILLIGRAM(S): at 12:03

## 2021-01-01 RX ADMIN — LOSARTAN POTASSIUM 50 MILLIGRAM(S): 100 TABLET, FILM COATED ORAL at 05:59

## 2021-01-01 RX ADMIN — Medication 81 MILLIGRAM(S): at 14:00

## 2021-01-01 RX ADMIN — ENOXAPARIN SODIUM 70 MILLIGRAM(S): 100 INJECTION SUBCUTANEOUS at 06:24

## 2021-01-01 RX ADMIN — PANTOPRAZOLE SODIUM 40 MILLIGRAM(S): 20 TABLET, DELAYED RELEASE ORAL at 07:03

## 2021-01-01 RX ADMIN — HEPARIN SODIUM 1800 UNIT(S)/HR: 5000 INJECTION INTRAVENOUS; SUBCUTANEOUS at 12:37

## 2021-01-01 RX ADMIN — Medication 40 MILLIEQUIVALENT(S): at 11:43

## 2021-01-01 RX ADMIN — Medication 1 MILLIGRAM(S): at 01:16

## 2021-01-01 RX ADMIN — Medication 1 CAPSULE(S): at 17:37

## 2021-01-01 RX ADMIN — Medication 80 MILLIGRAM(S): at 06:20

## 2021-01-01 RX ADMIN — Medication 1 MILLIGRAM(S): at 12:00

## 2021-01-01 RX ADMIN — FINASTERIDE 5 MILLIGRAM(S): 5 TABLET, FILM COATED ORAL at 12:27

## 2021-01-01 RX ADMIN — CEFEPIME 100 MILLIGRAM(S): 1 INJECTION, POWDER, FOR SOLUTION INTRAMUSCULAR; INTRAVENOUS at 06:18

## 2021-01-01 RX ADMIN — Medication 3 MILLIGRAM(S): at 22:07

## 2021-01-01 RX ADMIN — Medication 100 MILLIEQUIVALENT(S): at 01:18

## 2021-01-01 RX ADMIN — FINASTERIDE 5 MILLIGRAM(S): 5 TABLET, FILM COATED ORAL at 22:13

## 2021-01-01 RX ADMIN — Medication 81 MILLIGRAM(S): at 12:54

## 2021-01-01 RX ADMIN — Medication 1 MILLIGRAM(S): at 23:14

## 2021-01-01 RX ADMIN — Medication 50 MILLILITER(S): at 21:10

## 2021-01-01 RX ADMIN — Medication 1 TABLET(S): at 13:03

## 2021-01-01 RX ADMIN — SIMETHICONE 80 MILLIGRAM(S): 80 TABLET, CHEWABLE ORAL at 12:52

## 2021-01-01 RX ADMIN — GABAPENTIN 100 MILLIGRAM(S): 400 CAPSULE ORAL at 22:57

## 2021-01-01 RX ADMIN — HEPARIN SODIUM 1300 UNIT(S)/HR: 5000 INJECTION INTRAVENOUS; SUBCUTANEOUS at 13:16

## 2021-01-01 RX ADMIN — LOSARTAN POTASSIUM 25 MILLIGRAM(S): 100 TABLET, FILM COATED ORAL at 06:31

## 2021-01-01 RX ADMIN — Medication 100 MILLIEQUIVALENT(S): at 00:01

## 2021-01-01 RX ADMIN — FINASTERIDE 5 MILLIGRAM(S): 5 TABLET, FILM COATED ORAL at 21:28

## 2021-01-01 RX ADMIN — ENOXAPARIN SODIUM 70 MILLIGRAM(S): 100 INJECTION SUBCUTANEOUS at 05:57

## 2021-01-01 RX ADMIN — Medication 1 MILLIGRAM(S): at 18:55

## 2021-01-01 RX ADMIN — Medication 1 TABLET(S): at 17:33

## 2021-01-01 RX ADMIN — Medication 30 MILLILITER(S): at 22:00

## 2021-01-01 RX ADMIN — PIPERACILLIN AND TAZOBACTAM 25 GRAM(S): 4; .5 INJECTION, POWDER, LYOPHILIZED, FOR SOLUTION INTRAVENOUS at 19:51

## 2021-01-01 RX ADMIN — Medication 100 MILLIEQUIVALENT(S): at 13:58

## 2021-01-01 RX ADMIN — PANTOPRAZOLE SODIUM 40 MILLIGRAM(S): 20 TABLET, DELAYED RELEASE ORAL at 12:58

## 2021-01-01 RX ADMIN — Medication 3 UNIT(S): at 13:35

## 2021-01-01 RX ADMIN — PIPERACILLIN AND TAZOBACTAM 25 GRAM(S): 4; .5 INJECTION, POWDER, LYOPHILIZED, FOR SOLUTION INTRAVENOUS at 00:59

## 2021-01-01 RX ADMIN — Medication 1 TABLET(S): at 11:25

## 2021-01-01 RX ADMIN — Medication 100 MILLIEQUIVALENT(S): at 14:46

## 2021-01-01 RX ADMIN — Medication 100 MILLIEQUIVALENT(S): at 17:36

## 2021-01-01 RX ADMIN — Medication 40 MILLIEQUIVALENT(S): at 11:18

## 2021-01-01 RX ADMIN — CARVEDILOL PHOSPHATE 3.12 MILLIGRAM(S): 80 CAPSULE, EXTENDED RELEASE ORAL at 18:26

## 2021-01-01 RX ADMIN — Medication 100 MILLIEQUIVALENT(S): at 00:55

## 2021-01-01 RX ADMIN — FINASTERIDE 5 MILLIGRAM(S): 5 TABLET, FILM COATED ORAL at 13:03

## 2021-01-01 RX ADMIN — ONDANSETRON 4 MILLIGRAM(S): 8 TABLET, FILM COATED ORAL at 23:09

## 2021-01-01 RX ADMIN — Medication 1 CAPSULE(S): at 08:42

## 2021-01-01 RX ADMIN — Medication 1 MILLIGRAM(S): at 00:47

## 2021-01-01 RX ADMIN — HEPARIN SODIUM 0 UNIT(S)/HR: 5000 INJECTION INTRAVENOUS; SUBCUTANEOUS at 04:56

## 2021-01-01 RX ADMIN — HEPARIN SODIUM 1300 UNIT(S)/HR: 5000 INJECTION INTRAVENOUS; SUBCUTANEOUS at 03:07

## 2021-01-01 RX ADMIN — Medication 1 TABLET(S): at 08:48

## 2021-01-01 RX ADMIN — Medication 3.6 MICROGRAM(S)/KG/MIN: at 12:28

## 2021-01-01 RX ADMIN — Medication 81 MILLIGRAM(S): at 12:55

## 2021-01-01 RX ADMIN — Medication 50 MILLILITER(S): at 15:05

## 2021-01-01 RX ADMIN — Medication 500000 UNIT(S): at 06:53

## 2021-01-01 RX ADMIN — GABAPENTIN 100 MILLIGRAM(S): 400 CAPSULE ORAL at 22:55

## 2021-01-01 RX ADMIN — POTASSIUM PHOSPHATE, MONOBASIC POTASSIUM PHOSPHATE, DIBASIC 83.33 MILLIMOLE(S): 236; 224 INJECTION, SOLUTION INTRAVENOUS at 10:08

## 2021-01-01 RX ADMIN — Medication 81 MILLIGRAM(S): at 13:05

## 2021-01-01 RX ADMIN — Medication 1 MILLIGRAM(S): at 18:19

## 2021-01-01 RX ADMIN — Medication 3 UNIT(S): at 12:56

## 2021-01-01 RX ADMIN — CARVEDILOL PHOSPHATE 12.5 MILLIGRAM(S): 80 CAPSULE, EXTENDED RELEASE ORAL at 17:33

## 2021-01-01 RX ADMIN — Medication 3: at 12:38

## 2021-01-01 RX ADMIN — Medication 81 MILLIGRAM(S): at 12:27

## 2021-01-01 RX ADMIN — Medication 81 MILLIGRAM(S): at 13:06

## 2021-01-01 RX ADMIN — PIPERACILLIN AND TAZOBACTAM 25 GRAM(S): 4; .5 INJECTION, POWDER, LYOPHILIZED, FOR SOLUTION INTRAVENOUS at 01:07

## 2021-01-01 RX ADMIN — CARVEDILOL PHOSPHATE 12.5 MILLIGRAM(S): 80 CAPSULE, EXTENDED RELEASE ORAL at 05:59

## 2021-01-01 RX ADMIN — Medication 1 MILLIGRAM(S): at 00:30

## 2021-01-01 RX ADMIN — Medication 5 MILLIGRAM(S): at 15:28

## 2021-01-01 RX ADMIN — Medication 2: at 13:03

## 2021-01-01 RX ADMIN — Medication 1 CAPSULE(S): at 09:09

## 2021-01-01 RX ADMIN — ENOXAPARIN SODIUM 70 MILLIGRAM(S): 100 INJECTION SUBCUTANEOUS at 17:32

## 2021-01-01 RX ADMIN — Medication 40 MILLIGRAM(S): at 17:35

## 2021-01-01 RX ADMIN — Medication 81 MILLIGRAM(S): at 13:51

## 2021-01-01 RX ADMIN — Medication 650 MILLIGRAM(S): at 22:19

## 2021-01-01 RX ADMIN — LOSARTAN POTASSIUM 12.5 MILLIGRAM(S): 100 TABLET, FILM COATED ORAL at 22:56

## 2021-01-01 RX ADMIN — Medication 2.5 MILLIGRAM(S): at 22:56

## 2021-01-01 RX ADMIN — SENNA PLUS 2 TABLET(S): 8.6 TABLET ORAL at 23:53

## 2021-01-01 RX ADMIN — Medication 2 CAPSULE(S): at 17:22

## 2021-01-01 RX ADMIN — Medication 1 MILLIGRAM(S): at 18:24

## 2021-01-01 RX ADMIN — FINASTERIDE 5 MILLIGRAM(S): 5 TABLET, FILM COATED ORAL at 12:06

## 2021-01-01 RX ADMIN — Medication 10 MILLIGRAM(S): at 06:09

## 2021-01-01 RX ADMIN — GABAPENTIN 100 MILLIGRAM(S): 400 CAPSULE ORAL at 23:19

## 2021-01-01 RX ADMIN — Medication 2: at 09:06

## 2021-01-01 RX ADMIN — Medication 30 MILLILITER(S): at 01:56

## 2021-01-01 RX ADMIN — ATORVASTATIN CALCIUM 80 MILLIGRAM(S): 80 TABLET, FILM COATED ORAL at 21:28

## 2021-01-01 RX ADMIN — Medication 3 UNIT(S): at 19:09

## 2021-01-01 RX ADMIN — Medication 1 TABLET(S): at 08:42

## 2021-01-01 RX ADMIN — PIPERACILLIN AND TAZOBACTAM 25 GRAM(S): 4; .5 INJECTION, POWDER, LYOPHILIZED, FOR SOLUTION INTRAVENOUS at 09:44

## 2021-01-01 RX ADMIN — DIPHENHYDRAMINE HYDROCHLORIDE AND LIDOCAINE HYDROCHLORIDE AND ALUMINUM HYDROXIDE AND MAGNESIUM HYDRO 30 MILLILITER(S): KIT at 12:45

## 2021-01-01 RX ADMIN — Medication 1 CAPSULE(S): at 17:53

## 2021-01-01 RX ADMIN — Medication 1 MILLIGRAM(S): at 05:59

## 2021-01-01 RX ADMIN — Medication 480 MICROGRAM(S): at 12:06

## 2021-01-01 RX ADMIN — Medication 81 MILLIGRAM(S): at 11:52

## 2021-01-01 RX ADMIN — Medication 60 MILLIGRAM(S): at 06:49

## 2021-01-01 RX ADMIN — Medication 500 MILLIGRAM(S): at 12:07

## 2021-01-01 RX ADMIN — Medication 50 MILLILITER(S): at 04:09

## 2021-01-01 RX ADMIN — Medication 2 CAPSULE(S): at 11:44

## 2021-01-01 RX ADMIN — Medication 50 MILLILITER(S): at 18:13

## 2021-01-01 RX ADMIN — LIDOCAINE 15 MILLILITER(S): 4 CREAM TOPICAL at 18:38

## 2021-01-01 RX ADMIN — Medication 1 MILLIGRAM(S): at 06:27

## 2021-01-01 RX ADMIN — Medication 5 MILLIGRAM(S): at 21:54

## 2021-01-01 RX ADMIN — GABAPENTIN 300 MILLIGRAM(S): 400 CAPSULE ORAL at 22:19

## 2021-01-01 RX ADMIN — ONDANSETRON 4 MILLIGRAM(S): 8 TABLET, FILM COATED ORAL at 21:33

## 2021-01-01 RX ADMIN — Medication 1 CAPSULE(S): at 14:40

## 2021-01-01 RX ADMIN — POTASSIUM PHOSPHATE, MONOBASIC POTASSIUM PHOSPHATE, DIBASIC 62.5 MILLIMOLE(S): 236; 224 INJECTION, SOLUTION INTRAVENOUS at 21:51

## 2021-01-01 RX ADMIN — Medication 2.5 MILLIGRAM(S): at 22:46

## 2021-01-01 RX ADMIN — ENOXAPARIN SODIUM 70 MILLIGRAM(S): 100 INJECTION SUBCUTANEOUS at 06:20

## 2021-01-01 RX ADMIN — Medication 40 MILLIGRAM(S): at 21:10

## 2021-01-01 RX ADMIN — Medication 1 TABLET(S): at 09:44

## 2021-01-01 RX ADMIN — Medication 1 MILLIGRAM(S): at 06:09

## 2021-01-01 RX ADMIN — Medication 100 GRAM(S): at 11:19

## 2021-01-01 RX ADMIN — HEPARIN SODIUM 1800 UNIT(S)/HR: 5000 INJECTION INTRAVENOUS; SUBCUTANEOUS at 22:17

## 2021-01-01 RX ADMIN — LIDOCAINE 15 MILLILITER(S): 4 CREAM TOPICAL at 23:11

## 2021-01-01 RX ADMIN — Medication 1 TABLET(S): at 17:24

## 2021-01-01 RX ADMIN — BUMETANIDE 2.5 MG/HR: 0.25 INJECTION INTRAMUSCULAR; INTRAVENOUS at 07:04

## 2021-01-01 RX ADMIN — CEFEPIME 100 MILLIGRAM(S): 1 INJECTION, POWDER, FOR SOLUTION INTRAMUSCULAR; INTRAVENOUS at 17:16

## 2021-01-01 RX ADMIN — Medication 1 TABLET(S): at 13:05

## 2021-01-01 RX ADMIN — PIPERACILLIN AND TAZOBACTAM 25 GRAM(S): 4; .5 INJECTION, POWDER, LYOPHILIZED, FOR SOLUTION INTRAVENOUS at 10:20

## 2021-01-01 RX ADMIN — Medication 1 TABLET(S): at 12:13

## 2021-01-01 RX ADMIN — Medication 100 MILLIGRAM(S): at 12:37

## 2021-01-01 RX ADMIN — Medication 2 CAPSULE(S): at 17:18

## 2021-01-01 RX ADMIN — ENOXAPARIN SODIUM 70 MILLIGRAM(S): 100 INJECTION SUBCUTANEOUS at 17:28

## 2021-01-01 RX ADMIN — PANTOPRAZOLE SODIUM 40 MILLIGRAM(S): 20 TABLET, DELAYED RELEASE ORAL at 05:46

## 2021-01-01 RX ADMIN — Medication 1 CAPSULE(S): at 13:34

## 2021-01-01 RX ADMIN — Medication 2.5 MILLIGRAM(S): at 22:03

## 2021-01-01 RX ADMIN — Medication 7.5 MICROGRAM(S)/KG/MIN: at 19:07

## 2021-01-01 RX ADMIN — ENOXAPARIN SODIUM 70 MILLIGRAM(S): 100 INJECTION SUBCUTANEOUS at 06:27

## 2021-01-01 RX ADMIN — ENOXAPARIN SODIUM 70 MILLIGRAM(S): 100 INJECTION SUBCUTANEOUS at 05:29

## 2021-01-01 RX ADMIN — ATORVASTATIN CALCIUM 80 MILLIGRAM(S): 80 TABLET, FILM COATED ORAL at 22:38

## 2021-01-01 RX ADMIN — Medication 2 CAPSULE(S): at 17:17

## 2021-01-01 RX ADMIN — Medication 125 MILLIGRAM(S): at 17:59

## 2021-01-01 RX ADMIN — Medication 125 MILLIGRAM(S): at 06:29

## 2021-01-01 RX ADMIN — PANTOPRAZOLE SODIUM 40 MILLIGRAM(S): 20 TABLET, DELAYED RELEASE ORAL at 05:59

## 2021-01-01 RX ADMIN — Medication 100 MILLIEQUIVALENT(S): at 17:23

## 2021-01-01 RX ADMIN — Medication 5 MILLIGRAM(S): at 15:09

## 2021-01-01 RX ADMIN — Medication 100 MILLIEQUIVALENT(S): at 12:27

## 2021-01-01 RX ADMIN — Medication 2: at 13:14

## 2021-01-01 RX ADMIN — FENTANYL CITRATE 100 MICROGRAM(S): 50 INJECTION INTRAVENOUS at 12:37

## 2021-01-01 RX ADMIN — Medication 5 MILLIGRAM(S): at 22:02

## 2021-01-01 RX ADMIN — Medication 125 MILLIGRAM(S): at 03:07

## 2021-01-01 RX ADMIN — PANTOPRAZOLE SODIUM 40 MILLIGRAM(S): 20 TABLET, DELAYED RELEASE ORAL at 06:08

## 2021-01-01 RX ADMIN — Medication 2.5 MILLIGRAM(S): at 23:19

## 2021-01-01 RX ADMIN — Medication 2 CAPSULE(S): at 12:32

## 2021-01-01 RX ADMIN — Medication 40 MILLIGRAM(S): at 06:39

## 2021-01-01 RX ADMIN — BUMETANIDE 2 MILLIGRAM(S): 0.25 INJECTION INTRAMUSCULAR; INTRAVENOUS at 08:07

## 2021-01-01 RX ADMIN — FENTANYL CITRATE 2.63 MICROGRAM(S)/KG/HR: 50 INJECTION INTRAVENOUS at 08:01

## 2021-01-01 RX ADMIN — CHLORHEXIDINE GLUCONATE 1 APPLICATION(S): 213 SOLUTION TOPICAL at 06:58

## 2021-01-01 RX ADMIN — SIMETHICONE 80 MILLIGRAM(S): 80 TABLET, CHEWABLE ORAL at 18:20

## 2021-01-01 RX ADMIN — Medication 100 MILLIEQUIVALENT(S): at 01:57

## 2021-01-01 RX ADMIN — Medication 50 MILLILITER(S): at 17:24

## 2021-01-01 RX ADMIN — HEPARIN SODIUM 1300 UNIT(S)/HR: 5000 INJECTION INTRAVENOUS; SUBCUTANEOUS at 20:21

## 2021-01-01 RX ADMIN — Medication 3.6 MICROGRAM(S)/KG/MIN: at 22:46

## 2021-01-01 RX ADMIN — Medication 2 CAPSULE(S): at 09:01

## 2021-01-01 RX ADMIN — Medication 12.5 MILLIGRAM(S): at 06:31

## 2021-01-01 RX ADMIN — Medication 100 MILLIEQUIVALENT(S): at 11:13

## 2021-01-01 RX ADMIN — ENOXAPARIN SODIUM 140 MILLIGRAM(S): 100 INJECTION SUBCUTANEOUS at 17:00

## 2021-01-01 RX ADMIN — Medication 100 MILLIEQUIVALENT(S): at 12:59

## 2021-01-01 RX ADMIN — HEPARIN SODIUM 1500 UNIT(S)/HR: 5000 INJECTION INTRAVENOUS; SUBCUTANEOUS at 05:58

## 2021-01-01 RX ADMIN — SIMETHICONE 80 MILLIGRAM(S): 80 TABLET, CHEWABLE ORAL at 23:52

## 2021-01-15 PROBLEM — I10 BENIGN ESSENTIAL HTN: Status: ACTIVE | Noted: 2021-01-01

## 2021-01-15 PROBLEM — E78.5 HLD (HYPERLIPIDEMIA): Status: ACTIVE | Noted: 2021-01-01

## 2021-01-15 NOTE — DISCUSSION/SUMMARY
[FreeTextEntry1] : Asim Joy is a 74y/o man with Hx of HTN, HLD, BPH, CAD s/p MI, and chronic systolic CHF s/p BiV ICD placement who presents today for initial evaluation as BiV ICD now at [facility]. \par \par Impression:\par \par 1. Chronic systolic CHF: s/p BiV ICD now at [facility]. We discussed the procedures, risks and outcomes of ICD generator change and living with an ICD. We discussed management of ICD therapy throughout life, including deactivation of the ICD. After all questions were answered, and literature was provided, it was a shared decision to proceed with ICD therapy. Resume OMT as prescribed, encouraged heart healthy diet, daily weight, and regular f/u with Cardiology/Heart failure team as scheduled. Consider need for possible LV lead revision to avoid early battery depletion in future. I discussed risks, benefits and alternatives of LV lead revision with patient.  Patient and wife both decided they prefer not have lead revision at this time.  Prefer generator change. We could set outputs at 2.75 at 1.0 msec. \par \par 2. HTN: resume oral antihypertensives as prescribed. Encouraged heart healthy diet, sodium restriction, and weight loss. Continue regular f/u with Cardiologist for further HTN management.\par \par 3. HLD: resume statin therapy as prescribed and regular f/u with Cardiologist for routine lipid monitoring and management.\par \par Plan for BiV ICD gen change. \par \par Sincerely,\par \par Benjamin Cavanaugh MD

## 2021-01-15 NOTE — PHYSICAL EXAM
[General Appearance - Well Developed] : well developed [Normal Appearance] : normal appearance [Well Groomed] : well groomed [General Appearance - Well Nourished] : well nourished [No Deformities] : no deformities [General Appearance - In No Acute Distress] : no acute distress [Normal Conjunctiva] : the conjunctiva exhibited no abnormalities [Eyelids - No Xanthelasma] : the eyelids demonstrated no xanthelasmas [Normal Oral Mucosa] : normal oral mucosa [No Oral Pallor] : no oral pallor [No Oral Cyanosis] : no oral cyanosis [Normal Jugular Venous V Waves Present] : normal jugular venous V waves present [Normal Jugular Venous A Waves Present] : normal jugular venous A waves present [No Jugular Venous Ignacio A Waves] : no jugular venous ignacio A waves [Heart Rate And Rhythm] : heart rate and rhythm were normal [Heart Sounds] : normal S1 and S2 [Murmurs] : no murmurs present [Respiration, Rhythm And Depth] : normal respiratory rhythm and effort [Exaggerated Use Of Accessory Muscles For Inspiration] : no accessory muscle use [Auscultation Breath Sounds / Voice Sounds] : lungs were clear to auscultation bilaterally [Abdomen Soft] : soft [Abdomen Tenderness] : non-tender [Abdomen Mass (___ Cm)] : no abdominal mass palpated [Abnormal Walk] : normal gait [Gait - Sufficient For Exercise Testing] : the gait was sufficient for exercise testing [Nail Clubbing] : no clubbing of the fingernails [Cyanosis, Localized] : no localized cyanosis [Petechial Hemorrhages (___cm)] : no petechial hemorrhages [Skin Color & Pigmentation] : normal skin color and pigmentation [] : no rash [No Venous Stasis] : no venous stasis [Skin Lesions] : no skin lesions [No Skin Ulcers] : no skin ulcer [No Xanthoma] : no  xanthoma was observed [Oriented To Time, Place, And Person] : oriented to person, place, and time [Affect] : the affect was normal [Mood] : the mood was normal [No Anxiety] : not feeling anxious

## 2021-01-15 NOTE — HISTORY OF PRESENT ILLNESS
[FreeTextEntry1] : Dawson Matthew MD\par \par Asim Joy is a 74y/o man with Hx of HTN, HLD, BPH, CAD s/p MI, and chronic systolic CHF s/p BiV ICD placement who presents today for initial evaluation as BiV ICD now at [facility]. Has been doing well with no issues or complaints. Denies chest pain, palpitations, SOB, syncope or near syncope. Of note, LV lead thresholds 2.25 V @1.0 msec (high since 2016).

## 2021-02-13 PROBLEM — Z01.818 PRE-OP EXAM: Status: ACTIVE | Noted: 2021-01-01

## 2021-02-16 NOTE — H&P PST ADULT - NSICDXPROBLEM_GEN_ALL_CORE_FT
PROBLEM DIAGNOSES  Problem: Chronic systolic heart failure  Assessment and Plan: Pt scheduled for BI- V ICD generator change on 2/18/2021.  labs done results pending, ekg in chart from   Pt went for covid testing.  Hibiclens provided-  written and verbal instructions given with teach back, pt able to verbalize understanding. Preop teaching done, pt able to verbalize understanding.   OR booking notified of YANETH precautons, AICD   meds day of surgery-  carvediolol, spirono/hctz, asa, losartan, pepcid        PROBLEM DIAGNOSES  Problem: Chronic systolic heart failure  Assessment and Plan: Pt scheduled for BI- V ICD generator change on 2/18/2021.  labs done results pending, ekg in chart from   Pt went for covid testing.  Hibiclens provided-  written and verbal instructions given with teach back, pt able to verbalize understanding. Preop teaching done, pt able to verbalize understanding.   cardiac stents-  pt instructed to continue asa  OR booking notified of YANETH precautons, AICD   meds day of surgery-  carvediolol, spirono/hctz, asa, losartan, pepcid

## 2021-02-16 NOTE — H&P PST ADULT - HISTORY OF PRESENT ILLNESS
74y/o male scheduled for Bi- V ICD generator change on 2/18/20221.  Pt states, "hx htn, hld, bph, CAD s/p MI 2009 cardiac stents X2, chronic systolic chf s/p BiV ICD having generator change."

## 2021-02-16 NOTE — H&P PST ADULT - GASTROINTESTINAL DETAILS
soft/nontender/no distention/no masses palpable/bowel sounds normal/no bruit/no guarding/no rigidity/no organomegaly

## 2021-02-16 NOTE — H&P PST ADULT - NS MD HP INPLANTS MED DEV
cardiac stents, AICD medtronic serial [id number], model [id number]/Automatic Implantable Cardioverter Defibrillator

## 2021-02-16 NOTE — H&P PST ADULT - NSANTHOSAYNRD_GEN_A_CORE
No. YANETH screening performed.  STOP BANG Legend: 0-2 = LOW Risk; 3-4 = INTERMEDIATE Risk; 5-8 = HIGH Risk

## 2021-02-16 NOTE — H&P PST ADULT - NSICDXPASTMEDICALHX_GEN_ALL_CORE_FT
home/Direct Admission PAST MEDICAL HISTORY:  BPH (Benign Prostatic Hyperplasia)     Hyperlipidemia (ICD9 272.4)     Hypertension (ICD9 401.9)      PAST MEDICAL HISTORY:  Borderline diabetes mellitus     BPH (Benign Prostatic Hyperplasia)     Hyperlipidemia (ICD9 272.4)     Hypertension (ICD9 401.9)     MI (myocardial infarction) 2009    Systolic heart failure

## 2021-02-18 NOTE — H&P CARDIOLOGY - PMH
Borderline diabetes mellitus    BPH (Benign Prostatic Hyperplasia)    Hyperlipidemia (ICD9 272.4)    Hypertension (ICD9 401.9)    MI (myocardial infarction)  2009  Systolic heart failure

## 2021-02-18 NOTE — CHART NOTE - NSCHARTNOTEFT_GEN_A_CORE
Patient is s/p ICD generator change. Device site with no bleeding or hematoma. Device teaching given to patient and he demonstrates understanding of the instructions. All questions answered. F/U appointment with device clinic on 2/26/21 @ 9 am
Type of Procedure: Implantable Cardioverter Defibrillator Generator Change (BiV ICD)  Licensed independent practitioner: Benjamin Cavanaugh MD  Assistant: none  Description of procedure: sterile conditions, antibiotic coverage, old biVICD removed, pocket copiously irrigated with antibiotic solution, new biV ICD implanted and pocket closed in 3 layers.  A technical representative was present to help operate a sophisticated .   Findings of procedure: normal pacing, sensing and lead integrity  Estimated blood loss: < 10 cc  Specimen removed: old battery depleted biVICD  Preoperative Dx: chronic systolic congestive heart failure; biVICD at [facility]  Postoperative Dx: chronic systolic congestive heart failure; ICD at [facility]; biV ICD replaced  Complications: none  Anesthesia type: local anesthesia with sedation  No heparin for 24 hours and then reassess.  NYHA Class II    Benjamin Cavanaugh MD.
74 y/o M w/ PMH of HTN, HLD, BPH, CAD s/p MI and chronic systolic CHF s/p BiV ICD placement who presents today for BiV ICD generator change. Pt's device is now at [facility].    PST H&P and labs reviewed  Pt is COVID negative as of 2/15/2021  Pt has no complaints at this time

## 2021-02-18 NOTE — H&P CARDIOLOGY - PSH
Biventricular ICD (implantable cardioverter-defibrillator) in place  2010  Hernia    Stented coronary artery  X 2, 2009

## 2021-02-18 NOTE — H&P CARDIOLOGY - HISTORY OF PRESENT ILLNESS
74 y/o M w/ PMH of HTN, HLD, BPH, CAD s/p MI and chronic systolic CHF s/p BiV ICD placement who presents today for BiV ICD generator change. Pt's device is now at [facility].    PST H&P and labs reviewed  Pt is COVID negative as of 2/15/2021  Pt has no complaints at this time

## 2021-02-26 PROBLEM — I50.20 UNSPECIFIED SYSTOLIC (CONGESTIVE) HEART FAILURE: Chronic | Status: ACTIVE | Noted: 2021-01-01

## 2021-02-26 PROBLEM — I50.22 CHRONIC SYSTOLIC CHF (CONGESTIVE HEART FAILURE): Status: ACTIVE | Noted: 2021-01-01

## 2021-02-26 PROBLEM — R73.03 PREDIABETES: Chronic | Status: ACTIVE | Noted: 2021-01-01

## 2021-02-26 PROBLEM — I21.9 ACUTE MYOCARDIAL INFARCTION, UNSPECIFIED: Chronic | Status: ACTIVE | Noted: 2021-01-01

## 2021-04-09 PROBLEM — E11.42 DIABETIC POLYNEUROPATHY ASSOCIATED WITH TYPE 2 DIABETES MELLITUS: Status: ACTIVE | Noted: 2017-06-30

## 2021-04-09 NOTE — PHYSICAL EXAM
[General Appearance - Alert] : alert [General Appearance - In No Acute Distress] : in no acute distress [Oriented To Time, Place, And Person] : oriented to person, place, and time [Affect] : the affect was normal [Impaired Insight] : insight and judgment were intact [Person] : oriented to person [Place] : oriented to place [Time] : oriented to time [Concentration Intact] : normal concentrating ability [Visual Intact] : visual attention was ~T not ~L decreased [Naming Objects] : no difficulty naming common objects [Repeating Phrases] : no difficulty repeating a phrase [Writing A Sentence] : no difficulty writing a sentence [Fluency] : fluency intact [Comprehension] : comprehension intact [Reading] : reading intact [Past History] : adequate knowledge of personal past history [Cranial Nerves Optic (II)] : visual acuity intact bilaterally,  visual fields full to confrontation, pupils equal round and reactive to light [Cranial Nerves Oculomotor (III)] : extraocular motion intact [Cranial Nerves Trigeminal (V)] : facial sensation intact symmetrically [Cranial Nerves Facial (VII)] : face symmetrical [Cranial Nerves Vestibulocochlear (VIII)] : hearing was intact bilaterally [Cranial Nerves Glossopharyngeal (IX)] : tongue and palate midline [Cranial Nerves Accessory (XI - Cranial And Spinal)] : head turning and shoulder shrug symmetric [Cranial Nerves Hypoglossal (XII)] : there was no tongue deviation with protrusion [Motor Tone] : muscle tone was normal in all four extremities [Motor Strength] : muscle strength was normal in all four extremities [No Muscle Atrophy] : normal bulk in all four extremities [Sensation Tactile Decrease] : light touch was intact [Abnormal Walk] : normal gait [Balance] : balance was intact [2+] : Ankle jerk left 2+ [FreeTextEntry1] : General examination is unremarkable.  There is no carotid bruit, thyromegaly or lymphadenopathy.  Chest is clear and heart sounds are normal.  Pedal pulsations are normal and straight leg raising test is negative.\par \par Neurological examination is pertinent for trace ankle reflexes and minimal decrease in pin perception in the feet without loss of posterior column sensations and has normal strength sensation and coordination elsewhere. [Past-pointing] : there was no past-pointing [Tremor] : no tremor present [Plantar Reflex Right Only] : normal on the right [Plantar Reflex Left Only] : normal on the left

## 2021-04-09 NOTE — REVIEW OF SYSTEMS
[As Noted in HPI] : as noted in HPI [Numbness] : numbness [Tingling] : tingling [As noted in HPI] : as noted in HPI [Negative] : Heme/Lymph

## 2021-04-09 NOTE — HISTORY OF PRESENT ILLNESS
[FreeTextEntry1] : Mr. 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  There is no interim past medical history but had change in the cardiac pacemaker/defibrillator battery which was uneventful.

## 2021-08-05 NOTE — H&P ADULT - PROBLEM SELECTOR PLAN 3
Hx of HF with AICD   -F/u with TTE   -C/w carvedilol 12.5 mg and losartan 50 mg   -Hold HCTZ and spironolactone as pt is getting contrast x2   -Monitor fluid status   -Tele monitoring

## 2021-08-05 NOTE — ED PROVIDER NOTE - CARE PLAN
Principal Discharge DX:	Pulmonary embolism, unspecified chronicity, unspecified pulmonary embolism type, unspecified whether acute cor pulmonale present

## 2021-08-05 NOTE — CHART NOTE - NSCHARTNOTEFT_GEN_A_CORE
Received call from radiology, CTA showing confirmed PE. RN called and made aware to start Heparin gtt. Will continue to monitor pt closely.

## 2021-08-05 NOTE — CONSULT NOTE ADULT - ASSESSMENT
74 yo M hx of CAD, MI, stent x2 (10/2009 at [facility]), pAfib (not on anticoagulation), HTN, HLD, BIV-AICD, and systolic CHF presents to the ED from radiology office after being diagnosed with suspected PE. Patient has been having L shoulder blade pain for the last 2 weeks.  He went to see his PCP who recommended CT chest with IV which showed R upper lobe PE, pancreatic mass with liver lesions and lung nodules (reported posted on patient's [facility] portal), prompting him to come to the ED. Currently, he continues to endorse chest tightness    Problem/Plan - 1:  ·  Problem: Pulmonary embolism, unspecified chronicity, unspecified pulmonary embolism type, unspecified whether acute cor pulmonale present.  Plan: Patient with chest pain of 2 weeks duration w/o any tachycardia or hypoxia   -CT chest with IV contrast at [facility] showed RUL PE.  - CTA-confirming Bilateralsegmental    PE-Pt is hemodynamically stable with no signs of heart strain or organ failure   -Tele monitoring.  -Started on Heparin gtt  -LE Dopplers      Problem/Plan - 2:  ·  Problem: Pancreatic mass.  Plan: New diagnosis of pancreatic mass on CT chest at [facility]   -Report reviewed by me. It shows 6.4 pancreatic mass at the tail with lesions in the liver and nodules in the lungs, concerning for adenocarcinoma according to the report.   -MRI can not be done as pt's AICD is not compatible with it   -F/u with CT A/P with IV contrast   -possible biopsy by IR or GI   -pain control with tylenol for now.  -Oncology evaluation     Problem/Plan - 3:  ·  Problem: Chronic systolic heart failure.  Plan: Hx of HF with AICD   -Repeat  TTE   -C/w carvedilol 12.5 mg and losartan 50 mg   -Hold HCTZ and spironolactone as pt is getting contrast x2 -clinically euvolemic  -Monitor fluid status   -Tele monitoring.     Problem/Plan - 4:  ·  Problem: Essential hypertension.  Plan: C/w carvedilol 12.5 mg and losartan 50 mg   -Hold HCTZ and spironolactone.     Problem/Plan - 5:  ·  Problem: Preventive measure.  Plan: On Heparin gtt  -diet regular

## 2021-08-05 NOTE — H&P ADULT - NSHPLABSRESULTS_GEN_ALL_CORE
14.1   8.92  )-----------( 229      ( 05 Aug 2021 17:27 )             43.6     Hgb Trend: 14.1<--  08-05    132<L>  |  93<L>  |  31<H>  ----------------------------<  120<H>  4.1   |  24  |  1.24    Ca    9.5      05 Aug 2021 17:27  Mg     2.40     08-05    TPro  7.0  /  Alb  4.1  /  TBili  0.4  /  DBili  x   /  AST  29  /  ALT  46<H>  /  AlkPhos  81  08-05    Creatinine Trend: 1.24<--  PT/INR - ( 05 Aug 2021 17:27 )   PT: 14.1 sec;   INR: 1.25 ratio         PTT - ( 05 Aug 2021 17:27 )  PTT:28.5 sec          CT chest with IV contrast from OSH reviewed by me  It showed RUL PE, pancreatic mass with lesions in the liver, lung nodules.       EKG reviewed by me   Unchanged from before

## 2021-08-05 NOTE — H&P ADULT - ASSESSMENT
76 yo M hx of CAD, MI, stent x2 (10/2009 at [facility]), pAfib (not on anticoagulation), HTN, HLD, BIV-AICD, and systolic CHF presents to the ED from radiology office after being diagnosed with suspected PE and new pancreatic mass, concerning for new malignancy.

## 2021-08-05 NOTE — ED PROVIDER NOTE - OBJECTIVE STATEMENT
74 yo M hx of CAD, MI, stent x2 (10/2009 at [facility]), pAfib (not on anticoagulation), HTN, HLD, BIV-AICD, systolic CHF, presenting as directed by his PCP for outpt CTA (ordered after report of chest pressure) showing pulmonary embolism and findings of 6 cm pancreatic mass concerning for adenocarcinoma. Pt denies complaints beyond chest pressure and symptoms of reflux.

## 2021-08-05 NOTE — H&P ADULT - NSICDXPASTMEDICALHX_GEN_ALL_CORE_FT
PAST MEDICAL HISTORY:  Borderline diabetes mellitus     BPH (Benign Prostatic Hyperplasia)     Hyperlipidemia (ICD9 272.4)     Hypertension (ICD9 401.9)     MI (myocardial infarction) 2009    Systolic heart failure

## 2021-08-05 NOTE — ED ADULT TRIAGE NOTE - CHIEF COMPLAINT QUOTE
Pt c/o chest/back pain. Pt states he received a call today after having CT scan performed stating he has a pulmonary embolism. Hx of MI, PPM, Defibrillator

## 2021-08-05 NOTE — ED PROVIDER NOTE - NS ED ROS FT
Gen: Denies fever.   HEENT: Denies headache. Denies congestion.  CV: +chest pain. Denies lightheadedness.  Skin: Denies rash.   Resp: Denies SOB. Denies cough.  GI: Denies abd pain. Denies nausea. Denies vomiting. Denies diarrhea. Denies melena. Denies hematochezia.  Msk: Denies extremity swelling. Denies extremity pain.  : Denies dysuria. Denies hematuria.  Neuro: Denies LOC. Denies dizziness. Denies new numbness/tingling. Denies new focal weakness.  Psych: Denies SI

## 2021-08-05 NOTE — H&P ADULT - NSICDXPASTSURGICALHX_GEN_ALL_CORE_FT
PAST SURGICAL HISTORY:  Biventricular ICD (implantable cardioverter-defibrillator) in place 2010    Hernia     Stented coronary artery X 2, 2009

## 2021-08-05 NOTE — ED PROVIDER NOTE - PHYSICAL EXAMINATION
Gen: A&Ox4   HEENT: Atraumatic. Mucous membranes moist, no scleral icterus   CV: RRR. No murmurs. No significant LE edema. Distal pulses 2+. Capillary refill < 2 seconds.  Resp: Respirations unlabored. CTAB, no rales, rhonchi, or wheezes.  GI: Abdomen non tender to palpation, soft and non-distended. + BS.  Skin/MSK: No open wounds. No ecchymosis appreciated.  Neuro: Following commands. Moving extremities spontaneously.  Psych: Appropriate mood, cooperative

## 2021-08-05 NOTE — ED PROVIDER NOTE - ATTENDING CONTRIBUTION TO CARE
Seen and examined, states chest pressure and SOB, hx of stents but states CP not similar to angina, hx of CHF but no c/o orthopnea/edema, sent for CT scan showing PE and referred to ED. No fever/chills, no N/V, states GERD hx. MMM, clear lungs, shallow resp, heart reg, abd soft, NT to palp, no CVAT, no edema, NT calves.

## 2021-08-05 NOTE — H&P ADULT - HISTORY OF PRESENT ILLNESS
76 yo M hx of CAD, MI, stent x2 (10/2009 at [facility]), pAfib (not on anticoagulation), HTN, HLD, BIV-AICD, systolic CHF    In the ED, his vitals were: Tmax 98F, HR 80s, BP 120s/70-80s mmHg, RR 15, SO2 95% on RA. His labs are significant for mildly elevated ALT.  74 yo M hx of CAD, MI, stent x2 (10/2009 at [facility]), pAfib (not on anticoagulation), HTN, HLD, BIV-AICD, and systolic CHF presents to the ED from radiology office after being diagnosed with suspected PE. Patient has been having L shoulder blade pain for the last 2 weeks. He went to see his orthopedist who gave him steroid shot without any improvement. In the meantime, he also developed chest tightness that is pressure like, 5/10 in severity, non-reproducible, non-pleuritic, and constant. Nothing improves or worsens the pain. He endorses intermittent SOB and LE edema but denies any fever, chills, nausea, vomiting, abdominal pain, diarrhea, or dysuria. He went to see his PCP who recommended CT chest with IV which showed R upper lobe PE, pancreatic mass with liver lesions and lung nodules (reported posted on patient's [facility] portal), prompting him to come to the ED. Currently, he continues to endorse chest tightness but denies any new concern.     In the ED, his vitals were: Tmax 98F, HR 80s, BP 120s/70-80s mmHg, RR 15, SO2 95% on RA. His labs are significant for mildly elevated ALT. CT chest with IV contrast from outside hospital showed RUL PE, pancreatic mass concerning for adenocarcinoma with liver lesion.

## 2021-08-05 NOTE — H&P ADULT - PROBLEM SELECTOR PLAN 1
Patient with chest pain of 2 weeks duration w/o any tachycardia or hypoxia   -CT chest with IV contrast at [facility] showed RUL PE. However, it was not a CTA   -Will obtain CTA, then start heparin gtt if it is confirmed PE  -Pt is hemodynamically stable with no signs of heart strain or organ failure   -Tele monitoring

## 2021-08-05 NOTE — H&P ADULT - NSHPPHYSICALEXAM_GEN_ALL_CORE
Vital Signs Last 24 Hrs  T(C): 37.2 (05 Aug 2021 19:27), Max: 37.2 (05 Aug 2021 19:27)  T(F): 98.9 (05 Aug 2021 19:27), Max: 98.9 (05 Aug 2021 19:27)  HR: 66 (05 Aug 2021 19:27) (66 - 85)  BP: 121/64 (05 Aug 2021 19:27) (119/84 - 122/77)  BP(mean): --  RR: 17 (05 Aug 2021 19:27) (15 - 18)  SpO2: 97% (05 Aug 2021 19:27) (95% - 98%)    GENERAL: NAD, well-developed  HEENT:  Atraumatic, Normocephalic, Conjunctiva and sclera clear, oral mucosa moist, clear w/o any exudate   NECK: Supple, No JVD  CHEST/LUNG: Clear to auscultation bilaterally; No wheeze  HEART: Regular rate and rhythm; No murmurs, rubs, or gallops  ABDOMEN: Soft, Nontender, Nondistended; Bowel sounds present  EXTREMITIES:  2+ Peripheral Pulses, No clubbing, cyanosis, or edema  PSYCH: AAOx3, normal affect  NEUROLOGY: non-focal, moving all extremities   SKIN: No rashes or lesions

## 2021-08-05 NOTE — CONSULT NOTE ADULT - SUBJECTIVE AND OBJECTIVE BOX
PATIENT SEEN AND EXAMINED ON :-08/05/2021  DATE OF SERVICE:      08/05/2021    Interim events noted,Labs ,Radiological studies and Cardiology tests reviewed .      History of Present Illness:   74 yo M hx of CAD, MI, stent x2 (10/2009 at [facility]), pAfib (not on anticoagulation), HTN, HLD, BIV-AICD, and systolic CHF presents to the ED from radiology office after being diagnosed with suspected PE. Patient has been having L shoulder blade pain for the last 2 weeks. He went to see his orthopedist who gave him steroid shot without any improvement. In the meantime, he also developed chest tightness that is pressure like, 5/10 in severity, non-reproducible, non-pleuritic, and constant. Nothing improves or worsens the pain. He endorses intermittent SOB and LE edema but denies any fever, chills, nausea, vomiting, abdominal pain, diarrhea, or dysuria. He went to see his PCP who recommended CT chest with IV which showed R upper lobe PE, pancreatic mass with liver lesions and lung nodules (reported posted on patient's [facility] portal), prompting him to come to the ED. Currently, he continues to endorse chest tightness but denies any new concern.     In the ED, his vitals were: Tmax 98F, HR 80s, BP 120s/70-80s mmHg, RR 15, SO2 95% on RA. His labs are significant for mildly elevated ALT. CT chest with IV contrast from outside hospital showed RUL PE, pancreatic mass concerning for adenocarcinoma with liver lesion.         Review of Systems:     CONSTITUTIONAL: No weakness, fevers or chills  EYES/ENT: No visual changes;  No vertigo or throat pain   NECK: No pain or stiffness  RESPIRATORY: No cough, wheezing, hemoptysis; No shortness of breath  CARDIOVASCULAR: +chest pain, no palpitations   GASTROINTESTINAL: No abdominal or epigastric pain. No nausea, vomiting, or hematemesis; No diarrhea or constipation. No melena or hematochezia.  GENITOURINARY: No dysuria, frequency or hematuria  NEUROLOGICAL: No numbness or weakness  SKIN: No itching, burning, rashes, or lesions   All other review of systems is negative unless indicated above.      Home Medications:   * Patient Currently Takes Medications as of 05-Aug-2021 20:34 documented in Structured Notes  · 	aspirin 81 mg oral tablet: Last Dose Taken:  , 1 tab(s) orally once a day  · 	losartan 50 mg oral tablet: Last Dose Taken:  , 1 tab(s) orally once a day  · 	Centrum Silver oral tablet: Last Dose Taken:  , 1 tab(s) orally once a day  · 	rosuvastatin 20 mg oral tablet: Last Dose Taken:  , 1 tab(s) orally once a day (at bedtime)  · 	carvedilol 12.5 mg oral tablet: Last Dose Taken:  , 1 tab(s) orally 2 times a day  · 	hydrochlorothiazide-spironolactone 25 mg-25 mg oral tablet: Last Dose Taken:  , 1 tab(s) orally once a day  · 	finasteride 5 mg oral tablet: Last Dose Taken:  , 1 tab(s) orally once a day  · 	pantoprazole 40 mg oral delayed release tablet: Last Dose Taken:  , 1 tab(s) orally once a day      Allergies and Intolerances:        Allergies:  	No Known Allergies:         Past Medical, Past Surgical, and Family History:  PAST MEDICAL HISTORY:  Borderline diabetes mellitus     BPH (Benign Prostatic Hyperplasia)     Hyperlipidemia (ICD9 272.4)     Hypertension (ICD9 401.9)     MI (myocardial infarction) 2009    Systolic heart failure.     PAST SURGICAL HISTORY:  Biventricular ICD (implantable cardioverter-defibrillator) in place 2010    Hernia     Stented coronary artery X 2, 2009.     FAMILY HISTORY:  No pertinent family history in first degree relatives.     No Pertinent Family History in first degree relatives of: Father and mother.    Social History:  Social History (marital status, living situation, occupation, tobacco use, alcohol and drug use, and sexual history): He is an . Denies any hx of smoking or alcohol consumption.    Vital Signs Last 24 Hrs  T(C): 37.2 (05 Aug 2021 19:27), Max: 37.2 (05 Aug 2021 19:27)  T(F): 98.9 (05 Aug 2021 19:27), Max: 98.9 (05 Aug 2021 19:27)  HR: 66 (05 Aug 2021 19:27) (66 - 85)  BP: 121/64 (05 Aug 2021 19:27) (119/84 - 122/77)  RR: 17 (05 Aug 2021 19:27) (15 - 18)  SpO2: 97% (05 Aug 2021 19:27) (95% - 98%)      PHYSICAL EXAMINATION  GENERAL: NAD, well-developed  HEENT:  Atraumatic, Normocephalic, Conjunctiva and sclera clear, oral mucosa moist, clear w/o any exudate   NECK: Supple, No JVD  CHEST/LUNG: Clear to auscultation bilaterally; No wheeze  HEART: Regular rate and rhythm; No murmurs, rubs, or gallops  ABDOMEN: Soft, Nontender, Nondistended; Bowel sounds present  EXTREMITIES:  2+ Peripheral Pulses, No clubbing, cyanosis, or edema  PSYCH: AAOx3, normal affect  NEUROLOGY: non-focal, moving all extremities   SKIN: No rashes or lesions      Labs, Radiology, Cardiology, and Other Results:                 14.1   8.92  )-----------( 229      ( 05 Aug 2021 17:27 )             43.6     Hgb Trend: 14.1<--  08-05    132<L>  |  93<L>  |  31<H>  ----------------------------<  120<H>  4.1   |  24  |  1.24    Ca    9.5      05 Aug 2021 17:27  Mg     2.40     08-05    TPro  7.0  /  Alb  4.1  /  TBili  0.4  /  DBili  x   /  AST  29  /  ALT  46<H>  /  AlkPhos  81  08-05    Creatinine Trend: 1.24<--  PT/INR - ( 05 Aug 2021 17:27 )   PT: 14.1 sec;   INR: 1.25 ratio         PTT - ( 05 Aug 2021 17:27 )  PTT:28.5 sec          CT chest with IV contrast from OSH-Showed RUL PE, pancreatic mass with lesions in the liver, lung nodules.       EKG    ECHO:\Study Date: 1/4/2011  Conclusions:  1. Severe global left ventricular dysfunction.  Mild diastolic dysfunction.  2. Normal right ventricular size and systolic function.  3. Minimal mitral regurgitation.  *** Compared with echocardiogram of 1/6/2010, no significant changes noted.    DEVICES:02/18/2021  Type of Procedure: Implantable Cardioverter Defibrillator Generator Change (BiV ICD)  Licensed independent practitioner: Benjamin Cavanaugh MD  Description of procedure: sterile conditions, antibiotic coverage, old biVICD removed, pocket copiously irrigated with antibiotic solution, new biV ICD implanted and pocket closed in 3 layers.  A technical representative was present to help operate a sophisticated .   Findings of procedure: normal pacing, sensing and lead integrity  Estimated blood loss: < 10 cc  Specimen removed: old battery depleted biVICD  Preoperative Dx: chronic systolic congestive heart failure; biVICD at [facility]  Postoperative Dx: chronic systolic congestive heart failure; ICD at [facility]; biV ICD replaced  Complications: none      EXAM:    CT ANGIO CHEST PULM ART WAWIC    PROCEDURE DATE:  Aug  5 2021   INTERPRETATION:  Bilateral upper lobar and segmental pulmonary emboli, right middle and right lower lobe subsegmental pulmonary emboli. Approximately 7cm pancreatic mass. D/w EITAN Thomas.

## 2021-08-05 NOTE — PHARMACOTHERAPY INTERVENTION NOTE - COMMENTS
Medication history confirmed with [facility] and [facility]. Please call [email or web address] [phone] if you have any questions.

## 2021-08-05 NOTE — ED ADULT NURSE NOTE - OBJECTIVE STATEMENT
Break covering RN: 74 y/o M received to room 6 c/o PE. pt a&oX4 and ambulatory. pt states he was having chest tightness back pain and rodgers x1 week went to his pcp and had a ct today that showed a pe. Pt endorsing generalized chest tightness at this time. pt currently taking asa 81mg daily. Pt respirations even and unlabored. pt abd soft nontender nondistended. pt noted to have A/V paced on the cardiac monitor. VS as noted, call bell in reach, comfort measures provided, will continue to monitor.

## 2021-08-05 NOTE — H&P ADULT - NSHPREVIEWOFSYSTEMS_GEN_ALL_CORE
REVIEW OF SYSTEMS:    CONSTITUTIONAL: No weakness, fevers or chills  EYES/ENT: No visual changes;  No vertigo or throat pain   NECK: No pain or stiffness  RESPIRATORY: No cough, wheezing, hemoptysis; No shortness of breath  CARDIOVASCULAR: +chest pain, no palpitations   GASTROINTESTINAL: No abdominal or epigastric pain. No nausea, vomiting, or hematemesis; No diarrhea or constipation. No melena or hematochezia.  GENITOURINARY: No dysuria, frequency or hematuria  NEUROLOGICAL: No numbness or weakness  SKIN: No itching, burning, rashes, or lesions   All other review of systems is negative unless indicated above.

## 2021-08-06 NOTE — CONSULT NOTE ADULT - TIME BILLING
- Review of records, telemetry, vital signs and daily labs.   - General and cardiovascular physical examination.  - Generation of cardiovascular treatment plan.  - Coordination of care.      Patient was seen and examined by me on 08/05/2021,interim events noted,labs and radiology studies reviewed.  Rogelio Wolf MD,FACC.  [address] [place], [place] [phone].  [phone]
Pancreatic mass

## 2021-08-06 NOTE — CONSULT NOTE ADULT - SUBJECTIVE AND OBJECTIVE BOX
General Surgery Consult  Consulting surgical team: D TEAM SURGERY  Consulting attending: Dr. Arboleda    HPI:  75M hx of CAD c/b MI s/p PCI to pLAD 10/2009, pAF previously on coumadin, AICD, systolic CHF here with 2 weeks of shoulder pain and recent chest tightness found to have PE on outpatient study, also found to have pancreatic head mass with liver mets. Surgery consulted.     AICD not MRI compatible    **********    PAST MEDICAL HISTORY:  Hypertension (ICD9 401.9)    Hyperlipidemia (ICD9 272.4)    Bacteremia (ICD9 790.7)    AF (Atrial Fibrillation) (ICD9 427.31)    Pneumonia (ICD9 486)    BPH (Benign Prostatic Hyperplasia)    Dyspepsia    Borderline diabetes mellitus    MI (myocardial infarction)    Systolic heart failure        PAST SURGICAL HISTORY:  Hernia    Biventricular ICD (implantable cardioverter-defibrillator) in place    Stented coronary artery        MEDICATIONS:  acetaminophen   Tablet .. 650 milliGRAM(s) Oral every 6 hours PRN  aluminum hydroxide/magnesium hydroxide/simethicone Suspension 30 milliLiter(s) Oral every 4 hours PRN  aspirin enteric coated 81 milliGRAM(s) Oral daily  atorvastatin 80 milliGRAM(s) Oral at bedtime  carvedilol 12.5 milliGRAM(s) Oral every 12 hours  finasteride 5 milliGRAM(s) Oral daily  heparin   Injectable 8000 Unit(s) IV Push every 6 hours PRN  heparin   Injectable 4000 Unit(s) IV Push every 6 hours PRN  heparin  Infusion.  Unit(s)/Hr IV Continuous <Continuous>  lactated ringers. 500 milliLiter(s) IV Continuous <Continuous>  losartan 50 milliGRAM(s) Oral daily  melatonin 3 milliGRAM(s) Oral at bedtime PRN  pantoprazole    Tablet 40 milliGRAM(s) Oral before breakfast      ALLERGIES:  No Known Allergies      VITALS & I/Os:  Vital Signs Last 24 Hrs  T(C): 36.8 (06 Aug 2021 13:30), Max: 37.2 (05 Aug 2021 19:27)  T(F): 98.2 (06 Aug 2021 13:30), Max: 98.9 (05 Aug 2021 19:27)  HR: 66 (06 Aug 2021 13:30) (62 - 85)  BP: 120/67 (06 Aug 2021 05:50) (117/75 - 121/64)  BP(mean): --  RR: 18 (06 Aug 2021 13:30) (15 - 18)  SpO2: 99% (06 Aug 2021 13:30) (95% - 99%)    I&O's Summary      PHYSICAL EXAM:  General: No acute distress  Respiratory: Nonlabored  Cardiovascular: RRR  Abdominal: Soft, nondistended, nontender. No rebound or guarding. No organomegaly, no palpable mass.  Extremities: Warm    LABS:                        13.7   9.50  )-----------( 215      ( 06 Aug 2021 04:22 )             41.6     08-06    132<L>  |  97<L>  |  28<H>  ----------------------------<  128<H>  4.4   |  20<L>  |  1.09    Ca    9.0      06 Aug 2021 04:22  Phos  3.2     08-06  Mg     2.30     08-06    TPro  6.5  /  Alb  3.6  /  TBili  0.5  /  DBili  x   /  AST  31  /  ALT  46<H>  /  AlkPhos  77  08-06    Lactate:  08-05 @ 17:27  1.0    PT/INR - ( 05 Aug 2021 17:27 )   PT: 14.1 sec;   INR: 1.25 ratio         PTT - ( 06 Aug 2021 12:41 )  PTT:108.6 sec              IMAGING:                                                                                               General Surgery Consult  Consulting surgical team: D TEAM SURGERY  Consulting attending: Dr. Arboleda    HPI:  75M hx of CAD c/b MI s/p PCI to pLAD 10/2009, pAF previously on coumadin, AICD, systolic CHF here with 2 weeks of shoulder pain and recent chest tightness found to have PE on outpatient study, also found to have pancreatic head mass with liver mets. Surgery consulted.     CEA elevated 4472  CA 19-9 pending    AICD not MRI compatible    **********    PAST MEDICAL HISTORY:  Hypertension (ICD9 401.9)    Hyperlipidemia (ICD9 272.4)    Bacteremia (ICD9 790.7)    AF (Atrial Fibrillation) (ICD9 427.31)    Pneumonia (ICD9 486)    BPH (Benign Prostatic Hyperplasia)    Dyspepsia    Borderline diabetes mellitus    MI (myocardial infarction)    Systolic heart failure        PAST SURGICAL HISTORY:  Hernia    Biventricular ICD (implantable cardioverter-defibrillator) in place    Stented coronary artery        MEDICATIONS:  acetaminophen   Tablet .. 650 milliGRAM(s) Oral every 6 hours PRN  aluminum hydroxide/magnesium hydroxide/simethicone Suspension 30 milliLiter(s) Oral every 4 hours PRN  aspirin enteric coated 81 milliGRAM(s) Oral daily  atorvastatin 80 milliGRAM(s) Oral at bedtime  carvedilol 12.5 milliGRAM(s) Oral every 12 hours  finasteride 5 milliGRAM(s) Oral daily  heparin   Injectable 8000 Unit(s) IV Push every 6 hours PRN  heparin   Injectable 4000 Unit(s) IV Push every 6 hours PRN  heparin  Infusion.  Unit(s)/Hr IV Continuous <Continuous>  lactated ringers. 500 milliLiter(s) IV Continuous <Continuous>  losartan 50 milliGRAM(s) Oral daily  melatonin 3 milliGRAM(s) Oral at bedtime PRN  pantoprazole    Tablet 40 milliGRAM(s) Oral before breakfast      ALLERGIES:  No Known Allergies      VITALS & I/Os:  Vital Signs Last 24 Hrs  T(C): 36.8 (06 Aug 2021 13:30), Max: 37.2 (05 Aug 2021 19:27)  T(F): 98.2 (06 Aug 2021 13:30), Max: 98.9 (05 Aug 2021 19:27)  HR: 66 (06 Aug 2021 13:30) (62 - 85)  BP: 120/67 (06 Aug 2021 05:50) (117/75 - 121/64)  BP(mean): --  RR: 18 (06 Aug 2021 13:30) (15 - 18)  SpO2: 99% (06 Aug 2021 13:30) (95% - 99%)    I&O's Summary      PHYSICAL EXAM:  General: No acute distress  Respiratory: Nonlabored  Cardiovascular: RRR  Abdominal: Soft, nondistended, nontender. No rebound or guarding. No organomegaly, no palpable mass.  Extremities: Warm    LABS:                        13.7   9.50  )-----------( 215      ( 06 Aug 2021 04:22 )             41.6     08-06    132<L>  |  97<L>  |  28<H>  ----------------------------<  128<H>  4.4   |  20<L>  |  1.09    Ca    9.0      06 Aug 2021 04:22  Phos  3.2     08-06  Mg     2.30     08-06    TPro  6.5  /  Alb  3.6  /  TBili  0.5  /  DBili  x   /  AST  31  /  ALT  46<H>  /  AlkPhos  77  08-06    Lactate:  08-05 @ 17:27  1.0    PT/INR - ( 05 Aug 2021 17:27 )   PT: 14.1 sec;   INR: 1.25 ratio         PTT - ( 06 Aug 2021 12:41 )  PTT:108.6 sec              IMAGING:                                                                                               General Surgery Consult  Consulting surgical team: D TEAM SURGERY  Consulting attending: Dr. Arboleda    HPI:  75M hx of CAD c/b MI s/p PCI to pLAD 10/2009, pAF previously on coumadin, AICD, systolic CHF here with 2 weeks of shoulder pain and recent chest tightness found to have PE on outpatient study, also found to have pancreatic head mass with liver mets. Surgery consulted.     tolerating diet  denies n/v ab pain or weight loss/gain  never smoker, denies etoh  last colonoscopy few years ago s/p removal of multiple polyps per patient and wife reportedly benign  no family hx of cancer    CEA elevated 4472  CA 19-9 pending    AICD leads not MRI compatible    PAST MEDICAL HISTORY:  Hypertension (ICD9 401.9)    Hyperlipidemia (ICD9 272.4)    Bacteremia (ICD9 790.7)    AF (Atrial Fibrillation) (ICD9 427.31)    Pneumonia (ICD9 486)    BPH (Benign Prostatic Hyperplasia)    Dyspepsia    Borderline diabetes mellitus    MI (myocardial infarction)    Systolic heart failure        PAST SURGICAL HISTORY:  Hernia    Biventricular ICD (implantable cardioverter-defibrillator) in place    Stented coronary artery        MEDICATIONS:  acetaminophen   Tablet .. 650 milliGRAM(s) Oral every 6 hours PRN  aluminum hydroxide/magnesium hydroxide/simethicone Suspension 30 milliLiter(s) Oral every 4 hours PRN  aspirin enteric coated 81 milliGRAM(s) Oral daily  atorvastatin 80 milliGRAM(s) Oral at bedtime  carvedilol 12.5 milliGRAM(s) Oral every 12 hours  finasteride 5 milliGRAM(s) Oral daily  heparin   Injectable 8000 Unit(s) IV Push every 6 hours PRN  heparin   Injectable 4000 Unit(s) IV Push every 6 hours PRN  heparin  Infusion.  Unit(s)/Hr IV Continuous <Continuous>  lactated ringers. 500 milliLiter(s) IV Continuous <Continuous>  losartan 50 milliGRAM(s) Oral daily  melatonin 3 milliGRAM(s) Oral at bedtime PRN  pantoprazole    Tablet 40 milliGRAM(s) Oral before breakfast      ALLERGIES:  No Known Allergies      VITALS & I/Os:  Vital Signs Last 24 Hrs  T(C): 36.8 (06 Aug 2021 13:30), Max: 37.2 (05 Aug 2021 19:27)  T(F): 98.2 (06 Aug 2021 13:30), Max: 98.9 (05 Aug 2021 19:27)  HR: 66 (06 Aug 2021 13:30) (62 - 85)  BP: 120/67 (06 Aug 2021 05:50) (117/75 - 121/64)  BP(mean): --  RR: 18 (06 Aug 2021 13:30) (15 - 18)  SpO2: 99% (06 Aug 2021 13:30) (95% - 99%)    I&O's Summary      PHYSICAL EXAM:  General: No acute distress  Respiratory: Nonlabored  Cardiovascular: RRR  Abdominal: Soft, nondistended, nontender. No rebound or guarding. No organomegaly, no palpable mass.  Extremities: Warm    LABS:                        13.7   9.50  )-----------( 215      ( 06 Aug 2021 04:22 )             41.6     08-06    132<L>  |  97<L>  |  28<H>  ----------------------------<  128<H>  4.4   |  20<L>  |  1.09    Ca    9.0      06 Aug 2021 04:22  Phos  3.2     08-06  Mg     2.30     08-06    TPro  6.5  /  Alb  3.6  /  TBili  0.5  /  DBili  x   /  AST  31  /  ALT  46<H>  /  AlkPhos  77  08-06    Lactate:  08-05 @ 17:27  1.0    PT/INR - ( 05 Aug 2021 17:27 )   PT: 14.1 sec;   INR: 1.25 ratio         PTT - ( 06 Aug 2021 12:41 )  PTT:108.6 sec              IMAGING:                                                                                               General Surgery Consult  Consulting surgical team: D TEAM SURGERY  Consulting attending: Dr. Arboleda    HPI:  75M hx of CAD c/b MI s/p PCI to pLAD 10/2009, pAF previously on coumadin, AICD, systolic CHF here with 2 weeks of shoulder pain and recent chest tightness found to have PE on outpatient study, also found to have pancreatic head mass with liver mets. Surgery consulted.     tolerating diet  denies n/v ab pain or weight loss/gain  never smoker, denies etoh  last colonoscopy few years ago s/p removal of multiple polyps per patient and wife reportedly benign  no family hx of cancer    CEA elevated 4472  CA 19-9 pending    AICD leads not MRI compatible    PAST MEDICAL HISTORY:  Hypertension (ICD9 401.9)    Hyperlipidemia (ICD9 272.4)    Bacteremia (ICD9 790.7)    AF (Atrial Fibrillation) (ICD9 427.31)    Pneumonia (ICD9 486)    BPH (Benign Prostatic Hyperplasia)    Dyspepsia    Borderline diabetes mellitus    MI (myocardial infarction)    Systolic heart failure        PAST SURGICAL HISTORY:  bilateral open inguinal hernia repairs    Biventricular ICD (implantable cardioverter-defibrillator) in place    Stented coronary artery        MEDICATIONS:  acetaminophen   Tablet .. 650 milliGRAM(s) Oral every 6 hours PRN  aluminum hydroxide/magnesium hydroxide/simethicone Suspension 30 milliLiter(s) Oral every 4 hours PRN  aspirin enteric coated 81 milliGRAM(s) Oral daily  atorvastatin 80 milliGRAM(s) Oral at bedtime  carvedilol 12.5 milliGRAM(s) Oral every 12 hours  finasteride 5 milliGRAM(s) Oral daily  heparin   Injectable 8000 Unit(s) IV Push every 6 hours PRN  heparin   Injectable 4000 Unit(s) IV Push every 6 hours PRN  heparin  Infusion.  Unit(s)/Hr IV Continuous <Continuous>  lactated ringers. 500 milliLiter(s) IV Continuous <Continuous>  losartan 50 milliGRAM(s) Oral daily  melatonin 3 milliGRAM(s) Oral at bedtime PRN  pantoprazole    Tablet 40 milliGRAM(s) Oral before breakfast      ALLERGIES:  No Known Allergies      VITALS & I/Os:  Vital Signs Last 24 Hrs  T(C): 36.8 (06 Aug 2021 13:30), Max: 37.2 (05 Aug 2021 19:27)  T(F): 98.2 (06 Aug 2021 13:30), Max: 98.9 (05 Aug 2021 19:27)  HR: 66 (06 Aug 2021 13:30) (62 - 85)  BP: 120/67 (06 Aug 2021 05:50) (117/75 - 121/64)  BP(mean): --  RR: 18 (06 Aug 2021 13:30) (15 - 18)  SpO2: 99% (06 Aug 2021 13:30) (95% - 99%)    I&O's Summary      PHYSICAL EXAM:  General: No acute distress  Respiratory: Nonlabored  Cardiovascular: RRR  Abdominal: Soft, nondistended, nontender. No rebound or guarding. No organomegaly, no palpable mass.  Extremities: Warm    LABS:                        13.7   9.50  )-----------( 215      ( 06 Aug 2021 04:22 )             41.6     08-06    132<L>  |  97<L>  |  28<H>  ----------------------------<  128<H>  4.4   |  20<L>  |  1.09    Ca    9.0      06 Aug 2021 04:22  Phos  3.2     08-06  Mg     2.30     08-06    TPro  6.5  /  Alb  3.6  /  TBili  0.5  /  DBili  x   /  AST  31  /  ALT  46<H>  /  AlkPhos  77  08-06    Lactate:  08-05 @ 17:27  1.0    PT/INR - ( 05 Aug 2021 17:27 )   PT: 14.1 sec;   INR: 1.25 ratio         PTT - ( 06 Aug 2021 12:41 )  PTT:108.6 sec              IMAGING:                                                                                               General Surgery Consult  Consulting surgical team: D TEAM SURGERY  Consulting attending: Dr. Arboleda    HPI:  75M hx of CAD c/b MI s/p PCI to pLAD 10/2009, pAF previously on coumadin, AICD, systolic CHF here with 2 weeks of shoulder pain and recent chest tightness found to have PE on outpatient study, also found to have pancreatic mass with liver mets. Surgery consulted.     tolerating diet  denies n/v ab pain or weight loss/gain  never smoker, denies etoh  last colonoscopy few years ago s/p removal of multiple polyps per patient and wife reportedly benign  no family hx of cancer    CEA elevated 4472  CA 19-9 pending    AICD leads not MRI compatible    PAST MEDICAL HISTORY:  Hypertension (ICD9 401.9)    Hyperlipidemia (ICD9 272.4)    Bacteremia (ICD9 790.7)    AF (Atrial Fibrillation) (ICD9 427.31)    Pneumonia (ICD9 486)    BPH (Benign Prostatic Hyperplasia)    Dyspepsia    Borderline diabetes mellitus    MI (myocardial infarction)    Systolic heart failure        PAST SURGICAL HISTORY:  bilateral open inguinal hernia repairs    Biventricular ICD (implantable cardioverter-defibrillator) in place    Stented coronary artery        MEDICATIONS:  acetaminophen   Tablet .. 650 milliGRAM(s) Oral every 6 hours PRN  aluminum hydroxide/magnesium hydroxide/simethicone Suspension 30 milliLiter(s) Oral every 4 hours PRN  aspirin enteric coated 81 milliGRAM(s) Oral daily  atorvastatin 80 milliGRAM(s) Oral at bedtime  carvedilol 12.5 milliGRAM(s) Oral every 12 hours  finasteride 5 milliGRAM(s) Oral daily  heparin   Injectable 8000 Unit(s) IV Push every 6 hours PRN  heparin   Injectable 4000 Unit(s) IV Push every 6 hours PRN  heparin  Infusion.  Unit(s)/Hr IV Continuous <Continuous>  lactated ringers. 500 milliLiter(s) IV Continuous <Continuous>  losartan 50 milliGRAM(s) Oral daily  melatonin 3 milliGRAM(s) Oral at bedtime PRN  pantoprazole    Tablet 40 milliGRAM(s) Oral before breakfast      ALLERGIES:  No Known Allergies      VITALS & I/Os:  Vital Signs Last 24 Hrs  T(C): 36.8 (06 Aug 2021 13:30), Max: 37.2 (05 Aug 2021 19:27)  T(F): 98.2 (06 Aug 2021 13:30), Max: 98.9 (05 Aug 2021 19:27)  HR: 66 (06 Aug 2021 13:30) (62 - 85)  BP: 120/67 (06 Aug 2021 05:50) (117/75 - 121/64)  BP(mean): --  RR: 18 (06 Aug 2021 13:30) (15 - 18)  SpO2: 99% (06 Aug 2021 13:30) (95% - 99%)    I&O's Summary      PHYSICAL EXAM:  General: No acute distress  Respiratory: Nonlabored  Cardiovascular: RRR  Abdominal: Soft, nondistended, nontender. No rebound or guarding. No organomegaly, no palpable mass.  Extremities: Warm    LABS:                        13.7   9.50  )-----------( 215      ( 06 Aug 2021 04:22 )             41.6     08-06    132<L>  |  97<L>  |  28<H>  ----------------------------<  128<H>  4.4   |  20<L>  |  1.09    Ca    9.0      06 Aug 2021 04:22  Phos  3.2     08-06  Mg     2.30     08-06    TPro  6.5  /  Alb  3.6  /  TBili  0.5  /  DBili  x   /  AST  31  /  ALT  46<H>  /  AlkPhos  77  08-06    Lactate:  08-05 @ 17:27  1.0    PT/INR - ( 05 Aug 2021 17:27 )   PT: 14.1 sec;   INR: 1.25 ratio         PTT - ( 06 Aug 2021 12:41 )  PTT:108.6 sec              IMAGING:

## 2021-08-06 NOTE — CHART NOTE - NSCHARTNOTEFT_GEN_A_CORE
Discussed w/ Dr. Dowd [facility] duplex result +L gastrocnemius occlusive DVT recommending to c/w heparin gtt at this time and no need for vascular cs at this time.  Patient's B/L LE w/  2+ Peripheral Pulses, No clubbing, cyanosis, or edema. Discussed w/ Dr. Dowd [facility] duplex result +L gastrocnemius DVT recommending to c/w heparin gtt at this time. Patient's B/L LE w/  2+ Peripheral Pulses, No clubbing, cyanosis, or edema. Discussed w/ Dr. Dowd [facility] duplex result +L gastrocnemius DVT recommending to c/w heparin gtt at this time. Patient's B/L LE w/  2+ Peripheral Pulses. L popliteal detected via doppler. No clubbing, cyanosis, or edema. Discussed w/ Dr. Dowd [facility] duplex result +L gastrocnemius DVT recommending to c/w heparin gtt at this time. Patient's B/L LE  pulses examined, dorsalis pedis and  tibialis posteria w/ 2+ peripheral pulses. L popliteal detected via doppler only. No clubbing, cyanosis, or edema.

## 2021-08-06 NOTE — CONSULT NOTE ADULT - ATTENDING COMMENTS
Pt with new PE on anticoagulation and multiple co-morbidities. Found to have a pancreatic mass.    Will require EUS with FNA when stable and with cardiac clearance after of anticoagulation.

## 2021-08-06 NOTE — CONSULT NOTE ADULT - ASSESSMENT
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    Impression:  # Acute PE and DVT  # Pancreatic mass and liver lesions    Recommendations:      Rachele Ricketts MD  Gastroenterology Fellow  Pager: [phone]  Please call answering service [phone] / on-call GI fellow after 5pm and before 8am, and on weekends.   74 yo M hx of CAD, MI, stent x2 (10/2009 at [facility]), pAfib (not on anticoagulation), HTN, HLD, BIV-AICD, and systolic CHF EF: 35% presents to the ED after he was found to have RUL PE, large pancreatic tail mass with liver lesions.    Impression:  # Acute PE and DVT  # Pancreatic mass and liver lesions    Recommendations:  - Check tumor markers CEA,   - Obtain CT abdomen - Triple phase to evaluate liver lesions and pancreatic lesion   - Obtain IR consult today for bx of liver lesion. ( Defer performing EUS give his cardiac disease )  - Continue anticoagulation     Discussed with Dr. Vann, patient/ his family and primary team      Rachele Ricketts MD  Gastroenterology Fellow  Pager: [phone]  Please call answering service [phone] / on-call GI fellow after 5pm and before 8am, and on weekends.

## 2021-08-06 NOTE — CONSULT NOTE ADULT - SUBJECTIVE AND OBJECTIVE BOX
HPI:  74 yo M hx of CAD, MI, stent x2 (10/2009 at [facility]), pAfib (not on anticoagulation), HTN, HLD, BIV-AICD, and systolic CHF presents to the ED from radiology office after being diagnosed with suspected PE. Patient has been having L shoulder blade pain for the last 2 weeks. He went to see his orthopedist who gave him steroid shot without any improvement. In the meantime, he also developed chest tightness that is pressure like, 5/10 in severity, non-reproducible, non-pleuritic, and constant. Nothing improves or worsens the pain. He endorses intermittent SOB and LE edema but denies any fever, chills, nausea, vomiting, abdominal pain, diarrhea, or dysuria.     He went to see his PCP who recommended CT chest with IV which showed R upper lobe PE, pancreatic mass with liver lesions and lung nodules (reported posted on patient's [facility] portal), prompting him to come to the ED.   CT chest with IV contrast from outside hospital showed RUL PE, pancreatic mass concerning for adenocarcinoma with liver lesion.        Allergies:  No Known Allergies    Home Medications:  aspirin 81 mg oral tablet: 1 tab(s) orally once a day (05 Aug 2021 20:34)  carvedilol 12.5 mg oral tablet: 1 tab(s) orally 2 times a day (05 Aug 2021 20:34)  Centrum Silver oral tablet: 1 tab(s) orally once a day (05 Aug 2021 20:34)  finasteride 5 mg oral tablet: 1 tab(s) orally once a day (05 Aug 2021 20:34)  hydrochlorothiazide-spironolactone 25 mg-25 mg oral tablet: 1 tab(s) orally once a day (05 Aug 2021 20:34)  losartan 50 mg oral tablet: 1 tab(s) orally once a day (05 Aug 2021 20:34)  pantoprazole 40 mg oral delayed release tablet: 1 tab(s) orally once a day (05 Aug 2021 20:34)  rosuvastatin 20 mg oral tablet: 1 tab(s) orally once a day (at bedtime) (05 Aug 2021 20:34)      Hospital Medications:  acetaminophen   Tablet .. 650 milliGRAM(s) Oral every 6 hours PRN  aluminum hydroxide/magnesium hydroxide/simethicone Suspension 30 milliLiter(s) Oral every 4 hours PRN  aspirin enteric coated 81 milliGRAM(s) Oral daily  atorvastatin 80 milliGRAM(s) Oral at bedtime  carvedilol 12.5 milliGRAM(s) Oral every 12 hours  finasteride 5 milliGRAM(s) Oral daily  heparin   Injectable 8000 Unit(s) IV Push every 6 hours PRN  heparin   Injectable 4000 Unit(s) IV Push every 6 hours PRN  heparin  Infusion.  Unit(s)/Hr IV Continuous <Continuous>  lactated ringers. 500 milliLiter(s) IV Continuous <Continuous>  losartan 50 milliGRAM(s) Oral daily  melatonin 3 milliGRAM(s) Oral at bedtime PRN  pantoprazole    Tablet 40 milliGRAM(s) Oral before breakfast      PMHX/PSHX:  Hypertension (ICD9 401.9)    Hyperlipidemia (ICD9 272.4)    Bacteremia (ICD9 790.7)    AF (Atrial Fibrillation) (ICD9 427.31)    Pneumonia (ICD9 486)    BPH (Benign Prostatic Hyperplasia)    Dyspepsia    Borderline diabetes mellitus    MI (myocardial infarction)    Systolic heart failure    Hernia    Biventricular ICD (implantable cardioverter-defibrillator) in place    Stented coronary artery        Family history:  No pertinent family history in first degree relatives        Social History: no smoking    ROS:   General:  No fevers, chills or night sweats  ENT:  No sore throat or dysphagia  CV:  No pain or palpitations  Resp:  No dyspnea, cough or  wheezing  GI:  as above  Skin:  No rash or edema  Neuro: no weakness   Hematologic: no bleeding  Musculoskeletal: no muscle pain or join pain  Psych: no agitation     : no dysuria      PHYSICAL EXAM:   GENERAL:  NAD, Appears stated age  HEENT:  NC/AT,  conjunctivae clear and pink, sclera -anicteric  CHEST:  CTA B/L, Normal effort  HEART:  RRR S1/S2,  ABDOMEN:  Soft, non-tender, non-distended,  no masses   EXTREMITIES:  No cyanosis or Edema  SKIN:  Warm & Dry. No rash or erythema  NEURO:  Alert, oriented, no focal deficit    Vital Signs:  Vital Signs Last 24 Hrs  T(C): 36.7 (06 Aug 2021 05:50), Max: 37.2 (05 Aug 2021 19:27)  T(F): 98 (06 Aug 2021 05:50), Max: 98.9 (05 Aug 2021 19:27)  HR: 62 (06 Aug 2021 05:50) (62 - 85)  BP: 120/67 (06 Aug 2021 05:50) (117/75 - 122/77)  BP(mean): --  RR: 18 (06 Aug 2021 05:50) (15 - 18)  SpO2: 99% (06 Aug 2021 05:50) (95% - 99%)  Daily Height in cm: 177.8 (05 Aug 2021 16:06)    Daily     LABS:                        13.7   9.50  )-----------( 215      ( 06 Aug 2021 04:22 )             41.6     Mean Cell Volume: 82.5 fL (08-06-21 @ 04:22)    08-06    132<L>  |  97<L>  |  28<H>  ----------------------------<  128<H>  4.4   |  20<L>  |  1.09    Ca    9.0      06 Aug 2021 04:22  Phos  3.2     08-06  Mg     2.30     08-06    TPro  6.5  /  Alb  3.6  /  TBili  0.5  /  DBili  x   /  AST  31  /  ALT  46<H>  /  AlkPhos  77  08-06    LIVER FUNCTIONS - ( 06 Aug 2021 04:22 )  Alb: 3.6 g/dL / Pro: 6.5 g/dL / ALK PHOS: 77 U/L / ALT: 46 U/L / AST: 31 U/L / GGT: x           PT/INR - ( 05 Aug 2021 17:27 )   PT: 14.1 sec;   INR: 1.25 ratio         PTT - ( 06 Aug 2021 12:41 )  PTT:108.6 sec    Amylase Serum--      Lipase serum54       Ammonia--                          13.7   9.50  )-----------( 215      ( 06 Aug 2021 04:22 )             41.6                         14.1   8.92  )-----------( 229      ( 05 Aug 2021 17:27 )             43.6     Imaging:  < from: CT Angio Chest PE Protocol w/ IV Cont (08.05.21 @ 21:16) >  EXAM:  CT ANGIO CHEST PULM ART WAWIC        PROCEDURE DATE:  Aug  5 2021         INTERPRETATION:  CLINICAL INFORMATION: Shortness of breath rule out PE.    COMPARISON: Chest x-ray 4/9/2016, CT chest 10/19/2009    CONTRAST/COMPLICATIONS:  IV Contrast: Omnipaque 350  90 cc administered   10 cc discarded  Oral Contrast: NONE  Complications: None reported at time of study completion    PROCEDURE:  CT Angiography of the Chest.  Sagittal and coronal reformats were performed as well as 3D (MIP) reconstructions.    FINDINGS:    LUNGS AND AIRWAYS: Patent central airways.  Mild bibasilar atelectasis. Small calcified granuloma within left upper lobe (series 2 image 40) is unchanged from 2009. The lungs are otherwise clear. No suspicious lung nodule.  PLEURA: No pleural effusion.  MEDIASTINUM AND SARATH: 1.2 cm short axis left-sided supraclavicular lymph node.  VESSELS: Pulmonary emboli in the bilateral upper, right middle, right lower lobe with extension into the segmental branches of the right middle and lower lobes. No aortic dissection.  HEART: No sign of right heart strain. Thinning of the myocardium of the left ventricle. 2-lead ICD in place. No pericardial effusion.  CHEST WALL AND LOWER NECK: 1.2 cm left-sided supraclavicular lymph node.  VISUALIZED UPPER ABDOMEN: There is a large ill-defined mass in the tail of the pancreas. Numerous hepatic hypodensities the largest of which  measure 2 x 2 cm (2, 92). There is also a large stone in the neck of the gallbladder.  BONES: Degenerative changes of the spine.    IMPRESSION:  Pulmonary emboli in the bilateral upper, right middle, right lower lobe with extension to the segmental branches of the right middle and lower lobes.    Large ill-defined mass in the tail of the pancreas with numerous extensive liver metastases.    1.2 cm left-sided supraclavicular lymph node.    --- End of Report ---    < end of copied text >

## 2021-08-06 NOTE — CONSULT NOTE ADULT - ASSESSMENT
75M hx of CAD s/p stents, CHF s/p AICD here with PE started on hep gtt and new pancreas mass with hepatic mets    Recommendations:  -no acute surgical intervention at this time  -please obtain CEA,  tumor marker serum levels  -CT with IV contrast pancreas protocol (triple phase) as patient cannot get MRI 2/2 non-compatible AICD  -appreciate GI recommendations  agree with IR consultation for liver biopsy of met    Pending discussion with Dr. Nkechi BRIONES TEAM SURG[facility]  [id number] 75M hx of CAD s/p stents, CHF s/p AICD here with PE started on hep gtt and new pancreas mass with hepatic mets    Recommendations:  -no acute surgical intervention at this time  -please obtain  tumor marker serum levels. CEA resulted  -CT with IV contrast pancreas protocol (triple phase) as patient cannot get MRI 2/2 non-compatible AICD  -appreciate GI recommendations  agree with IR consultation for liver biopsy of met    Pending discussion with Dr. Nkechi BRIONES TE[facility] SURG[facility]  [id number] 75M hx of CAD s/p stents, CHF s/p AICD here with PE started on hep gtt and new pancreas mass with hepatic mets    Recommendations:  -no acute surgical intervention at this time  -please obtain  tumor marker serum levels. CEA resulted  -CT with IV contrast pancreas protocol (triple phase) as patient cannot get MRI 2/2 non-compatible AICD leads  -appreciate GI recommendations  agree with IR consultation for liver biopsy of met    Pending discussion with Dr. Nkechi BRIONES TE[facility] SURG[facility]  [id number] 75M hx of CAD s/p stents, CHF s/p AICD here with PE started on hep gtt and new pancreas mass with hepatic mets    Recommendations:  -no acute surgical intervention at this time  -agree with AC for DVT/PE  -please obtain  tumor marker serum levels. CEA resulted  -CT with IV contrast pancreas protocol (triple phase) as patient cannot get MRI 2/2 non-compatible AICD leads  -appreciate GI recommendations  agree with IR consultation for liver biopsy of met    Pending discussion with Dr. Nkechi BRIONES TEAM SURGERY  [id number]

## 2021-08-07 NOTE — PROGRESS NOTE ADULT - ASSESSMENT
75 male with history of CAD s/p MI s/p Stents , ISchemic CM , systolic CHF s/p BIV ICD, PAF was not on a/c  admitted with acute PE found to have pancreatic mass     pulmonary embolism  clinically stable  no evidence of myocardial injury   on a/c   can change to NOAC if no upcoming intervention   obtain echo   pulm consult recommended     CAD history of stent  cont asa  cont statin     Systolic CHF  stable   on BB, Arb   s/p ICD    Pancreatic Mass / ca  fu with surg onc

## 2021-08-07 NOTE — PROGRESS NOTE ADULT - ASSESSMENT
74 yo M hx of CAD, MI, stent x2 (10/2009 at [facility]), pAfib (not on anticoagulation), HTN, HLD, BIV-AICD, and systolic CHF presents to the ED from radiology office after being diagnosed with suspected PE and new pancreatic mass, concerning for new malignancy.

## 2021-08-07 NOTE — PROGRESS NOTE ADULT - ASSESSMENT
74 yo M hx of CAD, MI, stent x2 (10/2009 at [facility]), pAfib (not on anticoagulation), HTN, HLD, BIV-AICD, and systolic CHF EF: 35% presents to the ED after he was found to have RUL PE, large pancreatic tail mass with liver lesions.    Impression:  # Acute PE and DVT  # Pancreatic mass and liver lesions - concerning for metastatic pancreatic malignancy    Recommendations:  - Check tumor markers CEA,   - Obtain CT abdomen - Triple phase to evaluate liver lesions and pancreatic lesion   - IR recs appreciated, biopsy deferred in the settings of AC  - EUS deferred given significant cardiac comorbidities and high procedural risk  - Continue anticoagulation     Thank you for involving us in the care of this patient. Please reach out if any further questions.    Lemuel Grace, PGY-5  GI Fellow    Available on [employer] Teams  Pager [phone] ([facility]) or [phone] ([facility])  After 5PM/Weekends, please contact the on-call GI fellow: [phone]  Available through [employer] Teams     74 yo M hx of CAD, MI, stent x2 (10/2009 at [facility]), pAfib (not on anticoagulation), HTN, HLD, BIV-AICD, and systolic CHF EF: 35% presents to the ED after he was found to have RUL PE, large pancreatic tail mass with liver lesions.    Impression:  # Acute PE and DVT  # Pancreatic mass and liver lesions - concerning for metastatic pancreatic malignancy    Recommendations:  - f/u tumor markers  (CEA markedly elevated at 4000)  - Obtain CT abdomen - Triple phase to evaluate liver lesions and pancreatic lesion   - IR recs appreciated, biopsy deferred in the settings of AC  - EUS deferred given significant cardiac comorbidities and high procedural risk  - Continue anticoagulation     Thank you for involving us in the care of this patient. Please reach out if any further questions.    Lemuel Grace, PGY-5  GI Fellow    Available on [employer] Teams  Pager [phone] ([facility]) or [phone] ([facility])  After 5PM/Weekends, please contact the on-call GI fellow: [phone]  Available through [employer] Teams     74 yo M hx of CAD, MI, stent x2 (10/2009 at [facility]), pAfib (not on anticoagulation), HTN, HLD, BIV-AICD, and systolic CHF EF: 35% presents to the ED after he was found to have RUL PE, large pancreatic tail mass with liver lesions.    Impression:  # Acute PE and DVT  # Pancreatic mass and liver lesions - concerning for metastatic pancreatic malignancy. Can plan for a biopsy if able to hold AC. Appreciate cardiology and pulmonology input - will f/u TTE, if negative for right heart strain would plan for biopsy with EUS vs. IR biopsy early next week.     Recommendations:  - f/u tumor markers  (CEA markedly elevated at 4000)  - Obtain CT abdomen - Triple phase to evaluate liver lesions and pancreatic lesion   - f/u TTE  - IR recs appreciated, biopsy deferred in the settings of AC  - EUS deferred given significant cardiac comorbidities and high procedural risk  - Continue anticoagulation     Thank you for involving us in the care of this patient. Please reach out if any further questions.    Lemuel Grace, PGY-5  GI Fellow    Available on [employer] Teams  Pager [phone] ([facility]) or [phone] ([facility])  After 5PM/Weekends, please contact the on-call GI fellow: [phone]  Available through [employer] Teams

## 2021-08-07 NOTE — CONSULT NOTE ADULT - SUBJECTIVE AND OBJECTIVE BOX
Pulmonary Consult Note    REGGIE PANTOJA  MRN-[id number]    Chief Complaint: Patient is a 75y old  Male who presents with a chief complaint of R/o pulmonary embolism (07 Aug 2021 10:00)      HPI:  Per chart review:    74 yo M hx of CAD, MI, stent x2 (10/2009 at [facility]), pAfib (not on anticoagulation), HTN, HLD, BIV-AICD, and systolic CHF presents to the ED from radiology office after being diagnosed with suspected PE. Patient has been having L shoulder blade pain for the last 2 weeks. He went to see his orthopedist who gave him steroid shot without any improvement. In the meantime, he also developed chest tightness that is pressure like, 5/10 in severity, non-reproducible, non-pleuritic, and constant. Nothing improves or worsens the pain. He endorses intermittent SOB and LE edema but denies any fever, chills, nausea, vomiting, abdominal pain, diarrhea, or dysuria. He went to see his PCP who recommended CT chest with IV which showed R upper lobe PE, pancreatic mass with liver lesions and lung nodules (reported posted on patient's [facility] portal), prompting him to come to the ED. Currently, he continues to endorse chest tightness but denies any new concern.     In the ED, his vitals were: Tmax 98F, HR 80s, BP 120s/70-80s mmHg, RR 15, SO2 95% on RA. His labs are significant for mildly elevated ALT. CT chest with IV contrast from outside hospital showed RUL PE, pancreatic mass concerning for adenocarcinoma with liver lesion.   -  -on my exam:  patient denies pulm hx, no hx of clot in past, denies sob/cough/cp/wheeze.  Has some indigestion    ROS:  -    All other systems reviewed and negative    ACTIVE MEDICATION LIST:  MEDICATIONS  (STANDING):  aspirin enteric coated 81 milliGRAM(s) Oral daily  atorvastatin 80 milliGRAM(s) Oral at bedtime  carvedilol 12.5 milliGRAM(s) Oral every 12 hours  finasteride 5 milliGRAM(s) Oral daily  heparin  Infusion.  Unit(s)/Hr (18 mL/Hr) IV Continuous <Continuous>  lactated ringers. 500 milliLiter(s) (250 mL/Hr) IV Continuous <Continuous>  losartan 50 milliGRAM(s) Oral daily  pantoprazole    Tablet 40 milliGRAM(s) Oral before breakfast  simethicone 80 milliGRAM(s) Chew four times a day    MEDICATIONS  (PRN):  acetaminophen   Tablet .. 650 milliGRAM(s) Oral every 6 hours PRN Temp greater or equal to 38.5C (101.3F), Mild Pain (1 - 3)  aluminum hydroxide/magnesium hydroxide/simethicone Suspension 30 milliLiter(s) Oral every 4 hours PRN Dyspepsia  heparin   Injectable 8000 Unit(s) IV Push every 6 hours PRN For aPTT less than 40  heparin   Injectable 4000 Unit(s) IV Push every 6 hours PRN For aPTT between 40 - 57  melatonin 3 milliGRAM(s) Oral at bedtime PRN Insomnia      EXAM:  Vital Signs Last 24 Hrs  T(C): 36.8 (07 Aug 2021 12:49), Max: 37.2 (06 Aug 2021 17:27)  T(F): 98.3 (07 Aug 2021 12:49), Max: 98.9 (06 Aug 2021 17:27)  HR: 80 (07 Aug 2021 12:49) (61 - 80)  BP: 111/70 (07 Aug 2021 12:49) (111/70 - 122/80)  BP(mean): --  RR: 18 (07 Aug 2021 12:49) (16 - 18)  SpO2: 99% (07 Aug 2021 12:49) (97% - 99%)    GENERAL: No acute distress  NEURO: Alert and oriented x 3  LUNGS: Clear to auscultation bilaterally, no rales or wheezes  CV: S1/S2, no murmur    LABS/IMAGING: reviewed                        13.5   7.90  )-----------( 229      ( 07 Aug 2021 06:54 )             41.3   08-07    133<L>  |  100  |  21  ----------------------------<  131<H>  4.2   |  21<L>  |  1.00    Ca    9.1      07 Aug 2021 06:54  Phos  3.2     08-07  Mg     2.30     08-07    TPro  6.5  /  Alb  3.6  /  TBili  0.5  /  DBili  x   /  AST  31  /  ALT  46<H>  /  AlkPhos  77  08-06    < from: CT Angio Chest PE Protocol w/ IV Cont (08.05.21 @ 21:16) >    EXAM:  CT ANGIO CHEST PULM ART WAWIC        PROCEDURE DATE:  Aug  5 2021         INTERPRETATION:  CLINICAL INFORMATION: Shortness of breath rule out PE.    COMPARISON: Chest x-ray 4/9/2016, CT chest 10/19/2009    CONTRAST/COMPLICATIONS:  IV Contrast: Omnipaque 350  90 cc administered   10 cc discarded  Oral Contrast: NONE  Complications: None reported at time of study completion    PROCEDURE:  CT Angiography of the Chest.  Sagittal and coronal reformats were performed as well as 3D (MIP) reconstructions.    FINDINGS:    LUNGS AND AIRWAYS: Patent central airways.  Mild bibasilar atelectasis. Small calcified granuloma within left upper lobe (series 2 image 40) is unchanged from 2009. The lungs are otherwise clear. No suspicious lung nodule.  PLEURA: No pleural effusion.  MEDIASTINUM AND SARATH: 1.2 cm short axis left-sided supraclavicular lymph node.  VESSELS: Pulmonary emboli in the bilateral upper, right middle, right lower lobe with extension into the segmental branches of the right middle and lower lobes. No aortic dissection.  HEART: No sign of right heart strain. Thinning of the myocardium of the left ventricle. 2-lead ICD in place. No pericardial effusion.  CHEST WALL AND LOWER NECK: 1.2 cm left-sided supraclavicular lymph node.  VISUALIZED UPPER ABDOMEN: There is a large ill-defined mass in the tail of the pancreas. Numerous hepatic hypodensities the largest of which  measure 2 x 2 cm (2, 92). There is also a large stone in the neck of the gallbladder.  BONES: Degenerative changes of the spine.    IMPRESSION:  Pulmonary emboli in the bilateral upper, right middle, right lower lobe with extension to the segmental branches of the right middle and lower lobes.    Large ill-defined mass in the tail of the pancreas with numerous extensive liver metastases.    1.2 cm left-sided supraclavicular lymph node.    --- End of Report ---        < end of copied text >        PROBLEM LIST:  75yMale with HEALTH ISSUES - PROBLEM Dx:  Pulmonary embolism, unspecified chronicity, unspecified pulmonary embolism type, unspecified whether acute cor pulmonale present  Pulmonary embolism, unspecified chronicity, unspecified pulmonary embolism type, unspecified whether acute cor pulmonale present    Pancreatic mass  Pancreatic mass    Chronic systolic heart failure  Chronic systolic heart failure    Essential hypertension  Essential hypertension    RECS:  -Agreee with full ac  -follow up results of echo  -will need to hold ac for biopsy if no heart strain found on echo  -GI/onc fu for pancreatic mass    Thank you for this consultation, please feel free to call with any questions [phone]  Jewell Corea MD

## 2021-08-07 NOTE — CONSULT NOTE ADULT - REASON FOR ADMISSION
R/o pulmonary embolism

## 2021-08-07 NOTE — PROGRESS NOTE ADULT - ATTENDING COMMENTS
Admitted with new-onset bilateral PE in association with malignant-appearing pancreatic tail mass and probable hepatic metastasis. Check CA 19-9. Monitor serial liver enzymes. Continue prophylactic pantoprazole 40 mg daily. Pending cardiac status plan for biopsy-possible EUS/FNA.

## 2021-08-08 NOTE — PROGRESS NOTE ADULT - ASSESSMENT
74 yo M hx of CAD, MI, stent x2 (10/2009 at [facility]), pAfib (not on anticoagulation), HTN, HLD, BIV-AICD, and systolic CHF presents to the ED from radiology office after being diagnosed with suspected PE and new pancreatic mass, concerning for new malignancy.

## 2021-08-08 NOTE — CHART NOTE - NSCHARTNOTEFT_GEN_A_CORE
Brief GI update  =========  Tentatively planned for EUS tomorrow.     - Please make NPO midnight.  - 3AM CBC, CMP, INR  - Please hold heparin at 7AM     Thank you for involving us in the care of this patient. Please reach out if any further questions.    Lemuel Grace, PGY-5  GI Fellow    Available on [employer] Teams  Pager [phone] ([facility]) or [phone] ([facility])  After 5PM/Weekends, please contact the on-call GI fellow: [phone]  Available through [employer] Teams

## 2021-08-08 NOTE — CHART NOTE - NSCHARTNOTEFT_GEN_A_CORE
GI planning to perform possible EUS tomorrow 8/9 however needs cardiology clearance. Cardiology Dr. Najera made aware.

## 2021-08-08 NOTE — PROGRESS NOTE ADULT - ASSESSMENT
75 male with history of CAD s/p MI s/p Stents , ISchemic CM , systolic CHF s/p BIV ICD, PAF was not on a/c  admitted with acute PE found to have pancreatic mass     pulmonary embolism  clinically stable  no evidence of myocardial injury   on a/c   echo shows normal RV   pulm eval appreciated     CAD history of stent  cont asa  cont statin     Systolic CHF  stable   on BB, Arb   echo reviewed   s/p ICD    Pancreatic Mass / ca  fu with surg onc  75 male with history of CAD s/p MI s/p Stents , ISchemic CM , systolic CHF s/p BIV ICD, PAF was not on a/c  admitted with acute PE found to have pancreatic mass     pulmonary embolism  clinically stable  no evidence of myocardial injury   on a/c   echo shows normal RV   pulm eval appreciated     CAD history of stent  cont asa  cont statin     Systolic CHF  stable   on BB, Arb   echo reviewed   s/p ICD    Pancreatic Mass / ca  fu with surg onc     Pre-Operative Cardiac Risk Stratification and Optimization prior to EUS  Based on patient history and physical exam, the patient is considered to have high risk   can proceed to planned low risk procedure as pt is optimized from cardiac standpoint with acceptable high CV risks  if pt needs general anesthesia then recommend pharm nuclear stress test

## 2021-08-09 NOTE — PROGRESS NOTE ADULT - SUBJECTIVE AND OBJECTIVE BOX
Interval Events:   - No acute events  - CT triple phase pending  - IR deferred biospy given AC status    Allergies:  No Known Allergies      Hospital Medications:  acetaminophen   Tablet .. 650 milliGRAM(s) Oral every 6 hours PRN  aluminum hydroxide/magnesium hydroxide/simethicone Suspension 30 milliLiter(s) Oral every 4 hours PRN  aspirin enteric coated 81 milliGRAM(s) Oral daily  atorvastatin 80 milliGRAM(s) Oral at bedtime  carvedilol 12.5 milliGRAM(s) Oral every 12 hours  finasteride 5 milliGRAM(s) Oral daily  heparin   Injectable 8000 Unit(s) IV Push every 6 hours PRN  heparin   Injectable 4000 Unit(s) IV Push every 6 hours PRN  heparin  Infusion.  Unit(s)/Hr IV Continuous <Continuous>  lactated ringers. 500 milliLiter(s) IV Continuous <Continuous>  losartan 50 milliGRAM(s) Oral daily  melatonin 3 milliGRAM(s) Oral at bedtime PRN  pantoprazole    Tablet 40 milliGRAM(s) Oral before breakfast  simethicone 80 milliGRAM(s) Chew four times a day      PMHX/PSHX:  Hypertension (ICD9 401.9)    Hyperlipidemia (ICD9 272.4)    Bacteremia (ICD9 790.7)    AF (Atrial Fibrillation) (ICD9 427.31)    Pneumonia (ICD9 486)    BPH (Benign Prostatic Hyperplasia)    Dyspepsia    Borderline diabetes mellitus    MI (myocardial infarction)    Systolic heart failure    Hernia    Biventricular ICD (implantable cardioverter-defibrillator) in place    Stented coronary artery        Family history:  No pertinent family history in first degree relatives        ROS:   General:  No wt loss, fevers, chills, night sweats, fatigue,   Eyes:  Good vision, no reported pain  ENT:  No sore throat, pain, runny nose, dysphagia  CV:  No pain, palpitations, hypo/hypertension  Pulm:  No dyspnea, cough, tachypnea, wheezing  GI:  As per HPI  :  No pain, bleeding, incontinence, nocturia  Muscle:  No pain, weakness  Neuro:  No weakness, tingling, memory problems  Psych:  No fatigue, insomnia, mood problems, depression  Endocrine:  No polyuria, polydipsia, cold/heat intolerance  Heme:  No petechiae, ecchymosis, easy bruisability  Skin:  No rash, tattoos, scars, edema      PHYSICAL EXAM:   Vital Signs:  Vital Signs Last 24 Hrs  T(C): 37 (07 Aug 2021 05:25), Max: 37.2 (06 Aug 2021 17:27)  T(F): 98.6 (07 Aug 2021 05:25), Max: 98.9 (06 Aug 2021 17:27)  HR: 64 (07 Aug 2021 05:25) (61 - 66)  BP: 122/80 (07 Aug 2021 05:25) (114/74 - 122/80)  BP(mean): --  RR: 18 (07 Aug 2021 05:25) (16 - 18)  SpO2: 98% (07 Aug 2021 05:25) (97% - 99%)  Daily     Daily Weight in k (07 Aug 2021 05:25)      21 @ 07:01  -  21 @ 07:00  --------------------------------------------------------  IN: 118 mL / OUT: 0 mL / NET: 118 mL        GENERAL:  No acute distress  HEENT:  Normocephalic/atraumtic,  no scleral icterus  CHEST:  No accessory muscle use  HEART:  Regular rate and rhythm  ABDOMEN:  Soft, non-tender, non-distended, normoactive bowel sounds, no masses, no hepato-splenomegaly, no signs of chronic liver disease  EXTREMITIES:  No cyanosis, clubbing, or edema  SKIN:  No rash  NEURO:  Alert and oriented x 3, no asterixis, no tremor    LABS:                        13.5   7.90  )-----------( 229      ( 07 Aug 2021 06:54 )             41.3     Mean Cell Volume: 82.4 fL (21 @ 06:54)        133<L>  |  100  |  21  ----------------------------<  131<H>  4.2   |  21<L>  |  1.00    Ca    9.1      07 Aug 2021 06:54  Phos  3.2     08-  Mg     2.30     -    TPro  6.5  /  Alb  3.6  /  TBili  0.5  /  DBili  x   /  AST  31  /  ALT  46<H>  /  AlkPhos  77  08-06    LIVER FUNCTIONS - ( 06 Aug 2021 04:22 )  Alb: 3.6 g/dL / Pro: 6.5 g/dL / ALK PHOS: 77 U/L / ALT: 46 U/L / AST: 31 U/L / GGT: x           PT/INR - ( 05 Aug 2021 17:27 )   PT: 14.1 sec;   INR: 1.25 ratio         PTT - ( 07 Aug 2021 02:13 )  PTT:86.4 sec                            13.5   7.90  )-----------( 229      ( 07 Aug 2021 06:54 )             41.3                         13.7   9.50  )-----------( 215      ( 06 Aug 2021 04:22 )             41.6                         14.1   8.92  )-----------( 229      ( 05 Aug 2021 17:27 )             43.6       Imaging:          
Vascular & Interventional Radiology Consult Note    Evaluate for Procedure: hepatic mass biopsy    HPI: 75y Male with CAD and stents on ASA found to have acute PE now on hep gtt found to have a large pancreatic mass and multiple hepatic masses. IR re-consulted for liver mass biopsy.    Allergies:   Medications (Abx/Cardiac/Anticoagulation/Blood Products)  aspirin enteric coated: 81 milliGRAM(s) Oral (08-08 @ 12:26)  carvedilol: 12.5 milliGRAM(s) Oral (08-08 @ 18:19)  heparin  Infusion.: 1300 Unit(s)/Hr IV Continuous (08-08 @ 09:00)  losartan: 50 milliGRAM(s) Oral (08-08 @ 05:46)    Data:    T(C): 36.7  HR: 60  BP: 115/67  RR: 18  SpO2: 97%    -WBC 7.67 / HgB 13.3 / Hct 40.5 / Plt 245  -Na 132 / Cl 97 / BUN 18 / Glucose 122  -K 3.9 / CO2 24 / Cr 1.01  -ALT 50 / Alk Phos 83 / T.Bili 0.4  -INR 1.32 / PTT 65.0      Imaging: CT reviewed demonstrating multiple hepatic lesions and a large pancreatic mass. Acute PE is seen on CTPA.
REGGIE PANTOJA  75y  Male      Patient is a 75y old  Male who presents with a chief complaint of R/o pulmonary embolism (09 Aug 2021 12:59)  Patient was seen and examined.chart reviewed.covering Daniel.Above note.Patient with PE on AC-DEFERRED EUS,LIVER BIOPSY per IR,GI  Patient will go home on lovonex  comfortable,stable  REVIEW OF SYSTEMS:  CONSTITUTIONAL: No fever  RESPIRATORY: No cough, hemoptysis or shortness of breath  CARDIOVASCULAR: No chest pain, palpitations, dizziness, or leg swelling  GASTROINTESTINAL: No abdominal pain. nausea, vomiting, hematemesis  GENITOURINARY: No dysuria, frequency, hematuria   NEUROLOGICAL: No headaches, no dizziness  MUSCULOSKELETAL: No joint pain or swelling;     INTERVAL HPI/OVERNIGHT EVENTS:  T(C): 36.7 (08-09-21 @ 14:25), Max: 36.8 (08-08-21 @ 18:16)  HR: 86 (08-09-21 @ 14:25) (60 - 86)  BP: 116/79 (08-09-21 @ 14:25) (106/65 - 128/75)  RR: 18 (08-09-21 @ 14:25) (16 - 18)  SpO2: 100% (08-09-21 @ 14:25) (97% - 100%)  Wt(kg): --  I&O's Summary    08 Aug 2021 07:01  -  09 Aug 2021 07:00  --------------------------------------------------------  IN: 743 mL / OUT: 0 mL / NET: 743 mL      T(C): 36.7 (08-09-21 @ 14:25), Max: 36.8 (08-08-21 @ 18:16)  HR: 86 (08-09-21 @ 14:25) (60 - 86)  BP: 116/79 (08-09-21 @ 14:25) (106/65 - 128/75)  RR: 18 (08-09-21 @ 14:25) (16 - 18)  SpO2: 100% (08-09-21 @ 14:25) (97% - 100%)  Wt(kg): --Vital Signs Last 24 Hrs  T(C): 36.7 (09 Aug 2021 14:25), Max: 36.8 (08 Aug 2021 18:16)  T(F): 98.1 (09 Aug 2021 14:25), Max: 98.3 (08 Aug 2021 18:16)  HR: 86 (09 Aug 2021 14:25) (60 - 86)  BP: 116/79 (09 Aug 2021 14:25) (106/65 - 128/75)  BP(mean): --  RR: 18 (09 Aug 2021 14:25) (16 - 18)  SpO2: 100% (09 Aug 2021 14:25) (97% - 100%)    LABS:                        13.3   7.67  )-----------( 245      ( 09 Aug 2021 07:35 )             40.5     08-09    132<L>  |  97<L>  |  18  ----------------------------<  122<H>  3.9   |  24  |  1.01    Ca    9.0      09 Aug 2021 07:35  Phos  3.0     08-09  Mg     2.30     08-09    TPro  6.4  /  Alb  3.7  /  TBili  0.4  /  DBili  x   /  AST  31  /  ALT  50<H>  /  AlkPhos  83  08-09    PT/INR - ( 09 Aug 2021 07:35 )   PT: 15.0 sec;   INR: 1.32 ratio         PTT - ( 09 Aug 2021 07:35 )  PTT:65.0 sec    CAPILLARY BLOOD GLUCOSE      POCT Blood Glucose.: 153 mg/dL (09 Aug 2021 01:41)            PAST MEDICAL & SURGICAL HISTORY:  Hypertension (ICD9 401.9)    Hyperlipidemia (ICD9 272.4)    BPH (Benign Prostatic Hyperplasia)    Borderline diabetes mellitus    MI (myocardial infarction)  2009    Systolic heart failure    Hernia    Biventricular ICD (implantable cardioverter-defibrillator) in place  2010    Stented coronary artery  X 2, 2009        MEDICATIONS  (STANDING):  aspirin enteric coated 81 milliGRAM(s) Oral daily  atorvastatin 80 milliGRAM(s) Oral at bedtime  carvedilol 12.5 milliGRAM(s) Oral every 12 hours  enoxaparin Injectable 140 milliGRAM(s) SubCutaneous daily  finasteride 5 milliGRAM(s) Oral daily  lactated ringers. 500 milliLiter(s) (250 mL/Hr) IV Continuous <Continuous>  losartan 50 milliGRAM(s) Oral daily  pantoprazole    Tablet 40 milliGRAM(s) Oral before breakfast  polyethylene glycol 3350 17 Gram(s) Oral daily  senna 2 Tablet(s) Oral at bedtime  simethicone 80 milliGRAM(s) Chew four times a day    MEDICATIONS  (PRN):  acetaminophen   Tablet .. 650 milliGRAM(s) Oral every 6 hours PRN Temp greater or equal to 38.5C (101.3F), Mild Pain (1 - 3)  aluminum hydroxide/magnesium hydroxide/simethicone Suspension 30 milliLiter(s) Oral every 4 hours PRN Dyspepsia  melatonin 3 milliGRAM(s) Oral at bedtime PRN Insomnia        RADIOLOGY & ADDITIONAL TESTS:    Imaging Personally Reviewed:  [ ] YES  [ ] NO    Consultant(s) Notes Reviewed:  [ ] YES  [ ] NO    PHYSICAL EXAM:  GENERAL: Alert and awake lying in bed in no distress  HEAD:  Atraumatic, Normocephalic  EYES: EOMI, JULIANA, conjunctiva and sclera clear  NECK: Supple, No JVD, Normal thyroid  NERVOUS SYSTEM:  Alert & Oriented X3, Motor and sensory systems are intact,   CHEST/LUNG: Bilateral clear breath sounds, no rhochi, no wheezing, no crepitations,  HEART: Regular rate and rhythm; No murmurs, rubs, or gallops  ABDOMEN: Soft, Nontender, Nondistended; Bowel sounds present  EXTREMITIES:   Peripheral Pulses are palpable, no  edema        Care Discussed with Consultants/Other Providers [ ] YES  [ ] NO      Code Status: [] Full Code [] DNR [] DNI [] Goals of Care:   Disposition: [] ICU [] Stroke Unit [] RCU []PCU []Floor [] Discharge Home         MODESTA ColmenaresP    
DATE OF SERVICE: 08-07-21 @ 10:00    Subjective: Patient seen and examined. No new events except as noted.     Covering Dr Wolf     SUBJECTIVE/ROS:  No chest pain, dyspnea, palpitation, or dizziness.       MEDICATIONS:  MEDICATIONS  (STANDING):  aspirin enteric coated 81 milliGRAM(s) Oral daily  atorvastatin 80 milliGRAM(s) Oral at bedtime  carvedilol 12.5 milliGRAM(s) Oral every 12 hours  finasteride 5 milliGRAM(s) Oral daily  heparin  Infusion.  Unit(s)/Hr (18 mL/Hr) IV Continuous <Continuous>  lactated ringers. 500 milliLiter(s) (250 mL/Hr) IV Continuous <Continuous>  losartan 50 milliGRAM(s) Oral daily  pantoprazole    Tablet 40 milliGRAM(s) Oral before breakfast  simethicone 80 milliGRAM(s) Chew four times a day      PHYSICAL EXAM:  T(C): 37 (08-07-21 @ 05:25), Max: 37.2 (08-06-21 @ 17:27)  HR: 64 (08-07-21 @ 05:25) (61 - 66)  BP: 122/80 (08-07-21 @ 05:25) (114/74 - 122/80)  RR: 18 (08-07-21 @ 05:25) (16 - 18)  SpO2: 98% (08-07-21 @ 05:25) (97% - 99%)  Wt(kg): --  I&O's Summary    06 Aug 2021 07:01  -  07 Aug 2021 07:00  --------------------------------------------------------  IN: 118 mL / OUT: 0 mL / NET: 118 mL            JVP: Normal  Neck: supple  Lung: clear   CV: S1 S2 , Murmur:  Abd: soft  Ext: No edema  neuro: Awake / alert  Psych: flat affect  Skin: normal``    LABS/DATA:    CARDIAC MARKERS:                                13.5   7.90  )-----------( 229      ( 07 Aug 2021 06:54 )             41.3     08-07    133<L>  |  100  |  21  ----------------------------<  131<H>  4.2   |  21<L>  |  1.00    Ca    9.1      07 Aug 2021 06:54  Phos  3.2     08-07  Mg     2.30     08-07    TPro  6.5  /  Alb  3.6  /  TBili  0.5  /  DBili  x   /  AST  31  /  ALT  46<H>  /  AlkPhos  77  08-06    proBNP:   Lipid Profile:   HgA1c:   TSH:     TELE:  EKG:        
    SUBJECTIVE / OVERNIGHT EVENTS:pt seen and examined    MEDICATIONS  (STANDING):  aspirin enteric coated 81 milliGRAM(s) Oral daily  atorvastatin 80 milliGRAM(s) Oral at bedtime  carvedilol 12.5 milliGRAM(s) Oral every 12 hours  finasteride 5 milliGRAM(s) Oral daily  heparin  Infusion.  Unit(s)/Hr (18 mL/Hr) IV Continuous <Continuous>  lactated ringers. 500 milliLiter(s) (250 mL/Hr) IV Continuous <Continuous>  losartan 50 milliGRAM(s) Oral daily  pantoprazole    Tablet 40 milliGRAM(s) Oral before breakfast  polyethylene glycol 3350 17 Gram(s) Oral daily  senna 2 Tablet(s) Oral at bedtime  simethicone 80 milliGRAM(s) Chew four times a day    MEDICATIONS  (PRN):  acetaminophen   Tablet .. 650 milliGRAM(s) Oral every 6 hours PRN Temp greater or equal to 38.5C (101.3F), Mild Pain (1 - 3)  aluminum hydroxide/magnesium hydroxide/simethicone Suspension 30 milliLiter(s) Oral every 4 hours PRN Dyspepsia  heparin   Injectable 8000 Unit(s) IV Push every 6 hours PRN For aPTT less than 40  heparin   Injectable 4000 Unit(s) IV Push every 6 hours PRN For aPTT between 40 - 57  melatonin 3 milliGRAM(s) Oral at bedtime PRN Insomnia    Vital Signs Last 24 Hrs  T(C): 36.8 (08 Aug 2021 18:16), Max: 36.9 (08 Aug 2021 12:23)  T(F): 98.3 (08 Aug 2021 18:16), Max: 98.4 (08 Aug 2021 12:23)  HR: 64 (08 Aug 2021 18:16) (61 - 66)  BP: 128/75 (08 Aug 2021 18:16) (105/68 - 128/75)  BP(mean): --  RR: 18 (08 Aug 2021 18:16) (18 - 18)  SpO2: 97% (08 Aug 2021 18:16) (95% - 98%)      Constitutional: No fever, fatigue  Skin: No rash.  Eyes: No recent vision problems or eye pain.  ENT: No congestion, ear pain, or sore throat.  Cardiovascular: No chest pain or palpation.  Respiratory: No cough, shortness of breath, congestion, or wheezing.  Gastrointestinal: No abdominal pain, nausea, vomiting, or diarrhea.  Genitourinary: No dysuria.  Musculoskeletal: No joint swelling.  Neurologic: No headache.    PHYSICAL EXAM:  GENERAL: NAD  EYES: EOMI, PERRLA  NECK: Supple, No JVD  CHEST/LUNG: dec breath sounds rt base  HEART:  S1 , S2 +  ABDOMEN: soft , bs+  EXTREMITIES:  no edema  NEUROLOGY:alert awake      LABS:  08-08    133<L>  |  98  |  20  ----------------------------<  143<H>  4.0   |  21<L>  |  1.03    Ca    8.9      08 Aug 2021 07:20  Phos  3.1     08-08  Mg     2.20     08-08      Creatinine Trend: 1.03 <--, 1.00 <--, 1.09 <--, 1.24 <--                        13.2   8.03  )-----------( 222      ( 08 Aug 2021 07:20 )             39.6     Urine Studies:              PTT - ( 08 Aug 2021 07:20 )  PTT:71.7 sec    TPro  6.5  /  Alb  3.6  /  TBili  0.5  /  DBili  x   /  AST  31  /  ALT  46<H>  /  AlkPhos  77  08-06    PTT - ( 07 Aug 2021 02:13 )  PTT:86.4 sec          RADIOLOGY & ADDITIONAL TESTS:    Imaging Personally Reviewed:    Consultant(s) Notes Reviewed:      Care Discussed with Consultants/Other Providers:  
PULMONARY PROGRESS NOTE    REGGIE PANTOJA  MRN-[id number]    Patient is a 75y old  Male who presents with a chief complaint of R/o pulmonary embolism (09 Aug 2021 15:40)      HPI:  -  -    ROS:   -    ACTIVE MEDICATION LIST:  MEDICATIONS  (STANDING):  aspirin enteric coated 81 milliGRAM(s) Oral daily  atorvastatin 80 milliGRAM(s) Oral at bedtime  carvedilol 12.5 milliGRAM(s) Oral every 12 hours  enoxaparin Injectable 140 milliGRAM(s) SubCutaneous daily  finasteride 5 milliGRAM(s) Oral daily  lactated ringers. 500 milliLiter(s) (250 mL/Hr) IV Continuous <Continuous>  losartan 50 milliGRAM(s) Oral daily  pantoprazole    Tablet 40 milliGRAM(s) Oral before breakfast  polyethylene glycol 3350 17 Gram(s) Oral daily  senna 2 Tablet(s) Oral at bedtime  simethicone 80 milliGRAM(s) Chew four times a day    MEDICATIONS  (PRN):  acetaminophen   Tablet .. 650 milliGRAM(s) Oral every 6 hours PRN Temp greater or equal to 38.5C (101.3F), Mild Pain (1 - 3)  aluminum hydroxide/magnesium hydroxide/simethicone Suspension 30 milliLiter(s) Oral every 4 hours PRN Dyspepsia  melatonin 3 milliGRAM(s) Oral at bedtime PRN Insomnia      EXAM:  Vital Signs Last 24 Hrs  T(C): 36.7 (09 Aug 2021 14:25), Max: 36.8 (08 Aug 2021 18:16)  T(F): 98.1 (09 Aug 2021 14:25), Max: 98.3 (08 Aug 2021 18:16)  HR: 86 (09 Aug 2021 14:25) (60 - 86)  BP: 116/79 (09 Aug 2021 14:25) (106/65 - 128/75)  BP(mean): --  RR: 18 (09 Aug 2021 14:25) (16 - 18)  SpO2: 100% (09 Aug 2021 14:25) (97% - 100%)    GENERAL: The patient is awake and alert in no apparent distress.     LUNGS: Clear to auscultation without wheezing, rales or rhonchi; respirations unlabored    HEART: Regular rate and rhythm without murmur.                            13.3   7.67  )-----------( 245      ( 09 Aug 2021 07:35 )             40.5       08-09    132<L>  |  97<L>  |  18  ----------------------------<  122<H>  3.9   |  24  |  1.01    Ca    9.0      09 Aug 2021 07:35  Phos  3.0     08-09  Mg     2.30     08-09    TPro  6.4  /  Alb  3.7  /  TBili  0.4  /  DBili  x   /  AST  31  /  ALT  50<H>  /  AlkPhos  83  08-09     rd< from: CT Abdomen and Pelvis w/ IV Cont (08.07.21 @ 11:54) >    EXAM:  CT ABDOMEN AND PELVIS IC        PROCEDURE DATE:  Aug  7 2021         INTERPRETATION:  CLINICAL INFORMATION: Evaluate liver and pancreatic lesions.    COMPARISON: No direct comparison is available. CT angiogram chest 8/5/2021.    CONTRAST/COMPLICATIONS:  IV Contrast: Omnipaque 350  90 cc administered   10 cc discarded  Oral Contrast: NONE  Complications: None reported at time of study completion    PROCEDURE:  CT of the Abdomen and Pelvis was performed.  Arterial, Portal Venous and delayed phases were acquired.  Sagittal and coronal reformats were performed.    FINDINGS:  LOWER CHEST: Bibasilar platelike and dependent atelectasis. Borderline enlarged heart. Cardiac device leads. Partially visualized known right lower lobe subsegmentalbranch pulmonary emboli.    LIVER: Multiple bilobar hepatic metastases.. Representative lesions in segment 8 measures 2.6 x 2.0 cm and segment 6 measures 2.9 x 1.9 cm (4-25, 4-53).  Patent portal and hepatic veins.  BILE DUCTS: No intra or extrabiliary ductal dilatation.  GALLBLADDER: Cholelithiasis.  SPLEEN: Within normal limits.  PANCREAS: Pancreatic mass as described below.  Location: Body and proximal tail with extension to the serosal surface of the stomach and retroperitoneal extension..  Size: 5.9 x 4.8 x 4.5 cm.Series/Image: 4-41  Variant Hepatic Arterial Anatomy: None.  Arterial Involvement : Greater than 180 degrees encasement of the splenic artery and less than 180 degrees involvement of the common hepatic artery and celiac artery bifurcation.  Venous Involvement (<180, > 180, vessel deformity, vessel occlusion): Occlusion of the splenic vein and greater 180 degrees involvement of the splenic vein/portal vein confluence.    ADRENALS: Within normal limits.  KIDNEYS/URETERS: Normal in size without hydronephrosis.    BLADDER: Minimally distended.  REPRODUCTIVE ORGANS: Prostate within normal limits.    BOWEL: Pancreatic mass extending to the serosal surface of the stomach with associated cystic changes.. Diverticulosis without diverticulitis. No bowel obstruction. Appendix is normal.  PERITONEUM: No ascites.  VESSELS: See pancreas section.. Atherosclerotic calcifications.  RETROPERITONEUM/LYMPH NODES: Retroperitoneal and aortocaval adenopathy. Reference lymph nodes include 1.5 x 1.5 cm aortocaval lymph node (series 5 image 69).    ABDOMINAL WALL: Within normal limits.  BONES: Degenerative changes. No suspicious lytic or blastic osseous lesions.    IMPRESSION:  Metastatic pancreatic cancer.        < end of copied text >  < from: US Duplex Venous Lower Ext Complete, Bilateral (08.06.21 @ 09:31) >    EXAM:  US DPLX LWR EXT VEINS COMPL BI        PROCEDURE DATE:  Aug  6 2021         INTERPRETATION:  CLINICAL INFORMATION: New pancreatic mass and acute pulmonary embolism    COMPARISON: None available.    TECHNIQUE: Duplex sonography of the BILATERAL LOWER extremity veins with color and spectral Doppler, with and without compression.    FINDINGS:    RIGHT:  Normal compressibility of the RIGHT common femoral, femoral and popliteal veins.  Doppler examination shows normal spontaneous and phasic flow.  No RIGHT calf vein thrombosis is detected.    LEFT:  LEFT gastrocnemius vein deep vein thrombosis.  Normal compressibility of the LEFT common femoral, femoral and popliteal veins.  Doppler examination shows normal spontaneous and phasic flow.    IMPRESSION:    Acute deep venous thrombosis: below the knee at the gastrocnemius vein.    Findings were discussed with TOMMY Moore 8/6/2021 9:51 AM by Dr. Mireles with read back confirmation.    --- End of Report ---            MORRIS MIRELES MD; Resident Radiology  This document has been electronically signed.  CROW DIAS MD; Attending Radiologist  This document has been electronically signed. Aug  6 2021 12:16PM    < end of copied text >  < from: CT Angio Chest PE Protocol w/ IV Cont (08.05.21 @ 21:16) >    EXAM:  CT ANGIO CHEST PULM ART [facility]AWIC        PROCEDURE DATE:  Aug  5 2021         INTERPRETATION:  CLINICAL INFORMATION: Shortness of breath rule out PE.    COMPARISON: Chest x-ray 4/9/2016, CT chest 10/19/2009    CONTRAST/COMPLICATIONS:  IV Contrast: Omnipaque 350  90 cc administered   10 cc discarded  Oral Contrast: NONE  Complications: None reported at time of study completion    PROCEDURE:  CT Angiography of the Chest.  Sagittal and coronal reformats were performed as well as 3D (MIP) reconstructions.    FINDINGS:    LUNGS AND AIRWAYS: Patent central airways.  Mild bibasilar atelectasis. Small calcified granuloma within left upper lobe (series 2 image 40) is unchanged from 2009. The lungs are otherwise clear. No suspicious lung nodule.  PLEURA: No pleural effusion.  MEDIASTINUM AND SARATH: 1.2 cm short axis left-sided supraclavicular lymph node.  VESSELS: Pulmonary emboli in the bilateral upper, right middle, right lower lobe with extension into the segmental branches of the right middle and lower lobes. No aortic dissection.  HEART: No sign of right heart strain. Thinning of the myocardium of the left ventricle. 2-lead ICD in place. No pericardial effusion.  CHEST WALL AND LOWER NECK: 1.2 cm left-sided supraclavicular lymph node.  VISUALIZED UPPER ABDOMEN: There is a large ill-defined mass in the tail of the pancreas. Numerous hepatic hypodensities the largest of which  measure 2 x 2 cm (2, 92). There is also a large stone in the neck of the gallbladder.  BONES: Degenerative changes of the spine.    IMPRESSION:  Pulmonary emboli in the bilateral upper, right middle, right lower lobe with extension to the segmental branches of the right middle and lower lobes.    Large ill-defined mass in the tail of the pancreas with numerous extensive liver metastases.    1.2 cm left-sided supraclavicular lymph node.    --- End of Report ---            MARY MENDOZA MD; Resident Radiologist  This document has been electronically signed.  AKBAR YOUSIF MD; Attending Radiologist  This document has been electronically signed. Aug  6 2021 10:02AM    < end of copied text >    < from: TTE with Doppler (w/Cont) (08.07.21 @ 12:02) >  CONCLUSIONS:  1. Normal left ventricular size.  2. Imaging is difficult and off axis despite Definity  contrast,precluding quantitative LVEF.  The left  ventricular function is moderately reduced.  The LVEF about  35-40% There are multiple wall mtoion abnormalities.  There  is a large area of apical akinesis.  The anteroseptum and inferoseptum are akinetic.  There is no thrombus in the left ventricular apex.  3. Normal right ventricular size and function.  Thee is a device wire in the right heart.  last study is from 2011.  ------------------------------------------------------------------------  PROCEDUREDESCRIPTION: Transthoracic echocardiogram with  2-D, M-Mode and complete spectral and color flow Doppler.  Intravenous ultrasound enhancing agent was administered for  improved left ventricular endocardial border definition.  Following the intravenous injection of ultrasound enhancing  agent, harmonic imaging was performed.  ------------------------------------------------------------------------  ECHOCARDIOGRAPHIC EXAMINATION:  AORTIC ROOT:    < end of copied text >    PROBLEM LIST:  75y Male with HEALTH ISSUES - PROBLEM Dx:  Pulmonary embolism, unspecified chronicity, unspecified pulmonary embolism type, unspecified whether acute cor pulmonale present  Pulmonary embolism, unspecified chronicity, unspecified pulmonary embolism type, unspecified whether acute cor pulmonale present    Pancreatic mass  Pancreatic mass    Chronic systolic heart failure  Chronic systolic heart failure    Essential hypertension  Essential hypertension    Preventive measure  Preventive measure       RECS:  left DVT & extensive segmental/subsegmental PE w/o RV strain  noted plans for lovenox outpatient  can f/u with Dr Corea for formal pfts    Please call with any questions.    Amy Luna DO  [facility] Pulmonary/Sleep Medicine  [phone]  
DATE OF SERVICE: 08-08-21 @ 06:49    Subjective: Patient seen and examined. No new events except as noted.     SUBJECTIVE/ROS:  feels ok       MEDICATIONS:  MEDICATIONS  (STANDING):  aspirin enteric coated 81 milliGRAM(s) Oral daily  atorvastatin 80 milliGRAM(s) Oral at bedtime  carvedilol 12.5 milliGRAM(s) Oral every 12 hours  finasteride 5 milliGRAM(s) Oral daily  heparin  Infusion.  Unit(s)/Hr (18 mL/Hr) IV Continuous <Continuous>  lactated ringers. 500 milliLiter(s) (250 mL/Hr) IV Continuous <Continuous>  losartan 50 milliGRAM(s) Oral daily  pantoprazole    Tablet 40 milliGRAM(s) Oral before breakfast  simethicone 80 milliGRAM(s) Chew four times a day      PHYSICAL EXAM:  T(C): 36.7 (08-08-21 @ 05:44), Max: 36.8 (08-07-21 @ 12:49)  HR: 66 (08-08-21 @ 05:44) (63 - 84)  BP: 105/68 (08-08-21 @ 05:44) (105/68 - 128/79)  RR: 18 (08-08-21 @ 05:44) (18 - 18)  SpO2: 95% (08-08-21 @ 05:44) (95% - 99%)  Wt(kg): --  I&O's Summary    06 Aug 2021 07:01  -  07 Aug 2021 07:00  --------------------------------------------------------  IN: 118 mL / OUT: 0 mL / NET: 118 mL            JVP: Normal  Neck: supple  Lung: clear   CV: S1 S2 , Murmur:  Abd: soft  Ext: No edema  neuro: Awake / alert  Psych: flat affect  Skin: normal``    LABS/DATA:    CARDIAC MARKERS:                                13.5   7.90  )-----------( 229      ( 07 Aug 2021 06:54 )             41.3     08-07    133<L>  |  100  |  21  ----------------------------<  131<H>  4.2   |  21<L>  |  1.00    Ca    9.1      07 Aug 2021 06:54  Phos  3.2     08-07  Mg     2.30     08-07      proBNP:   Lipid Profile:   HgA1c:   TSH:     TELE:  EKG:    < from: TTE with Doppler (w/Cont) (08.07.21 @ 12:02) >  ------------------------------------------------------------------------  CONCLUSIONS:  1. Normal left ventricular size.  2. Imaging is difficult and off axis despite Definity  contrast,precluding quantitative LVEF.  The left  ventricular function is moderately reduced.  The LVEF about  35-40% There are multiple wall mtoion abnormalities.  There  is a large area of apical akinesis.  The anteroseptum and inferoseptum are akinetic.  There is no thrombus in the left ventricular apex.  3. Normal right ventricular size and function.  Thee is a device wire in the right heart.    < end of copied text >      
    SUBJECTIVE / OVERNIGHT EVENTS:pt seen and examined      MEDICATIONS  (STANDING):  aspirin enteric coated 81 milliGRAM(s) Oral daily  atorvastatin 80 milliGRAM(s) Oral at bedtime  carvedilol 12.5 milliGRAM(s) Oral every 12 hours  finasteride 5 milliGRAM(s) Oral daily  heparin  Infusion.  Unit(s)/Hr (18 mL/Hr) IV Continuous <Continuous>  lactated ringers. 500 milliLiter(s) (250 mL/Hr) IV Continuous <Continuous>  losartan 50 milliGRAM(s) Oral daily  pantoprazole    Tablet 40 milliGRAM(s) Oral before breakfast  simethicone 80 milliGRAM(s) Chew four times a day    MEDICATIONS  (PRN):  acetaminophen   Tablet .. 650 milliGRAM(s) Oral every 6 hours PRN Temp greater or equal to 38.5C (101.3F), Mild Pain (1 - 3)  aluminum hydroxide/magnesium hydroxide/simethicone Suspension 30 milliLiter(s) Oral every 4 hours PRN Dyspepsia  heparin   Injectable 8000 Unit(s) IV Push every 6 hours PRN For aPTT less than 40  heparin   Injectable 4000 Unit(s) IV Push every 6 hours PRN For aPTT between 40 - 57  melatonin 3 milliGRAM(s) Oral at bedtime PRN Insomnia    Vital Signs Last 24 Hrs  T(C): 36.7 (07 Aug 2021 23:08), Max: 37 (07 Aug 2021 05:25)  T(F): 98.1 (07 Aug 2021 23:08), Max: 98.6 (07 Aug 2021 05:25)  HR: 63 (07 Aug 2021 23:08) (63 - 84)  BP: 109/70 (07 Aug 2021 23:08) (109/70 - 128/79)  BP(mean): --  RR: 18 (07 Aug 2021 23:08) (18 - 18)  SpO2: 98% (07 Aug 2021 23:08) (98% - 99%)    CAPILLARY BLOOD GLUCOSE        I&O's Summary    06 Aug 2021 07:01  -  07 Aug 2021 07:00  --------------------------------------------------------  IN: 118 mL / OUT: 0 mL / NET: 118 mL        Constitutional: No fever, fatigue  Skin: No rash.  Eyes: No recent vision problems or eye pain.  ENT: No congestion, ear pain, or sore throat.  Cardiovascular: No chest pain or palpation.  Respiratory: No cough, shortness of breath, congestion, or wheezing.  Gastrointestinal: No abdominal pain, nausea, vomiting, or diarrhea.  Genitourinary: No dysuria.  Musculoskeletal: No joint swelling.  Neurologic: No headache.    PHYSICAL EXAM:  GENERAL: NAD  EYES: EOMI, PERRLA  NECK: Supple, No JVD  CHEST/LUNG: dec breath sounds rt base  HEART:  S1 , S2 +  ABDOMEN: soft , bs+  EXTREMITIES:  no edema  NEUROLOGY:alert awake      LABS:                        13.5   7.90  )-----------( 229      ( 07 Aug 2021 06:54 )             41.3     08-07    133<L>  |  100  |  21  ----------------------------<  131<H>  4.2   |  21<L>  |  1.00    Ca    9.1      07 Aug 2021 06:54  Phos  3.2     08-07  Mg     2.30     08-07    TPro  6.5  /  Alb  3.6  /  TBili  0.5  /  DBili  x   /  AST  31  /  ALT  46<H>  /  AlkPhos  77  08-06    PTT - ( 07 Aug 2021 02:13 )  PTT:86.4 sec          RADIOLOGY & ADDITIONAL TESTS:    Imaging Personally Reviewed:    Consultant(s) Notes Reviewed:      Care Discussed with Consultants/Other Providers:

## 2021-08-09 NOTE — PROGRESS NOTE ADULT - ASSESSMENT
76 yo M hx of CAD, MI, stent x2 (10/2009 at [facility]), pAfib (not on anticoagulation), HTN, HLD, BIV-AICD, and systolic CHF presents to the ED from radiology office after being diagnosed with suspected PE and new pancreatic mass, concerning for new malignancy.

## 2021-08-09 NOTE — DISCHARGE NOTE PROVIDER - CARE PROVIDER_API CALL
Che Luna)  Gastroenterology; Internal Medicine  [address] [place] [address]  Phone: [phone]  Fax: [phone]  Follow Up Time:     Javier Arboleda)  Complex General Surgical Oncology; Surgery; Surgical Oncology  [address] [place] [address]  Phone: [phone]  Fax: [phone]  Follow Up Time:

## 2021-08-09 NOTE — CHART NOTE - NSCHARTNOTEFT_GEN_A_CORE
Discussed with Dr. Bee and primary team  Given cardiac and pulmonary conditions. Will defer performing EUS. (Pancreas biopsy would require hep gtt to be held at 48 hours post-procedure)   Please reconsult IR to perform biopsy of liver lesions. ( Less risks of anesthesia ) risks and benefits of holding anticoagulation to be discussed between patient and primary team / IR Discussed with Dr. Bee and primary team  Given cardiac and pulmonary conditions. Will defer performing EUS. (Pancreas biopsy would require hep gtt to be held for 48 hours at least post-procedure)   Please reconsult IR to perform biopsy of liver lesions. ( Less risks of anesthesia ) risks and benefits of holding anticoagulation to be discussed between patient and primary team / IR

## 2021-08-09 NOTE — PROGRESS NOTE ADULT - REASON FOR ADMISSION
R/o pulmonary embolism

## 2021-08-09 NOTE — PROGRESS NOTE ADULT - PROBLEM SELECTOR PLAN 2
New diagnosis of pancreatic mass on CT chest at [facility]   -Report reviewed by me. It shows 6.4 pancreatic mass at the tail with lesions in the liver and nodules in the lungs, concerning for adenocarcinoma according to the report.   -MRI can not be done as pt's AICD is not compatible with it   -< from: CT Abdomen and Pelvis w/ IV Cont (08.07.21 @ 11:54) >    Metastatic pancreatic cancer.  -GI F/U NOTES EUS,WORKUP AS OUTPT.SEC.TO AC        EUS by gi after cards clearance
New diagnosis of pancreatic mass on CT chest at [facility]   -Report reviewed by me. It shows 6.4 pancreatic mass at the tail with lesions in the liver and nodules in the lungs, concerning for adenocarcinoma according to the report.   -MRI can not be done as pt's AICD is not compatible with it   -< from: CT Abdomen and Pelvis w/ IV Cont (08.07.21 @ 11:54) >    Metastatic pancreatic cancer.    < end of copied text >    EUS by gi after cards clearance
New diagnosis of pancreatic mass on CT chest at [facility]   -Report reviewed by me. It shows 6.4 pancreatic mass at the tail with lesions in the liver and nodules in the lungs, concerning for adenocarcinoma according to the report.   -MRI can not be done as pt's AICD is not compatible with it   -F/u with CT A/P with IV contrast   -possible biopsy by IR or GI   -pain control with tylenol for now

## 2021-08-09 NOTE — DISCHARGE NOTE PROVIDER - NSDCFUADDAPPT_GEN_ALL_CORE_FT
Follow up with PCP within 1-2 weeks of discharge. If you are in need of a general medicine physician and post-discharge medical follow-up for further care/recommendations you may contact the [facility] for an appointment at [phone].   Follow up with INTERVENTIONAL RADIOLOGY within 1-2 weeks of discharge.   Follow up with gastroenterology within 1-2 weeks of discharge.   Follow up with surgical oncology within 1-2 weeks of discharge.

## 2021-08-09 NOTE — DISCHARGE NOTE NURSING/CASE MANAGEMENT/SOCIAL WORK - NSDCFUADDAPPT_GEN_ALL_CORE_FT
Follow up with PCP within 1-2 weeks of discharge. If you are in need of a general medicine physician and post-discharge medical follow-up for further care/recommendations you may contact the [facility] for an appointment at [phone].   Follow up with INTERVENTIONAL RADIOLOGY within 1-2 weeks of discharge.   Follow up with gastroenterology within 1-2 weeks of discharge.   Follow up with surgical oncology within 1-2 weeks of discharge.

## 2021-08-09 NOTE — PROGRESS NOTE ADULT - PROBLEM SELECTOR PLAN 4
C/w carvedilol 12.5 mg and losartan 50 mg   -Hold HCTZ and spironolactone

## 2021-08-09 NOTE — DISCHARGE NOTE PROVIDER - NSDCMRMEDTOKEN_GEN_ALL_CORE_FT
aspirin 81 mg oral tablet: 1 tab(s) orally once a day  carvedilol 12.5 mg oral tablet: 1 tab(s) orally 2 times a day  Centrum Silver oral tablet: 1 tab(s) orally once a day  finasteride 5 mg oral tablet: 1 tab(s) orally once a day  hydrochlorothiazide-spironolactone 25 mg-25 mg oral tablet: 1 tab(s) orally once a day  losartan 50 mg oral tablet: 1 tab(s) orally once a day  Lovenox 100 mg/mL injectable solution: 100 milligram(s) subcutaneously once a day   Lovenox 40 mg/0.4 mL injectable solution: 40 milligram(s) subcutaneously once a day   pantoprazole 40 mg oral delayed release tablet: 1 tab(s) orally once a day  rosuvastatin 20 mg oral tablet: 1 tab(s) orally once a day (at bedtime)

## 2021-08-09 NOTE — DISCHARGE NOTE PROVIDER - NSDCCPCAREPLAN_GEN_ALL_CORE_FT
PRINCIPAL DISCHARGE DIAGNOSIS  Diagnosis: Pulmonary embolism, unspecified chronicity, unspecified pulmonary embolism type, unspecified whether acute cor pulmonale present  Assessment and Plan of Treatment: You were found to have a pulmonary embolism (blood clot)  in your right lung as well as a left leg deep vein thrombosis (clot). Further imaging also showed a large pancreatic tail mass with liver lesions that were concerning for malignancy. You will need a pancreatic or a liver biopsy to confirm a diagnosis. You are being sent home on Lovenox, a blood thinner. You will need to continue this daily medication until you see the interventional radiology team as an outpatient. Follow up with the interventional radiology team and/or the surgical oncology team within 1-2 weeks of discharge.

## 2021-08-09 NOTE — DISCHARGE NOTE PROVIDER - HOSPITAL COURSE
76 yo male with PMHx of CAD, MI, stent x2 (10/2009 at [facility]), pAfib (not on anticoagulation), HTN, HLD, BIV-AICD, and systolic CHF who presented to the ED from radiology office after found to have RUL PE and large pancreatic tail mass with liver lesions concerning for new malignancy. Also found to have acute LEFT gastrocnemius vein deep vein thrombosis. Patient was started on heparin gtt. Echo with normal LV size, EF 35-40%, multiple QMA, no thrombus in LV and device wire in R heart. Unable to get MRI given patient's ICD was not compatible. Seen by surg onc, no acute surgical intervention. Initially planned to undergo liver biopsy with IR, however, they deferred in setting of acute PE while on heparin drip, which would have to be held at least 24 hours post procedure. IR team recommended potential pancreatic biopsy with GI team instead. GI team consulted and initially planned for EUS but given anesthesia risks and need for holding AC once again, it was decided that best procedure would be IR guided biopsy in 2 weeks.

## 2021-08-09 NOTE — PROVIDER CONTACT NOTE (MEDICATION) - BACKGROUND
Pt Heparin infusion was discontinued as per orders by night RN at 0700 on 8/9/21, per Night RN Nanette. Pt is pending EUS

## 2021-08-09 NOTE — PROGRESS NOTE ADULT - PROBLEM SELECTOR PLAN 3
Hx of HF with AICD     -C/w carvedilol 12.5 mg and losartan 50 mg
Hx of HF with AICD     -C/w carvedilol 12.5 mg and losartan 50 mg
Hx of HF with AICD   -F/u with TTE   -C/w carvedilol 12.5 mg and losartan 50 mg   -Hold HCTZ and spironolactone as pt is getting contrast x2   -Monitor fluid status   -Tele monitoring

## 2021-08-09 NOTE — PROGRESS NOTE ADULT - ASSESSMENT
Assessment: 75y Male with acute PE on A/C found to have a pancreatic mass and multiple hepatic lesions. IR re-consulted for hepatic mass biopsy.    Plan: IR to continue to defer hepatic lesion biopsy as patient would need to hold A/C for 48 hours after procedure  - patient can be discharged when medically cleared and schedule biopsy as an outpatient  - discussed with surgery Dr. Mao.    Doni Gonsalez MD  PGY-IV, Interventional Radiology  [facility]-p.[phone], [phone]  [facility]-[phone]), [phone]

## 2021-08-09 NOTE — DISCHARGE NOTE NURSING/CASE MANAGEMENT/SOCIAL WORK - PATIENT PORTAL LINK FT
You can access the FollowMyHealth Patient Portal offered by [facility] by registering at the following website: [email or web address]. By joining [employer]’s FollowMyHealth portal, you will also be able to view your health information using other applications (apps) compatible with our system.

## 2021-08-09 NOTE — PROGRESS NOTE ADULT - PROBLEM SELECTOR PLAN 1
Patient with chest pain of 2 weeks duration w/o any tachycardia or hypoxia   -CT chest with IV contrast at [facility] showed RUL PE. However, it was not a CTA   heparin gtt  echo
Patient with chest pain of 2 weeks duration w/o any tachycardia or hypoxia   -CT chest with IV contrast at [facility] showed RUL PE. However, it was not a CTA   heparin gtt  < from: TTE with Doppler (w/Cont) (08.07.21 @ 12:02) >    Normal left ventricular size.  2. Imaging is difficult and off axis despite Definity  contrast,precluding quantitative LVEF.  The left  ventricular function is moderately reduced.  The LVEF about  35-40% There are multiple wall mtoion abnormalities.  There  is a large area of apical akinesis.  The anteroseptum and inferoseptum are akinetic.  There is no thrombus in the left ventricular apex.  3. Normal right ventricular size and function.  Thee is a device wire in the right heart.    < end of copied text >    cards clearance prior to EUS
Patient with chest pain of 2 weeks duration w/o any tachycardia or hypoxia   -CT chest with IV contrast at [facility] showed RUL PE. However, it was not a CTA   heparin gtt  < from: TTE with Doppler (w/Cont) (08.07.21 @ 12:02) >    Normal left ventricular size.  2. Imaging is difficult and off axis despite Definity  contrast,precluding quantitative LVEF.  The left  ventricular function is moderately reduced.  The LVEF about  35-40% There are multiple wall mtoion abnormalities.  There  is a large area of apical akinesis.  The anteroseptum and inferoseptum are akinetic.  There is no thrombus in the left ventricular apex.  3. Normal right ventricular size and function.  -cont AC-dISCHARGE ON LOVONEX.DEFER EUS,LIVER BIOPSY PER IR

## 2021-08-09 NOTE — PROGRESS NOTE ADULT - PROVIDER SPECIALTY LIST ADULT
Pulmonology
Cardiology
Internal Medicine
Cardiology
Gastroenterology
Intervent Radiology
Internal Medicine
Internal Medicine

## 2021-08-13 PROBLEM — R16.0 LIVER MASSES: Status: ACTIVE | Noted: 2021-01-01

## 2021-08-18 NOTE — DISCUSSION/SUMMARY
[FreeTextEntry1] : In summary Asim Joy is a 76y/o man with Hx of HTN, HLD, BPH, CAD s/p MI, and chronic systolic CHF s/p BiV ICD placement who is here for clearance for biopsy pancreatic cancer to be performed laparoscopically.  He has not had chest pain, palpitations, SOB, syncope or near syncope. \par Needs biopsy for pancreatic CA (will be liver bx).  Ok to proceed from arrhythmia point of view.  If cautery, will need to deactivate device with monitoring. \par \par Sincerely,\par \par Benjamin Cavanaugh MD

## 2021-08-18 NOTE — HISTORY OF PRESENT ILLNESS
[FreeTextEntry1] : Dawson Matthew MD\par \par Asim Joy is a 76y/o man with Hx of HTN, HLD, BPH, CAD s/p MI, and chronic systolic CHF s/p BiV ICD placement who is here for clearance for biopsy pancreatic cancer to be performed laparoscopically.  Has been doing well with no issues or complaints. Denies chest pain, palpitations, SOB, syncope or near syncope. \par \par \par \par

## 2021-09-14 NOTE — ED PROVIDER NOTE - PHYSICAL EXAMINATION
GENERAL: NAD, conversant  HEENT: OP clear, PERRL  CHEST/LUNG: Clear to auscultation bilaterally; No crackles, rhonchi, wheezing, or rubs  HEART: Regular rate and rhythm; No murmurs, rubs, or gallops  ABDOMEN: Soft, Nontender, Nondistended; Bowel sounds present  EXTREMITIES:  2+ Peripheral Pulses, No clubbing, cyanosis, or edema  SKIN: No rashes or lesions  NERVOUS SYSTEM:  Alert & Oriented, Good concentration

## 2021-09-14 NOTE — ED PROVIDER NOTE - NS ED ROS FT
I will SWITCH the dose or number of times a day I take the medications listed below when I get home from the hospital:  None CONSTITUTIONAL: No weakness, + fevers - chills  EYES/ENT: No visual changes;  No vertigo or throat pain   NECK: No pain or stiffness  RESPIRATORY: No cough, wheezing, hemoptysis; No shortness of breath  CARDIOVASCULAR: No chest pain or palpitations  GASTROINTESTINAL: No abdominal or epigastric pain. No nausea, vomiting, or hematemesis; + diarrhea No constipation. No melena or hematochezia.  GENITOURINARY: No dysuria, frequency or hematuria  NEUROLOGICAL: No numbness or weakness  SKIN: No itching, burning, rashes, or lesions   PSYCH: no hx depression, no hx anxiety

## 2021-09-14 NOTE — ED ADULT TRIAGE NOTE - CHIEF COMPLAINT QUOTE
Pt. c/o fevers, chills, body aches, s/p receiving chemotherapy at [facility]. Patient sent in by Dr. Higgins. PHx Pancreatic CA with mets, HTN, HLD, Defibrillator.

## 2021-09-14 NOTE — ED PROVIDER NOTE - OBJECTIVE STATEMENT
74 yo M with hx metastatic pancreatic cancer (to liver, on active chemo and immunotherapy), HTN and HLD, who presents c/o fever at home. T 76 yo M with hx metastatic pancreatic cancer (to liver, on active chemo and immunotherapy), HTN and HLD, who presents c/o fever at home. Tmax at home 101. Has had watery diarrhea since Sunday, no blood in stool, no known hx C diff diarrhea. ROS neg for SOB, cough, CP, abd pain, n/v, rash. Has not been eating well and drinking much fluids. Otherwise feels well. Took Tylenol 2 hours prior to arrival in ED.

## 2021-09-14 NOTE — ED PROVIDER NOTE - ATTENDING CONTRIBUTION TO CARE
DR. BLOCH, ATTENDING MD-  I performed a face to face bedside interview with patient regarding history of present illness, review of symptoms and past medical history. I completed an independent physical exam.  I have discussed patient's plan of care with the resident.  Patient examined, well appearing NAD HEENT nml heart s1s2, lungs clear, abd soft no tenderness, skin no rashes, extrem no edema, pulses intact, neuro motor and sensory intact, gait nml.   Concern for occult infection vs neutropenic fever- check CBC, culture, covid swab, antibiotics pending cultures

## 2021-09-14 NOTE — ED CLERICAL - NS ED CLERK NOTE PRE-ARRIVAL INFORMATION; ADDITIONAL PRE-ARRIVAL INFORMATION
Patient sent in for fever after receiving chemo/immuno therapy temp 101.9 with chills. Pt being treated for pancreatic cancer with mets to the liver.

## 2021-09-14 NOTE — ED PROVIDER NOTE - CLINICAL SUMMARY MEDICAL DECISION MAKING FREE TEXT BOX
76 yo M with hx metastatic pancreatic cancer (to liver, on active chemo and immunotherapy), HTN and HLD, who presents c/o fever at home. Tmax at home 101. Took Tylenol 2 hours prior to arrival in ED. No fever here. Leukopenia and neutrophilia present on CBC, as well as transaminitis and lactate elevation. CXR clear. Give cefepime, has a PPM, give vancomyin - blood cx in lab. Give IVF. Admit to medicine - under Dr. Dowd.

## 2021-09-14 NOTE — CONSULT NOTE ADULT - SUBJECTIVE AND OBJECTIVE BOX
Brief Oncology Note    Pt known to [facility] for pancreatic cancer currently on treatment. Sent by [facility] for fever  If admitted please admit to Dr. Melanie Dowd and we will follow as consult    We are in touch with [facility] and they have asked us to follow along as consultants. Please call with any questions.    Vel Geiger MD  [facility]  Cell: [phone]

## 2021-09-14 NOTE — ED ADULT NURSE NOTE - OBJECTIVE STATEMENT
received Pt in room 24. pt A&Ox3, ambulatory. pt with hx of MI, HTN, HLD, pancreatic CA with mets. pt c/o fevers, chills, body aches, s/p receiving chemotherapy at [facility]. pt appears to be in no distress at this time. respirations are equal and nonlabored, no respiratory distress noted. denies any chest pain, sob, headache, dizziness, vomiting. 20 gauge iv placed in the left arm , sepsis labs sent. pt stable, awaiting further plan

## 2021-09-15 NOTE — CONSULT NOTE ADULT - ASSESSMENT
74 yo M hx of CAD, MI, stent x2 (10/2009 at [facility]), pAfib, HTN, HLD, BIV-AICD, and systolic CHF, hx of PE (on Eliquis), recently dx’d pancreatic mass on chemo at [facility] now presenting with fever of 101 at home and several days of watery diarrhea concerning for C diff colitis.    1) Pancreatic cancer  Pt has newly dx pancreatic cancer with metastatic disease to the liver  He had CT scan at [facility] in Aug 2021 with PE, pancreatic, and liver mets  Biopsy from Aug 21 confirmed pancreatic adenocarcinoma  Started treatment on 8/31/21 per protocol , randomized to gemcitabine/nab-paclitaxel and dual immmunotherapy 76 yo M hx of CAD, MI, stent x2 (10/2009 at [facility]), pAfib, HTN, HLD, BIV-AICD, and systolic CHF, hx of PE (on Eliquis), recently dx’d pancreatic [place] on [place] [facility] [place] with fever of 101 at home and several days of watery diarrhea concerning for C diff colitis.    1) Pancreatic cancer  Pt has newly dx pancreatic cancer with metastatic disease to the liver  He had [place] scan [place] [facility] [place] 2021 with PE, pancreatic, and liver mets  Biopsy from Aug 21 confirmed pancreatic adenocarcinoma  Started treatment on 8/31/21 per protocol , randomized to gemcitabine/nab-paclitaxel and dual immmunotherapy    note imcomplete 76 yo M hx of CAD, MI, stent x2 (10/2009 at [facility]), pAfib, HTN, HLD, BIV-AICD, and systolic CHF, hx of PE (on Eliquis), recently dx’d with metastatic pancreatic ca wto liver now presenting with fever of 101 at home and several days of watery diarrhea    1) Pancreatic cancer  Pt has newly dx pancreatic cancer with metastatic disease to the liver  He had CT scan at [facility] in Aug 2021 with PE, pancreatic, and liver mets  Biopsy from Aug 21 confirmed pancreatic adenocarcinoma  Started treatment on 8/31/21 per protocol , randomized to gemcitabine/nab-paclitaxel and dual immmunotherapy  -he is s/p treatment yesterday prior to admission    2) Diarrhea  -pt had diarrhea prior to treatment yesterday  -undergoing infectious work up, f/u stool studies, c. diff  -he is also on immunotherapy and must observe for immunotherapy related colitis  -he states symptoms slightly improving today    3) Fever  -f/u infectious work up, on broad spectrum abx as ordered    4) Hyponatremia  -consider renal consultation- Dr. Siddharth Westbrook    We are in touch with [facility] and they have asked us to follow along as consultants. Please call with any questions.    Vel Geiger MD  [facility]  Cell: [phone]

## 2021-09-15 NOTE — H&P ADULT - NSHPREVIEWOFSYSTEMS_GEN_ALL_CORE
REVIEW OF SYSTEMS:    CONSTITUTIONAL: No weakness,  EYES/ENT: No visual changes;  No dysphagia  NECK: No pain or stiffness  RESPIRATORY: No cough, wheezing, hemoptysis; No shortness of breath  CARDIOVASCULAR: No chest pain or palpitations; No lower extremity edema  GASTROINTESTINAL: No abdominal or epigastric pain. No nausea, vomiting, or hematemesis; No constipation. No melena or hematochezia.  GENITOURINARY: No dysuria, frequency or hematuria  NEUROLOGICAL: No numbness or weakness  SKIN: No itching, burning, rashes, or lesions   PSYCH: No auditory or visual hallucinations  All other review of systems is negative unless indicated above.

## 2021-09-15 NOTE — CONSULT NOTE ADULT - SUBJECTIVE AND OBJECTIVE BOX
Reason for consult: pancreatic cancer    HPI:  76 yo M hx of CAD, MI, stent x2 (10/2009 at [facility]), pAfib, HTN, HLD, BIV-AICD, and systolic CHF, hx of PE (on Eliquis), recently dx’d pancreatic mass on chemo at [facility] now presenting with fever of 101 at home. He also endorsed diarrhea x3-4 days, watery. 4-5x daily. No recent abx exposure. No abdominal pain. Denies chest pain, dyspnea, palpitations. Just finished chemo today. Called his onc who advised him to come in. No abx exposure.  In the ED, vitals stable. Labs with leukopenia without neutropenia. Elevated ASt/ALT, hyponatremia. EKG paced. CXR clear.      PAST MEDICAL & SURGICAL HISTORY:  Hypertension (ICD9 401.9)    Hyperlipidemia (ICD9 272.4)    BPH (Benign Prostatic Hyperplasia)    Borderline diabetes mellitus    MI (myocardial infarction)  2009    Systolic heart failure    Hernia    Biventricular ICD (implantable cardioverter-defibrillator) in place  2010    Stented coronary artery  X 2, 2009        FAMILY HISTORY:  No pertinent family history in first degree relatives        Alochol: Denied  Smoking: Nonsmoker  Drug Use: Denied  Marital Status:         Allergies    No Known Allergies    Intolerances        MEDICATIONS  (STANDING):  aspirin  chewable 81 milliGRAM(s) Oral daily  atorvastatin 80 milliGRAM(s) Oral at bedtime  carvedilol 6.25 milliGRAM(s) Oral every 12 hours  enoxaparin Injectable 100 milliGRAM(s) SubCutaneous every 12 hours  finasteride 5 milliGRAM(s) Oral at bedtime  piperacillin/tazobactam IVPB.. 3.375 Gram(s) IV Intermittent every 8 hours  vancomycin    Solution 125 milliGRAM(s) Oral every 6 hours    MEDICATIONS  (PRN):  acetaminophen   Tablet .. 650 milliGRAM(s) Oral every 6 hours PRN Temp greater or equal to 38.5C (101.3F), Mild Pain (1 - 3)  aluminum hydroxide/magnesium hydroxide/simethicone Suspension 30 milliLiter(s) Oral every 4 hours PRN Dyspepsia  melatonin 3 milliGRAM(s) Oral at bedtime PRN Insomnia  ondansetron Injectable 4 milliGRAM(s) IV Push every 8 hours PRN Nausea and/or Vomiting      ROS  No fever, sweats, chills  No epistaxis, HA, sore throat  No CP, SOB, cough, sputum  No n/v/d, abd pain, melena, hematochezia  No edema  No rash  No anxiety  No back pain, joint pain  No bleeding, bruising  No dysuria, hematuria    T(C): 36.7 (09-15-21 @ 05:12), Max: 37.1 (09-15-21 @ 04:43)  HR: 77 (09-15-21 @ 06:34) (73 - 110)  BP: 104/67 (09-15-21 @ 06:34) (87/51 - 119/96)  RR: 16 (09-15-21 @ 05:12) (15 - 18)  SpO2: 100% (09-15-21 @ 05:12) (100% - 100%)  Wt(kg): --    PE  NAD  Awake, alert  Anicteric, MMM  RRR  CTAB  Abd soft, NT, ND  No c/c/e  No rash grossly  FROM                          9.0    2.58  )-----------( 134      ( 15 Sep 2021 07:11 )             26.6       09-15    125<L>  |  96<L>  |  25<H>  ----------------------------<  179<H>  4.4   |  18<L>  |  1.10    Ca    7.5<L>      15 Sep 2021 07:11  Phos  2.8     09-14  Mg     1.90     09-15    TPro  4.7<L>  /  Alb  2.2<L>  /  TBili  1.0  /  DBili  x   /  AST  154<H>  /  ALT  179<H>  /  AlkPhos  160<H>  09-15   Reason for consult: pancreatic cancer    HPI:  74 yo M hx of CAD, MI, stent x2 (10/2009 at [facility]), pAfib, HTN, HLD, BIV-AICD, and systolic CHF, hx of PE (on Eliquis), recently dx’d [place] [facility] [place] now presenting with fever of 101 at home. He also endorsed diarrhea x3-4 days, watery. 4-5x daily. No recent abx exposure. No abdominal pain. Denies chest pain, dyspnea, palpitations. Just finished chemo today. Called his onc who advised him to come in. No abx exposure.  In the ED, vitals stable. Labs with leukopenia without neutropenia. Elevated ASt/ALT, hyponatremia. EKG paced. CXR clear.      PAST MEDICAL & SURGICAL HISTORY:  Hypertension (ICD9 401.9)    Hyperlipidemia (ICD9 272.4)    BPH (Benign Prostatic Hyperplasia)    Borderline diabetes mellitus    MI (myocardial infarction)  2009    Systolic heart failure    Hernia    Biventricular ICD (implantable cardioverter-defibrillator) in place  2010    Stented coronary artery  X 2, 2009        FAMILY HISTORY:  No pertinent family history in first degree relatives        Alochol: Denied  Smoking: Nonsmoker  Drug Use: Denied  Marital Status:         Allergies    No Known Allergies    Intolerances        MEDICATIONS  (STANDING):  aspirin  chewable 81 milliGRAM(s) Oral daily  atorvastatin 80 milliGRAM(s) Oral at bedtime  carvedilol 6.25 milliGRAM(s) Oral every 12 hours  enoxaparin Injectable 100 milliGRAM(s) SubCutaneous every 12 hours  finasteride 5 milliGRAM(s) Oral at bedtime  piperacillin/tazobactam IVPB.. 3.375 Gram(s) IV Intermittent every 8 hours  vancomycin    Solution 125 milliGRAM(s) Oral every 6 hours    MEDICATIONS  (PRN):  acetaminophen   Tablet .. 650 milliGRAM(s) Oral every 6 hours PRN Temp greater or equal to 38.5C (101.3F), Mild Pain (1 - 3)  aluminum hydroxide/magnesium hydroxide/simethicone Suspension 30 milliLiter(s) Oral every 4 hours PRN Dyspepsia  melatonin 3 milliGRAM(s) Oral at bedtime PRN Insomnia  ondansetron Injectable 4 milliGRAM(s) IV Push every 8 hours PRN Nausea and/or Vomiting      ROS  +chills  +diarrhea  No bleeding, bruising  No dysuria, hematuria    T(C): 36.7 (09-15-21 @ 05:12), Max: 37.1 (09-15-21 @ 04:43)  HR: 77 (09-15-21 @ 06:34) (73 - 110)  BP: 104/67 (09-15-21 @ 06:34) (87/51 - 119/96)  RR: 16 (09-15-21 @ 05:12) (15 - 18)  SpO2: 100% (09-15-21 @ 05:12) (100% - 100%)  Wt(kg): --    PE  NAD  Awake, alert  Anicteric, MMM    No rash grossly  FROM                          9.0    2.58  )-----------( 134      ( 15 Sep 2021 07:11 )             26.6       09-15    125<L>  |  96<L>  |  25<H>  ----------------------------<  179<H>  4.4   |  18<L>  |  1.10    Ca    7.5<L>      15 Sep 2021 07:11  Phos  2.8     09-14  Mg     1.90     09-15    TPro  4.7<L>  /  Alb  2.2<L>  /  TBili  1.0  /  DBili  x   /  AST  154<H>  /  ALT  179<H>  /  AlkPhos  160<H>  09-15

## 2021-09-15 NOTE — H&P ADULT - PROBLEM SELECTOR PLAN 5
3 pt drop from Aug. Will need to check outpatient record. No signs of GI bleed  -Get Occult fecal blood  -Transition to lovenox BID for now and if HGB stabilizes can restart eliquis  -Iron panels sent

## 2021-09-15 NOTE — H&P ADULT - NSHPLABSRESULTS_GEN_ALL_CORE
09-14    128<L>  |  95<L>  |  25<H>  ----------------------------<  109<H>  5.0   |  19<L>  |  1.14    Ca    8.3<L>      14 Sep 2021 23:03  Phos  2.8     09-14  Mg     1.90     09-14    TPro  5.7<L>  /  Alb  2.6<L>  /  TBili  1.2  /  DBili  x   /  AST  210<H>  /  ALT  217<H>  /  AlkPhos  212<H>  09-14                        10.4   2.52  )-----------( 203      ( 14 Sep 2021 23:03 )             30.2   LIVER FUNCTIONS - ( 14 Sep 2021 23:03 )  Alb: 2.6 g/dL / Pro: 5.7 g/dL / ALK PHOS: 212 U/L / ALT: 217 U/L / AST: 210 U/L / GGT: x           EKG pending. CXR clear. 09-14    128<L>  |  95<L>  |  25<H>  ----------------------------<  109<H>  5.0   |  19<L>  |  1.14    Ca    8.3<L>      14 Sep 2021 23:03  Phos  2.8     09-14  Mg     1.90     09-14    TPro  5.7<L>  /  Alb  2.6<L>  /  TBili  1.2  /  DBili  x   /  AST  210<H>  /  ALT  217<H>  /  AlkPhos  212<H>  09-14                        10.4   2.52  )-----------( 203      ( 14 Sep 2021 23:03 )             30.2   LIVER FUNCTIONS - ( 14 Sep 2021 23:03 )  Alb: 2.6 g/dL / Pro: 5.7 g/dL / ALK PHOS: 212 U/L / ALT: 217 U/L / AST: 210 U/L / GGT: x           EKG paced. CXR clear.

## 2021-09-15 NOTE — H&P ADULT - NSHPPHYSICALEXAM_GEN_ALL_CORE
PHYSICAL EXAM:  GENERAL: NAD, well-developed, well-nourished  HEAD:  Atraumatic, Normocephalic  EYES: EOMI, PERRL, conjunctiva and sclera clear  NECK: Supple, No JVD  CHEST/LUNG: Clear to auscultation bilaterally; No wheezes, rales or rhonchi  HEART: Regular rate and rhythm; No murmurs, rubs, or gallops, (+)S1, S2  ABDOMEN: Soft, Nontender, Nondistended; Normal Bowel sounds   EXTREMITIES:  2+ Peripheral Pulses, No clubbing, cyanosis, or edema  PSYCH: normal mood and affect  NEUROLOGY: AAOx3, non-focal  SKIN: No rashes or lesions

## 2021-09-15 NOTE — H&P ADULT - CONTRAINDICATIONS ACEI/ARB
Patient is doing well post-operatively. The importance of post-op drop compliance was emphasized. Drop schedule reviewed with patient. Patient to call if any visual changes or concerns. No

## 2021-09-15 NOTE — CONSULT NOTE ADULT - SUBJECTIVE AND OBJECTIVE BOX
"HPI:  74 yo M hx of CAD, MI, stent x2 (10/2009 at [facility]), pAfib, HTN, HLD, BIV-AICD, and systolic CHF, hx of PE (on Eliquis), recently dx’d [place] [facility] [place] presenting with fever of 101 at home. He also endorsed diarrhea x3-4 days, watery. 4-5x daily. No recent abx exposure. No abdominal pain. Denies chest pain, dyspnea, palpitations. Just finished chemo today. Called his onc who advised him to come in. No abx exposure.  In the ED, vitals stable. Labs with leukopenia without neutropenia. Elevated ASt/ALT, hyponatremia. EKG paced. CXR clear. (15 Sep 2021 01:36)"    Above reviewed. 74 yo M HTN, HLD, pancreatic mass, recent chemo (1 day ago), presents with diarrhea. Has had diarrhea x 3-4 days. Patient has been on chemo weekly over past 2 weeks. No chest pain, no cough, no sob. No abd pain. No dysuria. No pyuria. No other new focal complaints. ID called for further eval.    PAST MEDICAL & SURGICAL HISTORY:  Hypertension (ICD9 401.9)    Hyperlipidemia (ICD9 272.4)    BPH (Benign Prostatic Hyperplasia)    Borderline diabetes mellitus    MI (myocardial infarction)  2009    Systolic heart failure    Hernia    Biventricular ICD (implantable cardioverter-defibrillator) in place  2010    Stented coronary artery  X 2,     Allergies    No Known Allergies    Intolerances    ANTIMICROBIALS:  piperacillin/tazobactam IVPB.. 3.375 every 8 hours  vancomycin    Solution 125 every 6 hours    OTHER MEDS:  acetaminophen   Tablet .. 650 milliGRAM(s) Oral every 6 hours PRN  aluminum hydroxide/magnesium hydroxide/simethicone Suspension 30 milliLiter(s) Oral every 4 hours PRN  aspirin  chewable 81 milliGRAM(s) Oral daily  atorvastatin 80 milliGRAM(s) Oral at bedtime  carvedilol 6.25 milliGRAM(s) Oral every 12 hours  enoxaparin Injectable 70 milliGRAM(s) SubCutaneous two times a day  finasteride 5 milliGRAM(s) Oral at bedtime  melatonin 3 milliGRAM(s) Oral at bedtime PRN  ondansetron Injectable 4 milliGRAM(s) IV Push every 8 hours PRN    SOCIAL HISTORY: No tobacco, no alcohol, no illicit drugs    FAMILY HISTORY:  No pertinent family history in first degree relatives relating to chief complaint    Drug Dosing Weight  Height (cm): 177.8 (15 Sep 2021 08:19)  Weight (kg): 96.2 (05 Aug 2021 19:09)  BMI (kg/m2): 30.4 (15 Sep 2021 08:19)  BSA (m2): 2.14 (15 Sep 2021 08:19)    PE:    Vital Signs Last 24 Hrs  T(C): 36.7 (15 Sep 2021 05:12), Max: 37.1 (15 Sep 2021 04:43)  T(F): 98.1 (15 Sep 2021 05:12), Max: 98.8 (15 Sep 2021 04:43)  HR: 77 (15 Sep 2021 06:34) (73 - 110)  BP: 104/67 (15 Sep 2021 06:34) (87/51 - 119/96)  RR: 16 (15 Sep 2021 05:12) (15 - 18)  SpO2: 100% (15 Sep 2021 05:12) (100% - 100%)    Gen: AOx3, NAD, non-toxic  CV: S1+S2 normal, nontachycardic  Resp: Clear bilat, no resp distress, no crackles/wheezes  Abd: Soft, nontender, +BS  Ext: No LE edema, no wounds  : No Bowman  IV/Skin: No thrombophlebitis  Msk: No low back pain, no arthralgias, no joint swelling  Neuro: No sensory deficits, no motor deficits    LABS:                        9.0    2.58  )-----------( 134      ( 15 Sep 2021 07:11 )             26.6     09-15    125<L>  |  96<L>  |  25<H>  ----------------------------<  179<H>  4.4   |  18<L>  |  1.10    Ca    7.5<L>      15 Sep 2021 07:11  Phos  2.8     09-14  Mg     1.90     09-15    TPro  4.7<L>  /  Alb  2.2<L>  /  TBili  1.0  /  DBili  x   /  AST  154<H>  /  ALT  179<H>  /  AlkPhos  160<H>  09-15    Urinalysis Basic - ( 15 Sep 2021 03:16 )    Color: Jennifer / Appearance: Slightly Turbid / S.028 / pH: x  Gluc: x / Ketone: Negative  / Bili: Negative / Urobili: 3 mg/dL   Blood: x / Protein: 30 mg/dL / Nitrite: Negative   Leuk Esterase: Negative / RBC: <5 /HPF / WBC <5 /HPF   Sq Epi: x / Non Sq Epi: FEW /HPF / Bacteria: Few    MICROBIOLOGY:    COVID neg    RADIOLOGY:     XR:    FINDINGS:    Lungs are clear. No pleural effusion or pneumothorax. Heart size is unchanged. Left chest wall biventricular AICD. Degenerative osseous disease.    IMPRESSION:    Clear lungs.      9/15 USG:    IMPRESSION:    No portal vein thrombosis.    Redemonstrated hepatic metastases with mild perihepatic ascites.

## 2021-09-15 NOTE — H&P ADULT - PROBLEM SELECTOR PLAN 1
Likely source from GI: C diff vs colitis  -Stool culture, GI PCR, C DIff, O/P ordered  -Hold imodium for now  -Start Vanco PO 125mg q6h  -Cover with Zosyn for now  -If worsening symptoms, consider CT A/P and ID consult  -Repeat lactate Likely source from GI: C diff vs colitis  -Stool culture, GI PCR, C DIff, O/P ordered  -Hold imodium for now  -Start Vanco PO 125mg q6h  -Cover with Zosyn for now  -If worsening symptoms, consider CT A/P and ID consult

## 2021-09-15 NOTE — H&P ADULT - HISTORY OF PRESENT ILLNESS
76 yo M hx of CAD, MI, stent x2 (10/2009 at [facility]), pAfib (not on anticoagulation), HTN, HLD, BIV-AICD, and systolic CHF, hx of PE (on Eliquis), recently dx’d pancreatic mass on chemo at [facility] [place] now presenting with fever of 101 at home. He also endorsed diarrhea x3-4 days, watery. 4-5x daily. No recent abx exposure. No abdominal pain. Denies chest pain, dyspnea, palpitations. Just finished chemo today. Called his onc who advised him to come in.  In the ED, vitals stable. Labs with leukopenia without neutropenia. Elevated ASt/ALT, hyponatremia. EKG pending. CXR clear. 74 yo M hx of CAD, MI, stent x2 (10/2009 at [facility]), pAfib (not on anticoagulation), HTN, HLD, BIV-AICD, and systolic CHF, hx of PE (on Eliquis), recently dx’d pancreatic mass on chemo [place] [facility] [place] now presenting with fever of 101 at home. He also endorsed diarrhea x3-4 days, watery. 4-5x daily. No recent abx exposure. No abdominal pain. Denies chest pain, dyspnea, palpitations. Just finished chemo today. Called his onc who advised him to come in. No abx exposure.  In the ED, vitals stable. Labs with leukopenia without neutropenia. Elevated ASt/ALT, hyponatremia. EKG pending. CXR clear. 76 yo M hx of CAD, MI, stent x2 (10/2009 at [facility]), pAfib (not on anticoagulation), HTN, HLD, BIV-AICD, and systolic CHF, hx of PE (on Eliquis), recently dx’d pancreatic mass on chemo at [facility] [place] now presenting with fever of 101 at home. He also endorsed diarrhea x3-4 days, watery. 4-5x daily. No recent abx exposure. No abdominal pain. Denies chest pain, dyspnea, palpitations. Just finished chemo today. Called his onc who advised him to come in. No abx exposure.  In the ED, vitals stable. Labs with leukopenia without neutropenia. Elevated ASt/ALT, hyponatremia. EKG paced. CXR clear. 74 yo M hx of CAD, MI, stent x2 (10/2009 at [facility]), pAfib, HTN, HLD, BIV-AICD, and systolic CHF, hx of PE (on Eliquis), recently dx’d pancreatic mass on chemo at [facility] now presenting with fever of 101 at home. He also endorsed diarrhea x3-4 days, watery. 4-5x daily. No recent abx exposure. No abdominal pain. Denies chest pain, dyspnea, palpitations. Just finished chemo today. Called his onc who advised him to come in. No abx exposure.  In the ED, vitals stable. Labs with leukopenia without neutropenia. Elevated ASt/ALT, hyponatremia. EKG paced. CXR clear.

## 2021-09-15 NOTE — CONSULT NOTE ADULT - ASSESSMENT
76 yo M HTN, HLD, pancreatic mass, recent chemo, presents with diarrhea  No documented fever, has leukopenia  Chemo 1 day prior to presentation  CXR clear  USG abd with hepatic mets  Diarrhea 3-4 days prior to presentation, recent chemo (as well as chemo over past 2 weeks)  Uncertain infection  Overall,  1) Diarrhea  - No recent antibiotic exposure. Chemo related? Gastroenteritis  - Vanco 125mg po q 6  - Check C diff PCR  - If diarrhea spontaneously resolved, would DC C diff PCR and PO Vanco  - Check GI PCR, check stool culture, stool ova/para  - Plan to check CT A/P if no diagnosis found  2) Elevated LFTs  - Chemo related?  - Check Ehrlichia/Anaplasma PCR, check Blood parasite smear  - Trend to normal  3) Leukopenia/subjective fever  - Zosyn empiric for now  - Follow up BCX, UCX    Vicente Anton MD  Pager [phone]  From 5pm-9am, and on weekends call [phone]

## 2021-09-15 NOTE — H&P ADULT - PROBLEM SELECTOR PLAN 2
Likely related to sepsis vs underlying pancreatica cancer  -Acute viral panels sent  -RUQ for portal vein thrombosis and hepatitis Likely related to sepsis vs underlying pancreatic cancer  -Acute viral panels sent  -RUQ for portal vein thrombosis and hepatitis

## 2021-09-15 NOTE — H&P ADULT - ASSESSMENT
74 yo M hx of CAD, MI, stent x2 (10/2009 at [facility]), pAfib (not on anticoagulation), HTN, HLD, BIV-AICD, and systolic CHF, hx of PE (on Eliquis), recently dx’d pancreatic mass on chemo at [facility] [place] now presenting with fever of 101 at home and several days of watery diarrhea concerning for C diff colitis. 76 yo M hx of CAD, MI, stent x2 (10/2009 at [facility]), pAfib, HTN, HLD, BIV-AICD, and systolic CHF, hx of PE (on Eliquis), recently dx’d pancreatic [place] on [place] [facility] [place] now presenting with fever of 101 at home and several days of watery diarrhea concerning for C diff colitis.

## 2021-09-15 NOTE — PATIENT PROFILE ADULT - WILL THE PATIENT ACCEPT THE PFIZER COVID-19 VACCINE IF ELIGIBLE AND IT IS AVAILABLE?
Not applicable Melolabial Interpolation Flap Division And Inset Text: Division and inset of the melolabial interpolation flap was performed to achieve optimal aesthetic result, restore normal anatomic appearance and avoid distortion of normal anatomy, expedite and facilitate wound healing, achieve optimal functional result and because linear closure either not possible or would produce suboptimal result. The patient was prepped and draped in the usual manner. The pedicle was infiltrated with local anesthesia. The pedicle was sectioned with a #15 blade. The pedicle was de-bulked and trimmed to match the shape of the defect. Hemostasis was achieved. The flap donor site and free margin of the flap were secured with deep buried sutures and the wound edges were re-approximated.

## 2021-09-16 NOTE — PROGRESS NOTE ADULT - ASSESSMENT
74 yo M HTN, HLD, pancreatic mass, recent chemo, presents with diarrhea  No documented fever, has leukopenia  Chemo 1 day prior to presentation  CXR clear  USG abd with hepatic mets  Diarrhea 3-4 days prior to presentation, recent chemo (as well as chemo over past 2 weeks)  Uncertain infection  Diarrhea improved today, more formed  Overall,  1) Diarrhea  - No recent antibiotic exposure. Chemo related? Gastroenteritis  - Vanco 125mg po q 6  - F/U C diff PCR  - Plan to check CT A/P if no diagnosis found  2) Elevated LFTs  - Chemo related?  - F/U Ehrlichia/Anaplasma PCR, check Blood parasite smear  - Trend to normal  3) Leukopenia/subjective fever  - Zosyn empiric for now  - Follow up BCX, UCX    Vicente Anton MD  Pager [phone]  From 5pm-9am, and on weekends call [phone]

## 2021-09-16 NOTE — PROGRESS NOTE ADULT - SUBJECTIVE AND OBJECTIVE BOX
CC: F/U for Diarrhea    Saw/spoke to patient. Still with diarrhea. No other new complaints.    Allergies  No Known Allergies    ANTIMICROBIALS:  piperacillin/tazobactam IVPB.. 3.375 every 8 hours  vancomycin    Solution 125 every 6 hours    PE:    Vital Signs Last 24 Hrs  T(C): 37.2 (16 Sep 2021 13:00), Max: 37.2 (16 Sep 2021 13:00)  T(F): 98.9 (16 Sep 2021 13:00), Max: 98.9 (16 Sep 2021 13:00)  HR: 90 (16 Sep 2021 13:00) (78 - 92)  BP: 109/62 (16 Sep 2021 13:00) (88/81 - 121/70)  BP(mean): 86 (15 Sep 2021 18:20) (59 - 86)  RR: 17 (16 Sep 2021 13:00) (16 - 18)  SpO2: 97% (16 Sep 2021 13:00) (95% - 100%)    Gen: AOx3, NAD, non-toxic  CV: S1+S2 normal, nontachycardic  Resp: Clear bilat, no resp distress, no crackles/wheezes  Abd: Soft, nontender, +BS  Ext: No LE edema, no wounds    LABS:                        8.7    1.04  )-----------( 115      ( 16 Sep 2021 07:54 )             25.6     09-15    125<L>  |  96<L>  |  25<H>  ----------------------------<  179<H>  4.4   |  18<L>  |  1.10    Ca    7.5<L>      15 Sep 2021 07:11  Phos  2.8     09-14  Mg     2.10     09-16    TPro  4.7<L>  /  Alb  2.2<L>  /  TBili  1.0  /  DBili  x   /  AST  154<H>  /  ALT  179<H>  /  AlkPhos  160<H>  09-15    Urinalysis Basic - ( 15 Sep 2021 03:16 )    Color: Jennifer / Appearance: Slightly Turbid / S.028 / pH: x  Gluc: x / Ketone: Negative  / Bili: Negative / Urobili: 3 mg/dL   Blood: x / Protein: 30 mg/dL / Nitrite: Negative   Leuk Esterase: Negative / RBC: <5 /HPF / WBC <5 /HPF   Sq Epi: x / Non Sq Epi: FEW /HPF / Bacteria: Few    MICROBIOLOGY:    .Stool Feces  09-15-21   Testing in progress     .Stool Feces  09-15-21   GI PCR Results: NOT detected    .Blood Blood-Peripheral  09-15-21   No growth to date.    Clean Catch Clean Catch (Midstream)  09-15-21   <10,000 CFU/mL Normal Urogenital Shelia  --  --    (otherwise reviewed)    RADIOLOGY:    9/15 USG:    IMPRESSION:    No portal vein thrombosis.    Redemonstrated hepatic metastases with mild perihepatic ascites.

## 2021-09-16 NOTE — PROGRESS NOTE ADULT - ASSESSMENT
76 yo M hx of CAD, MI, stent x2 (10/2009 at [facility]), pAfib, HTN, HLD, BIV-AICD, and systolic CHF, hx of PE (on Eliquis), recently dx’d pancreatic mass on chemo at [facility] now presenting with fever of 101 at home and several days of watery diarrhea concerning for C diff colitis.

## 2021-09-16 NOTE — PROGRESS NOTE ADULT - ASSESSMENT
76 yo M hx of CAD, MI, stent x2 (10/2009 at [facility]), pAfib, HTN, HLD, BIV-AICD, and systolic CHF, hx of PE (on Eliquis), recently dx’d with metastatic pancreatic ca wto liver now presenting with fever of 101 at home and several days of watery diarrhea    1) Pancreatic cancer  Pt has newly dx pancreatic cancer with metastatic disease to the liver  [place] [facility], pancreatic, and liver mets  Biopsy from Aug 21 confirmed pancreatic adenocarcinoma  Started treatment on 8/31/21 per protocol , randomized to gemcitabine/nab-paclitaxel and dual immmunotherapy  -he is s/p treatment day prior to admission  outpt f/u with msk after dc    2) Diarrhea  -pt had diarrhea prior to treatment, unchanged at this time  -undergoing infectious work up, f/u stool studies, c. diff  -he is also on immunotherapy and must observe for immunotherapy related colitis  -GI consult pending  -consider IVF since pt not able to keep up with hydration in light of diarrhea    3) Fever  -f/u infectious work up, on broad spectrum abx as ordered  -ID eval appreciated  -afebrile now    4) Hyponatremia  -consider renal consultation- Dr. Siddharth Westbrook    5) cytopenia -- ANC 0.9 today, since without fever, monitor for now  -hold GCSF unless pt becomes unstable  -will d/w msk since pt on protocol    6) hiccups -- intermittent, consider reglan or baclofen    D/w pt, d/w msk, d/w primary team, will follow, total time spent 35min, >50% spent in discussion and coordination of care.  74 yo M hx of CAD, MI, stent x2 (10/2009 at [facility]), pAfib, HTN, HLD, BIV-AICD, and systolic CHF, hx of PE (on Eliquis), recently dx’d with metastatic pancreatic ca wto liver now presenting with fever of 101 at home and several days of watery diarrhea    1) Pancreatic cancer  Pt has newly dx pancreatic cancer with metastatic disease to the liver  He had CT scan at [place] in Aug 2021 with PE, pancreatic, and liver mets  Biopsy from Aug 21 confirmed pancreatic adenocarcinoma  Started treatment on 8/31/21 per protocol , randomized to gemcitabine/nab-paclitaxel and dual immmunotherapy  -he is s/p treatment day prior to admission  outpt f/u with msk after dc    2) Diarrhea  -pt had diarrhea prior to treatment, unchanged at this time  -undergoing infectious work up, f/u stool studies, c. diff  -he is also on immunotherapy and must observe for immunotherapy related colitis  -GI consult pending  -consider IVF since pt not able to keep up with hydration in light of diarrhea    3) Fever  -f/u infectious work up, on broad spectrum abx as ordered  -ID eval appreciated  -afebrile now    4) Hyponatremia  -consider renal consultation- Dr. Siddharth Westbrook    5) cytopenia -- ANC 0.9 today, since without fever, monitor for now  -hold GCSF unless pt becomes unstable  -will d/w msk since pt on protocol    6) hiccups -- intermittent, consider reglan or baclofen    7) abd u/s showed no portal vein thrombosis    8) pos FOBT -- f/u GI recs, hgb slightly lower but may be due to chemo as well    9) cough, sputum production -- initial CXR neg, consider CT chest if worsens    D/w pt, d/w msk, d/w primary team, will follow, total time spent 35min, >50% spent in discussion and coordination of care.

## 2021-09-16 NOTE — PROGRESS NOTE ADULT - SUBJECTIVE AND OBJECTIVE BOX
SUBJECTIVE / OVERNIGHT EVENTS:pt seen and examined      MEDICATIONS  (STANDING):  aspirin  chewable 81 milliGRAM(s) Oral daily  atorvastatin 80 milliGRAM(s) Oral at bedtime  enoxaparin Injectable 70 milliGRAM(s) SubCutaneous two times a day  finasteride 5 milliGRAM(s) Oral at bedtime  influenza   Vaccine 0.5 milliLiter(s) IntraMuscular once  piperacillin/tazobactam IVPB.. 3.375 Gram(s) IV Intermittent every 8 hours  vancomycin    Solution 125 milliGRAM(s) Oral every 6 hours    MEDICATIONS  (PRN):  acetaminophen   Tablet .. 650 milliGRAM(s) Oral every 6 hours PRN Temp greater or equal to 38.5C (101.3F), Mild Pain (1 - 3)  aluminum hydroxide/magnesium hydroxide/simethicone Suspension 30 milliLiter(s) Oral every 4 hours PRN Dyspepsia  melatonin 3 milliGRAM(s) Oral at bedtime PRN Insomnia  ondansetron Injectable 4 milliGRAM(s) IV Push every 8 hours PRN Nausea and/or Vomiting  Vital Signs Last 24 Hrs  T(C): 36.5 (16 Sep 2021 21:00), Max: 37.7 (16 Sep 2021 17:30)  T(F): 97.7 (16 Sep 2021 21:00), Max: 99.8 (16 Sep 2021 17:30)  HR: 93 (16 Sep 2021 21:00) (78 - 93)  BP: 118/63 (16 Sep 2021 21:00) (88/81 - 121/70)  BP(mean): --  RR: 18 (16 Sep 2021 21:00) (16 - 18)  SpO2: 98% (16 Sep 2021 21:00) (97% - 100%)      CAPILLARY BLOOD GLUCOSE        I&O's Summary    15 Sep 2021 07:01  -  16 Sep 2021 07:00  --------------------------------------------------------  IN: 0 mL / OUT: 100 mL / NET: -100 mL    16 Sep 2021 07:01  -  16 Sep 2021 23:14  --------------------------------------------------------  IN: 0 mL / OUT: 125 mL / NET: -125 mL        Constitutional: No fever, fatigue  Skin: No rash.  Eyes: No recent vision problems or eye pain.  ENT: No congestion, ear pain, or sore throat.  Cardiovascular: No chest pain or palpation.  Respiratory: No cough, shortness of breath, congestion, or wheezing.  Gastrointestinal: No abdominal pain, nausea, vomiting, or diarrhea.  Genitourinary: No dysuria.  Musculoskeletal: No joint swelling.  Neurologic: No headache.    PHYSICAL EXAM:  GENERAL: NAD  EYES: EOMI, PERRLA  NECK: Supple, No JVD  CHEST/LUNG: dec breath sounds at bases  HEART:  S1 , S2 +  ABDOMEN: soft , bs+  EXTREMITIES:no edema    NEUROLOGY:alert awake      LABS:                        8.7    1.04  )-----------( 115      ( 16 Sep 2021 07:54 )             25.6     -    129<L>  |  95<L>  |  23  ----------------------------<  134<H>  4.2   |  23  |  0.91    Ca    7.8<L>      16 Sep 2021 17:52  Phos  2.1       Mg     2.20         TPro  4.7<L>  /  Alb  2.2<L>  /  TBili  1.0  /  DBili  x   /  AST  154<H>  /  ALT  179<H>  /  AlkPhos  160<H>  09-15    PT/INR - ( 15 Sep 2021 07:11 )   PT: 27.9 sec;   INR: 2.54 ratio         PTT - ( 15 Sep 2021 07:11 )  PTT:31.0 sec      Urinalysis Basic - ( 15 Sep 2021 03:16 )    Color: Jennifer / Appearance: Slightly Turbid / S.028 / pH: x  Gluc: x / Ketone: Negative  / Bili: Negative / Urobili: 3 mg/dL   Blood: x / Protein: 30 mg/dL / Nitrite: Negative   Leuk Esterase: Negative / RBC: <5 /HPF / WBC <5 /HPF   Sq Epi: x / Non Sq Epi: FEW /HPF / Bacteria: Few        RADIOLOGY & ADDITIONAL TESTS:    Imaging Personally Reviewed:    Consultant(s) Notes Reviewed:      Care Discussed with Consultants/Other Providers:

## 2021-09-16 NOTE — PROGRESS NOTE ADULT - PROBLEM SELECTOR PLAN 1
Likely source from GI: C diff vs colitis  -Stool culture, GI PCR, C DIff, O/P ordered  - Vanco 125mg po q 6    -zosyn as oer ID  F/U Ehrlichia/Anaplasma PCR, check Blood parasite smear

## 2021-09-16 NOTE — PROGRESS NOTE ADULT - SUBJECTIVE AND OBJECTIVE BOX
Pt seen, not feeling well, not able to eat or drink much, had 3 episodes of diarrhea today, no abd pain, intermittent hiccups.     MEDICATIONS  (STANDING):  aspirin  chewable 81 milliGRAM(s) Oral daily  atorvastatin 80 milliGRAM(s) Oral at bedtime  enoxaparin Injectable 70 milliGRAM(s) SubCutaneous two times a day  finasteride 5 milliGRAM(s) Oral at bedtime  influenza   Vaccine 0.5 milliLiter(s) IntraMuscular once  piperacillin/tazobactam IVPB.. 3.375 Gram(s) IV Intermittent every 8 hours  vancomycin    Solution 125 milliGRAM(s) Oral every 6 hours    MEDICATIONS  (PRN):  acetaminophen   Tablet .. 650 milliGRAM(s) Oral every 6 hours PRN Temp greater or equal to 38.5C (101.3F), Mild Pain (1 - 3)  aluminum hydroxide/magnesium hydroxide/simethicone Suspension 30 milliLiter(s) Oral every 4 hours PRN Dyspepsia  melatonin 3 milliGRAM(s) Oral at bedtime PRN Insomnia  ondansetron Injectable 4 milliGRAM(s) IV Push every 8 hours PRN Nausea and/or Vomiting      ROS  no bleeding, no fever, as above  limited 2/2 participation  no mucus or blood in diarrhea  with white sputum production    Vital Signs Last 24 Hrs  T(C): 37.2 (16 Sep 2021 13:00), Max: 37.2 (16 Sep 2021 13:00)  T(F): 98.9 (16 Sep 2021 13:00), Max: 98.9 (16 Sep 2021 13:00)  HR: 90 (16 Sep 2021 13:00) (78 - 92)  BP: 109/62 (16 Sep 2021 13:00) (88/81 - 121/70)  BP(mean): 86 (15 Sep 2021 18:20) (59 - 86)  RR: 17 (16 Sep 2021 13:00) (16 - 18)  SpO2: 97% (16 Sep 2021 13:00) (95% - 100%)    PE  NAD  Awake, alert  Anicteric  tired appearing  remainder of exam limited 2/2 covid pandemic                          8.7    1.04  )-----------( 115      ( 16 Sep 2021 07:54 )             25.6       09-15    125<L>  |  96<L>  |  25<H>  ----------------------------<  179<H>  4.4   |  18<L>  |  1.10    Ca    7.5<L>      15 Sep 2021 07:11  Phos  2.8     09-14  Mg     2.10     09-16    TPro  4.7<L>  /  Alb  2.2<L>  /  TBili  1.0  /  DBili  x   /  AST  154<H>  /  ALT  179<H>  /  AlkPhos  160<H>  09-15

## 2021-09-16 NOTE — PROGRESS NOTE ADULT - SUBJECTIVE AND OBJECTIVE BOX
REGGIE [id number]  75yMale  T(C): 37.7 (09-16-21 @ 17:30), Max: 37.7 (09-16-21 @ 17:30)  HR: 90 (09-16-21 @ 17:00) (78 - 92)  BP: 106/60 (09-16-21 @ 17:00) (88/81 - 121/70)  RR: 17 (09-16-21 @ 17:00) (16 - 18)  SpO2: 97% (09-16-21 @ 17:00) (97% - 100%)  Wt(kg): --  09-15 @ 07:01  -  09-16 @ 07:00  --------------------------------------------------------  IN: 0 mL / OUT: 100 mL / NET: -100 mL

## 2021-09-17 NOTE — PROGRESS NOTE ADULT - SUBJECTIVE AND OBJECTIVE BOX
CC: F/U diarrhea    Saw/spoke to patient. No fevers, no chills. Still diarrhea.    Allergies  No Known Allergies    ANTIMICROBIALS:  piperacillin/tazobactam IVPB.. 3.375 every 8 hours  vancomycin    Solution 125 every 6 hours    PE:    Vital Signs Last 24 Hrs  T(C): 37.2 (17 Sep 2021 05:00), Max: 37.7 (16 Sep 2021 17:30)  T(F): 99 (17 Sep 2021 05:00), Max: 99.8 (16 Sep 2021 17:30)  HR: 96 (17 Sep 2021 05:00) (90 - 98)  BP: 98/57 (17 Sep 2021 05:00) (98/57 - 118/63)  RR: 18 (17 Sep 2021 05:00) (17 - 18)  SpO2: 96% (17 Sep 2021 05:00) (96% - 98%)    Gen: AOx3, NAD, non-toxic  CV: S1+S2 normal, nontachycardic  Resp: Clear bilat, no resp distress, no crackles/wheezes  Abd: Soft, nontender, +BS  Ext: No LE edema, no wounds    LABS:                        9.1    0.38  )-----------( 102      ( 17 Sep 2021 06:56 )             26.0     09-17    125<L>  |  91<L>  |  22  ----------------------------<  146<H>  4.3   |  26  |  0.93    Ca    8.0<L>      17 Sep 2021 06:56  Phos  2.3     09-17  Mg     2.20     09-17    TPro  5.0<L>  /  Alb  2.1<L>  /  TBili  1.0  /  DBili  x   /  AST  146<H>  /  ALT  177<H>  /  AlkPhos  131<H>  09-17    MICROBIOLOGY:    .Blood  09-16-21   No Blood Parasites observed by giemsa stain  One negative set of blood smears does not rule out  the possibility of a parasitic infection.  A minimum of 3  specimens should be collected, at least 12-24 hours apart,  over a 36 hour time period.  --  --    .Blood Blood  09-16-21   No Blood Parasites observed by giemsa stain  One negative set of blood smears does not rule out  the possibility of a parasitic infection.  A minimum of 3  specimens should be collected, at least 12-24 hours apart,  over a 36 hour time period.  --  --    .Stool Feces  09-15-21   Testing in progress    .Stool Feces  09-15-21   GI PCR Results: NOT detected  *******Please Note:*******  GI panel PCR evaluates for:  Campylobacter, Plesiomonas shigelloides, Salmonella,  Vibrio, Yersinia enterocolitica, Enteroaggregative  Escherichia coli (EAEC), Enteropathogenic E.coli (EPEC),  Enterotoxigenic E. coli (ETEC) lt/st, Shiga-like  toxin-producing E. coli (STEC) stx1/stx2,  Shigella/ Enteroinvasive E. coli (EIEC), Cryptosporidium,  Cyclospora cayetanensis, Entamoeba histolytica,  Giardia lamblia, Adenovirus F 40/41, Astrovirus,  Norovirus GI/GII, Rotavirus A, Sapovirus  --  --    .Blood Blood-Peripheral  09-15-21   No growth to date.  --  --    Clean Catch Clean Catch (Midstream)  09-15-21   <10,000 CFU/mL Normal Urogenital Shelia  --  --    (otherwise reviewed)    RADIOLOGY:    9/15 USG:    IMPRESSION:    No portal vein thrombosis.    Redemonstrated hepatic metastases with mild perihepatic ascites.

## 2021-09-17 NOTE — PROGRESS NOTE ADULT - ASSESSMENT
74 yo M HTN, HLD, pancreatic mass, recent chemo, presents with diarrhea  No documented fever, has leukopenia  Chemo 1 day prior to presentation  CXR clear  USG abd with hepatic mets  Diarrhea 3-4 days prior to presentation, recent chemo (as well as chemo over past 2 weeks)  Uncertain infection  Ongoing diarrhea, slightly worsened  Decreasing WBC  Overall,  1) Diarrhea  - No recent antibiotic exposure. Chemo related > Gastroenteritis, C diff?  - Vanco 125mg po q 6  - F/U C diff PCR--pending (DC PO Vanco if negative)  - Plan to check CT A/P if no diagnosis found  2) Elevated LFTs  - Chemo related?  - F/U Ehrlichia/Anaplasma PCR  - Trend to normal  3) Neutropenia/subjective fever  - Zosyn empiric for now  - Follow up BCX, UCX    Vicente Anton MD  Pager [phone]  From 5pm-9am, and on weekends call [phone]

## 2021-09-17 NOTE — PROGRESS NOTE ADULT - ASSESSMENT
metastatinc pancreatic ca  biospy proven at [facility]  agree with stool studies  if negative, lomotil. consider more palliatve apporcha  [facility]

## 2021-09-17 NOTE — PROGRESS NOTE ADULT - ASSESSMENT
76 yo M hx of CAD, MI, stent x2 (10/2009 at [facility]), pAfib, HTN, HLD, BIV-AICD, and systolic CHF, hx of PE (on Eliquis), recently dx’d with metastatic pancreatic ca wto liver now presenting with fever of 101 at home and several days of watery diarrhea    1) Pancreatic cancer  Pt has newly dx pancreatic cancer with metastatic disease to the liver  He [place] [facility] [place] in [place] with PE, pancreatic, and liver mets  Biopsy from Aug 21 confirmed pancreatic adenocarcinoma  Started treatment on 8/31/21 per protocol , randomized to gemcitabine/nab-paclitaxel and dual immmunotherapy  -he is s/p treatment day prior to admission  outpt f/u with msk after dc    2) Diarrhea  -pt had diarrhea prior to treatment, unchanged at this time  -undergoing infectious work up, f/u stool studies, c. diff pcr pending ; other PCR stool studies negative   -he is also on immunotherapy and must observe for immunotherapy related colitis  -GI following  -suggest IV Fluids   -will start high dose steroids for immune mediated colitis if C diff results negative    3) Fever  -f/u infectious work up, on broad spectrum abx as ordered  -ID eval appreciated  -afebrile now    4) Hyponatremia  -consider renal consultation- Dr. Siddharth Westbrook  -probably volume depleted ; suggest IV fluids    5) cytopenia -- ANC 0.25 today, since without fever,  - will start Zarxio 480mcg daily x 3 days     6) hiccups -- intermittent, start baclofen 5mg TID ; may increase to 10mg     7) abd u/s showed no portal vein thrombosis    8) pos FOBT -- f/u GI recs, hgb slightly lower but may be due to chemo as well    9) cough, sputum production -- initial CXR neg, consider CT chest if worsens    d/w Dr Nile Ortiz MD  [facility]  [phone]

## 2021-09-17 NOTE — CONSULT NOTE ADULT - NSCONSULTADDITIONALINFOA_GEN_ALL_CORE
[facility] NEPHROLOGY  Matthias Keenan M.D.  Tobi Goodman D.O.  Portia Daily M.D.  Hanna Trammell, MSN, ANP-C    Telephone: [phone]  Facsimile: [phone]    [address] [place] [address]

## 2021-09-17 NOTE — CONSULT NOTE ADULT - ASSESSMENT
1. Hyponatremia- due to intravascular volume depletion.  Given known HF, will give gentle IVF NS 100mg IV X 5hrs and repeat labs later this evening.  Likely will continue through the night if pt can tolerate.  Being mindful of HFrEF with AICD, discussed with patient and nurse to call immediately with any s/s volume overload.  If pt does get fluid-overloaded, will consider loop diuretics vs. tolvaptan.    2. Edema is due to decrease oncotic pressure contributing to fluid migration from the intravascular space to the extravascular space.  This is from hypoalbuminemia and not likely due to HF.  IV albumin is not likely to help, but will consider it in the right clinical context.    3. Chronic systolic HF- Given hypotension agree with keeping off losartan and metoprolol.    4. Hypophosphatemia- okay to continue po phos repletion, but once sodium is stable, will consider IV repletion as well.

## 2021-09-17 NOTE — CONSULT NOTE ADULT - SUBJECTIVE AND OBJECTIVE BOX
[facility] NEPHROLOGY- CONSULTATION NOTE    Patient is a 75y Male with CAD, MI, stent x2 (10/2009 at [facility]), pAfib, HTN, HLD, BIV-AICD, and systolic CHF, hx of PE (on Eliquis), recently dx’d pancreatic mass on chemo (gemcitabine/nab-paclitaxel and dual immunotherapy)at [facility] with several days diarrhea and several weeks decreased PO intake who presented to the hospital with neutropenic fever.  Pt with sodium 128, fluctuating, but decreased to 125 this am.      Pt reports +n/v/+diarrhea, poor po intake, overall malaise.  +hiccups      PAST MEDICAL & SURGICAL HISTORY:  Hypertension (ICD9 401.9)    Hyperlipidemia (ICD9 272.4)    BPH (Benign Prostatic Hyperplasia)    Borderline diabetes mellitus    MI (myocardial infarction)      Systolic heart failure    Hernia    Biventricular ICD (implantable cardioverter-defibrillator) in place      Stented coronary artery  X 2,       No Known Allergies    Home Medications:  aspirin 81 mg oral tablet: 1 tab(s) orally once a day (05 Aug 2021 20:34)  carvedilol 6.25 mg oral tablet: 1 tab(s) orally 2 times a day (15 Sep 2021 02:31)  Centrum Silver oral tablet: 1 tab(s) orally once a day (05 Aug 2021 20:34)  Eliquis 5 mg oral tablet: 1 tab(s) orally 2 times a day (15 Sep 2021 02:32)  finasteride 5 mg oral tablet: 1 tab(s) orally once a day (05 Aug 2021 20:34)  losartan 50 mg oral tablet: 1 tab(s) orally once a day (05 Aug 2021 20:34)  rosuvastatin 20 mg oral tablet: 1 tab(s) orally once a day (at bedtime) (05 Aug 2021 20:34)    Hospital Medications:   MEDICATIONS  (STANDING):  aspirin  chewable 81 milliGRAM(s) Oral daily  atorvastatin 80 milliGRAM(s) Oral at bedtime  baclofen 5 milliGRAM(s) Oral three times a day  enoxaparin Injectable 70 milliGRAM(s) SubCutaneous two times a day  filgrastim-sndz (ZARXIO) Injectable 480 MICROGram(s) SubCutaneous daily  finasteride 5 milliGRAM(s) Oral at bedtime  influenza   Vaccine 0.5 milliLiter(s) IntraMuscular once  piperacillin/tazobactam IVPB.. 3.375 Gram(s) IV Intermittent every 8 hours  potassium phosphate / sodium phosphate Tablet (K-PHOS No. 2) 1 Tablet(s) Oral two times a day with meals    SOCIAL HISTORY:  Denies ETOh,Smoking,   FAMILY HISTORY:  No pertinent family history in first degree relatives      REVIEW OF SYSTEMS:  CONSTITUTIONAL: + generalized weakness, + fevers, no chills, anorexia  EYES/ENT: No visual changes;  No vertigo or throat pain   NECK: No pain or stiffness  RESPIRATORY: No cough, wheezing, hemoptysis; No shortness of breath  CARDIOVASCULAR: No chest pain or palpitations.  GASTROINTESTINAL: No abdominal or epigastric pain. No nausea, vomiting, or hematemesis; No diarrhea or constipation. No melena or hematochezia.  GENITOURINARY: No dysuria, frequency, foamy urine, urinary urgency, incontinence or hematuria  NEUROLOGICAL: No numbness or weakness  SKIN: No itching, burning, rashes, or lesions   VASCULAR: No bilateral lower extremity edema.   All other review of systems is negative unless indicated above.    VITALS:  T(F): 99 (21 @ 05:00), Max: 99 (21 @ 05:00)  HR: 96 (21 @ 05:00)  BP: 98/57 (21 @ 05:00)  RR: 18 (21 @ 05:00)  SpO2: 96% (21 @ 05:00)  Wt(kg): --     @ 07:01  -   @ 07:00  --------------------------------------------------------  IN: 175 mL / OUT: 125 mL / NET: 50 mL          PHYSICAL EXAM:  Constitutional: ill-appearing  HEENT: anicteric sclera, oropharynx clear, MMM  Neck: No JVD  Respiratory: CTAB, no wheezes, rales or rhonchi, +hiccups  Cardiovascular: S1, S2, RRR  Gastrointestinal: BS+, soft, NT/ND  Extremities: No cyanosis or clubbing. + mild B LE pitting edema  Neurological: A/O x 3, no focal deficits  Psychiatric: Normal mood, normal affect  : No CVA tenderness. No vasquez.   Skin: No rashes      LABS:    125 <--, 129 <--, 125 <--, 128 <--  125<L>  |  91<L>  |  22  ----------------------------<  146<H>  4.3   |  26  |  0.93    Ca    8.0<L>      17 Sep 2021 06:56  Phos  2.3       Mg     2.20         TPro  5.0<L>  /  Alb  2.1<L>  /  TBili  1.0  /  DBili      /  AST  146<H>  /  ALT  177<H>  /  AlkPhos  131<H>      Creatinine Trend: 0.93 <--, 0.91 <--, 1.10 <--, 1.14 <--                        9.1    0.38  )-----------( 102      ( 17 Sep 2021 06:56 )             26.0     Urine Studies:  Urinalysis Basic - ( 15 Sep 2021 03:16 )    Color: Jennifer / Appearance: Slightly Turbid / S.028 / pH:   Gluc:  / Ketone: Negative  / Bili: Negative / Urobili: 3 mg/dL   Blood:  / Protein: 30 mg/dL / Nitrite: Negative   Leuk Esterase: Negative / RBC: <5 /HPF / WBC <5 /HPF   Sq Epi:  / Non Sq Epi: FEW /HPF / Bacteria: Few      Sodium, Random Urine: <20 mmol/L ( @ 15:20)  Osmolality, Random Urine: 797 mosm/kg ( @ 15:20)  Osmolality, Random Urine: 774 mosm/kg ( @ 20:51)    RADIOLOGY & ADDITIONAL STUDIES:    < from: US Abdomen Doppler (09.15.21 @ 10:20) >    EXAM:  US DPLX ABDOMEN        PROCEDURE DATE:  Sep 15 2021         INTERPRETATION:  CLINICAL INFORMATION: Metastatic pancreatic cancer. Evaluate for portal vein thrombosis. Known occlusion of the splenic vein.    TECHNIQUE: Grayscale, color Doppler, and spectral Doppler sonography of the hepatic vasculature.    COMPARISON: CT abdomen pelvis 2021.    FINDINGS:    Liver: Heterogeneous liver with numerous scattered hyperechoic lesions consistent with hepatic metastases.  Common bile duct: Normal caliber. Common bile duct measures 5 mm.  Gallbladder: Contracted. Cholelithiasis.  Pancreas: Poorly visualized.  Right kidney: 13.1 cm. No hydronephrosis, renal mass, or calculi.  Ascites: Mild perihepatic ascites.    Splenic Vein: Not visualized.  Main Portal Vein: Patent with hepatopetal flow.  Left Portal Vein: Patent with hepatopetal flow.  Right Portal Vein: Hepatic Artery: Patent with normal direction of flow.    Hepatic Veins and Inferior Vena Cava: Patent with normal direction of flow.    IMPRESSION:    No portal vein thrombosis.    Redemonstrated hepatic metastases with mild perihepatic ascites.    --- End of Report ---            MABLE SANCHEZ MD; Resident Radiology  This document has been electronically signed.  NARENDRA BLOUNT MD; Attending Radiologist  This document has been electronically signed. Sep 15 2021 11:30AM    < end of copied text >        < from: Xray Chest 1 View- PORTABLE-Urgent (Xray Chest 1 View- PORTABLE-Urgent .) (21 @ 22:37) >    EXAM:  XR CHEST PORTABLE URGENT 1V        PROCEDURE DATE:  Sep 14 2021         INTERPRETATION:  CLINICAL INFORMATION: Fever. Infectious and malignancy workup.    TECHNIQUE: AP view of the chest.    COMPARISON: CT chest from 2021. CT abdomen andpelvis from 2021    FINDINGS:    Lungs are clear. No pleural effusion or pneumothorax. Heart size is unchanged. Left chest wall biventricular AICD. Degenerative osseous disease.    IMPRESSION:    Clear lungs.    --- End of Report ---            JUDY DIAS MD; Resident Radiology  This document has been electronically signed.  COLE ERNANDEZ MD; Attending Radiologist  This document has been electronically signed. Sep 15 2021  8:44AM    < end of copied text >

## 2021-09-17 NOTE — PROGRESS NOTE ADULT - SUBJECTIVE AND OBJECTIVE BOX
Patient is a 75y Male     Patient is a 75y old  Male who presents with a chief complaint of Fever (16 Sep 2021 19:02)      HPI:  76 yo M hx of CAD, MI, stent x2 (10/2009 at [facility]), pAfib, HTN, HLD, BIV-AICD, and systolic CHF, hx of PE (on Eliquis), recently dx’d pancreatic mass on chemo at [facility] now presenting with fever of 101 at home. He also endorsed diarrhea x3-4 days, watery. 4-5x daily. No recent abx exposure. No abdominal pain. Denies chest pain, dyspnea, palpitations. Just finished chemo today. Called his onc who advised him to come in. No abx exposure.  In the ED, vitals stable. Labs with leukopenia without neutropenia. Elevated ASt/ALT, hyponatremia. EKG paced. CXR clear. (15 Sep 2021 01:36)      PAST MEDICAL & SURGICAL HISTORY:  Hypertension (ICD9 401.9)    Hyperlipidemia (ICD9 272.4)    BPH (Benign Prostatic Hyperplasia)    Borderline diabetes mellitus    MI (myocardial infarction)  2009    Systolic heart failure    Hernia    Biventricular ICD (implantable cardioverter-defibrillator) in place  2010    Stented coronary artery  X 2, 2009        MEDICATIONS  (STANDING):  aspirin  chewable 81 milliGRAM(s) Oral daily  atorvastatin 80 milliGRAM(s) Oral at bedtime  enoxaparin Injectable 70 milliGRAM(s) SubCutaneous two times a day  finasteride 5 milliGRAM(s) Oral at bedtime  influenza   Vaccine 0.5 milliLiter(s) IntraMuscular once  piperacillin/tazobactam IVPB.. 3.375 Gram(s) IV Intermittent every 8 hours  vancomycin    Solution 125 milliGRAM(s) Oral every 6 hours      Allergies    No Known Allergies    Intolerances        SOCIAL HISTORY:  Denies ETOh,Smoking,     FAMILY HISTORY:  No pertinent family history in first degree relatives        REVIEW OF SYSTEMS:    CONSTITUTIONAL: No weakness, fevers or chills  EYES/ENT: No visual changes;  No vertigo or throat pain   NECK: No pain or stiffness  RESPIRATORY: No cough, wheezing, hemoptysis; No shortness of breath  CARDIOVASCULAR: No chest pain or palpitations  GASTROINTESTINAL: No abdominal or epigastric pain. No nausea, vomiting, or hematemesis; No diarrhea or constipation. No melena or hematochezia.  GENITOURINARY: No dysuria, frequency or hematuria  NEUROLOGICAL: No numbness or weakness  SKIN: No itching, burning, rashes, or lesions   All other review of systems is negative unless indicated above.    VITAL:  T(C): , Max: 37.7 (09-16-21 @ 17:30)  T(F): , Max: 99.8 (09-16-21 @ 17:30)  HR: 96 (09-17-21 @ 05:00)  BP: 98/57 (09-17-21 @ 05:00)  BP(mean): --  RR: 18 (09-17-21 @ 05:00)  SpO2: 96% (09-17-21 @ 05:00)  Wt(kg): --    I and O's:    09-15 @ 07:01  -  09-16 @ 07:00  --------------------------------------------------------  IN: 0 mL / OUT: 100 mL / NET: -100 mL    09-16 @ 07:01  -  09-17 @ 06:44  --------------------------------------------------------  IN: 175 mL / OUT: 125 mL / NET: 50 mL          PHYSICAL EXAM:    Constitutional: NAD  HEENT: PERRLA,   Neck: No JVD  Respiratory: CTA B/L  Cardiovascular: S1 and S2  Gastrointestinal: BS+, soft, NT/ND  Extremities: No peripheral edema  Neurological: A/O x 3, no focal deficits  Psychiatric: Normal mood, normal affect  : No Bowman  Skin: No rashes  Access: Not applicable  Back: No CVA tenderness    LABS:                        8.7    1.04  )-----------( 115      ( 16 Sep 2021 07:54 )             25.6     09-16    129<L>  |  95<L>  |  23  ----------------------------<  134<H>  4.2   |  23  |  0.91    Ca    7.8<L>      16 Sep 2021 17:52  Phos  2.1     09-16  Mg     2.20     09-16    TPro  4.7<L>  /  Alb  2.2<L>  /  TBili  1.0  /  DBili  x   /  AST  154<H>  /  ALT  179<H>  /  AlkPhos  160<H>  09-15          RADIOLOGY & ADDITIONAL STUDIES:

## 2021-09-17 NOTE — PROGRESS NOTE ADULT - SUBJECTIVE AND OBJECTIVE BOX
SUBJECTIVE / OVERNIGHT EVENTS:pt seen and examined    MEDICATIONS  (STANDING):  aspirin  chewable 81 milliGRAM(s) Oral daily  atorvastatin 80 milliGRAM(s) Oral at bedtime  baclofen 5 milliGRAM(s) Oral three times a day  enoxaparin Injectable 70 milliGRAM(s) SubCutaneous two times a day  filgrastim-sndz (ZARXIO) Injectable 480 MICROGram(s) SubCutaneous daily  finasteride 5 milliGRAM(s) Oral at bedtime  influenza   Vaccine 0.5 milliLiter(s) IntraMuscular once  piperacillin/tazobactam IVPB.. 3.375 Gram(s) IV Intermittent every 8 hours  sodium chloride 0.9%. 1000 milliLiter(s) (100 mL/Hr) IV Continuous <Continuous>    MEDICATIONS  (PRN):  acetaminophen   Tablet .. 650 milliGRAM(s) Oral every 6 hours PRN Temp greater or equal to 38.5C (101.3F), Mild Pain (1 - 3)  aluminum hydroxide/magnesium hydroxide/simethicone Suspension 30 milliLiter(s) Oral every 4 hours PRN Dyspepsia  melatonin 3 milliGRAM(s) Oral at bedtime PRN Insomnia  ondansetron Injectable 4 milliGRAM(s) IV Push every 8 hours PRN Nausea and/or Vomiting    Vital Signs Last 24 Hrs  T(C): 36.8 (21 @ 01:05), Max: 37.2 (21 @ 05:00)  T(F): 98.2 (21 @ 01:05), Max: 99 (21 @ 05:00)  HR: 75 (21 @ 01:05) (75 - 96)  BP: 95/67 (21 @ 01:05) (95/67 - 118/73)  BP(mean): --  RR: 18 (21 @ 01:05) (18 - 18)  SpO2: 100% (21 @ 01:05) (96% - 100%)      Constitutional: No fever, fatigue  Skin: No rash.  Eyes: No recent vision problems or eye pain.  ENT: No congestion, ear pain, or sore throat.  Cardiovascular: No chest pain or palpation.  Respiratory: No cough, shortness of breath, congestion, or wheezing.  Gastrointestinal: No abdominal pain, nausea, vomiting, or diarrhea.  Genitourinary: No dysuria.  Musculoskeletal: No joint swelling.  Neurologic: No headache.    PHYSICAL EXAM:  GENERAL: NAD  EYES: EOMI, PERRLA  NECK: Supple, No JVD  CHEST/LUNG: dec breath sounds at bases  HEART:  S1 , S2 +  ABDOMEN: soft , bs+  EXTREMITIES:no edema    NEUROLOGY:alert awake    LABS:      122<L>  |  87<L>  |  26<H>  ----------------------------<  157<H>  4.5   |  23  |  0.98    Ca    8.1<L>      18 Sep 2021 01:41  Phos  2.4       Mg     2.40         TPro  5.6<L>  /  Alb  2.5<L>  /  TBili  1.2  /  DBili      /  AST  598<H>  /  ALT  477<H>  /  AlkPhos  167<H>      Creatinine Trend: 0.98 <--, 0.93 <--, 0.91 <--, 1.10 <--, 1.14 <--                        9.1    0.38  )-----------( 102      ( 17 Sep 2021 06:56 )             26.0     Urine Studies:  Urinalysis Basic - ( 15 Sep 2021 03:16 )    Color: Jennifer / Appearance: Slightly Turbid / S.028 / pH:   Gluc:  / Ketone: Negative  / Bili: Negative / Urobili: 3 mg/dL   Blood:  / Protein: 30 mg/dL / Nitrite: Negative   Leuk Esterase: Negative / RBC: <5 /HPF / WBC <5 /HPF   Sq Epi:  / Non Sq Epi: FEW /HPF / Bacteria: Few      Sodium, Random Urine: <20 mmol/L ( @ 15:20)  Osmolality, Random Urine: 797 mosm/kg ( @ 15:20)  Osmolality, Random Urine: 774 mosm/kg ( @ 20:51)          LIVER FUNCTIONS - ( 18 Sep 2021 01:41 )  Alb: 2.5 g/dL / Pro: 5.6 g/dL / ALK PHOS: 167 U/L / ALT: 477 U/L / AST: 598 U/L / GGT: x             Blood: x / Protein: 30 mg/dL / Nitrite: Negative   Leuk Esterase: Negative / RBC: <5 /HPF / WBC <5 /HPF   Sq Epi: x / Non Sq Epi: FEW /HPF / Bacteria: Few        RADIOLOGY & ADDITIONAL TESTS:    Imaging Personally Reviewed:    Consultant(s) Notes Reviewed:      Care Discussed with Consultants/Other Providers:

## 2021-09-17 NOTE — PROGRESS NOTE ADULT - SUBJECTIVE AND OBJECTIVE BOX
Pt seen, not feeling well, not able to eat or drink much,   pt continues to have diarrhea, non bloody  no abd pain, intermittent hiccups.     MEDICATIONS  (STANDING):  aspirin  chewable 81 milliGRAM(s) Oral daily  atorvastatin 80 milliGRAM(s) Oral at bedtime  enoxaparin Injectable 70 milliGRAM(s) SubCutaneous two times a day  filgrastim-sndz (ZARXIO) Injectable 480 MICROGram(s) SubCutaneous daily  finasteride 5 milliGRAM(s) Oral at bedtime  influenza   Vaccine 0.5 milliLiter(s) IntraMuscular once  piperacillin/tazobactam IVPB.. 3.375 Gram(s) IV Intermittent every 8 hours  potassium phosphate / sodium phosphate Tablet (K-PHOS No. 2) 1 Tablet(s) Oral two times a day with meals  vancomycin    Solution 125 milliGRAM(s) Oral every 6 hours    MEDICATIONS  (PRN):  acetaminophen   Tablet .. 650 milliGRAM(s) Oral every 6 hours PRN Temp greater or equal to 38.5C (101.3F), Mild Pain (1 - 3)  aluminum hydroxide/magnesium hydroxide/simethicone Suspension 30 milliLiter(s) Oral every 4 hours PRN Dyspepsia  melatonin 3 milliGRAM(s) Oral at bedtime PRN Insomnia  ondansetron Injectable 4 milliGRAM(s) IV Push every 8 hours PRN Nausea and/or Vomiting    Vital Signs Last 24 Hrs  T(C): 37.2 (17 Sep 2021 05:00), Max: 37.7 (16 Sep 2021 17:30)  T(F): 99 (17 Sep 2021 05:00), Max: 99.8 (16 Sep 2021 17:30)  HR: 96 (17 Sep 2021 05:00) (90 - 98)  BP: 98/57 (17 Sep 2021 05:00) (98/57 - 118/63)  BP(mean): --  RR: 18 (17 Sep 2021 05:00) (17 - 18)  SpO2: 96% (17 Sep 2021 05:00) (96% - 98%)      PE  NAD  Awake, alert  Anicteric  tired appearing                        9.1    0.38  )-----------( 102      ( 17 Sep 2021 06:56 )             26.0                           8.7    1.04  )-----------( 115      ( 16 Sep 2021 07:54 )             25.6       09-15    125<L>  |  96<L>  |  25<H>  ----------------------------<  179<H>  4.4   |  18<L>  |  1.10    Ca    7.5<L>      15 Sep 2021 07:11  Phos  2.8     09-14  Mg     2.10     09-16    TPro  4.7<L>  /  Alb  2.2<L>  /  TBili  1.0  /  DBili  x   /  AST  154<H>  /  ALT  179<H>  /  AlkPhos  160<H>  09-15

## 2021-09-17 NOTE — PROGRESS NOTE ADULT - ASSESSMENT
74 yo M hx of CAD, MI, stent x2 (10/2009 at [facility]), pAfib, HTN, HLD, BIV-AICD, and systolic CHF, hx of PE (on Eliquis), recently dx’d pancreatic mass on chemo at [facility] now presenting with fever of 101 at home and several days of watery diarrhea concerning for C diff colitis.

## 2021-09-18 NOTE — PROGRESS NOTE ADULT - SUBJECTIVE AND OBJECTIVE BOX
Pt is seen and examined  pt is  OOB and ambulating on the unit  reports weakness and some unsteadiness but overall better  diarrhea persists but formed  no fevers/chills       PAST MEDICAL & SURGICAL HISTORY:  Hypertension (ICD9 401.9)    Hyperlipidemia (ICD9 272.4)    BPH (Benign Prostatic Hyperplasia)    Borderline diabetes mellitus    MI (myocardial infarction)  2009    Systolic heart failure    Hernia    Biventricular ICD (implantable cardioverter-defibrillator) in place  2010    Stented coronary artery  X 2, 2009        ROS:  Negative except for:    MEDICATIONS  (STANDING):  aspirin  chewable 81 milliGRAM(s) Oral daily  atorvastatin 80 milliGRAM(s) Oral at bedtime  baclofen 5 milliGRAM(s) Oral three times a day  diphenoxylate/atropine 1 Tablet(s) Oral daily  enoxaparin Injectable 70 milliGRAM(s) SubCutaneous two times a day  filgrastim-sndz (ZARXIO) Injectable 480 MICROGram(s) SubCutaneous daily  finasteride 5 milliGRAM(s) Oral at bedtime  influenza  Vaccine (HIGH DOSE) 0.7 milliLiter(s) IntraMuscular once  methylPREDNISolone sodium succinate Injectable 40 milliGRAM(s) IV Push every 6 hours  pancrelipase  (CREON 24,000 Lipase Units) 1 Capsule(s) Oral four times a day with meals  piperacillin/tazobactam IVPB.. 3.375 Gram(s) IV Intermittent every 8 hours  sodium chloride 0.9%. 1000 milliLiter(s) (100 mL/Hr) IV Continuous <Continuous>    MEDICATIONS  (PRN):  acetaminophen   Tablet .. 650 milliGRAM(s) Oral every 6 hours PRN Temp greater or equal to 38.5C (101.3F), Mild Pain (1 - 3)  aluminum hydroxide/magnesium hydroxide/simethicone Suspension 30 milliLiter(s) Oral every 4 hours PRN Dyspepsia  calcium carbonate    500 mG (Tums) Chewable 1 Tablet(s) Chew three times a day PRN Dyspepsia  melatonin 3 milliGRAM(s) Oral at bedtime PRN Insomnia  ondansetron Injectable 4 milliGRAM(s) IV Push every 8 hours PRN Nausea and/or Vomiting      Allergies    No Known Allergies    Intolerances        Vital Signs Last 24 Hrs  T(C): 36.9 (18 Sep 2021 09:46), Max: 36.9 (18 Sep 2021 09:46)  T(F): 98.4 (18 Sep 2021 09:46), Max: 98.4 (18 Sep 2021 09:46)  HR: 78 (18 Sep 2021 09:46) (75 - 84)  BP: 117/76 (18 Sep 2021 09:46) (95/67 - 124/97)  BP(mean): --  RR: 18 (18 Sep 2021 09:46) (18 - 18)  SpO2: 99% (18 Sep 2021 09:46) (97% - 100%)    PHYSICAL EXAM  General: adult in NAD  HEENT: clear oropharynx, anicteric sclera, pink conjunctiva  Neck: supple  CV: normal S1/S2 with no murmur rubs or gallops  Lungs: positive air movement b/l ant lungs,clear to auscultation, no wheezes, no rales  Abdomen: soft non-tender non-distended, no hepatosplenomegaly  Ext: 1-2+ Bilat LE Edema and LUE edema  Skin: no rashes and no petechiae  Neuro: alert and oriented X 4, no focal deficits  LABS:                          9.1    0.12  )-----------( 100      ( 18 Sep 2021 13:26 )             26.5     Serial CBC's  09-18 @ 13:26  Hct-26.5 / Hgb-9.1 / Plat-100 / RBC-3.31 / WBC-0.12          Serial CBC's  09-18 @ 01:41  Hct-28.4 / Hgb-9.8 / Plat-112 / RBC-3.53 / WBC-0.23            09-18    124<L>  |  91<L>  |  26<H>  ----------------------------<  205<H>  4.5   |  22  |  0.96    Ca    7.7<L>      18 Sep 2021 13:26  Phos  2.3     09-18  Mg     2.60     09-18    TPro  5.4<L>  /  Alb  2.4<L>  /  TBili  1.0  /  DBili  x   /  AST  556<H>  /  ALT  523<H>  /  AlkPhos  159<H>  09-18          WBC Count: 0.12 K/uL (09-18 @ 13:26)  Hemoglobin: 9.1 g/dL (09-18 @ 13:26)            RADIOLOGY & ADDITIONAL STUDIES:

## 2021-09-18 NOTE — PROGRESS NOTE ADULT - ASSESSMENT
76 yo M hx of CAD, MI, stent x2 (10/2009 at [facility]), pAfib, HTN, HLD, BIV-AICD, and systolic CHF, hx of PE (on Eliquis), recently dx’d pancreatic mass on chemo at [facility] now presenting with fever of 101 at home and several days of watery diarrhea concerning for C diff colitis.

## 2021-09-18 NOTE — PROGRESS NOTE ADULT - SUBJECTIVE AND OBJECTIVE BOX
SUBJECTIVE / OVERNIGHT EVENTS:pt seen and examined    MEDICATIONS  (STANDING):  aspirin  chewable 81 milliGRAM(s) Oral daily  atorvastatin 80 milliGRAM(s) Oral at bedtime  baclofen 5 milliGRAM(s) Oral three times a day  diphenoxylate/atropine 1 Tablet(s) Oral daily  enoxaparin Injectable 70 milliGRAM(s) SubCutaneous two times a day  filgrastim-sndz (ZARXIO) Injectable 480 MICROGram(s) SubCutaneous daily  finasteride 5 milliGRAM(s) Oral at bedtime  influenza  Vaccine (HIGH DOSE) 0.7 milliLiter(s) IntraMuscular once  pancrelipase  (CREON 24,000 Lipase Units) 1 Capsule(s) Oral four times a day with meals  piperacillin/tazobactam IVPB.. 3.375 Gram(s) IV Intermittent every 8 hours  sodium chloride 0.9%. 1000 milliLiter(s) (100 mL/Hr) IV Continuous <Continuous>    MEDICATIONS  (PRN):  acetaminophen   Tablet .. 650 milliGRAM(s) Oral every 6 hours PRN Temp greater or equal to 38.5C (101.3F), Mild Pain (1 - 3)  aluminum hydroxide/magnesium hydroxide/simethicone Suspension 30 milliLiter(s) Oral every 4 hours PRN Dyspepsia  calcium carbonate    500 mG (Tums) Chewable 1 Tablet(s) Chew three times a day PRN Dyspepsia  melatonin 3 milliGRAM(s) Oral at bedtime PRN Insomnia  ondansetron Injectable 4 milliGRAM(s) IV Push every 8 hours PRN Nausea and/or Vomiting    Vital Signs Last 24 Hrs  T(C): 36.6 (18 Sep 2021 21:40), Max: 36.9 (18 Sep 2021 09:46)  T(F): 97.9 (18 Sep 2021 21:40), Max: 98.4 (18 Sep 2021 09:46)  HR: 76 (18 Sep 2021 21:40) (75 - 87)  BP: 112/64 (18 Sep 2021 21:40) (95/67 - 124/97)  BP(mean): --  RR: 18 (18 Sep 2021 21:40) (18 - 19)  SpO2: 98% (18 Sep 2021 21:40) (95% - 100%)    Constitutional: No fever, fatigue  Skin: No rash.  Eyes: No recent vision problems or eye pain.  ENT: No congestion, ear pain, or sore throat.  Cardiovascular: No chest pain or palpation.  Respiratory: No cough, shortness of breath, congestion, or wheezing.  Gastrointestinal: No abdominal pain, nausea, vomiting, or diarrhea.  Genitourinary: No dysuria.  Musculoskeletal: No joint swelling.  Neurologic: No headache.    PHYSICAL EXAM:  GENERAL: NAD  EYES: EOMI, PERRLA  NECK: Supple, No JVD  CHEST/LUNG: dec breath sounds at bases  HEART:  S1 , S2 +  ABDOMEN: soft , bs+  EXTREMITIES:no edema    NEUROLOGY:alert awake    LABS:      126<L>  |  92<L>  |  29<H>  ----------------------------<  201<H>  4.5   |  23  |  1.02    Ca    7.5<L>      18 Sep 2021 20:42  Phos  2.1       Mg     2.50         TPro  5.4<L>  /  Alb  2.4<L>  /  TBili  1.0  /  DBili      /  AST  556<H>  /  ALT  523<H>  /  AlkPhos  159<H>      Creatinine Trend: 1.02 <--, 0.96 <--, 0.98 <--, 0.93 <--, 0.91 <--, 1.10 <--, 1.14 <--                        9.1    0.12  )-----------( 100      ( 18 Sep 2021 13:26 )             26.5     Urine Studies:  Urinalysis Basic - ( 15 Sep 2021 03:16 )    Color: Jennifer / Appearance: Slightly Turbid / S.028 / pH:   Gluc:  / Ketone: Negative  / Bili: Negative / Urobili: 3 mg/dL   Blood:  / Protein: 30 mg/dL / Nitrite: Negative   Leuk Esterase: Negative / RBC: <5 /HPF / WBC <5 /HPF   Sq Epi:  / Non Sq Epi: FEW /HPF / Bacteria: Few      Osmolality, Random Urine: 825 mosm/kg ( @ 15:37)  Sodium, Random Urine: <20 mmol/L ( @ 15:37)  Sodium, Random Urine: <20 mmol/L ( @ 15:20)  Osmolality, Random Urine: 797 mosm/kg ( @ 15:20)  Osmolality, Random Urine: 774 mosm/kg ( @ 20:51)          LIVER FUNCTIONS - ( 18 Sep 2021 13:26 )  Alb: 2.4 g/dL / Pro: 5.4 g/dL / ALK PHOS: 159 U/L / ALT: 523 U/L / AST: 556 U/L / GGT: x             Care Discussed with Consultants/Other Providers:

## 2021-09-18 NOTE — CHART NOTE - NSCHARTNOTEFT_GEN_A_CORE
[facility] NIGHT MEDICINE COVERAGE.    Notified by RN, patient hard stick, and called to obtain blood work on a patient who was an unsuccessful attempt by staff and phlebotomy. Arterial stick successful on right radial artery. Labs drawn and labeled and sent to lab. Patient tolerated procedure well. Applied pressure to the site. No active bleeding / ecchymosis / erythema noted.     Nephrology, Dr. CARA Keenan notified of BMP results, Na now 126 from 124. As per recs, continue to monitor and f/u with AM Labs. As per chart review, noted pt was ordered for tolvaptan however then discontinued- pt never received medication. Clarified with nephrology, pt unable to receive d/t his liver failure. Will continue to monitor overnight.     Vital Signs Last 24 Hrs  T(C): 36.7 (18 Sep 2021 19:35), Max: 36.9 (18 Sep 2021 09:46)  T(F): 98 (18 Sep 2021 19:35), Max: 98.4 (18 Sep 2021 09:46)  HR: 87 (18 Sep 2021 19:35) (75 - 87)  BP: 108/69 (18 Sep 2021 19:35) (95/67 - 124/97)  RR: 19 (18 Sep 2021 19:35) (18 - 19)  SpO2: 95% (18 Sep 2021 19:35) (95% - 100%)    09-18    126<L>  |  92<L>  |  29<H>  ----------------------------<  201<H>  4.5   |  23  |  1.02    Ca    7.5<L>      18 Sep 2021 20:42  Phos  2.1     09-18  Mg     2.50     09-18    TPro  5.4<L>  /  Alb  2.4<L>  /  TBili  1.0  /  DBili  x   /  AST  556<H>  /  ALT  523<H>  /  AlkPhos  159<H>  09-18      Elise Pappas PA  Medicine [id number]

## 2021-09-18 NOTE — PROGRESS NOTE ADULT - ASSESSMENT
1. Hyponatremia- due to intravascular volume depletion, however, now pt with total body volume overload.  Given known HF, it is possible that EF has decreased. Repeat labs this evening.  Will likely give trial of higher dose of furosemide.  Possibly will give higher dose of boht IV albumin and furosemide.  Consider echocardiogram to evaluate current EF.    2. Edema is due to decrease oncotic pressure contributing to fluid migration from the intravascular space to the extravascular space.  This is from hypoalbuminemia and not likely due to HF.  Trial of IV albumin+ furosemide as above may help, though often does not.    3. Chronic systolic HF- Given hypotension agree with keeping off losartan and metoprolol. Consider echocardiogram.    4. Hypophosphatemia- okay to continue po phos repletion, but once sodium is stable, will consider IV repletion as well.    5. 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  Steroids per GI.  6. 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 as above.  7. Neutropenia on Neupogen    D/w primary team, GI, and daughter extensively

## 2021-09-18 NOTE — PROGRESS NOTE ADULT - SUBJECTIVE AND OBJECTIVE BOX
[facility] NEPHROLOGY- CONSULTATION NOTE    Patient is a 75y Male with CAD, MI, stent x2 (10/2009 at [facility]), pAfib, HTN, HLD, BIV-AICD, and systolic CHF, hx of PE (on Eliquis), recently dx’d pancreatic mass on chemo (gemcitabine/nab-paclitaxel and dual immunotherapy)at [facility] with several days diarrhea and several weeks decreased PO intake who presented to the hospital with neutropenic fever.  Pt with sodium 128, fluctuating, but decreased to 125 this am.      Pt reports +n/v/+diarrhea, poor po intake, overall malaise.   Hiccups resolved.    : I gave pt IVF for hyponatremia in the setting of intravascular volume depletion.  Most of it 3rd spaced and pt is now edematous.  Sodium improved slightly.  : Trial of IV Albumin + lasix, both low dose.  Pt has not made significant urine since.    REVIEW OF SYSTEMS:  CONSTITUTIONAL: + generalized weakness, no fevers, no chills, anorexia  EYES/ENT: No visual changes;  No vertigo or throat pain   NECK: No pain or stiffness  RESPIRATORY: No cough, wheezing, hemoptysis; No shortness of breath  CARDIOVASCULAR: No chest pain or palpitations.  GENITOURINARY: No dysuria, frequency, foamy urine, urinary urgency, incontinence or hematuria  NEUROLOGICAL: No numbness or weakness  SKIN: No itching, burning, rashes, or lesions   VASCULAR: No bilateral lower extremity edema.   All other review of systems is negative unless indicated above.    Vital Signs Last 24 Hrs  T(C): 36.7 (18 Sep 2021 19:35), Max: 36.9 (18 Sep 2021 09:46)  T(F): 98 (18 Sep 2021 19:35), Max: 98.4 (18 Sep 2021 09:46)  HR: 87 (18 Sep 2021 19:35) (75 - 87)  BP: 108/69 (18 Sep 2021 19:35) (95/67 - 124/97)  RR: 19 (18 Sep 2021 19:35) (18 - 19)  SpO2: 95% (18 Sep 2021 19:35) (95% - 100%)      PHYSICAL EXAM:  Constitutional: ill-appearing  HEENT: anicteric sclera, oropharynx clear, MMM  Neck: No JVD  Respiratory: CTAB, no wheezes, rales or rhonchi, +hiccups  Cardiovascular: S1, S2, RRR  Gastrointestinal: BS+, soft, NT/ND  Extremities: No cyanosis or clubbing. + 2+B LE pitting edema  Neurological: A/O x 3, no focal deficits  Psychiatric: Normal mood, normal affect  : No CVA tenderness. No vasquez.   Skin: No rashes      LABS:    124 <--, 122 <--, 125 <--, 129 <--, 125 <--, 128 <--  124<L>  |  91<L>  |  26<H>  ----------------------------<  205<H>  4.5   |  22  |  0.96    Ca    7.7<L>      18 Sep 2021 13:26  Phos  2.3       Mg     2.60         TPro  5.4<L>  /  Alb  2.4<L>  /  TBili  1.0  /  DBili      /  AST  556<H>  /  ALT  523<H>  /  AlkPhos  159<H>      Creatinine Trend: 0.96 <--, 0.98 <--, 0.93 <--, 0.91 <--, 1.10 <--, 1.14 <--                        9.1    0.12  )-----------( 100      ( 18 Sep 2021 13:26 )             26.5     Urine Studies:  Urinalysis Basic - ( 15 Sep 2021 03:16 )    Color: Jennifer / Appearance: Slightly Turbid / S.028 / pH:   Gluc:  / Ketone: Negative  / Bili: Negative / Urobili: 3 mg/dL   Blood:  / Protein: 30 mg/dL / Nitrite: Negative   Leuk Esterase: Negative / RBC: <5 /HPF / WBC <5 /HPF   Sq Epi:  / Non Sq Epi: FEW /HPF / Bacteria: Few      Osmolality, Random Urine: 825 mosm/kg ( @ 15:37)  Sodium, Random Urine: <20 mmol/L ( @ 15:37)  Sodium, Random Urine: <20 mmol/L ( @ 15:20)  Osmolality, Random Urine: 797 mosm/kg ( @ 15:20)  Osmolality, Random Urine: 774 mosm/kg ( @ 20:51)

## 2021-09-18 NOTE — PROGRESS NOTE ADULT - SUBJECTIVE AND OBJECTIVE BOX
Follow Up:  fever    Interval History/ROS:  feels better - had large liquid bowel movement early this am - eating lightly, denies pain    Allergies  No Known Allergies    ANTIMICROBIALS:  piperacillin/tazobactam IVPB.. 3.375 every 8 hours      OTHER MEDS:  MEDICATIONS  (STANDING):  acetaminophen   Tablet .. 650 every 6 hours PRN  aluminum hydroxide/magnesium hydroxide/simethicone Suspension 30 every 4 hours PRN  aspirin  chewable 81 daily  atorvastatin 80 at bedtime  baclofen 5 three times a day  calcium carbonate    500 mG (Tums) Chewable 1 three times a day PRN  diphenoxylate/atropine 1 daily  enoxaparin Injectable 70 two times a day  filgrastim-sndz (ZARXIO) Injectable 480 daily  finasteride 5 at bedtime  influenza   Vaccine 0.5 once  melatonin 3 at bedtime PRN  methylPREDNISolone sodium succinate Injectable 40 every 6 hours  ondansetron Injectable 4 every 8 hours PRN  pancrelipase  (CREON 24,000 Lipase Units) 1 four times a day with meals      Vital Signs Last 24 Hrs  T(C): 36.9 (18 Sep 2021 09:46), Max: 36.9 (18 Sep 2021 09:46)  T(F): 98.4 (18 Sep 2021 09:46), Max: 98.4 (18 Sep 2021 09:46)  HR: 78 (18 Sep 2021 09:46) (75 - 84)  BP: 117/76 (18 Sep 2021 09:46) (95/67 - 124/97)  BP(mean): --  RR: 18 (18 Sep 2021 09:46) (18 - 18)  SpO2: 99% (18 Sep 2021 09:46) (97% - 100%)    PHYSICAL EXAM:  General: NAD, Non-toxic  Neurology: A&Ox3, nonfocal  Respiratory: Clear to auscultation bilaterally  CV: RRR, S1S2, no murmurs, rubs or gallops  Abdominal: Soft, Non-tender, non-distended, normal bowel sounds  Extremities: marked bilateral LE and LUE edema  Line Sites: Clear  Skin: No rash                        9.8    0.23  )-----------( 112      ( 18 Sep 2021 01:41 )             28.4   WBC Count: 0.23 (09-18 @ 01:41)  WBC Count: 0.38 (09-17 @ 06:56)  WBC Count: 1.04 (09-16 @ 07:54)  WBC Count: 2.58 (09-15 @ 07:11)  WBC Count: 2.52 (09-14 @ 23:03)      09-18    122<L>  |  87<L>  |  26<H>  ----------------------------<  157<H>  4.5   |  23  |  0.98    Ca    8.1<L>      18 Sep 2021 01:41  Phos  2.4     09-18  Mg     2.40     09-18    TPro  5.6<L>  /  Alb  2.5<L>  /  TBili  1.2  /  DBili  x   /  AST  598<H>  /  ALT  477<H>  /  AlkPhos  167<H>  09-18      MICROBIOLOGY:  .Blood  09-16-21   No Blood Parasites observed by giemsa stain  One negative set of blood smears does not rule out  the possibility of a parasitic infection.  A minimum of 3  specimens should be collected, at least 12-24 hours apart,  over a 36 hour time period.  --  --      .Blood Blood  09-16-21   No Blood Parasites observed by giemsa stain  One negative set of blood smears does not rule out  the possibility of a parasitic infection.  A minimum of 3  specimens should be collected, at least 12-24 hours apart,  over a 36 hour time period.  --  --      .Stool Feces  09-15-21   No Protozoa seen by trichrome stain  No Helminths or Protozoa seen in formalin concentrate  performed by iodine stain  (routine O+P not evaluated for Microsporidia,  Cryptosporidia, Cyclospora, or Isospora.)  One negative sample does not necessarily rule  out the presence of a parasitic infection.  Moderate WBC's  Moderate Red blood cells  --  --      .Blood Blood-Peripheral  09-15-21   No growth to date.  --  --      Clean Catch Clean Catch (Midstream)  09-15-21   <10,000 CFU/mL Normal Urogenital Shelia  --  --      C Diff by PCR Result: Jann (09-16 @ 17:29)        RADIOLOGY:  9/15  Abd CT:  LIVER: Multiple bilobar hepatic metastases.. Representative lesions in segment 8 measures 2.6 x 2.0 cm and segment 6 measures 2.9 x 1.9 cm (4-25, 4-53).  Patent portal and hepatic veins.  BILE DUCTS: No intra or extrabiliary ductal dilatation.  GALLBLADDER: Cholelithiasis.  SPLEEN: Within normal limits.  PANCREAS: Pancreatic mass as described below.  Location: Body and proximal tail with extension to the serosal surface of the stomach and retroperitoneal extension..  Size: 5.9 x 4.8 x 4.5 cm.Series/Image: 4-41  Variant Hepatic Arterial Anatomy: None.  Arterial Involvement : Greater than 180 degrees encasement of the splenic artery and less than 180 degrees involvement of the common hepatic artery and celiac artery bifurcation.  Venous Involvement (<180, > 180, vessel deformity, vessel occlusion): Occlusion of the splenic vein and greater 180 degrees involvement of the splenic vein/portal vein confluence.    Benjamin Crockett MD; Division of Infectious Disease; Pager: [phone]; nights and weekends: [phone]

## 2021-09-18 NOTE — PROGRESS NOTE ADULT - ASSESSMENT
c diff is negative  start lomotil and pancreatic enzymes for diarrhea.   pt with widely metastatic panc ca.  [facility]

## 2021-09-18 NOTE — PROGRESS NOTE ADULT - ASSESSMENT
1. Metastatic Pancreatic CA    -- dx on 8/21/21  -- follows at [facility] [place]  -- Started treatment on 8/31/21 per protocol , randomized to gemcitabine/nab-paclitaxel and dual immmunotherapy. Received [place]/Abraxane on 9/14    2. Diarrhea    -- C diff negative  -- stool studies pending  -- may be sec to immunotx  -- appears to be improving w more formed stool  -- if infectious w/u neg and diarrhea persists may need to consider trial of steroids    3. Fevers    -- ID following  -- No documented fevers  -- off of PO Vanco    4. Abnormal LFTS    -- significant transaminitis, likely sec to immunotherapy  -- will speak to clinical trial staff at [place] today about starting steroids, prednisone 60mg    5. Neutropenia    -- cont Zarxio daily until ANC > 1500 x 2 consecutive days  -- pan culture and broad spectrum abx if T > 100.4    6. Hyponatremia    -- slightly improved  -- renal following. Likely intravascular depletion . Gentle hydration sec to HFrEF w AICD    Lucas Pisano MD   1. Metastatic Pancreatic CA    -- dx on 8/21/21  -- follows at [facility] [place]  -- Started treatment on 8/31/21 per protocol , randomized to gemcitabine/nab-paclitaxel and dual immmunotherapy. Received [place]/Abraxane on 9/14    2. Diarrhea    -- C diff negative  -- stool studies pending  -- may be sec to immunotx  -- appears to be improving w more formed stool  -- if infectious w/u neg and diarrhea persists may need to consider trial of steroids    3. Fevers    -- ID following  -- No documented fevers  -- off of PO Vanco    4. Abnormal LFTS    -- significant transaminitis, likely sec to immunotherapy vs chemo vs mets  -- I spoke to clinical trial staff [place] [facility] [place] about starting steroids. Recommend holding off for now    5. Neutropenia    -- cont Zarxio daily until ANC > 1500 x 2 consecutive days  -- pan culture and broad spectrum abx if T > 100.4    6. Hyponatremia    -- slightly improved  -- renal following. Likely intravascular depletion . Gentle hydration sec to HFrEF w SUSHMA Pisano MD

## 2021-09-18 NOTE — PROGRESS NOTE ADULT - SUBJECTIVE AND OBJECTIVE BOX
Patient is a 75y Male     Patient is a 75y old  Male who presents with a chief complaint of Fever (17 Sep 2021 17:59)      HPI:  74 yo M hx of CAD, MI, stent x2 (10/2009 at [facility]), pAfib, HTN, HLD, BIV-AICD, and systolic CHF, hx of PE (on Eliquis), recently dx’d pancreatic mass on chemo at [facility] [place] now presenting with fever of 101 at home. He also endorsed diarrhea x3-4 days, watery. 4-5x daily. No recent abx exposure. No abdominal pain. Denies chest pain, dyspnea, palpitations. Just finished chemo today. Called his onc who advised him to come in. No abx exposure.  In the ED, vitals stable. Labs with leukopenia without neutropenia. Elevated ASt/ALT, hyponatremia. EKG paced. CXR clear. (15 Sep 2021 01:36)      PAST MEDICAL & SURGICAL HISTORY:  Hypertension (ICD9 401.9)    Hyperlipidemia (ICD9 272.4)    BPH (Benign Prostatic Hyperplasia)    Borderline diabetes mellitus    MI (myocardial infarction)  2009    Systolic heart failure    Hernia    Biventricular ICD (implantable cardioverter-defibrillator) in place  2010    Stented coronary artery  X 2, 2009        MEDICATIONS  (STANDING):  aspirin  chewable 81 milliGRAM(s) Oral daily  atorvastatin 80 milliGRAM(s) Oral at bedtime  baclofen 5 milliGRAM(s) Oral three times a day  enoxaparin Injectable 70 milliGRAM(s) SubCutaneous two times a day  filgrastim-sndz (ZARXIO) Injectable 480 MICROGram(s) SubCutaneous daily  finasteride 5 milliGRAM(s) Oral at bedtime  influenza   Vaccine 0.5 milliLiter(s) IntraMuscular once  methylPREDNISolone sodium succinate Injectable 40 milliGRAM(s) IV Push every 6 hours  piperacillin/tazobactam IVPB.. 3.375 Gram(s) IV Intermittent every 8 hours  sodium chloride 0.9%. 1000 milliLiter(s) (100 mL/Hr) IV Continuous <Continuous>      Allergies    No Known Allergies    Intolerances        SOCIAL HISTORY:  Denies ETOh,Smoking,     FAMILY HISTORY:  No pertinent family history in first degree relatives        REVIEW OF SYSTEMS:    CONSTITUTIONAL: No weakness, fevers or chills  EYES/ENT: No visual changes;  No vertigo or throat pain   NECK: No pain or stiffness  RESPIRATORY: No cough, wheezing, hemoptysis; No shortness of breath  CARDIOVASCULAR: No chest pain or palpitations  GASTROINTESTINAL: No abdominal or epigastric pain. No nausea, vomiting, or hematemesis; No diarrhea or constipation. No melena or hematochezia.  GENITOURINARY: No dysuria, frequency or hematuria  NEUROLOGICAL: No numbness or weakness  SKIN: No itching, burning, rashes, or lesions   All other review of systems is negative unless indicated above.    VITAL:  T(C): , Max: 36.9 (09-18-21 @ 09:46)  T(F): , Max: 98.4 (09-18-21 @ 09:46)  HR: 78 (09-18-21 @ 09:46)  BP: 117/76 (09-18-21 @ 09:46)  BP(mean): --  RR: 18 (09-18-21 @ 09:46)  SpO2: 99% (09-18-21 @ 09:46)  Wt(kg): --    I and O's:    09-16 @ 07:01  -  09-17 @ 07:00  --------------------------------------------------------  IN: 175 mL / OUT: 125 mL / NET: 50 mL          PHYSICAL EXAM:    Constitutional: NAD  HEENT: PERRLA,   Neck: No JVD  Respiratory: CTA B/L  Cardiovascular: S1 and S2  Gastrointestinal: BS+, soft, NT/ND  Extremities: No peripheral edema  Neurological: A/O x 3, no focal deficits  Psychiatric: Normal mood, normal affect  : No Bowman  Skin: No rashes  Access: Not applicable  Back: No CVA tenderness    LABS:                        9.8    0.23  )-----------( 112      ( 18 Sep 2021 01:41 )             28.4     09-18    122<L>  |  87<L>  |  26<H>  ----------------------------<  157<H>  4.5   |  23  |  0.98    Ca    8.1<L>      18 Sep 2021 01:41  Phos  2.4     09-18  Mg     2.40     09-18    TPro  5.6<L>  /  Alb  2.5<L>  /  TBili  1.2  /  DBili  x   /  AST  598<H>  /  ALT  477<H>  /  AlkPhos  167<H>  09-18          RADIOLOGY & ADDITIONAL STUDIES:

## 2021-09-18 NOTE — PROGRESS NOTE ADULT - ASSESSMENT
74 yo M HTN, HLD, pancreatic mass, recent chemo, presents with diarrhea  No documented fever, has leukopenia  Chemo 1 day prior to presentation  CXR clear  USG abd with hepatic mets  Diarrhea 3-4 days prior to presentation, recent chemo (as well as chemo over past 2 weeks)  Uncertain infection C diff negative; neg FGI PCR  Ongoing diarrhea, now improved  Decreasing WBC - neutropenic    Overall,  1) Diarrhea  - No recent antibiotic exposure. Chemo related > Gastroenteritis, C diff ruled out  - Vanco 125mg po q 6  discontinued    2) Elevated LFTs  - Chemo related?  ? related to hepatic mets  - F/U Ehrlichia/Anaplasma PCR  - Trend to normal  3) Neutropenia/subjective fever  -  continue Zosyn empiric for now  - Follow up BCX, UCX  NGTD    managment of hyponatremia with evidence of fluid overload deferred to primary team

## 2021-09-19 NOTE — PROGRESS NOTE ADULT - ASSESSMENT
spoke with renal and heme onc yesterday  dr. del cid spoke with [facility]  risk of steroids seems to outweigh potential benefit.  diarrhea likely drug induced  conservative magnet  neutropenic fever

## 2021-09-19 NOTE — PROGRESS NOTE ADULT - SUBJECTIVE AND OBJECTIVE BOX
Follow Up:  neutropenia    Interval History/ROS:  nauseated, had emesis today    Allergies  No Known Allergies    ANTIMICROBIALS:  piperacillin/tazobactam IVPB.. 3.375 every 8 hours      OTHER MEDS:  MEDICATIONS  (STANDING):  acetaminophen   Tablet .. 650 every 6 hours PRN  aluminum hydroxide/magnesium hydroxide/simethicone Suspension 30 every 4 hours PRN  aspirin  chewable 81 daily  atorvastatin 80 at bedtime  baclofen 5 three times a day  calcium carbonate    500 mG (Tums) Chewable 1 three times a day PRN  diphenoxylate/atropine 1 daily  enoxaparin Injectable 70 two times a day  finasteride 5 at bedtime  influenza  Vaccine (HIGH DOSE) 0.7 once  melatonin 3 at bedtime PRN  ondansetron Injectable 4 every 8 hours PRN  pancrelipase  (CREON 24,000 Lipase Units) 1 four times a day with meals      Vital Signs Last 24 Hrs  T(C): 36.5 (19 Sep 2021 14:46), Max: 36.7 (18 Sep 2021 19:35)  T(F): 97.7 (19 Sep 2021 14:46), Max: 98 (18 Sep 2021 19:35)  HR: 90 (19 Sep 2021 14:46) (76 - 96)  BP: 123/76 (19 Sep 2021 14:46) (100/59 - 123/76)  BP(mean): --  RR: 18 (19 Sep 2021 14:46) (18 - 19)  SpO2: 96% (19 Sep 2021 14:46) (95% - 99%)    PHYSICAL EXAM:  General: fatigued,  Non-toxic  Neurology: A&Ox3, nonfocal  Respiratory: Clear to auscultation bilaterally  CV: RRR, S1S2, no murmurs, rubs or gallops  Abdominal: Soft, Non-tender, distended, normal bowel sounds  Extremities: 1+ edema,   Line Sites: Clear  Skin: No rash                        9.4    0.18  )-----------( 79       ( 19 Sep 2021 14:27 )             27.5     09-19    125<L>  |  89<L>  |  32<H>  ----------------------------<  214<H>  4.4   |  24  |  1.01    Ca    8.0<L>      19 Sep 2021 14:27  Phos  1.9     09-19  Mg     2.70     09-19    TPro  5.9<L>  /  Alb  2.7<L>  /  TBili  1.0  /  DBili  x   /  AST  610<H>  /  ALT  666<H>  /  AlkPhos  187<H>  09-19    MICROBIOLOGY:  .Stool Feces  09-18-21   No enteric pathogens to date: Final culture pending  No enteric gram negative rods isolated  --  --      .Blood  09-16-21   No Blood Parasites observed by giemsa stain  One negative set of blood smears does not rule out  the possibility of a parasitic infection.  A minimum of 3  specimens should be collected, at least 12-24 hours apart,  over a 36 hour time period.  --  --      .Blood Blood  09-16-21   No Blood Parasites observed by giemsa stain  One negative set of blood smears does not rule out  the possibility of a parasitic infection.  A minimum of 3  specimens should be collected, at least 12-24 hours apart,  over a 36 hour time period.  --  --      .Stool Feces  09-15-21   No Protozoa seen by trichrome stain  No Helminths or Protozoa seen in formalin concentrate  performed by iodine stain  (routine O+P not evaluated for Microsporidia,  Cryptosporidia, Cyclospora, or Isospora.)  One negative sample does not necessarily rule  out the presence of a parasitic infection.  Moderate WBC's  Moderate Red blood cells  --  --      .Blood Blood-Peripheral  09-15-21   No growth to date.  --  --      Clean Catch Clean Catch (Midstream)  09-15-21   <10,000 CFU/mL Normal Urogenital Shelia  --  --    GI PCR Panel, Stool (09.15.21 @ 21:16)   Specimen Source: .Stool Feces GI PCR Results: NOT detected   C Diff by PCR Result: NotDetec (09-16 @ 17:29)    C Diff by PCR Result: NotDetec (09-16-21 @ 17:29)      RADIOLOGY:  < from: US Abdomen Doppler (09.15.21 @ 10:20) >  No portal vein thrombosis.    Redemonstrated hepatic metastases with mild perihepatic ascites.    < end of copied text >      Benjamin Crockett MD; Division of Infectious Disease; Pager: [phone]; nights and weekends: [phone]

## 2021-09-19 NOTE — PROGRESS NOTE ADULT - SUBJECTIVE AND OBJECTIVE BOX
SUBJECTIVE / OVERNIGHT EVENTS:pt seen and examined    MEDICATIONS  (STANDING):  aspirin  chewable 81 milliGRAM(s) Oral daily  atorvastatin 80 milliGRAM(s) Oral at bedtime  baclofen 5 milliGRAM(s) Oral three times a day  diphenoxylate/atropine 1 Tablet(s) Oral daily  enoxaparin Injectable 70 milliGRAM(s) SubCutaneous two times a day  finasteride 5 milliGRAM(s) Oral at bedtime  influenza  Vaccine (HIGH DOSE) 0.7 milliLiter(s) IntraMuscular once  MIRACLE MOUTHWASH 30 milliLiter(s) Swish and Spit every 6 hours  pancrelipase  (CREON 24,000 Lipase Units) 1 Capsule(s) Oral four times a day with meals  piperacillin/tazobactam IVPB.. 3.375 Gram(s) IV Intermittent every 8 hours    MEDICATIONS  (PRN):  acetaminophen   Tablet .. 650 milliGRAM(s) Oral every 6 hours PRN Temp greater or equal to 38.5C (101.3F), Mild Pain (1 - 3)  aluminum hydroxide/magnesium hydroxide/simethicone Suspension 30 milliLiter(s) Oral every 4 hours PRN Dyspepsia  calcium carbonate    500 mG (Tums) Chewable 1 Tablet(s) Chew three times a day PRN Dyspepsia  melatonin 3 milliGRAM(s) Oral at bedtime PRN Insomnia  ondansetron Injectable 4 milliGRAM(s) IV Push every 8 hours PRN Nausea and/or Vomiting    Vital Signs Last 24 Hrs  T(C): 36.3 (21 @ 21:33), Max: 37.2 (21 @ 17:48)  T(F): 97.3 (21 @ 21:33), Max: 98.9 (21 @ 17:48)  HR: 104 (21 @ 21:33) (89 - 104)  BP: 104/71 (21 @ 21:33) (100/59 - 123/76)  BP(mean): --  RR: 18 (21 @ 21:33) (18 - 18)  SpO2: 100% (21 @ 21:33) (95% - 100%)        Constitutional: No fever, fatigue  Skin: No rash.  Eyes: No recent vision problems or eye pain.  ENT: No congestion, ear pain, or sore throat.  Cardiovascular: No chest pain or palpation.  Respiratory: No cough, shortness of breath, congestion, or wheezing.  Gastrointestinal: No abdominal pain, nausea, vomiting, or diarrhea.  Genitourinary: No dysuria.  Musculoskeletal: No joint swelling.  Neurologic: No headache.    PHYSICAL EXAM:  GENERAL: NAD  EYES: EOMI, PERRLA  NECK: Supple, No JVD  CHEST/LUNG: dec breath sounds at bases  HEART:  S1 , S2 +  ABDOMEN: soft , bs+  EXTREMITIES:no edema    NEUROLOGY:alert awake    LABS:      125<L>  |  89<L>  |  32<H>  ----------------------------<  214<H>  4.4   |  24  |  1.01    Ca    8.0<L>      19 Sep 2021 14:27  Phos  1.9       Mg     2.70         TPro  5.9<L>  /  Alb  2.7<L>  /  TBili  1.0  /  DBili      /  AST  610<H>  /  ALT  666<H>  /  AlkPhos  187<H>      Creatinine Trend: 1.01 <--, 1.02 <--, 0.96 <--, 0.98 <--, 0.93 <--, 0.91 <--, 1.10 <--, 1.14 <--                        9.4    0.18  )-----------( 79       ( 19 Sep 2021 14:27 )             27.5     Urine Studies:  Urinalysis Basic - ( 15 Sep 2021 03:16 )    Color: Jennifer / Appearance: Slightly Turbid / S.028 / pH:   Gluc:  / Ketone: Negative  / Bili: Negative / Urobili: 3 mg/dL   Blood:  / Protein: 30 mg/dL / Nitrite: Negative   Leuk Esterase: Negative / RBC: <5 /HPF / WBC <5 /HPF   Sq Epi:  / Non Sq Epi: FEW /HPF / Bacteria: Few      Osmolality, Random Urine: 825 mosm/kg ( @ 15:37)  Sodium, Random Urine: <20 mmol/L ( @ 15:37)  Sodium, Random Urine: <20 mmol/L ( @ 15:20)  Osmolality, Random Urine: 797 mosm/kg ( @ 15:20)  Osmolality, Random Urine: 774 mosm/kg ( @ 20:51)          LIVER FUNCTIONS - ( 19 Sep 2021 14:27 )  Alb: 2.7 g/dL / Pro: 5.9 g/dL / ALK PHOS: 187 U/L / ALT: 666 U/L / AST: 610 U/L / GGT: x             Color: Jennifer / Appearance: Slightly Turbid / S.028 / pH:   Gluc:  / Ketone: Negative  / Bili: Negative / Urobili: 3 mg/dL   Blood:  / Protein: 30 mg/dL / Nitrite: Negative   Leuk Esterase: Negative / RBC: <5 /HPF / WBC <5 /HPF   Sq Epi:  / Non Sq Epi: FEW /HPF / Bacteria: Few      Osmolality, Random Urine: 825 mosm/kg ( @ 15:37)  Sodium, Random Urine: <20 mmol/L ( @ 15:37)  Sodium, Random Urine: <20 mmol/L ( @ 15:20)  Osmolality, Random Urine: 797 mosm/kg ( @ 15:20)  Osmolality, Random Urine: 774 mosm/kg ( @ 20:51)          LIVER FUNCTIONS - ( 18 Sep 2021 13:26 )  Alb: 2.4 g/dL / Pro: 5.4 g/dL / ALK PHOS: 159 U/L / ALT: 523 U/L / AST: 556 U/L / GGT: x             Care Discussed with Consultants/Other Providers:

## 2021-09-19 NOTE — PROGRESS NOTE ADULT - ASSESSMENT
76 yo M hx of CAD, MI, stent x2 (10/2009 at [facility]), pAfib, HTN, HLD, BIV-AICD, and systolic CHF, hx of PE (on Eliquis), recently dx’d pancreatic mass on chemo at [facility] [place] now presenting with fever of 101 at home and several days of watery diarrhea concerning for C diff colitis.

## 2021-09-19 NOTE — PROGRESS NOTE ADULT - SUBJECTIVE AND OBJECTIVE BOX
[facility] [place] NEPHROLOGY- PROGRESS NOTE    Patient is a 75y Male with CAD, MI, stent x2 (10/2009 at [facility]), pAfib, HTN, HLD, BIV-AICD, and systolic CHF, hx of PE (on Eliquis), recently dx’d pancreatic mass on chemo (gemcitabine/nab-paclitaxel and dual immunotherapy)at [facility] [place] with several days diarrhea and several weeks decreased PO intake who presented to the hospital with neutropenic fever.  Pt with sodium 128, fluctuating, but decreased to 125 this am.      Pt reports +n/v ongoing, diarrhea has resolved.  Poor po intake still, overall malaise. Hiccups resolved.      : I gave pt IVF for hyponatremia in the setting of intravascular volume depletion.  Most of it 3rd spaced and pt is now edematous.  Sodium improved slightly.  : Trial of IV Albumin + lasix, both low dose.  Pt has not made significant urine since.  : Cardiology saw pt, agrees that overall intravascularly euvolemic.  Another small trial of albumin given per cardiology recommendations.    REVIEW OF SYSTEMS: + generalized weakness, +n/v, no diarrhea, +LE edema. NO CP, SOB, dysuria.    VITALS:  T(F): 98.9 (21 @ 17:48), Max: 98.9 (21 @ 17:48)  HR: 101 (21 @ 17:48)  BP: 107/73 (21 @ 17:48)  RR: 18 (21 @ 17:48)  SpO2: 98% (21 @ 17:48)  Wt(kg): --     @ 07:01  -   @ 07:00  --------------------------------------------------------  IN: 150 mL / OUT: 501 mL / NET: -351 mL     @ 07:01  -   @ 19:44  --------------------------------------------------------  IN: 0 mL / OUT: 0 mL / NET: 0 mL      PHYSICAL EXAM:  Constitutional: ill-appearing  HEENT: anicteric sclera, oropharynx clear, MMM  Neck: No JVD  Respiratory: CTAB, no wheezes, rales or rhonchi, +hiccups  Cardiovascular: S1, S2, RRR  Gastrointestinal: BS+, soft, NT/ND  Extremities: No cyanosis or clubbing. severe B LE pitting edema  Neurological: A/O x 3, no focal deficits  Psychiatric: Normal mood, normal affect  : No CVA tenderness. No vasquez.       LABS:      125<L>  |  89<L>  |  32<H>  ----------------------------<  214<H>  4.4   |  24  |  1.01    Ca    8.0<L>      19 Sep 2021 14:27  Phos  1.9       Mg     2.70         TPro  5.9<L>  /  Alb  2.7<L>  /  TBili  1.0  /  DBili      /  AST  610<H>  /  ALT  666<H>  /  AlkPhos  187<H>      Creatinine Trend: 1.01 <--, 1.02 <--, 0.96 <--, 0.98 <--, 0.93 <--, 0.91 <--, 1.10 <--, 1.14 <--                        9.4    0.18  )-----------( 79       ( 19 Sep 2021 14:27 )             27.5     Urine Studies:  Urinalysis Basic - ( 15 Sep 2021 03:16 )    Color: Jennifer / Appearance: Slightly Turbid / S.028 / pH:   Gluc:  / Ketone: Negative  / Bili: Negative / Urobili: 3 mg/dL   Blood:  / Protein: 30 mg/dL / Nitrite: Negative   Leuk Esterase: Negative / RBC: <5 /HPF / WBC <5 /HPF   Sq Epi:  / Non Sq Epi: FEW /HPF / Bacteria: Few      Osmolality, Random Urine: 825 mosm/kg ( @ 15:37)  Sodium, Random Urine: <20 mmol/L (09-18 @ 15:37)  Sodium, Random Urine: <20 mmol/L ( @ 15:20)  Osmolality, Random Urine: 797 mosm/kg ( @ 15:20)  Osmolality, Random Urine: 774 mosm/kg ( @ 20:51)

## 2021-09-19 NOTE — PROGRESS NOTE ADULT - ASSESSMENT
1. Hyponatremia- due to intravascular volume depletion, however, now pt with total body volume overload.  Given known HF, it is possible that EF has decreased.  Possibly will give higher dose of IV albumin and furosemide tomorrow.  Consider echocardiogram to evaluate current EF.    2. Edema is due to decrease oncotic pressure contributing to fluid migration from the intravascular space to the extravascular space.  This is from hypoalbuminemia and not likely due to HF.  Trial of IV albumin+ furosemide as above may help, though often is not.    3. Chronic systolic HF- Given hypotension agree with keeping off losartan and metoprolol. Consider echocardiogram.    4. Hypophosphatemia- Will replete IV tonight.    5. 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  Steroids per GI.  6. 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 as above.  7. Neutropenia on Neupogen    D/w primary team, GI, and daughter extensively

## 2021-09-19 NOTE — PROGRESS NOTE ADULT - SUBJECTIVE AND OBJECTIVE BOX
Patient is a 75y Male     Patient is a 75y old  Male who presents with a chief complaint of Fever (19 Sep 2021 10:29)      HPI:  76 yo M hx of CAD, MI, stent x2 (10/2009 at [facility]), pAfib, HTN, HLD, BIV-AICD, and systolic CHF, hx of PE (on Eliquis), recently dx’d pancreatic mass on chemo at [facility] [place] now presenting with fever of 101 at home. He also endorsed diarrhea x3-4 days, watery. 4-5x daily. No recent abx exposure. No abdominal pain. Denies chest pain, dyspnea, palpitations. Just finished chemo today. Called his onc who advised him to come in. No abx exposure.  In the ED, vitals stable. Labs with leukopenia without neutropenia. Elevated ASt/ALT, hyponatremia. EKG paced. CXR clear. (15 Sep 2021 01:36)      PAST MEDICAL & SURGICAL HISTORY:  Hypertension (ICD9 401.9)    Hyperlipidemia (ICD9 272.4)    BPH (Benign Prostatic Hyperplasia)    Borderline diabetes mellitus    MI (myocardial infarction)  2009    Systolic heart failure    Hernia    Biventricular ICD (implantable cardioverter-defibrillator) in place  2010    Stented coronary artery  X 2, 2009        MEDICATIONS  (STANDING):  albumin human 25% IVPB 50 milliLiter(s) IV Intermittent once  aspirin  chewable 81 milliGRAM(s) Oral daily  atorvastatin 80 milliGRAM(s) Oral at bedtime  baclofen 5 milliGRAM(s) Oral three times a day  diphenoxylate/atropine 1 Tablet(s) Oral daily  enoxaparin Injectable 70 milliGRAM(s) SubCutaneous two times a day  filgrastim-sndz (ZARXIO) Injectable 480 MICROGram(s) SubCutaneous daily  finasteride 5 milliGRAM(s) Oral at bedtime  influenza  Vaccine (HIGH DOSE) 0.7 milliLiter(s) IntraMuscular once  pancrelipase  (CREON 24,000 Lipase Units) 1 Capsule(s) Oral four times a day with meals  piperacillin/tazobactam IVPB.. 3.375 Gram(s) IV Intermittent every 8 hours  sodium chloride 0.9%. 1000 milliLiter(s) (100 mL/Hr) IV Continuous <Continuous>      Allergies    No Known Allergies    Intolerances        SOCIAL HISTORY:  Denies ETOh,Smoking,     FAMILY HISTORY:  No pertinent family history in first degree relatives        REVIEW OF SYSTEMS:    CONSTITUTIONAL: No weakness, fevers or chills  EYES/ENT: No visual changes;  No vertigo or throat pain   NECK: No pain or stiffness  RESPIRATORY: No cough, wheezing, hemoptysis; No shortness of breath  CARDIOVASCULAR: No chest pain or palpitations  GASTROINTESTINAL: No abdominal or epigastric pain. No nausea, vomiting, or hematemesis; No diarrhea or constipation. No melena or hematochezia.  GENITOURINARY: No dysuria, frequency or hematuria  NEUROLOGICAL: No numbness or weakness  SKIN: No itching, burning, rashes, or lesions   All other review of systems is negative unless indicated above.    VITAL:  T(C): , Max: 36.8 (09-18-21 @ 15:13)  T(F): , Max: 98.2 (09-18-21 @ 15:13)  HR: 96 (09-19-21 @ 10:20)  BP: 100/59 (09-19-21 @ 10:20)  BP(mean): --  RR: 18 (09-19-21 @ 10:20)  SpO2: 95% (09-19-21 @ 10:20)  Wt(kg): --    I and O's:    09-18 @ 07:01  -  09-19 @ 07:00  --------------------------------------------------------  IN: 150 mL / OUT: 501 mL / NET: -351 mL    09-19 @ 07:01  -  09-19 @ 10:49  --------------------------------------------------------  IN: 0 mL / OUT: 0 mL / NET: 0 mL          PHYSICAL EXAM:    Constitutional: NAD  HEENT: PERRLA,   Neck: No JVD  Respiratory: CTA B/L  Cardiovascular: S1 and S2  Gastrointestinal: BS+, soft, NT/ND  Extremities: No peripheral edema  Neurological: A/O x 3, no focal deficits  Psychiatric: Normal mood, normal affect  : No Bowman  Skin: No rashes  Access: Not applicable  Back: No CVA tenderness    LABS:                        9.1    0.12  )-----------( 100      ( 18 Sep 2021 13:26 )             26.5     09-18    126<L>  |  92<L>  |  29<H>  ----------------------------<  201<H>  4.5   |  23  |  1.02    Ca    7.5<L>      18 Sep 2021 20:42  Phos  2.1     09-18  Mg     2.50     09-18    TPro  5.4<L>  /  Alb  2.4<L>  /  TBili  1.0  /  DBili  x   /  AST  556<H>  /  ALT  523<H>  /  AlkPhos  159<H>  09-18          RADIOLOGY & ADDITIONAL STUDIES:

## 2021-09-19 NOTE — PROGRESS NOTE ADULT - SUBJECTIVE AND OBJECTIVE BOX
DATE OF SERVICE:  09/19/2021  Patient was seen and examined on 09/19/2021    .Interim events noted.Consultant notes ,Labs,Telemetry reviewed by me    PRESENTING CC: Fever    HPI and HOSPITAL COURSE: HPI:  76 yo M hx of CAD, MI, stent x2 (10/2009 at [facility]), pAfib, HTN, HLD, BIV-AICD, and systolic CHF, hx of PE (on Eliquis), recently dx’d pancreatic mass on chemo at [facility] now presenting with fever of 101 at home. He also endorsed diarrhea x3-4 days, watery. 4-5x daily. No recent abx exposure. No abdominal pain. Denies chest pain, dyspnea, palpitations. Just finished chemo today. Called his onc who advised him to come in. No abx exposure.  In the ED, vitals stable. Labs with leukopenia without neutropenia. Elevated ASt/ALT, hyponatremia. EKG paced. CXR clear.    INTERIM EVENTS:Awake alert still has diarrhea no nausea vomitting      PMH -reviewed admission note, no change since admission  Heart Failure: Acute [ ] Chronic [x ] Acute on Chronic [ ] Diastolic [ ] Systolic [xx ] Combined Systolic and Diastolic[ ]  GLEN[ ]  ATN[ ]  CKD I [ ] CKDII [ ] CKD III [ ] CKD IV [ ] CKD V [ ] ESRD[ ]  HTN[ ] CVA[ ] DM[ ] COPD[ ] COVID[ ] AF[x ]  PPM[ ] ICD[x ]    MEDICATIONS  (STANDING):  aspirin  chewable 81 milliGRAM(s) Oral daily  atorvastatin 80 milliGRAM(s) Oral at bedtime  baclofen 5 milliGRAM(s) Oral three times a day  diphenoxylate/atropine 1 Tablet(s) Oral daily  enoxaparin Injectable 70 milliGRAM(s) SubCutaneous two times a day  filgrastim-sndz (ZARXIO) Injectable 480 MICROGram(s) SubCutaneous daily  finasteride 5 milliGRAM(s) Oral at bedtime  influenza  Vaccine (HIGH DOSE) 0.7 milliLiter(s) IntraMuscular once  pancrelipase  (CREON 24,000 Lipase Units) 1 Capsule(s) Oral four times a day with meals  piperacillin/tazobactam IVPB.. 3.375 Gram(s) IV Intermittent every 8 hours  sodium chloride 0.9%. 1000 milliLiter(s) (100 mL/Hr) IV Continuous <Continuous>    MEDICATIONS  (PRN):  acetaminophen   Tablet .. 650 milliGRAM(s) Oral every 6 hours PRN Temp greater or equal to 38.5C (101.3F), Mild Pain (1 - 3)  aluminum hydroxide/magnesium hydroxide/simethicone Suspension 30 milliLiter(s) Oral every 4 hours PRN Dyspepsia  calcium carbonate    500 mG (Tums) Chewable 1 Tablet(s) Chew three times a day PRN Dyspepsia  melatonin 3 milliGRAM(s) Oral at bedtime PRN Insomnia  ondansetron Injectable 4 milliGRAM(s) IV Push every 8 hours PRN Nausea and/or Vomiting            REVIEW OF SYSTEMS:  Constitutional: [ ] fever, [ ]weight loss,  [xx ]fatigue  Eyes: [ ] visual changes  Respiratory: [ ]shortness of breath;  [ ] cough, [ ]wheezing, [ ]chills, [ ]hemoptysis  Cardiovascular: [ ] chest pain, [ ]palpitations, [ ]dizziness,  [ ]leg swelling[ ]orthopnea[ ]PND  Gastrointestinal: [x ] abdominal pain, [ ]nausea, [ ]vomiting,  [x ]diarrhea [ ]Constipation [ ]Melena  Genitourinary: [ ] dysuria, [ ] hematuria [ ]Bowman  Neurologic: [ ] headaches [ ] tremors[ ]weakness [ ]Paralysis Right[ ] Left[ ]  Skin: [ ] itching, [ ]burning, [ ] rashes  Endocrine: [ ] heat or cold intolerance  Musculoskeletal: [ ] joint pain or swelling; [ ] muscle, back, or extremity pain  Psychiatric: [ ] depression, [ ]anxiety, [ ]mood swings, or [ ]difficulty sleeping  Hematologic: [ ] easy bruising, [ ] bleeding gums    [x] All remaining systems negative except as per above.   [ ]Unable to obtain.    Vital Signs Last 24 Hrs  T(C): 36.6 (19 Sep 2021 05:21), Max: 36.8 (18 Sep 2021 15:13)  T(F): 97.9 (19 Sep 2021 05:21), Max: 98.2 (18 Sep 2021 15:13)  HR: 89 (19 Sep 2021 05:21) (76 - 90)  BP: 108/69 (19 Sep 2021 05:21) (98/55 - 122/76)  RR: 18 (19 Sep 2021 05:21) (18 - 19)  SpO2: 98% (19 Sep 2021 05:21) (95% - 99%)  I&O's Summary    18 Sep 2021 07:01  -  19 Sep 2021 07:00  --------------------------------------------------------  IN: 150 mL / OUT: 501 mL / NET: -351 mL        PHYSICAL EXAM:  General: No acute distress BMI-29  HEENT: EOMI, PERRL  Neck: Supple, [ ] JVD  Lungs: Equal air entry bilaterally; [ ] rales [ ] wheezing [ ] rhonchi  Heart: Regular rate and rhythm; [x ] murmur   2/6 [x ] systolic [ ] diastolic [ ] radiation[ ] rubs [ ]  gallops  Abdomen: Nontender, bowel sounds present  Extremities: No clubbing, cyanosis, [ ] edema [ ]Pulses  equal and intact  Nervous system:  Alert & Oriented X3, no focal deficits  Psychiatric: Normal affect  Skin: No rashes or lesions    LABS:  09-18    126<L>  |  92<L>  |  29<H>  ----------------------------<  201<H>  4.5   |  23  |  1.02    Ca    7.5<L>      18 Sep 2021 20:42  Phos  2.1     09-18  Mg     2.50     09-18    TPro  5.4<L>  /  Alb  2.4<L>  /  TBili  1.0  /  DBili  x   /  AST  556<H>  /  ALT  523<H>  /  AlkPhos  159<H>  09-18    Creatinine Trend: 1.02<--, 0.96<--, 0.98<--, 0.93<--, 0.91<--, 1.10<--                        9.1    0.12  )-----------( 100      ( 18 Sep 2021 13:26 )             26.5       IMPRESSION AND PLAN:      76 yo M hx of CAD, MI, stent x2 (10/2009 at [facility]), pAfib, HTN, HLD, BIV-AICD, and systolic CHF, hx of PE (on Eliquis), recently dx’d pancreatic mass on chemo at [facility] now presenting with fever of 101 at home and several days of watery diarrhea concerning for C diff colitis.    Problem/Plan - 1:  ·  Problem: Acute sepsis.   ·  Plan: Likely source from GI: C diff vs colitis  stool c diff neg  cont abx as per ID.    Problem/Plan - 2:  ·  Problem: Elevated transaminase level.   ·  Plan: Likely related to sepsis vs underlying pancreatic cancer  -Acute viral panels sent  -RUQ for portal vein thrombosis and hepatitis.    Problem/Plan - 3:  ·  Problem: Chronic systolic congestive heart failure.   ·  Plan: Observe. Be cautious with fluid status. Currently euvolemic.    Problem/Plan - 4:  ·  Problem: Acute diarrhea.   ·  Plan: Could be leading to hyponatremia. C diff treatment for now until C diff comes back.    Problem/Plan - 5:  ·  Problem: Acute anemia.   ·  Plan: 3 pt drop from Aug. Will need to check outpatient record. No signs of GI bleed  -Get Occult fecal blood  -Transition to lovenox BID for now and if HGB stabilizes can restart eliquis  -Iron panels sent.    Problem/Plan - 6:  ·  Problem: Metastasis from pancreatic cancer.   ·  Plan: -Need outpatient follow up vs GOC discussion.    Problem/Plan - 7:  ·  Problem: CAD (coronary artery disease).   ·  Plan: Stable. Continue ASA for now.    Problem/Plan - 8:  ·  Problem: H/O pulmonary thrombosis.   ·  Plan: -Transition to lovenox BID for now and if HGB stabilizes can restart eliquis.    Problem/Plan - 9:  ·  Problem: Need for prophylactic measure.    DATE OF SERVICE:  09/19/2021  Patient was seen and examined on 09/19/2021    .Interim events noted.Consultant notes ,Labs,Telemetry reviewed by me    PRESENTING CC: Fever    HPI and HOSPITAL COURSE: HPI:  74 yo M hx of CAD, MI, stent x2 (10/2009 at [facility]), pAfib, HTN, HLD, BIV-AICD, and systolic CHF, hx of PE (on Eliquis), recently dx’d pancreatic mass on chemo at [facility] [place] now presenting with fever of 101 at home. He also endorsed diarrhea x3-4 days, watery. 4-5x daily. No recent abx exposure. No abdominal pain. Denies chest pain, dyspnea, palpitations. Just finished chemo today. Called his onc who advised him to come in. No abx exposure.  In the ED, vitals stable. Labs with leukopenia without neutropenia. Elevated ASt/ALT, hyponatremia. EKG paced. CXR clear.    INTERIM EVENTS:Awake alert still has diarrhea no nausea vomitting voiding remains Hyponatremic       PMH -reviewed admission note, no change since admission  Heart Failure: Acute [ ] Chronic [x ] Acute on Chronic [ ] Diastolic [ ] Systolic [xx ] Combined Systolic and Diastolic[ ]  GLEN[ ]  ATN[ ]  CKD I [ ] CKDII [ ] CKD III [ ] CKD IV [ ] CKD V [ ] ESRD[ ]  HTN[ ] CVA[ ] DM[ ] COPD[ ] COVID[ ] AF[x ]  PPM[ ] ICD[x ]    MEDICATIONS  (STANDING):  aspirin  chewable 81 milliGRAM(s) Oral daily  atorvastatin 80 milliGRAM(s) Oral at bedtime  baclofen 5 milliGRAM(s) Oral three times a day  diphenoxylate/atropine 1 Tablet(s) Oral daily  enoxaparin Injectable 70 milliGRAM(s) SubCutaneous two times a day  filgrastim-sndz (ZARXIO) Injectable 480 MICROGram(s) SubCutaneous daily  finasteride 5 milliGRAM(s) Oral at bedtime  influenza  Vaccine (HIGH DOSE) 0.7 milliLiter(s) IntraMuscular once  pancrelipase  (CREON 24,000 Lipase Units) 1 Capsule(s) Oral four times a day with meals  piperacillin/tazobactam IVPB.. 3.375 Gram(s) IV Intermittent every 8 hours  sodium chloride 0.9%. 1000 milliLiter(s) (100 mL/Hr) IV Continuous <Continuous>    MEDICATIONS  (PRN):  acetaminophen   Tablet .. 650 milliGRAM(s) Oral every 6 hours PRN Temp greater or equal to 38.5C (101.3F), Mild Pain (1 - 3)  aluminum hydroxide/magnesium hydroxide/simethicone Suspension 30 milliLiter(s) Oral every 4 hours PRN Dyspepsia  calcium carbonate    500 mG (Tums) Chewable 1 Tablet(s) Chew three times a day PRN Dyspepsia  melatonin 3 milliGRAM(s) Oral at bedtime PRN Insomnia  ondansetron Injectable 4 milliGRAM(s) IV Push every 8 hours PRN Nausea and/or Vomiting            REVIEW OF SYSTEMS:  Constitutional: [ ] fever, [ ]weight loss,  [xx ]fatigue  Eyes: [ ] visual changes  Respiratory: [ ]shortness of breath;  [ ] cough, [ ]wheezing, [ ]chills, [ ]hemoptysis  Cardiovascular: [ ] chest pain, [ ]palpitations, [ ]dizziness,  [ ]leg swelling[ ]orthopnea[ ]PND  Gastrointestinal: [x ] abdominal pain, [ ]nausea, [ ]vomiting,  [x ]diarrhea [ ]Constipation [ ]Melena  Genitourinary: [ ] dysuria, [ ] hematuria [ ]Bowman  Neurologic: [ ] headaches [ ] tremors[ ]weakness [ ]Paralysis Right[ ] Left[ ]  Skin: [ ] itching, [ ]burning, [ ] rashes  Endocrine: [ ] heat or cold intolerance  Musculoskeletal: [ ] joint pain or swelling; [ ] muscle, back, or extremity pain  Psychiatric: [ ] depression, [ ]anxiety, [ ]mood swings, or [ ]difficulty sleeping  Hematologic: [ ] easy bruising, [ ] bleeding gums    [x] All remaining systems negative except as per above.   [ ]Unable to obtain.    Vital Signs Last 24 Hrs  T(C): 36.6 (19 Sep 2021 05:21), Max: 36.8 (18 Sep 2021 15:13)  T(F): 97.9 (19 Sep 2021 05:21), Max: 98.2 (18 Sep 2021 15:13)  HR: 89 (19 Sep 2021 05:21) (76 - 90)  BP: 108/69 (19 Sep 2021 05:21) (98/55 - 122/76)  RR: 18 (19 Sep 2021 05:21) (18 - 19)  SpO2: 98% (19 Sep 2021 05:21) (95% - 99%)  I&O's Summary    18 Sep 2021 07:01  -  19 Sep 2021 07:00  --------------------------------------------------------  IN: 150 mL / OUT: 501 mL / NET: -351 mL        PHYSICAL EXAM:  General: No acute distress BMI-29  HEENT: EOMI, PERRL  Neck: Supple, [ ] JVD  Lungs: Equal air entry bilaterally; [ ] rales [ ] wheezing [ ] rhonchi  Heart: Regular rate and rhythm; [x ] murmur   2/6 [x ] systolic [ ] diastolic [ ] radiation[ ] rubs [ ]  gallops  Abdomen: Nontender, bowel sounds present  Extremities: No clubbing, cyanosis, [ ] edema [ ]Pulses  equal and intact  Nervous system:  Alert & Oriented X3, no focal deficits  Psychiatric: Normal affect  Skin: No rashes or lesions    LABS:  09-18    126<L>  |  92<L>  |  29<H>  ----------------------------<  201<H>  4.5   |  23  |  1.02    Ca    7.5<L>      18 Sep 2021 20:42  Phos  2.1     09-18  Mg     2.50     09-18    TPro  5.4<L>  /  Alb  2.4<L>  /  TBili  1.0  /  DBili  x   /  AST  556<H>  /  ALT  523<H>  /  AlkPhos  159<H>  09-18    Creatinine Trend: 1.02<--, 0.96<--, 0.98<--, 0.93<--, 0.91<--, 1.10<--                        9.1    0.12  )-----------( 100      ( 18 Sep 2021 13:26 )             26.5       IMPRESSION AND PLAN:      74 yo M hx of CAD, MI, stent x2 (10/2009 at [facility]), pAfib, HTN, HLD, BIV-AICD, and systolic CHF, hx of PE (on Eliquis), recently dx’d pancreatic mass on chemo at [facility] now presenting with fever of 101 at home and several days of watery diarrhea concerning for C diff colitis.    Problem/Plan - 1:  ·  Problem: Acute sepsis.   ·  Plan: Likely source from GI: C diff vs colitis  stool c diff neg  cont abx as per ID.    Problem/Plan - 2:  ·  Problem: Elevated transaminase level.   ·  Plan: Likely related to sepsis vs underlying pancreatic cancer  -Acute viral panels sent  -RUQ for portal vein thrombosis and hepatitis.    Problem/Plan - 3:  ·  Problem: Chronic systolic congestive heart failure.   ·  Plan: Observe.. Currently euvolemic.    Problem/Plan - 4:  ·  Problem: Acute diarrhea with hyponatremia  ·  Plan: Stool cultures and C Diff negative  ? Diarrhea 2/2 leukopenia  Try albumin gtt in view of third spacing    Problem/Plan - 5:  ·  Problem: Acute anemia.   ·  Plan: 3 pt drop from Aug. Will need to check outpatient record. No signs of GI bleed  -Get Occult fecal blood  -Transition to lovenox BID for now and if HGB stabilizes can restart eliquis  -Iron panels sent.    Problem/Plan - 6:  ·  Problem: Metastasis from pancreatic cancer.   ·  Plan: -Need outpatient follow up vs GOC discussion.    Problem/Plan - 7:  ·  Problem: CAD (coronary artery disease).   ·  Plan: Stable. Continue ASA for now.    Problem/Plan - 8:  ·  Problem: H/O pulmonary thrombosis.   ·  Plan: -Transition to lovenox BID for now and if HGB stabilizes can restart eliquis.    Problem/Plan - 9:  ·  Problem: Need for prophylactic measure.

## 2021-09-19 NOTE — PROGRESS NOTE ADULT - SUBJECTIVE AND OBJECTIVE BOX
Patient seen and examined at bedside. pt had 2 episodes of diarrhea today.     MEDICATIONS  (STANDING):  aspirin  chewable 81 milliGRAM(s) Oral daily  atorvastatin 80 milliGRAM(s) Oral at bedtime  baclofen 5 milliGRAM(s) Oral three times a day  diphenoxylate/atropine 1 Tablet(s) Oral daily  enoxaparin Injectable 70 milliGRAM(s) SubCutaneous two times a day  filgrastim-sndz (ZARXIO) Injectable 480 MICROGram(s) SubCutaneous daily  finasteride 5 milliGRAM(s) Oral at bedtime  influenza  Vaccine (HIGH DOSE) 0.7 milliLiter(s) IntraMuscular once  pancrelipase  (CREON 24,000 Lipase Units) 1 Capsule(s) Oral four times a day with meals  piperacillin/tazobactam IVPB.. 3.375 Gram(s) IV Intermittent every 8 hours  sodium chloride 0.9%. 1000 milliLiter(s) (100 mL/Hr) IV Continuous <Continuous>    MEDICATIONS  (PRN):  acetaminophen   Tablet .. 650 milliGRAM(s) Oral every 6 hours PRN Temp greater or equal to 38.5C (101.3F), Mild Pain (1 - 3)  aluminum hydroxide/magnesium hydroxide/simethicone Suspension 30 milliLiter(s) Oral every 4 hours PRN Dyspepsia  calcium carbonate    500 mG (Tums) Chewable 1 Tablet(s) Chew three times a day PRN Dyspepsia  melatonin 3 milliGRAM(s) Oral at bedtime PRN Insomnia  ondansetron Injectable 4 milliGRAM(s) IV Push every 8 hours PRN Nausea and/or Vomiting        Vital Signs Last 24 Hrs  T(C): 36.7 (19 Sep 2021 10:20), Max: 36.8 (18 Sep 2021 15:13)  T(F): 98 (19 Sep 2021 10:20), Max: 98.2 (18 Sep 2021 15:13)  HR: 96 (19 Sep 2021 10:20) (76 - 96)  BP: 100/59 (19 Sep 2021 10:20) (98/55 - 122/76)  BP(mean): --  RR: 18 (19 Sep 2021 10:20) (18 - 19)  SpO2: 95% (19 Sep 2021 10:20) (95% - 99%)      PHYSICAL EXAM:     GENERAL:  Appears stated age, well-groomed  HEENT:  NC/AT,    CHEST:  CTA b/l  HEART:  S1 s2+   ABDOMEN:  Soft,  EXTEREMITIES:  + b/l edema   NEURO:  Alert, oriented, no asterixis                            9.1    0.12  )-----------( 100      ( 18 Sep 2021 13:26 )             26.5       09-18    126<L>  |  92<L>  |  29<H>  ----------------------------<  201<H>  4.5   |  23  |  1.02    Ca    7.5<L>      18 Sep 2021 20:42  Phos  2.1     09-18  Mg     2.50     09-18    TPro  5.4<L>  /  Alb  2.4<L>  /  TBili  1.0  /  DBili  x   /  AST  556<H>  /  ALT  523<H>  /  AlkPhos  159<H>  09-18

## 2021-09-19 NOTE — PROGRESS NOTE ADULT - ASSESSMENT
74 yo M HTN, HLD, pancreatic mass, recent chemo, presents with diarrhea  No documented fever, has leukopenia  Chemo 1 day prior to presentation  CXR clear  USG abd with hepatic mets  Diarrhea 3-4 days prior to presentation, recent chemo (as well as chemo over past 2 weeks)  Uncertain infection C diff negative; neg FGI PCR  Ongoing diarrhea, now improved  Decreasing WBC - neutropenic    Overall,  1) Diarrhea  - No recent antibiotic exposure. Chemo related > Gastroenteritis, C diff ruled out  no infectious etiology identified  - Vanco 125mg po q 6  discontinued    2) Elevated LFTs  - Chemo related?  ? related to hepatic mets  - F/U Ehrlichia/Anaplasma PCR  - Trend to normal  3) Neutropenia/subjective fever  -  continue Zosyn empiric for now  - Follow up BCX, UCX  NGTD    managment of hyponatremia with evidence of fluid overload and nausea as per to primary team

## 2021-09-19 NOTE — PROGRESS NOTE ADULT - ASSESSMENT
1. Metastatic Pancreatic CA    -- dx on 8/21/21  -- follows at [facility] [place]  -- Started treatment on 8/31/21 per protocol , randomized to gemcitabine/nab-paclitaxel and dual immmunotherapy. Received [place]/Abraxane on 9/14    2. Diarrhea    -- C diff negative  -- stool studies negative   -- may be sec to immunotx  -- if infectious w/u neg and diarrhea persists may need to consider trial of steroids    3. Fevers    -- ID following  -- No documented fevers  -- off of PO Vanco    4. Abnormal LFTS    -- significant transaminitis, likely sec to immunotherapy vs chemo vs mets  -- Dr Pisano spoke to clinical trial staff at [facility] [place] today about starting steroids. Recommend holding off for now    5. Neutropenia    -- cont Zarxio daily until ANC > 1500 x 2 consecutive days  -- pan culture and broad spectrum abx if T > 100.4    6. Hyponatremia    -- slightly improved 126 today   -- renal following. Likely intravascular depletion . Gentle hydration sec to HFrEF w AICD    Ron Coburn MD  Hematology Oncology   O: [phone]  C: [phone]    1. Metastatic Pancreatic CA    -- dx on 8/21/21  -- [place] [facility] [place]  -- Started treatment on 8/31/21 per protocol , randomized to gemcitabine/nab-paclitaxel and dual immmunotherapy. Received [place]/Abraxane on 9/14    2. Diarrhea    -- C diff negative  -- stool studies negative   -- may be sec to immunotx  -- if infectious w/u neg and diarrhea persists may need to consider trial of steroids    3. Fevers    -- ID following  -- No documented fevers  -- off of PO Vanco    4. Abnormal LFTS    -- significant transaminitis, likely sec to immunotherapy vs chemo vs mets  -- Dr Pisano spoke to clinical trial staff at [facility] [place] today about starting steroids. Recommend holding off for now    5. pancytopenia     -- cont Zarxio daily until ANC > 1500 x 2 consecutive days  -- pan culture and broad spectrum abx if T > 100.4  -- likely form recent chemo   -- hg>7  -- plt>10k, if bleeding >50k     6. Hyponatremia    -- slightly improved 126 today   -- renal following. Likely intravascular depletion . Gentle hydration sec to HFrEF w AICD    Ron Coburn MD  Hematology Oncology   O: [phone]  C: [phone]

## 2021-09-20 NOTE — PROGRESS NOTE ADULT - SUBJECTIVE AND OBJECTIVE BOX
Patient is a 75y Male     Patient is a 75y old  Male who presents with a chief complaint of Fever (19 Sep 2021 19:32)      HPI:  76 yo M hx of CAD, MI, stent x2 (10/2009 at [facility]), pAfib, HTN, HLD, BIV-AICD, and systolic CHF, hx of PE (on Eliquis), recently dx’d pancreatic mass on chemo at [facility] [place] now presenting with fever of 101 at home. He also endorsed diarrhea x3-4 days, watery. 4-5x daily. No recent abx exposure. No abdominal pain. Denies chest pain, dyspnea, palpitations. Just finished chemo today. Called his onc who advised him to come in. No abx exposure.  In the ED, vitals stable. Labs with leukopenia without neutropenia. Elevated ASt/ALT, hyponatremia. EKG paced. CXR clear. (15 Sep 2021 01:36)      PAST MEDICAL & SURGICAL HISTORY:  Hypertension (ICD9 401.9)    Hyperlipidemia (ICD9 272.4)    BPH (Benign Prostatic Hyperplasia)    Borderline diabetes mellitus    MI (myocardial infarction)  2009    Systolic heart failure    Hernia    Biventricular ICD (implantable cardioverter-defibrillator) in place  2010    Stented coronary artery  X 2, 2009        MEDICATIONS  (STANDING):  aspirin  chewable 81 milliGRAM(s) Oral daily  atorvastatin 80 milliGRAM(s) Oral at bedtime  baclofen 5 milliGRAM(s) Oral three times a day  diphenoxylate/atropine 1 Tablet(s) Oral daily  enoxaparin Injectable 70 milliGRAM(s) SubCutaneous two times a day  finasteride 5 milliGRAM(s) Oral at bedtime  influenza  Vaccine (HIGH DOSE) 0.7 milliLiter(s) IntraMuscular once  MIRACLE MOUTHWASH 30 milliLiter(s) Swish and Spit every 6 hours  pancrelipase  (CREON 24,000 Lipase Units) 1 Capsule(s) Oral four times a day with meals  piperacillin/tazobactam IVPB.. 3.375 Gram(s) IV Intermittent every 8 hours      Allergies    No Known Allergies    Intolerances        SOCIAL HISTORY:  Denies ETOh,Smoking,     FAMILY HISTORY:  No pertinent family history in first degree relatives        REVIEW OF SYSTEMS:    CONSTITUTIONAL: No weakness, fevers or chills  EYES/ENT: No visual changes;  No vertigo or throat pain   NECK: No pain or stiffness  RESPIRATORY: No cough, wheezing, hemoptysis; No shortness of breath  CARDIOVASCULAR: No chest pain or palpitations  GASTROINTESTINAL: No abdominal or epigastric pain. No nausea, vomiting, or hematemesis; No diarrhea or constipation. No melena or hematochezia.  GENITOURINARY: No dysuria, frequency or hematuria  NEUROLOGICAL: No numbness or weakness  SKIN: No itching, burning, rashes, or lesions   All other review of systems is negative unless indicated above.    VITAL:  T(C): , Max: 37.2 (09-19-21 @ 17:48)  T(F): , Max: 98.9 (09-19-21 @ 17:48)  HR: 80 (09-20-21 @ 06:30)  BP: 96/65 (09-20-21 @ 06:30)  BP(mean): --  RR: 17 (09-20-21 @ 05:44)  SpO2: 98% (09-20-21 @ 05:44)  Wt(kg): --    I and O's:    09-18 @ 07:01  -  09-19 @ 07:00  --------------------------------------------------------  IN: 150 mL / OUT: 501 mL / NET: -351 mL    09-19 @ 07:01  -  09-20 @ 07:00  --------------------------------------------------------  IN: 600 mL / OUT: 600 mL / NET: 0 mL          PHYSICAL EXAM:    Constitutional: NAD  HEENT: PERRLA,   Neck: No JVD  Respiratory: CTA B/L  Cardiovascular: S1 and S2  Gastrointestinal: BS+, soft, NT/ND  Extremities: No peripheral edema  Neurological: A/O x 3, no focal deficits  Psychiatric: Normal mood, normal affect  : No Bowman  Skin: No rashes  Access: Not applicable  Back: No CVA tenderness    LABS:                        9.5    0.37  )-----------( 60       ( 20 Sep 2021 08:15 )             27.6     09-19    125<L>  |  89<L>  |  32<H>  ----------------------------<  214<H>  4.4   |  24  |  1.01    Ca    8.0<L>      19 Sep 2021 14:27  Phos  1.9     09-19  Mg     2.70     09-19    TPro  5.9<L>  /  Alb  2.7<L>  /  TBili  1.0  /  DBili  x   /  AST  610<H>  /  ALT  666<H>  /  AlkPhos  187<H>  09-19          RADIOLOGY & ADDITIONAL STUDIES:

## 2021-09-20 NOTE — PROGRESS NOTE ADULT - SUBJECTIVE AND OBJECTIVE BOX
Patient seen and examined at bedside. feels ok. no overnight events     MEDICATIONS  (STANDING):  aspirin  chewable 81 milliGRAM(s) Oral daily  atorvastatin 80 milliGRAM(s) Oral at bedtime  baclofen 5 milliGRAM(s) Oral three times a day  diphenoxylate/atropine 1 Tablet(s) Oral daily  enoxaparin Injectable 70 milliGRAM(s) SubCutaneous two times a day  finasteride 5 milliGRAM(s) Oral at bedtime  influenza  Vaccine (HIGH DOSE) 0.7 milliLiter(s) IntraMuscular once  MIRACLE MOUTHWASH 30 milliLiter(s) Swish and Spit every 6 hours  pancrelipase  (CREON 24,000 Lipase Units) 1 Capsule(s) Oral four times a day with meals  piperacillin/tazobactam IVPB.. 3.375 Gram(s) IV Intermittent every 8 hours    MEDICATIONS  (PRN):  acetaminophen   Tablet .. 650 milliGRAM(s) Oral every 6 hours PRN Temp greater or equal to 38.5C (101.3F), Mild Pain (1 - 3)  aluminum hydroxide/magnesium hydroxide/simethicone Suspension 30 milliLiter(s) Oral every 4 hours PRN Dyspepsia  calcium carbonate    500 mG (Tums) Chewable 1 Tablet(s) Chew three times a day PRN Dyspepsia  melatonin 3 milliGRAM(s) Oral at bedtime PRN Insomnia  ondansetron Injectable 4 milliGRAM(s) IV Push every 8 hours PRN Nausea and/or Vomiting        Vital Signs Last 24 Hrs  T(C): 36.8 (20 Sep 2021 05:44), Max: 37.2 (19 Sep 2021 17:48)  T(F): 98.3 (20 Sep 2021 05:44), Max: 98.9 (19 Sep 2021 17:48)  HR: 80 (20 Sep 2021 06:30) (80 - 104)  BP: 96/65 (20 Sep 2021 06:30) (87/57 - 123/76)  BP(mean): --  RR: 17 (20 Sep 2021 05:44) (17 - 18)  SpO2: 98% (20 Sep 2021 05:44) (95% - 100%)      PHYSICAL EXAM:     GENERAL:  Appears stated age, well-groomed  HEENT:  NC/AT,   CHEST:  CTA b/l  HEART:  S1 s2+   ABDOMEN:  Soft,   EXTEREMITIES:  b/l edema   SKIN:  No rash/erythema/ecchymoses  NEURO:  Alert, oriented, no asterixis                            9.5    0.37  )-----------( 60       ( 20 Sep 2021 08:15 )             27.6       09-20    123<L>  |  88<L>  |  33<H>  ----------------------------<  197<H>  4.7   |  22  |  1.07    Ca    8.2<L>      20 Sep 2021 08:15  Phos  1.9     09-19  Mg     2.70     09-20    TPro  5.7<L>  /  Alb  2.6<L>  /  TBili  1.1  /  DBili  x   /  AST  361<H>  /  ALT  575<H>  /  AlkPhos  182<H>  09-20

## 2021-09-20 NOTE — PROGRESS NOTE ADULT - PROBLEM SELECTOR PROBLEM 7
Was the patient seen in the last year in this department? Yes     Does patient have an active prescription for medications requested? No     Received Request Via: Pharmacy       CAD (coronary artery disease)

## 2021-09-20 NOTE — PROGRESS NOTE ADULT - ASSESSMENT
neutorplenic fever  likely drug induced hepatitis  lomotil for diarrhea likely drug induced  overall prognsosis poor

## 2021-09-20 NOTE — CONSULT NOTE ADULT - ASSESSMENT
75 year old male with a PMH of HTN, HLD, CAD, MI, PCI with stent x2 2009, chronic HFrEF, s/p BIV ICD ([employer]tr[employer]), BPH, recently diagnosed with DVT/PE-was started on AC therapy Lovenox then Eliquis and recently diagnosed with metastatic pancreatic cancer in August, started on chemotherapy at [facility] [place] for past few weeks.  Patient presented to ED with fever and N/V and frequent diarrhea for few days and decrease po intake for past few weeks.  Patient reports "low BP" and his BB Carvedilol was reduced to 6.25mg Bid, Spironolactone and Losartan were held.  Upon admission, patient was noted in severe hyponatremia, leukopenia and persistent high transaminitis.  BP remains borderline with SBP 's bpm.   BiV ICD interrogation today revealed recently onset of multiple PAF with RVR since Sep 5, 2021 with a longest episode lasted 4 hours with ventricular rate up to 194 bpm recorded this am.  Alert from his device showing average ventricular>100 during AT/AF (>6 hours) for 5 days.  No VT therapy seen.  BiV paced at 98% with underlying rhythm of SR with PAF.    -Telemetry monitor for PAF with RVR  -Appreciate nephology /heme's input  -may consider IV Lopressor for rate control if BP tolerates, no rhythm control indicated as patient is in paroxysmal atrial fibrillation curently  -Will follow up        75 year old male with a PMH of HTN, HLD, CAD, MI, PCI with stent x2 2009, chronic HFrEF, s/p BIV ICD (Medtronic), BPH, recently diagnosed with DVT/PE-was started on AC therapy Lovenox then Eliquis and recently diagnosed with metastatic pancreatic cancer in August, started on chemotherapy at [facility] [place] for past few weeks.  Patient presented to ED with fever and N/V and frequent diarrhea for few days and decrease po intake for past few weeks.  Patient reports "low BP" and his BB Carvedilol was reduced to 6.25mg Bid, Spironolactone and Losartan were held.  Upon admission, patient was noted to have severe hyponatremia, leukopenia and persistent high transaminitis.  BP remains borderline with SBP 's bpm.   BiV ICD interrogation today revealed recently onset of multiple PAF with RVR since Sep 5, 2021 with a longest episode lasted 4 hours with ventricular rate up to 194 bpm recorded this am.  Alert from his device showing average ventricular>100 during AT/AF (>6 hours) for 5 days.  No VT therapy seen.  BiV paced at 98% with underlying rhythm of SR with PAF.    -Telemetry monitor for PAF with RVR  -Appreciate nephology /heme's input  -may consider IV Lopressor for rate control if BP tolerates,  -Will follow up

## 2021-09-20 NOTE — PROGRESS NOTE ADULT - ASSESSMENT
1. Metastatic Pancreatic CA    -- dx on 8/21/21  -- follows at [facility] [place]  -- Started treatment on 8/31/21 per protocol , randomized to gemcitabine/nab-paclitaxel and dual immmunotherapy. Received Nemo/Abraxane on 9/14    2. Diarrhea    -- C diff negative  -- stool studies negative   -- may be sec to immunotx  -- appears to be resolving   -- no diarrhea overnight     3. Fevers    -- ID following  -- No documented fevers  -- off of PO Vanco    4. Abnormal LFTS    -- significant transaminitis, likely sec to immunotherapy vs chemo vs mets  -- liver US shows mets   - LFTs are stable   -- Dr Pisano spoke to clinical trial staff at [facility] [place] today about starting steroids. Recommend holding off for now    5. pancytopenia     -- cont Zarxio daily until ANC > 1500 x 2 consecutive days  -- pan culture and broad spectrum abx if T > 100.4  -- likely form recent chemo   -- hg>7  -- plt>10k, if bleeding >50k     6. Hyponatremia    --  123 today   -- renal following. Likely intravascular depletion . Gentle hydration sec to HFrEF w AICD    Ron Coburn MD  Hematology Oncology   O: [phone]  C: [phone]

## 2021-09-20 NOTE — PROGRESS NOTE ADULT - ASSESSMENT
76 yo M HTN, HLD, pancreatic mass, recent chemo, presents with diarrhea  No documented fever, has leukopenia  Chemo 1 day prior to presentation  CXR clear  USG abd with hepatic mets  Diarrhea 3-4 days prior to presentation, recent chemo  Uncertain infection  Ongoing diarrhea, slightly worsened  Decreasing WBC  Overall,  1) Diarrhea  - C diff PCR neg, GI PCR neg  - Suspect chemo related  2) Elevated LFTs  - Chemo/malignancy related?  3) Neutropenia/subjective fever  - Zosyn empiric for now  - Follow up BCX, UCX    Vicente Anton MD  Pager [phone]  From 5pm-9am, and on weekends call [phone]

## 2021-09-20 NOTE — PROGRESS NOTE ADULT - ASSESSMENT
75y Male with history of HTN, CHF, pancreatic CA on chemo presents with fevers and diarrhea. Nephrology consulted for hyponatremia.    1. Hyponatremia: Secondary to volume overload. Urine studies consistent with decreased EABV. Start IV albumin 25% in 50 ml Q6 hours X 2 days with concurrent lasix 40 mg IV twice daily. Start 1L FR. Change Zosyn to NS base to limit free water. Will consider tolvaptan if LFT's continue to improve. Monitor serum Na.    2. HTN: BP low normal. Holding anti-hypertensive medications. Monitor BP.    3. LE edema: IV albumin and lasix as above. Continue with ensure to increase oncotic pressure. Monitor UO.    4. Chronic systolic HF: As per cardiology.       [facility] NEPHROLOGY  Matthias Keenan M.D.  Tobi Goodman D.O.  Portia Daily M.D.  Hanna Trammell, MSN, ANP-C    Telephone: [phone]  Facsimile: [phone]    [address] [id number]

## 2021-09-20 NOTE — PROGRESS NOTE ADULT - SUBJECTIVE AND OBJECTIVE BOX
CC: F/U for Diarrhea    Saw/spoke to patient. Unchanged. No new events.    Allergies  No Known Allergies    ANTIMICROBIALS:  piperacillin/tazobactam IVPB.. 3.375 every 8 hours    PE:    Vital Signs Last 24 Hrs  T(C): 36.7 (20 Sep 2021 13:18), Max: 37.2 (19 Sep 2021 17:48)  T(F): 98 (20 Sep 2021 13:18), Max: 98.9 (19 Sep 2021 17:48)  HR: 81 (20 Sep 2021 13:18) (80 - 104)  BP: 106/74 (20 Sep 2021 13:18) (87/57 - 123/76)  RR: 19 (20 Sep 2021 13:18) (16 - 19)  SpO2: 98% (20 Sep 2021 13:18) (96% - 100%)    Gen: AOx3, sleepy  CV: S1+S2 normal, nontachycardic  Resp: Clear bilat, no resp distress, no crackles/wheezes  Abd: Soft, nontender, +BS  Ext: No LE edema, no wounds    LABS:                        9.5    0.37  )-----------( 60       ( 20 Sep 2021 08:15 )             27.6     09-20    123<L>  |  88<L>  |  33<H>  ----------------------------<  197<H>  4.7   |  22  |  1.07    Ca    8.2<L>      20 Sep 2021 08:15  Phos  2.6     09-20  Mg     2.70     09-20    TPro  5.7<L>  /  Alb  2.6<L>  /  TBili  1.1  /  DBili  x   /  AST  361<H>  /  ALT  575<H>  /  AlkPhos  182<H>  09-20    MICROBIOLOGY:    .Stool Feces  09-18-21   No enteric pathogens isolated.  (Stool culture examined for Salmonella,  Shigella, Campylobacter, Aeromonas, Plesiomonas,  Vibrio, E.coli O157 and Yersinia)  No enteric gram negative rods isolated  --  --    .Blood  09-16-21   No Blood Parasites observed by giemsa stain  One negative set of blood smears does not rule out  the possibility of a parasitic infection.  A minimum of 3  specimens should be collected, at least 12-24 hours apart,  over a 36 hour time period.  --  --    .Blood Blood  09-16-21   No Blood Parasites observed by giemsa stain  One negative set of blood smears does not rule out  the possibility of a parasitic infection.  A minimum of 3  specimens should be collected, at least 12-24 hours apart,  over a 36 hour time period.  --  --    .Stool Feces  09-15-21   No Protozoa seen by trichrome stain  No Helminths or Protozoa seen in formalin concentrate  performed by iodine stain  (routine O+P not evaluated for Microsporidia,  Cryptosporidia, Cyclospora, or Isospora.)  One negative sample does not necessarily rule  out the presence of a parasitic infection.  Moderate WBC's  Moderate Red blood cells  --  --    Clean Catch Clean Catch (Midstream)  09-15-21   <10,000 CFU/mL Normal Urogenital Shelia  --  --    C Diff by PCR Result: NotDetec (09-16 @ 17:29)    C Diff by PCR Result: NotDetec (09-16-21 @ 17:29)    (otherwise reviewed)    RADIOLOGY:    9/15 US:    IMPRESSION:    No portal vein thrombosis.    Redemonstrated hepatic metastases with mild perihepatic ascites.

## 2021-09-20 NOTE — PROGRESS NOTE ADULT - SUBJECTIVE AND OBJECTIVE BOX
[facility] NEPHROLOGY- PROGRESS NOTE    75y Male with history of HTN, CHF, pancreatic CA on chemo presents with fevers and diarrhea. Nephrology consulted for hyponatremia.    REVIEW OF SYSTEMS:  Gen: no changes in weight  Cards: no chest pain  Resp: no dyspnea  GI: no nausea or vomiting or diarrhea  Vascular: + LE edema    No Known Allergies      Hospital Medications: Medications reviewed    VITALS:  T(F): 98 (21 @ 13:18), Max: 98.9 (21 @ 17:48)  HR: 81 (21 @ 13:18)  BP: 106/74 (21 @ 13:18)  RR: 19 (21 @ 13:18)  SpO2: 98% (21 @ 13:18)  Wt(kg): --  Height (cm): 177.8 (09-15 @ 08:19)     @ 07:01  -   @ 07:00  --------------------------------------------------------  IN: 600 mL / OUT: 600 mL / NET: 0 mL     @ 07:01  -   @ 13:43  --------------------------------------------------------  IN: 175 mL / OUT: 0 mL / NET: 175 mL        PHYSICAL EXAM:    Gen: NAD, calm  Cards: RRR, +S1/S2, no M/G/R  Resp: CTA B/L  GI: soft, NT/ND, NABS  Vascular: 2+ LE edema B/L, LUE edema    LABS:      123<L>  |  88<L>  |  33<H>  ----------------------------<  197<H>  4.7   |  22  |  1.07    Ca    8.2<L>      20 Sep 2021 08:15  Phos  2.6     09-20  Mg     2.70         TPro  5.7<L>  /  Alb  2.6<L>  /  TBili  1.1  /  DBili      /  AST  361<H>  /  ALT  575<H>  /  AlkPhos  182<H>      Creatinine Trend: 1.07 <--, 1.01 <--, 1.02 <--, 0.96 <--, 0.98 <--, 0.93 <--, 0.91 <--, 1.10 <--, 1.14 <--                        9.5    0.37  )-----------( 60       ( 20 Sep 2021 08:15 )             27.6     Urine Studies:  Urinalysis Basic - ( 15 Sep 2021 03:16 )    Color: Jennifer / Appearance: Slightly Turbid / S.028 / pH:   Gluc:  / Ketone: Negative  / Bili: Negative / Urobili: 3 mg/dL   Blood:  / Protein: 30 mg/dL / Nitrite: Negative   Leuk Esterase: Negative / RBC: <5 /HPF / WBC <5 /HPF   Sq Epi:  / Non Sq Epi: FEW /HPF / Bacteria: Few      Osmolality, Random Urine: 825 mosm/kg ( @ 15:37)  Sodium, Random Urine: <20 mmol/L ( @ 15:37)  Sodium, Random Urine: <20 mmol/L ( @ 15:20)  Osmolality, Random Urine: 797 mosm/kg ( @ 15:20)  Osmolality, Random Urine: 774 mosm/kg ( @ 20:51)      RADIOLOGY & ADDITIONAL STUDIES:

## 2021-09-20 NOTE — PROGRESS NOTE ADULT - ASSESSMENT
74 yo M hx of CAD, MI, stent x2 (10/2009 at [facility]), pAfib, HTN, HLD, BIV-AICD, and systolic CHF, hx of PE (on Eliquis), recently dx’d pancreatic mass on chemo at [facility] [place] now presenting with fever of 101 at home and several days of watery diarrhea concerning for C diff colitis.

## 2021-09-20 NOTE — PROGRESS NOTE ADULT - SUBJECTIVE AND OBJECTIVE BOX
SUBJECTIVE / OVERNIGHT EVENTS:pt seen and examined    MEDICATIONS  (STANDING):  albumin human 25% IVPB 50 milliLiter(s) IV Intermittent every 6 hours  aspirin  chewable 81 milliGRAM(s) Oral daily  atorvastatin 80 milliGRAM(s) Oral at bedtime  baclofen 5 milliGRAM(s) Oral three times a day  diphenoxylate/atropine 1 Tablet(s) Oral daily  enoxaparin Injectable 70 milliGRAM(s) SubCutaneous two times a day  finasteride 5 milliGRAM(s) Oral at bedtime  furosemide   Injectable 40 milliGRAM(s) IV Push two times a day  influenza  Vaccine (HIGH DOSE) 0.7 milliLiter(s) IntraMuscular once  MIRACLE MOUTHWASH 30 milliLiter(s) Swish and Spit every 6 hours  pancrelipase  (CREON 24,000 Lipase Units) 1 Capsule(s) Oral four times a day with meals  piperacillin/tazobactam IVPB.. 3.375 Gram(s) IV Intermittent every 8 hours    MEDICATIONS  (PRN):  acetaminophen   Tablet .. 650 milliGRAM(s) Oral every 6 hours PRN Temp greater or equal to 38.5C (101.3F), Mild Pain (1 - 3)  aluminum hydroxide/magnesium hydroxide/simethicone Suspension 30 milliLiter(s) Oral every 4 hours PRN Dyspepsia  calcium carbonate    500 mG (Tums) Chewable 1 Tablet(s) Chew three times a day PRN Dyspepsia  melatonin 3 milliGRAM(s) Oral at bedtime PRN Insomnia  ondansetron Injectable 4 milliGRAM(s) IV Push every 8 hours PRN Nausea and/or Vomiting    Vital Signs Last 24 Hrs  T(C): 36.3 (21 @ 22:14), Max: 36.9 (21 @ 16:56)  T(F): 97.4 (21 @ 22:14), Max: 98.4 (21 @ 16:56)  HR: 97 (21 @ 22:14) (80 - 97)  BP: 106/71 (21 @ 22:14) (87/57 - 106/74)  BP(mean): --  RR: 17 (21 @ 22:14) (16 - 19)  SpO2: 97% (21 @ 22:14) (97% - 99%)          Constitutional: No fever, fatigue  Skin: No rash.  Eyes: No recent vision problems or eye pain.  ENT: No congestion, ear pain, or sore throat.  Cardiovascular: No chest pain or palpation.  Respiratory: No cough, shortness of breath, congestion, or wheezing.  Gastrointestinal: No abdominal pain, nausea, vomiting, or diarrhea.  Genitourinary: No dysuria.  Musculoskeletal: No joint swelling.  Neurologic: No headache.    PHYSICAL EXAM:  GENERAL: NAD  EYES: EOMI, PERRLA  NECK: Supple, No JVD  CHEST/LUNG: dec breath sounds at bases  HEART:  S1 , S2 +  ABDOMEN: soft , bs+  EXTREMITIES:no edema    NEUROLOGY:alert awake    LABS:      123<L>  |  88<L>  |  33<H>  ----------------------------<  197<H>  4.7   |  22  |  1.07    Ca    8.2<L>      20 Sep 2021 08:15  Phos  2.6       Mg     2.70         TPro  5.7<L>  /  Alb  2.6<L>  /  TBili  1.1  /  DBili      /  AST  361<H>  /  ALT  575<H>  /  AlkPhos  182<H>      Creatinine Trend: 1.07 <--, 1.01 <--, 1.02 <--, 0.96 <--, 0.98 <--, 0.93 <--, 0.91 <--, 1.10 <--, 1.14 <--                        9.5    0.37  )-----------( 60       ( 20 Sep 2021 08:15 )             27.6     Urine Studies:  Urinalysis Basic - ( 15 Sep 2021 03:16 )    Color: Jennifer / Appearance: Slightly Turbid / S.028 / pH:   Gluc:  / Ketone: Negative  / Bili: Negative / Urobili: 3 mg/dL   Blood:  / Protein: 30 mg/dL / Nitrite: Negative   Leuk Esterase: Negative / RBC: <5 /HPF / WBC <5 /HPF   Sq Epi:  / Non Sq Epi: FEW /HPF / Bacteria: Few      Osmolality, Random Urine: 825 mosm/kg ( @ 15:37)  Sodium, Random Urine: <20 mmol/L ( @ 15:37)  Sodium, Random Urine: <20 mmol/L ( @ 15:20)  Osmolality, Random Urine: 797 mosm/kg ( @ 15:20)  Osmolality, Random Urine: 774 mosm/kg ( @ 20:51)          LIVER FUNCTIONS - ( 20 Sep 2021 08:15 )  Alb: 2.6 g/dL / Pro: 5.7 g/dL / ALK PHOS: 182 U/L / ALT: 575 U/L / AST: 361 U/L / GGT: x             Osmolality, Random Urine: 825 mosm/kg ( @ 15:37)  Sodium, Random Urine: <20 mmol/L ( @ 15:37)  Sodium, Random Urine: <20 mmol/L ( @ 15:20)  Osmolality, Random Urine: 797 mosm/kg ( @ 15:20)  Osmolality, Random Urine: 774 mosm/kg ( @ 20:51)          LIVER FUNCTIONS - ( 18 Sep 2021 13:26 )  Alb: 2.4 g/dL / Pro: 5.4 g/dL / ALK PHOS: 159 U/L / ALT: 523 U/L / AST: 556 U/L / GGT: x             Care Discussed with Consultants/Other Providers:

## 2021-09-20 NOTE — CONSULT NOTE ADULT - SUBJECTIVE AND OBJECTIVE BOX
Patient is a 75y old  Male who presents with a chief complaint of Fever (20 Sep 2021 13:41)          HPI:  75 year old male with a PMH of HTN, HLD, CAD, MI, PCI with stent x2 2009, chronic HFrEF, s/p BIV ICD (Medtronic), BPH, recently diagnosed with DVT/PE-was started on AC therapy Lovenox then Eliquis and recently diagnosed with metastatic pancreatic cancer in August, started on chemotherapy.                 PAST MEDICAL & SURGICAL HISTORY:  recently diagnosed pancreatic cancer   Hypertension (ICD9 401.9)    Hyperlipidemia (ICD9 272.4)    BPH (Benign Prostatic Hyperplasia)    Borderline diabetes mellitus    MI (myocardial infarction)  2009    Systolic heart failure    Hernia    Biventricular ICD (implantable cardioverter-defibrillator) in place  2010    Stented coronary artery  X 2, 2009        MEDICATIONS  (STANDING): Home meds:    albumin human 25% IVPB 50 milliLiter(s) IV Intermittent every 6 hours  aspirin  chewable 81 milliGRAM(s) Oral daily  atorvastatin 80 milliGRAM(s) Oral at bedtime  baclofen 5 milliGRAM(s) Oral three times a day  diphenoxylate/atropine 1 Tablet(s) Oral daily  enoxaparin Injectable 70 milliGRAM(s) SubCutaneous two times a day  finasteride 5 milliGRAM(s) Oral at bedtime  furosemide   Injectable 40 milliGRAM(s) IV Push two times a day  influenza  Vaccine (HIGH DOSE) 0.7 milliLiter(s) IntraMuscular once  MIRACLE MOUTHWASH 30 milliLiter(s) Swish and Spit every 6 hours  pancrelipase  (CREON 24,000 Lipase Units) 1 Capsule(s) Oral four times a day with meals  piperacillin/tazobactam IVPB.. 3.375 Gram(s) IV Intermittent every 8 hours    MEDICATIONS  (PRN):  acetaminophen   Tablet .. 650 milliGRAM(s) Oral every 6 hours PRN Temp greater or equal to 38.5C (101.3F), Mild Pain (1 - 3)  aluminum hydroxide/magnesium hydroxide/simethicone Suspension 30 milliLiter(s) Oral every 4 hours PRN Dyspepsia  calcium carbonate    500 mG (Tums) Chewable 1 Tablet(s) Chew three times a day PRN Dyspepsia  melatonin 3 milliGRAM(s) Oral at bedtime PRN Insomnia  ondansetron Injectable 4 milliGRAM(s) IV Push every 8 hours PRN Nausea and/or Vomiting    Allergies    No Known Allergies    Intolerances      FAMILY HISTORY:  No pertinent family history in first degree relatives        SOCIAL HISTORY:  Denies smoking; no   Alcohol  or  Drug abuse     REVIEW OF SYSTEMS:    CONSTITUTIONAL: Noweight loss, chills, shakes, or fatigue  +fever,   EYES: No eye pain, visual disturbances, or discharge  ENMT:  No difficulty hearing, tinnitus, vertigo; No sinus or throat pain  NECK: No pain or stiffness  RESPIRATORY: No cough, wheezing, hemoptysis, or shortness of breath  CARDIOVASCULAR: No chest pain, dyspnea, palpitations, dizziness, syncope, paroxysmal nocturnal dyspnea, orthopnea,  +leg swelling  GASTROINTESTINAL: No abdominal  or epigastric pain, hematemesis, diarrhea, constipation, melena or bright red blood. +nausea, + vomiting  GENITOURINARY: No dysuria, nocturia, hematuria, or urinary incontinence  NEUROLOGICAL: No headaches, memory loss, slurred speech, limb weakness, loss of strength, numbness, or tremors  SKIN: No itching, burning, rashes, or lesions   LYMPH NODES: No enlarged glands  ENDOCRINE: No heat or cold intolerance, or hair loss  MUSCULOSKELETAL: No joint pain or swelling, muscle, back, or extremity pain  PSYCHIATRIC: No depression, anxiety, or difficulty sleeping  HEME/LYMPH: No easy bruising or bleeding gums  ALLERY AND IMMUNOLOGIC: No hives or rash.      Vital Signs Last 24 Hrs  T(C): 36.7 (20 Sep 2021 13:18), Max: 37.2 (19 Sep 2021 17:48)  T(F): 98 (20 Sep 2021 13:18), Max: 98.9 (19 Sep 2021 17:48)  HR: 81 (20 Sep 2021 13:18) (80 - 104)  BP: 106/74 (20 Sep 2021 13:18) (87/57 - 123/76)  BP(mean): --  RR: 19 (20 Sep 2021 13:18) (16 - 19)  SpO2: 98% (20 Sep 2021 13:18) (96% - 100%)    PHYSICAL EXAM:    GENERAL: In no apparent distress, well nourished, and hydrated.  HEAD:  Atraumatic, Normocephalic  NECK: Supple . mild JVD, no carotid bruit or thyroidmegaly.  Carotid pulse is 2+ bilaterally.  PULMONARY: faint basilar crackles, wheezing, or rhonchi bilaterally.  HEART: Regular rate and rhythm; No murmurs, rubs, or gallops.  L BiV ICD  ABDOMEN: Soft, Nontender, Nondistended; Bowel sounds present  EXTREMITIES: No clubbing, cyanosis, +grossly  edematous Wan LE  NEUROLOGICAL: Alert oriented to person, place and time.  Speech clear.  Skin: Dry intact, no rashes or lesions.          INTERPRETATION OF TELEMETRY:  Sinus rhythm with BiV pacing; episodes of PAF with RVR up to 170's bpm    ECG: SR with biv pacing        LABS:                        9.5    0.37  )-----------( 60       ( 20 Sep 2021 08:15 )             27.6     09-20    123<L>  |  88<L>  |  33<H>  ----------------------------<  197<H>  4.7   |  22  |  1.07    Ca    8.2<L>      20 Sep 2021 08:15  Phos  2.6     09-20  Mg     2.70     09-20    TPro  5.7<L>  /  Alb  2.6<L>  /  TBili  1.1  /  DBili  x   /  AST  361<H>  /  ALT  575<H>  /  AlkPhos  182<H>  09-20              BNPSerum Pro-Brain Natriuretic Peptide: 1126 pg/mL (09-19 @ 14:27)    RADIOLOGY & ADDITIONAL STUDIES:  PREVIOUS DIAGNOSTIC TESTING:      ECHO FINDINGS:  CONCLUSIONS:  1. Normal left ventricular size.  2. Imaging is difficult and off axis despite Definity  contrast,precluding quantitative LVEF.  The left  ventricular function is moderately reduced.  The LVEF about  35-40% There are multiple wall mtoion abnormalities.  There  is a large area of apical akinesis.  The anteroseptum and inferoseptum are akinetic.  There is no thrombus in the left ventricular apex.  3. Normal right ventricular size and function.  Thee is a device wire in the right heart.  last study is from 2011.  ------------------------------------------------------------------------  PROCEDUREDESCRIPTION: Transthoracic echocardiogram with  2-D, M-Mode and complete spectral and color flow Doppler.  Intravenous ultrasound enhancing agent was administered for  improved left ventricular endocardial border definition.  Following the intravenous injection of ultrasound enhancing  agent, harmonic imaging was performed.  ------------------------------------------------------------------------  ECHOCARDIOGRAPHIC EXAMINATION:  AORTIC ROOT:  Aortic Root (Leaflet): 3.7 cm  Aortic Root Index: 1.0  LEFT ATRIUM:  AP Dimensions: 3.5 cm  LA Volume Index: 47.00 cc/sqm  LA Volume: 101.0 cc  LEFT VENTRICLE:  LVIDd: 5.7 cm  LVIDs: 4.3 cm  Fraction Short: 25 %  IVS: 0.80  ILWT: 0.7 cm  RWT: 0.24  LV Mass: 156.0 gm  LV Mass Index: 73 gm/sqm  EF (Visual Estimate): 35-40%  HR and BP:  HR: 63 bpm  BP: 122/80  BSA: 2.14  ------------------------------------------------------------------------  HEMODYNAMICS:  RA Pressure Estimate: 10  ------------------------------------------------------------------------  COLOR FLOW and SPECIAL DOPPLER:  AORTIC VALVE:  ------------------------------------------------------------------------  DIASTOLIC FUNCTION:  DT:293 ms  E/A: 0.66  MV E wave: 0.6 m/s  MV A wave: 0.9 m/s  ------------------------------------------------------------------------  Confirmed on  8/7/2021 - 15:51:15 by Juanita Rashid MD  ------------------------------------------------------------------------      STRESS FINDINGS:    CATHETERIZATION FINDINGS:       Patient is a 75y old  Male who presents with a chief complaint of Fever (20 Sep 2021 13:41)          HPI:  75 year old male with a PMH of HTN, HLD, CAD, MI, PCI with stent x2 2009, chronic HFrEF, s/p BIV ICD (Medtr[employer]), BPH, recently diagnosed with DVT/PE-was started on AC therapy Lovenox then Eliquis and recently diagnosed with metastatic pancreatic cancer in August, started on chemotherapy at [facility] [place] for past few weeks.  Patient presented to ED with fever and N/V and frequent diarrhea for few days and decrease po intake for past few weeks.  Patient reports "low BP" and his BB Carvedilol was reduced to 6.25mg Bid, Spironolactone and Losartan were held.  Upon admission, patient was noted in severe hyponatremia, leukopenia and persistent high transaminitis.  BP remains borderline with SBP 's bpm.   BiV ICD interrogation today revealed recently onset of multiple PAF with RVR since Sep 5, 2021 with a longest episode lasted 4 hours with ventricular rate up to 194 bpm recorded this am.  Alert from his device showing average ventricular>100 during AT/AF (>6 hours) for 5 days.  No VT therapy seen.  BiV paced at 98% with underlying rhythm of SR with PAF.        PAST MEDICAL & SURGICAL HISTORY:  recently diagnosed pancreatic cancer   Hypertension (ICD9 401.9)    Hyperlipidemia (ICD9 272.4)    BPH (Benign Prostatic Hyperplasia)    Borderline diabetes mellitus    MI (myocardial infarction)  2009    Systolic heart failure    Hernia    Biventricular ICD (implantable cardioverter-defibrillator) in place  2010    Stented coronary artery  X 2, 2009        MEDICATIONS  (STANDING): Home meds:     albumin human 25% IVPB 50 milliLiter(s) IV Intermittent every 6 hours  aspirin  chewable 81 milliGRAM(s) Oral daily  atorvastatin 80 milliGRAM(s) Oral at bedtime  baclofen 5 milliGRAM(s) Oral three times a day  diphenoxylate/atropine 1 Tablet(s) Oral daily  enoxaparin Injectable 70 milliGRAM(s) SubCutaneous two times a day  finasteride 5 milliGRAM(s) Oral at bedtime  furosemide   Injectable 40 milliGRAM(s) IV Push two times a day  influenza  Vaccine (HIGH DOSE) 0.7 milliLiter(s) IntraMuscular once  MIRACLE MOUTHWASH 30 milliLiter(s) Swish and Spit every 6 hours  pancrelipase  (CREON 24,000 Lipase Units) 1 Capsule(s) Oral four times a day with meals  piperacillin/tazobactam IVPB.. 3.375 Gram(s) IV Intermittent every 8 hours    MEDICATIONS  (PRN):  acetaminophen   Tablet .. 650 milliGRAM(s) Oral every 6 hours PRN Temp greater or equal to 38.5C (101.3F), Mild Pain (1 - 3)  aluminum hydroxide/magnesium hydroxide/simethicone Suspension 30 milliLiter(s) Oral every 4 hours PRN Dyspepsia  calcium carbonate    500 mG (Tums) Chewable 1 Tablet(s) Chew three times a day PRN Dyspepsia  melatonin 3 milliGRAM(s) Oral at bedtime PRN Insomnia  ondansetron Injectable 4 milliGRAM(s) IV Push every 8 hours PRN Nausea and/or Vomiting    Allergies    No Known Allergies    Intolerances      FAMILY HISTORY:  No pertinent family history in first degree relatives        SOCIAL HISTORY:  Denies smoking; no   Alcohol  or  Drug abuse     REVIEW OF SYSTEMS:    CONSTITUTIONAL: Noweight loss, chills, shakes, or fatigue  +fever,   EYES: No eye pain, visual disturbances, or discharge  ENMT:  No difficulty hearing, tinnitus, vertigo; No sinus or throat pain  NECK: No pain or stiffness  RESPIRATORY: No cough, wheezing, hemoptysis, or shortness of breath  CARDIOVASCULAR: No chest pain, dyspnea, palpitations, dizziness, syncope, paroxysmal nocturnal dyspnea, orthopnea,  +leg swelling  GASTROINTESTINAL: No abdominal  or epigastric pain, hematemesis,  constipation, melena or bright red blood. +nausea, + vomiting +diarrhea,  GENITOURINARY: No dysuria, nocturia, hematuria, or urinary incontinence  NEUROLOGICAL: No headaches, memory loss, slurred speech, limb weakness, loss of strength, numbness, or tremors  SKIN: No itching, burning, rashes, or lesions   LYMPH NODES: No enlarged glands  ENDOCRINE: No heat or cold intolerance, or hair loss  MUSCULOSKELETAL: No joint pain or swelling, muscle, back, or extremity pain  PSYCHIATRIC: No depression, anxiety, or difficulty sleeping  HEME/LYMPH: No easy bruising or bleeding gums  ALLERY AND IMMUNOLOGIC: No hives or rash.      Vital Signs Last 24 Hrs  T(C): 36.7 (20 Sep 2021 13:18), Max: 37.2 (19 Sep 2021 17:48)  T(F): 98 (20 Sep 2021 13:18), Max: 98.9 (19 Sep 2021 17:48)  HR: 81 (20 Sep 2021 13:18) (80 - 104)  BP: 106/74 (20 Sep 2021 13:18) (87/57 - 123/76)  BP(mean): --  RR: 19 (20 Sep 2021 13:18) (16 - 19)  SpO2: 98% (20 Sep 2021 13:18) (96% - 100%)    PHYSICAL EXAM:    GENERAL: In no apparent distress, well nourished, and hydrated.  HEAD:  Atraumatic, Normocephalic  NECK: Supple. no JVD or carotid bruit or thyroidmegaly.  Carotid pulse is 2+ bilaterally.  PULMONARY: no rales  wheezing, or rhonchi bilaterally.  HEART: Regular rate and rhythm; No murmurs, rubs, or gallops.  L BiV ICD  ABDOMEN: Soft, Nontender, Nondistended; Bowel sounds present  EXTREMITIES: No clubbing, cyanosis, +grossly  edematous Wan LE  NEUROLOGICAL: Alert oriented to person, place and time.  Speech clear.  Skin: Dry intact, no rashes or lesions.          INTERPRETATION OF TELEMETRY:  Sinus rhythm with BiV pacing; episodes of PAF with RVR up to 170's bpm    ECG: SR with biv pacing        LABS:                        9.5    0.37  )-----------( 60       ( 20 Sep 2021 08:15 )             27.6     09-20    123<L>  |  88<L>  |  33<H>  ----------------------------<  197<H>  4.7   |  22  |  1.07    Ca    8.2<L>      20 Sep 2021 08:15  Phos  2.6     09-20  Mg     2.70     09-20    TPro  5.7<L>  /  Alb  2.6<L>  /  TBili  1.1  /  DBili  x   /  AST  361<H>  /  ALT  575<H>  /  AlkPhos  182<H>  09-20              BNPSerum Pro-Brain Natriuretic Peptide: 1126 pg/mL (09-19 @ 14:27)    RADIOLOGY & ADDITIONAL STUDIES:  PREVIOUS DIAGNOSTIC TESTING:      ECHO FINDINGS:  CONCLUSIONS:  1. Normal left ventricular size.  2. Imaging is difficult and off axis despite Definity  contrast,precluding quantitative LVEF.  The left  ventricular function is moderately reduced.  The LVEF about  35-40% There are multiple wall mtoion abnormalities.  There  is a large area of apical akinesis.  The anteroseptum and inferoseptum are akinetic.  There is no thrombus in the left ventricular apex.  3. Normal right ventricular size and function.  Thee is a device wire in the right heart.  last study is from 2011.  ------------------------------------------------------------------------  PROCEDUREDESCRIPTION: Transthoracic echocardiogram with  2-D, M-Mode and complete spectral and color flow Doppler.  Intravenous ultrasound enhancing agent was administered for  improved left ventricular endocardial border definition.  Following the intravenous injection of ultrasound enhancing  agent, harmonic imaging was performed.  ------------------------------------------------------------------------  ECHOCARDIOGRAPHIC EXAMINATION:  AORTIC ROOT:  Aortic Root (Leaflet): 3.7 cm  Aortic Root Index: 1.0  LEFT ATRIUM:  AP Dimensions: 3.5 cm  LA Volume Index: 47.00 cc/sqm  LA Volume: 101.0 cc  LEFT VENTRICLE:  LVIDd: 5.7 cm  LVIDs: 4.3 cm  Fraction Short: 25 %  IVS: 0.80  ILWT: 0.7 cm  RWT: 0.24  LV Mass: 156.0 gm  LV Mass Index: 73 gm/sqm  EF (Visual Estimate): 35-40%  HR and BP:  HR: 63 bpm  BP: 122/80  BSA: 2.14  ------------------------------------------------------------------------  HEMODYNAMICS:  RA Pressure Estimate: 10  ------------------------------------------------------------------------  COLOR FLOW and SPECIAL DOPPLER:  AORTIC VALVE:  ------------------------------------------------------------------------  DIASTOLIC FUNCTION:  DT:293 ms  E/A: 0.66  MV E wave: 0.6 m/s  MV A wave: 0.9 m/s  ------------------------------------------------------------------------  Confirmed on  8/7/2021 - 15:51:15 by Juanita Rashid MD  ------------------------------------------------------------------------      STRESS FINDINGS:    CATHETERIZATION FINDINGS:       Patient is a 75y old  Male who presents with a chief complaint of Fever (20 Sep 2021 13:41)          HPI:  75 year old male with a PMH of HTN, HLD, CAD, MI, PCI with stent x2 2009, chronic HFrEF, s/p BIV ICD (Medtronic), BPH, recently diagnosed with DVT/PE-was started on AC therapy Lovenox then Eliquis and recently diagnosed with metastatic pancreatic cancer in August, started on chemotherapy at [facility] [place] for past few weeks.  Patient presented to ED with fever and N/V and frequent diarrhea for few days and decrease po intake for past few weeks.  Patient reports "low BP" and his BB Carvedilol was reduced to 6.25mg Bid, Spironolactone and Losartan were held.  Upon admission, patient was noted in severe hyponatremia, leukopenia and persistent high transaminitis.  BP remains borderline with SBP 's bpm.   BiV ICD interrogation today revealed recently onset of multiple PAF with RVR since Sep 5, 2021 with a longest episode lasted 4 hours with ventricular rate up to 194 bpm recorded this am.  Alert from his device showing average ventricular>100 during AT/AF (>6 hours) for 5 days.  No VT therapy seen.  BiV paced at 98% with underlying rhythm of SR with PAF.        PAST MEDICAL & SURGICAL HISTORY:  recently diagnosed pancreatic cancer   Hypertension (ICD9 401.9)    Hyperlipidemia (ICD9 272.4)    BPH (Benign Prostatic Hyperplasia)    Borderline diabetes mellitus    MI (myocardial infarction)  2009    Systolic heart failure    Hernia    Biventricular ICD (implantable cardioverter-defibrillator) in place  2010    Stented coronary artery  X 2, 2009      MEDICATIONS  (STANDING): Home meds:   coreg 6.25mg biD; ASA 81mg daily; Losartan 50mg daily; crestor, protonix, Eliquis 5mg daily; Spirono/hctz 25/25 daily (on hold)    albumin human 25% IVPB 50 milliLiter(s) IV Intermittent every 6 hours  aspirin  chewable 81 milliGRAM(s) Oral daily  atorvastatin 80 milliGRAM(s) Oral at bedtime  baclofen 5 milliGRAM(s) Oral three times a day  diphenoxylate/atropine 1 Tablet(s) Oral daily  enoxaparin Injectable 70 milliGRAM(s) SubCutaneous two times a day  finasteride 5 milliGRAM(s) Oral at bedtime  furosemide   Injectable 40 milliGRAM(s) IV Push two times a day  influenza  Vaccine (HIGH DOSE) 0.7 milliLiter(s) IntraMuscular once  MIRACLE MOUTHWASH 30 milliLiter(s) Swish and Spit every 6 hours  pancrelipase  (CREON 24,000 Lipase Units) 1 Capsule(s) Oral four times a day with meals  piperacillin/tazobactam IVPB.. 3.375 Gram(s) IV Intermittent every 8 hours    MEDICATIONS  (PRN):  acetaminophen   Tablet .. 650 milliGRAM(s) Oral every 6 hours PRN Temp greater or equal to 38.5C (101.3F), Mild Pain (1 - 3)  aluminum hydroxide/magnesium hydroxide/simethicone Suspension 30 milliLiter(s) Oral every 4 hours PRN Dyspepsia  calcium carbonate    500 mG (Tums) Chewable 1 Tablet(s) Chew three times a day PRN Dyspepsia  melatonin 3 milliGRAM(s) Oral at bedtime PRN Insomnia  ondansetron Injectable 4 milliGRAM(s) IV Push every 8 hours PRN Nausea and/or Vomiting    Allergies    No Known Allergies    Intolerances      FAMILY HISTORY:  No pertinent family history in first degree relatives        SOCIAL HISTORY:  Denies smoking; no   Alcohol  or  Drug abuse     REVIEW OF SYSTEMS:    CONSTITUTIONAL: Noweight loss, chills, shakes, or fatigue  +fever,   EYES: No eye pain, visual disturbances, or discharge  ENMT:  No difficulty hearing, tinnitus, vertigo; No sinus or throat pain  NECK: No pain or stiffness  RESPIRATORY: No cough, wheezing, hemoptysis, or shortness of breath  CARDIOVASCULAR: No chest pain, dyspnea, palpitations, dizziness, syncope, paroxysmal nocturnal dyspnea, orthopnea,  +leg swelling  GASTROINTESTINAL: No abdominal  or epigastric pain, hematemesis,  constipation, melena or bright red blood. +nausea, + vomiting +diarrhea,  GENITOURINARY: No dysuria, nocturia, hematuria, or urinary incontinence  NEUROLOGICAL: No headaches, memory loss, slurred speech, limb weakness, loss of strength, numbness, or tremors  SKIN: No itching, burning, rashes, or lesions   LYMPH NODES: No enlarged glands  ENDOCRINE: No heat or cold intolerance, or hair loss  MUSCULOSKELETAL: No joint pain or swelling, muscle, back, or extremity pain  PSYCHIATRIC: No depression, anxiety, or difficulty sleeping  HEME/LYMPH: No easy bruising or bleeding gums  ALLERY AND IMMUNOLOGIC: No hives or rash.      Vital Signs Last 24 Hrs  T(C): 36.7 (20 Sep 2021 13:18), Max: 37.2 (19 Sep 2021 17:48)  T(F): 98 (20 Sep 2021 13:18), Max: 98.9 (19 Sep 2021 17:48)  HR: 81 (20 Sep 2021 13:18) (80 - 104)  BP: 106/74 (20 Sep 2021 13:18) (87/57 - 123/76)  BP(mean): --  RR: 19 (20 Sep 2021 13:18) (16 - 19)  SpO2: 98% (20 Sep 2021 13:18) (96% - 100%)    PHYSICAL EXAM:    GENERAL: In no apparent distress, well nourished, and hydrated.  HEAD:  Atraumatic, Normocephalic  NECK: Supple. no JVD or carotid bruit or thyroidmegaly.  Carotid pulse is 2+ bilaterally.  PULMONARY: no rales  wheezing, or rhonchi bilaterally.  HEART: Regular rate and rhythm; No murmurs, rubs, or gallops.  L BiV ICD  ABDOMEN: Soft, Nontender, Nondistended; Bowel sounds present  EXTREMITIES: No clubbing, cyanosis, +grossly  edematous Wan LE  NEUROLOGICAL: Alert oriented to person, place and time.  Speech clear.  Skin: Dry intact, no rashes or lesions.          INTERPRETATION OF TELEMETRY:  Sinus rhythm with BiV pacing; episodes of PAF with RVR up to 170's bpm    ECG: SR with biv pacing        LABS:                        9.5    0.37  )-----------( 60       ( 20 Sep 2021 08:15 )             27.6     09-20    123<L>  |  88<L>  |  33<H>  ----------------------------<  197<H>  4.7   |  22  |  1.07    Ca    8.2<L>      20 Sep 2021 08:15  Phos  2.6     09-20  Mg     2.70     09-20    TPro  5.7<L>  /  Alb  2.6<L>  /  TBili  1.1  /  DBili  x   /  AST  361<H>  /  ALT  575<H>  /  AlkPhos  182<H>  09-20              BNPSerum Pro-Brain Natriuretic Peptide: 1126 pg/mL (09-19 @ 14:27)    RADIOLOGY & ADDITIONAL STUDIES:  PREVIOUS DIAGNOSTIC TESTING:      ECHO FINDINGS:  CONCLUSIONS:  1. Normal left ventricular size.  2. Imaging is difficult and off axis despite Definity  contrast,precluding quantitative LVEF.  The left  ventricular function is moderately reduced.  The LVEF about  35-40% There are multiple wall mtoion abnormalities.  There  is a large area of apical akinesis.  The anteroseptum and inferoseptum are akinetic.  There is no thrombus in the left ventricular apex.  3. Normal right ventricular size and function.  Thee is a device wire in the right heart.  last study is from 2011.  ------------------------------------------------------------------------  PROCEDUREDESCRIPTION: Transthoracic echocardiogram with  2-D, M-Mode and complete spectral and color flow Doppler.  Intravenous ultrasound enhancing agent was administered for  improved left ventricular endocardial border definition.  Following the intravenous injection of ultrasound enhancing  agent, harmonic imaging was performed.  ------------------------------------------------------------------------  ECHOCARDIOGRAPHIC EXAMINATION:  AORTIC ROOT:  Aortic Root (Leaflet): 3.7 cm  Aortic Root Index: 1.0  LEFT ATRIUM:  AP Dimensions: 3.5 cm  LA Volume Index: 47.00 cc/sqm  LA Volume: 101.0 cc  LEFT VENTRICLE:  LVIDd: 5.7 cm  LVIDs: 4.3 cm  Fraction Short: 25 %  IVS: 0.80  ILWT: 0.7 cm  RWT: 0.24  LV Mass: 156.0 gm  LV Mass Index: 73 gm/sqm  EF (Visual Estimate): 35-40%  HR and BP:  HR: 63 bpm  BP: 122/80  BSA: 2.14  ------------------------------------------------------------------------  HEMODYNAMICS:  RA Pressure Estimate: 10  ------------------------------------------------------------------------  COLOR FLOW and SPECIAL DOPPLER:  AORTIC VALVE:  ------------------------------------------------------------------------  DIASTOLIC FUNCTION:  DT:293 ms  E/A: 0.66  MV E wave: 0.6 m/s  MV A wave: 0.9 m/s  ------------------------------------------------------------------------  Confirmed on  8/7/2021 - 15:51:15 by Juanita Rashid MD  ------------------------------------------------------------------------      STRESS FINDINGS:    CATHETERIZATION FINDINGS:

## 2021-09-21 NOTE — DIETITIAN NUTRITION RISK NOTIFICATION - TREATMENT: THE FOLLOWING DIET HAS BEEN RECOMMENDED
Diet, Regular:   1000mL Fluid Restriction ([id number])  Supplement Feeding Modality:  Oral  Ensure Enlive Cans or Servings Per Day:  3       Frequency:  Daily (09-20-21 @ 13:40) [Active]

## 2021-09-21 NOTE — PROGRESS NOTE ADULT - SUBJECTIVE AND OBJECTIVE BOX
CC: F/U for Neutropenia    Saw/spoke to patient. No fevers, no chills. No new complaints.    Allergies  No Known Allergies    ANTIMICROBIALS:  piperacillin/tazobactam IVPB.. 3.375 every 8 hours    PE:    Vital Signs Last 24 Hrs  T(C): 36.7 (21 Sep 2021 01:53), Max: 36.9 (20 Sep 2021 16:56)  T(F): 98.1 (21 Sep 2021 01:53), Max: 98.4 (20 Sep 2021 16:56)  HR: 75 (21 Sep 2021 01:53) (75 - 96)  BP: 124/59 (21 Sep 2021 01:53) (105/60 - 124/59)  RR: 17 (21 Sep 2021 01:53) (17 - 18)  SpO2: 99% (21 Sep 2021 01:53) (97% - 99%)    Gen: AOx3, NAD, non-toxic  CV: S1+S2 normal, nontachycardic  Resp: Clear bilat, no resp distress, no crackles/wheezes  Abd: Soft, nontender, +BS  Ext: No LE edema, no wounds    LABS:                        9.8    3.20  )-----------( 40       ( 21 Sep 2021 07:22 )             27.8     09-21    126<L>  |  89<L>  |  37<H>  ----------------------------<  175<H>  4.6   |  21<L>  |  1.13    Ca    8.2<L>      21 Sep 2021 07:22  Phos  2.2     09-21  Mg     2.50     09-21    TPro  5.8<L>  /  Alb  3.0<L>  /  TBili  1.2  /  DBili  x   /  AST  201<H>  /  ALT  421<H>  /  AlkPhos  161<H>  09-21    MICROBIOLOGY:    .Stool Feces  09-18-21   No enteric pathogens isolated.  (Stool culture examined for Salmonella,  Shigella, Campylobacter, Aeromonas, Plesiomonas,  Vibrio, E.coli O157 and Yersinia)  No enteric gram negative rods isolated     .Blood  09-16-21   No Blood Parasites observed by giemsa stain  One negative set of blood smears does not rule out  the possibility of a parasitic infection.  A minimum of 3  specimens should be collected, at least 12-24 hours apart,  over a 36 hour time period.     .Blood Blood  09-16-21   No Blood Parasites observed by giemsa stain  One negative set of blood smears does not rule out  the possibility of a parasitic infection.  A minimum of 3  specimens should be collected, at least 12-24 hours apart,  over a 36 hour time period.     .Stool Feces  09-15-21   No Protozoa seen by trichrome stain  No Helminths or Protozoa seen in formalin concentrate  performed by iodine stain  (routine O+P not evaluated for Microsporidia,  Cryptosporidia, Cyclospora, or Isospora.)  One negative sample does not necessarily rule  out the presence of a parasitic infection.  Moderate WBC's  Moderate Red blood cells  --  --    Clean Catch Clean Catch (Midstream)  09-15-21   <10,000 CFU/mL Normal Urogenital Shelia  --  --    C Diff by PCR Result: NotDetec (09-16 @ 17:29)    C Diff by PCR Result: NotDetec (09-16-21 @ 17:29)    (otherwise reviewed)    RADIOLOGY:    9/15 USG:      IMPRESSION:    No portal vein thrombosis.    Redemonstrated hepatic metastases with mild perihepatic ascites.

## 2021-09-21 NOTE — PROGRESS NOTE ADULT - ASSESSMENT
76 yo M hx of CAD, MI, stent x2 (10/2009 at [facility]), pAfib, HTN, HLD, BIV-AICD, and systolic CHF, hx of PE (on Eliquis), recently dx’d pancreatic mass on chemo at [facility] [place] now presenting with fever of 101 at home and several days of watery diarrhea concerning for C diff colitis.

## 2021-09-21 NOTE — PROGRESS NOTE ADULT - ASSESSMENT
1. Metastatic Pancreatic CA    -- dx on 8/21/21  -- [facility]  -- Started treatment on 8/31/21 per protocol , randomized to gemcitabine/nab-paclitaxel and dual immmunotherapy. Received [place]/Abraxane on 9/14  -- further care as outpt after d/c    2. Diarrhea    -- C diff negative  -- stool studies negative   -- may be sec to immunotx  -- appears to be resolving spontaneously  --  holding off on steroids    3. Fevers    -- ID following  -- No documented fevers  -- off of PO Vanco  -- on zosyn    4. Abnormal LFTS    -- significant transaminitis, likely sec to immunotherapy vs chemo vs mets  -- liver US shows mets   -- LFTs are slowly improving  -- Dr Pisano spoke to clinical trial staff at [facility] today about starting steroids. Recommend holding off for now    5. pancytopenia     -- cont Zarxio daily until ANC > 1500 x 2 consecutive days, WBC improving, ANC 1400  -- pan culture and broad spectrum abx if T > 100.4  -- likely form recent chemo   -- hg>7  -- plt>10k, if bleeding >50k   -- plts lower today, likely multifactorial, adequate    6. Hyponatremia    -- slightly improved today  -- renal following    D/w pt and primary team, will follow, total time spent 35min, >50% spent in discussion and coordination of care.

## 2021-09-21 NOTE — DIETITIAN INITIAL EVALUATION ADULT. - OTHER INFO
76 yo M hx of CAD, MI, stent x2 (10/2009 at [facility]), pAfib, HTN, HLD, BIV-AICD, and systolic CHF, hx of PE (on Eliquis), recently dx’d pancreatic mass on chemo at [facility] [place] now presenting with fever of 101 at home and several days of watery diarrhea concerning for C diff colitis.    Pt remains with very poor appetite and po intakes since admission. Family reports Pt was on immunotherapy trial and chemotherapy at [facility] [place]. Pt is also very nauseas and complaints of mouth sores and is using miracle mouthwash. Pt was resting at the time of visit and most information obtained from Daughter Shameka at the bedside. Family reports Pt's face is looking very "thin". Unable to assess for weight changes due to edema present. No admit weight was noted, reported weight is 207#. Pt was admitted with diarrhea, is negative for CDiff. No diarrhea thus far today. Family is inquiring how Pt can increase his appetite/po intake and strength. This will be challenging as Pt is without an appetite and nausea. Noted Ensure Enlive ordered, suggested may trial with Ensure Clear instead. Family was in agreement and will encourage Pt. No reported difficulty with chewing.   HIE weight hx noted; (8/21) 95.3 kg  (1/21) 99.3 kg 74 yo M hx of CAD, MI, stent x2 (10/2009 at [facility]), pAfib, HTN, HLD, BIV-AICD, and systolic CHF, hx of PE (on Eliquis), recently dx’d pancreatic mass on chemo at [facility] now presenting with fever of 101 at home and several days of watery diarrhea concerning for C diff colitis.    Pt remains with very poor appetite and po intakes since admission. Family reports Pt was on immunotherapy trial and chemotherapy at [facility]. Pt is also very nauseas and complaints of mouth sores and is using miracle mouthwash. Pt was resting at the time of visit and most information obtained from Daughter Shameka at the bedside. Family reports Pt's face is looking very "thin". Unable to assess for weight changes due to edema present. No admit weight was noted, reported weight is 207#. Pt was admitted with diarrhea, is negative for CDiff. No diarrhea thus far today. Family is inquiring how Pt can increase his appetite/po intake and strength. This will be challenging as Pt is without an appetite and c/o nausea. Noted Ensure Enlive ordered, suggested may trial with Ensure Clear instead. Family was in agreement and will encourage Pt. No reported difficulty with chewing.   HIE weight hx noted; (8/21) 95.3 kg  (1/21) 99.3 kg

## 2021-09-21 NOTE — PROGRESS NOTE ADULT - ASSESSMENT
75 year old male with a PMH of HTN, HLD, CAD, MI, PCI with stent x2 2009, chronic HFrEF, s/p BIV ICD ([employer]), BPH, recently diagnosed with DVT/PE-was started on AC therapy Lovenox then Eliquis and recently diagnosed with metastatic pancreatic cancer in August, started on chemotherapy at [facility] [place] for past few weeks.  Patient presented to ED with fever and N/V and frequent diarrhea for few days and decrease po intake for past few weeks.  Patient reports "low BP" and his BB Carvedilol was reduced to 6.25mg Bid, Spironolactone and Losartan were held.  Upon admission, patient was noted to have severe hyponatremia, leukopenia and persistent high transaminitis.  BP meds include BB/ARB were held due to borderline SBP 's and severe nausea.  He was started on Albumin assisted diuresis for fluid overload/ hyponatremia. Improving leukopenia and down trending transaminitis noted.   BiV ICD interrogation revealed recently onset of multiple PAF with RVR since Sep 5, 2021 with a longest episode lasted 4 hours with ventricular rate up to 194 bpm recorded on 9/20.  Alert from his device showing average ventricular>100 during AT/AF (>6 hours) for 5 days.  Noted his onset of PAF with RVR correlated to his increased OptiVol (fluid accumulation) index recorded by his ICD.  suspect his PAF with RVR likely contributed to his worsening HF.  In light of his comorbidity, conservative approach with rate control for his PAF is preferred over rhythm control strategy.  In addition his transaminitis will preclude use of antiarrhythmic drug.     -Consider continuous telemetry monitoring for PAF with RVR (daily EKG if not on telemetry)  -Continue care per nephology/ID/heme, tolerating Albumin assisted diuresis   -May use IV Lopressor for rate control if PAF with RVR recur, BP improved off BP meds (BB/ARB on hold), resume lower dose of BB (coreg 6.25mg BID) if BP stable and tolerating oral intake  -Continue anticoagulation therapy Eliquis 5mg BID for elevated [id number] score of >5   -Will follow up

## 2021-09-21 NOTE — PROGRESS NOTE ADULT - ASSESSMENT
75y Male with history of HTN, CHF, pancreatic CA on chemo presents with fevers and diarrhea. Nephrology consulted for hyponatremia.    1. Hyponatremia: Secondary to volume overload. Serum Na improving. Urine studies consistent with decreased EABV. Continue with IV albumin 25% in 50 ml Q6 hours X 2 days with concurrent lasix 40 mg IV twice daily. Continue with 1L FR. Will consider tolvaptan if LFT's continue to improve. Monitor serum Na.    2. HTN: BP low normal. Holding anti-hypertensive medications. Monitor BP.    3. LE edema: IV albumin and lasix as above. Continue with ensure to increase oncotic pressure. Monitor UO.    4. Chronic systolic HF: As per cardiology.       [facility] NEPHROLOGY  Matthias Keenan M.D.  Tobi Goodman D.O.  Portia Daily M.D.  Hanna Trammell, MSN, ANP-C    Telephone: [phone]  Facsimile: [phone]    [address] [place] [address]

## 2021-09-21 NOTE — PROGRESS NOTE ADULT - SUBJECTIVE AND OBJECTIVE BOX
SUBJECTIVE / OVERNIGHT EVENTS:pt seen and examined    MEDICATIONS  (STANDING):  albumin human 25% IVPB 50 milliLiter(s) IV Intermittent every 6 hours  aspirin  chewable 81 milliGRAM(s) Oral daily  atorvastatin 80 milliGRAM(s) Oral at bedtime  baclofen 5 milliGRAM(s) Oral three times a day  carvedilol 6.25 milliGRAM(s) Oral every 12 hours  dextrose 40% Gel 15 Gram(s) Oral once  dextrose 5%. 1000 milliLiter(s) (50 mL/Hr) IV Continuous <Continuous>  dextrose 5%. 1000 milliLiter(s) (100 mL/Hr) IV Continuous <Continuous>  dextrose 50% Injectable 25 Gram(s) IV Push once  dextrose 50% Injectable 12.5 Gram(s) IV Push once  dextrose 50% Injectable 25 Gram(s) IV Push once  diphenoxylate/atropine 1 Tablet(s) Oral daily  enoxaparin Injectable 70 milliGRAM(s) SubCutaneous two times a day  finasteride 5 milliGRAM(s) Oral at bedtime  furosemide   Injectable 40 milliGRAM(s) IV Push two times a day  glucagon  Injectable 1 milliGRAM(s) IntraMuscular once  influenza  Vaccine (HIGH DOSE) 0.7 milliLiter(s) IntraMuscular once  insulin lispro (ADMELOG) corrective regimen sliding scale   SubCutaneous three times a day before meals  insulin lispro (ADMELOG) corrective regimen sliding scale   SubCutaneous at bedtime  MIRACLE MOUTHWASH 30 milliLiter(s) Swish and Spit every 6 hours  pancrelipase  (CREON 24,000 Lipase Units) 1 Capsule(s) Oral four times a day with meals  piperacillin/tazobactam IVPB.. 3.375 Gram(s) IV Intermittent every 8 hours  potassium phosphate / sodium phosphate Tablet (K-PHOS No. 2) 1 Tablet(s) Oral three times a day with meals    MEDICATIONS  (PRN):  acetaminophen   Tablet .. 650 milliGRAM(s) Oral every 6 hours PRN Temp greater or equal to 38.5C (101.3F), Mild Pain (1 - 3)  aluminum hydroxide/magnesium hydroxide/simethicone Suspension 30 milliLiter(s) Oral every 4 hours PRN Dyspepsia  calcium carbonate    500 mG (Tums) Chewable 1 Tablet(s) Chew three times a day PRN Dyspepsia  melatonin 3 milliGRAM(s) Oral at bedtime PRN Insomnia  ondansetron Injectable 4 milliGRAM(s) IV Push every 8 hours PRN Nausea and/or Vomiting    Vital Signs Last 24 Hrs  T(C): 36.9 (21 @ 21:57), Max: 36.9 (21 @ 21:57)  T(F): 98.4 (21 @ 21:57), Max: 98.4 (21 @ 21:57)  HR: 57 (21 @ 21:57) (57 - 112)  BP: 99/56 (21 @ 21:57) (89/62 - 124/59)  BP(mean): --  RR: 18 (21 @ 21:57) (17 - 19)  SpO2: 99% (21 @ 21:57) (93% - 99%)        Constitutional: No fever, fatigue  Skin: No rash.  Eyes: No recent vision problems or eye pain.  ENT: No congestion, ear pain, or sore throat.  Cardiovascular: No chest pain or palpation.  Respiratory: No cough, shortness of breath, congestion, or wheezing.  Gastrointestinal: No abdominal pain, nausea, vomiting, or diarrhea.  Genitourinary: No dysuria.  Musculoskeletal: No joint swelling.  Neurologic: No headache.    PHYSICAL EXAM:  GENERAL: NAD  EYES: EOMI, PERRLA  NECK: Supple, No JVD  CHEST/LUNG: dec breath sounds at bases  HEART:  S1 , S2 +  ABDOMEN: soft , bs+  EXTREMITIES:no edema    NEUROLOGY:alert awake    LABS:      126<L>  |  89<L>  |  37<H>  ----------------------------<  175<H>  4.6   |  21<L>  |  1.13    Ca    8.2<L>      21 Sep 2021 07:22  Phos  2.2       Mg     2.50         TPro  5.8<L>  /  Alb  3.0<L>  /  TBili  1.2  /  DBili      /  AST  201<H>  /  ALT  421<H>  /  AlkPhos  161<H>      Creatinine Trend: 1.13 <--, 1.07 <--, 1.01 <--, 1.02 <--, 0.96 <--, 0.98 <--, 0.93 <--, 0.91 <--, 1.10 <--                        9.8    3.20  )-----------( 40       ( 21 Sep 2021 07:22 )             27.8     Urine Studies:  Urinalysis Basic - ( 15 Sep 2021 03:16 )    Color: Jennifer / Appearance: Slightly Turbid / S.028 / pH:   Gluc:  / Ketone: Negative  / Bili: Negative / Urobili: 3 mg/dL   Blood:  / Protein: 30 mg/dL / Nitrite: Negative   Leuk Esterase: Negative / RBC: <5 /HPF / WBC <5 /HPF   Sq Epi:  / Non Sq Epi: FEW /HPF / Bacteria: Few      Osmolality, Random Urine: 825 mosm/kg ( @ 15:37)  Sodium, Random Urine: <20 mmol/L ( @ 15:37)  Sodium, Random Urine: <20 mmol/L ( @ 15:20)  Osmolality, Random Urine: 797 mosm/kg ( @ 15:20)  Osmolality, Random Urine: 774 mosm/kg ( @ 20:51)          LIVER FUNCTIONS - ( 21 Sep 2021 07:22 )  Alb: 3.0 g/dL / Pro: 5.8 g/dL / ALK PHOS: 161 U/L / ALT: 421 U/L / AST: 201 U/L / GGT: x               Care Discussed with Consultants/Other Providers:

## 2021-09-21 NOTE — PROGRESS NOTE ADULT - ASSESSMENT
76 yo M HTN, HLD, pancreatic mass, recent chemo, presents with diarrhea  No documented fever, has leukopenia  Chemo 1 day prior to presentation  CXR clear  USG abd with hepatic mets  Diarrhea 3-4 days prior to presentation, recent chemo  WBC improving  Overall,  1) Diarrhea  - C diff PCR neg, GI PCR neg  - Suspect chemo related  2) Elevated LFTs  - Chemo/malignancy related? Trending down  3) Neutropenia/subjective fever  - Continue Zosyn until ANC >500 and afebrile x 48 hours (can discontinue after this timeframe)  - Monitor for focal signs infection    Signing off. Please call with further questions or change in status.    Vicente Anton MD  Pager [phone]  From 5pm-9am, and on weekends call [phone]

## 2021-09-21 NOTE — PHYSICAL THERAPY INITIAL EVALUATION ADULT - PERTINENT HX OF CURRENT PROBLEM, REHAB EVAL
75 year old Male history of CAD, MI, stent x2 (10/2009 at [facility]), pAfib, HTN, HLD, BIV-AICD, and systolic CHF, history of PE (on Eliquis), recently dx’d pancreatic mass on chemo at [facility] [place] now presenting with fever of 101 at home and several days of watery diarrhea concerning for C diff colitis.

## 2021-09-21 NOTE — PROGRESS NOTE ADULT - SUBJECTIVE AND OBJECTIVE BOX
[facility] NEPHROLOGY- PROGRESS NOTE    75y Male with history of HTN, CHF, pancreatic CA on chemo presents with fevers and diarrhea. Nephrology consulted for hyponatremia.    REVIEW OF SYSTEMS:  Gen: no changes in weight  Cards: no chest pain  Resp: no dyspnea  GI: no nausea or vomiting or diarrhea  Vascular: + LE edema improving    No Known Allergies      Hospital Medications: Medications reviewed      VITALS:  T(F): 98.1 (21 @ 01:53), Max: 98.4 (21 @ 16:56)  HR: 75 (21 @ 01:53)  BP: 124/59 (21 @ 01:53)  RR: 17 (21 @ 01:53)  SpO2: 99% (21 @ 01:53)  Wt(kg): --     @ 07:01  -   @ 07:00  --------------------------------------------------------  IN: 905 mL / OUT: 1150 mL / NET: -245 mL      PHYSICAL EXAM:    Gen: NAD, calm  Cards: RRR, +S1/S2, no M/G/R  Resp: CTA B/L  GI: soft, NT/ND, NABS  Vascular: 2+ LE edema B/L, LUE edema      LABS:      126<L>  |  89<L>  |  37<H>  ----------------------------<  175<H>  4.6   |  21<L>  |  1.13    Ca    8.2<L>      21 Sep 2021 07:22  Phos  2.2       Mg     2.50         TPro  5.8<L>  /  Alb  3.0<L>  /  TBili  1.2  /  DBili      /  AST  201<H>  /  ALT  421<H>  /  AlkPhos  161<H>      Creatinine Trend: 1.13 <--, 1.07 <--, 1.01 <--, 1.02 <--, 0.96 <--, 0.98 <--, 0.93 <--, 0.91 <--, 1.10 <--, 1.14 <--                        9.8    3.20  )-----------( 40       ( 21 Sep 2021 07:22 )             27.8     Urine Studies:  Urinalysis Basic - ( 15 Sep 2021 03:16 )    Color: Jennifer / Appearance: Slightly Turbid / S.028 / pH:   Gluc:  / Ketone: Negative  / Bili: Negative / Urobili: 3 mg/dL   Blood:  / Protein: 30 mg/dL / Nitrite: Negative   Leuk Esterase: Negative / RBC: <5 /HPF / WBC <5 /HPF   Sq Epi:  / Non Sq Epi: FEW /HPF / Bacteria: Few      Osmolality, Random Urine: 825 mosm/kg ( @ 15:37)  Sodium, Random Urine: <20 mmol/L ( @ 15:37)  Sodium, Random Urine: <20 mmol/L ( @ 15:20)  Osmolality, Random Urine: 797 mosm/kg ( @ 15:20)  Osmolality, Random Urine: 774 mosm/kg ( @ 20:51)

## 2021-09-21 NOTE — DIETITIAN INITIAL EVALUATION ADULT. - PROBLEM SELECTOR PLAN 1
Likely source from GI: C diff vs colitis  -Stool culture, GI PCR, C DIff, O/P ordered  -Hold imodium for now  -Start Vanco PO 125mg q6h  -Cover with Zosyn for now  -If worsening symptoms, consider CT A/P and ID consult

## 2021-09-21 NOTE — PHYSICAL THERAPY INITIAL EVALUATION ADULT - ADDITIONAL COMMENTS
Pt lives in [facility] with wife. +elevator access. Pt was independent in functional activities prior to admission without use of assistive device.     Pt left seated in bedside chair, NAD. RN aware of session. +call bell.

## 2021-09-21 NOTE — DIETITIAN INITIAL EVALUATION ADULT. - PERTINENT LABORATORY DATA
09-21 @ 07:22: Na 126<L>, BUN 37<H>, Cr 1.13, <H>, K+ 4.6, Phos 2.2<L>, Mg 2.50, Alk Phos 161<H>, ALT/SGPT 421<H>, AST/SGOT 201<H>, HbA1c --6.8    CAPILLARY BLOOD GLUCOSE  POCT Blood Glucose.: 199 mg/dL (21 Sep 2021 09:26)  POCT Blood Glucose.: 191 mg/dL (21 Sep 2021 00:05)  POCT Blood Glucose.: 202 mg/dL (20 Sep 2021 19:29)

## 2021-09-21 NOTE — PHYSICAL THERAPY INITIAL EVALUATION ADULT - GENERAL OBSERVATIONS, REHAB EVAL
Pt received seated in bedside chair, NAD. Pt agreeable to PT consultation. Cleared for PT as per SHAYAN Mathur

## 2021-09-21 NOTE — PROGRESS NOTE ADULT - SUBJECTIVE AND OBJECTIVE BOX
Patient is a 75y old  Male who presents with a chief complaint of Fever (21 Sep 2021 13:16)    Less nausea, poor PO intake.  Denies palpitations or SOB.     PAST MEDICAL & SURGICAL HISTORY:  Hypertension (ICD9 401.9)    Hyperlipidemia (ICD9 272.4)    Bacteremia (ICD9 790.7)    AF (Atrial Fibrillation) (ICD9 427.31)    Pneumonia (ICD9 486)    BPH (Benign Prostatic Hyperplasia)    Dyspepsia    Borderline diabetes mellitus    MI (myocardial infarction)  2009    Systolic heart failure    Hernia    Biventricular ICD (implantable cardioverter-defibrillator) in place  2010    Stented coronary artery  X 2, 2009        MEDICATIONS  (STANDING):  albumin human 25% IVPB 50 milliLiter(s) IV Intermittent every 6 hours  aspirin  chewable 81 milliGRAM(s) Oral daily  atorvastatin 80 milliGRAM(s) Oral at bedtime  baclofen 5 milliGRAM(s) Oral three times a day  diphenoxylate/atropine 1 Tablet(s) Oral daily  enoxaparin Injectable 70 milliGRAM(s) SubCutaneous two times a day  finasteride 5 milliGRAM(s) Oral at bedtime  furosemide   Injectable 40 milliGRAM(s) IV Push two times a day  influenza  Vaccine (HIGH DOSE) 0.7 milliLiter(s) IntraMuscular once  MIRACLE MOUTHWASH 30 milliLiter(s) Swish and Spit every 6 hours  pancrelipase  (CREON 24,000 Lipase Units) 1 Capsule(s) Oral four times a day with meals  piperacillin/tazobactam IVPB.. 3.375 Gram(s) IV Intermittent every 8 hours    MEDICATIONS  (PRN):  acetaminophen   Tablet .. 650 milliGRAM(s) Oral every 6 hours PRN Temp greater or equal to 38.5C (101.3F), Mild Pain (1 - 3)  aluminum hydroxide/magnesium hydroxide/simethicone Suspension 30 milliLiter(s) Oral every 4 hours PRN Dyspepsia  calcium carbonate    500 mG (Tums) Chewable 1 Tablet(s) Chew three times a day PRN Dyspepsia  melatonin 3 milliGRAM(s) Oral at bedtime PRN Insomnia  ondansetron Injectable 4 milliGRAM(s) IV Push every 8 hours PRN Nausea and/or Vomiting            Vital Signs Last 24 Hrs  T(C): 36.7 (21 Sep 2021 01:53), Max: 36.9 (20 Sep 2021 16:56)  T(F): 98.1 (21 Sep 2021 01:53), Max: 98.4 (20 Sep 2021 16:56)  HR: 75 (21 Sep 2021 01:53) (75 - 96)  BP: 124/59 (21 Sep 2021 01:53) (105/60 - 124/59)  BP(mean): --  RR: 17 (21 Sep 2021 01:53) (17 - 18)  SpO2: 99% (21 Sep 2021 01:53) (97% - 99%)            INTERPRETATION OF TELEMETRY: not on telemetry    ECG:        LABS:                        9.8    3.20  )-----------( 40       ( 21 Sep 2021 07:22 )             27.8     09-21    126<L>  |  89<L>  |  37<H>  ----------------------------<  175<H>  4.6   |  21<L>  |  1.13    Ca    8.2<L>      21 Sep 2021 07:22  Phos  2.2     09-21  Mg     2.50     09-21    TPro  5.8<L>  /  Alb  3.0<L>  /  TBili  1.2  /  DBili  x   /  AST  201<H>  /  ALT  421<H>  /  AlkPhos  161<H>  09-21              BNP  RADIOLOGY & ADDITIONAL STUDIES:        PHYSICAL EXAM:    GENERAL: In no apparent distress, well nourished, and hydrated.  NECK: Supple and normal thyroid.  No JVD or carotid bruit.  Carotid pulse is 2+ bilaterally.  HEART: Regular rate and rhythm; No murmurs, rubs, or gallops.  L BiV ICD  PULMONARY: Clear to auscultation and perfusion.  No rales, wheezing, or rhonchi bilaterally.  ABDOMEN: Soft, Nontender, Nondistended; Bowel sounds present  EXTREMITIES:  2+ Peripheral Pulses, No clubbing, cyanosis, 2+ grossly edematous srinivas LE  NEUROLOGICAL: alert oriented no focal neurological deficit         Tylenol/Motrin for pain

## 2021-09-21 NOTE — DIETITIAN INITIAL EVALUATION ADULT. - ADD RECOMMEND
1. Suggest D/C Ensure Enlive 3x daily and order Ensure Clear 3x daily instead.  2. Monitor POCT and cover with Insulin.

## 2021-09-21 NOTE — DIETITIAN INITIAL EVALUATION ADULT. - PROBLEM SELECTOR PLAN 2
Likely related to sepsis vs underlying pancreatic cancer  -Acute viral panels sent  -RUQ for portal vein thrombosis and hepatitis

## 2021-09-21 NOTE — PROGRESS NOTE ADULT - SUBJECTIVE AND OBJECTIVE BOX
Patient is a 75y Male     Patient is a 75y old  Male who presents with a chief complaint of Fever (20 Sep 2021 14:12)      HPI:  74 yo M hx of CAD, MI, stent x2 (10/2009 at [facility]), pAfib, HTN, HLD, BIV-AICD, and systolic CHF, hx of PE (on Eliquis), recently dx’d pancreatic mass on chemo at [facility] now presenting with fever of 101 at home. He also endorsed diarrhea x3-4 days, watery. 4-5x daily. No recent abx exposure. No abdominal pain. Denies chest pain, dyspnea, palpitations. Just finished chemo today. Called his onc who advised him to come in. No abx exposure.  In the ED, vitals stable. Labs with leukopenia without neutropenia. Elevated ASt/ALT, hyponatremia. EKG paced. CXR clear. (15 Sep 2021 01:36)      PAST MEDICAL & SURGICAL HISTORY:  Hypertension (ICD9 401.9)    Hyperlipidemia (ICD9 272.4)    BPH (Benign Prostatic Hyperplasia)    Borderline diabetes mellitus    MI (myocardial infarction)  2009    Systolic heart failure    Hernia    Biventricular ICD (implantable cardioverter-defibrillator) in place  2010    Stented coronary artery  X 2, 2009        MEDICATIONS  (STANDING):  albumin human 25% IVPB 50 milliLiter(s) IV Intermittent every 6 hours  aspirin  chewable 81 milliGRAM(s) Oral daily  atorvastatin 80 milliGRAM(s) Oral at bedtime  baclofen 5 milliGRAM(s) Oral three times a day  diphenoxylate/atropine 1 Tablet(s) Oral daily  enoxaparin Injectable 70 milliGRAM(s) SubCutaneous two times a day  finasteride 5 milliGRAM(s) Oral at bedtime  furosemide   Injectable 40 milliGRAM(s) IV Push two times a day  influenza  Vaccine (HIGH DOSE) 0.7 milliLiter(s) IntraMuscular once  MIRACLE MOUTHWASH 30 milliLiter(s) Swish and Spit every 6 hours  pancrelipase  (CREON 24,000 Lipase Units) 1 Capsule(s) Oral four times a day with meals  piperacillin/tazobactam IVPB.. 3.375 Gram(s) IV Intermittent every 8 hours      Allergies    No Known Allergies    Intolerances        SOCIAL HISTORY:  Denies ETOh,Smoking,     FAMILY HISTORY:  No pertinent family history in first degree relatives        REVIEW OF SYSTEMS:    CONSTITUTIONAL: No weakness, fevers or chills  EYES/ENT: No visual changes;  No vertigo or throat pain   NECK: No pain or stiffness  RESPIRATORY: No cough, wheezing, hemoptysis; No shortness of breath  CARDIOVASCULAR: No chest pain or palpitations  GASTROINTESTINAL: No abdominal or epigastric pain. No nausea, vomiting, or hematemesis; No diarrhea or constipation. No melena or hematochezia.  GENITOURINARY: No dysuria, frequency or hematuria  NEUROLOGICAL: No numbness or weakness  SKIN: No itching, burning, rashes, or lesions   All other review of systems is negative unless indicated above.    VITAL:  T(C): , Max: 36.9 (09-20-21 @ 16:56)  T(F): , Max: 98.4 (09-20-21 @ 16:56)  HR: 75 (09-21-21 @ 01:53)  BP: 124/59 (09-21-21 @ 01:53)  BP(mean): --  RR: 17 (09-21-21 @ 01:53)  SpO2: 99% (09-21-21 @ 01:53)  Wt(kg): --    I and O's:    09-19 @ 07:01  -  09-20 @ 07:00  --------------------------------------------------------  IN: 600 mL / OUT: 600 mL / NET: 0 mL    09-20 @ 07:01  -  09-21 @ 07:00  --------------------------------------------------------  IN: 905 mL / OUT: 1150 mL / NET: -245 mL          PHYSICAL EXAM:    Constitutional: NAD  HEENT: PERRLA,   Neck: No JVD  Respiratory: CTA B/L  Cardiovascular: S1 and S2  Gastrointestinal: BS+, soft, NT/ND  Extremities: No peripheral edema  Neurological: A/O x 3, no focal deficits  Psychiatric: Normal mood, normal affect  : No Bowman  Skin: No rashes  Access: Not applicable  Back: No CVA tenderness    LABS:                        9.8    3.20  )-----------( 40       ( 21 Sep 2021 07:22 )             27.8     09-21    126<L>  |  89<L>  |  37<H>  ----------------------------<  175<H>  4.6   |  21<L>  |  1.13    Ca    8.2<L>      21 Sep 2021 07:22  Phos  2.2     09-21  Mg     2.50     09-21    TPro  5.8<L>  /  Alb  3.0<L>  /  TBili  1.2  /  DBili  x   /  AST  201<H>  /  ALT  421<H>  /  AlkPhos  161<H>  09-21          RADIOLOGY & ADDITIONAL STUDIES:

## 2021-09-21 NOTE — PROGRESS NOTE ADULT - ATTENDING COMMENTS
75 year old male with a PMH of HTN, HLD, CAD, MI, PCI with stent x2 2009, chronic HFrEF, s/p BIV ICD (Medtronic), BPH, recently diagnosed with DVT/PE-was started on AC therapy Lovenox then Eliquis and recently diagnosed with metastatic pancreatic cancer in August, started on chemotherapy at [facility] for past few weeks.  Patient presented to ED with fever and N/V and frequent diarrhea for few days and decrease po intake for past few weeks.  Patient reports "low BP" and his BB Carvedilol was reduced to 6.25mg Bid, Spironolactone and Losartan were held.  Upon admission, patient was noted to have severe hyponatremia, leukopenia and persistent high transaminitis.  BP meds include BB/ARB were held due to borderline SBP 's and severe nausea.  He was started on Albumin assisted diuresis for fluid overload/ hyponatremia. Improving leukopenia and down trending transaminitis noted.   BiV ICD interrogation revealed recently onset of multiple PAF with RVR since Sep 5, 2021 with a longest episode lasted 4 hours with ventricular rate up to 194 bpm recorded on 9/20.  Alert from his device showing average ventricular>100 during AT/AF (>6 hours) for 5 days.  Noted his onset of PAF with RVR correlated to his increased OptiVol (fluid accumulation) index recorded by his ICD.  suspect his PAF with RVR likely contributed to his worsening HF.  In light of his comorbidity, conservative approach with rate control for his PAF is preferred over rhythm control strategy.  In addition his transaminitis will preclude use of antiarrhythmic drug.     -Consider continuous telemetry monitoring for PAF with RVR (daily EKG if not on telemetry)  -Continue care per nephology/ID/heme, tolerating Albumin assisted diuresis   -May use IV Lopressor for rate control if PAF with RVR recur, BP improved off BP meds (BB/ARB on hold), resume lower dose of BB (coreg 6.25mg BID) if BP stable and tolerating oral intake  -Continue anticoagulation therapy Eliquis 5mg BID for elevated [id number] score of >5   -Will follow up

## 2021-09-21 NOTE — DIETITIAN INITIAL EVALUATION ADULT. - PERTINENT MEDS FT
MEDICATIONS  (STANDING):  albumin human 25% IVPB 50 milliLiter(s) IV Intermittent every 6 hours  aspirin  chewable 81 milliGRAM(s) Oral daily  atorvastatin 80 milliGRAM(s) Oral at bedtime  baclofen 5 milliGRAM(s) Oral three times a day  diphenoxylate/atropine 1 Tablet(s) Oral daily  enoxaparin Injectable 70 milliGRAM(s) SubCutaneous two times a day  finasteride 5 milliGRAM(s) Oral at bedtime  furosemide   Injectable 40 milliGRAM(s) IV Push two times a day  influenza  Vaccine (HIGH DOSE) 0.7 milliLiter(s) IntraMuscular once  MIRACLE MOUTHWASH 30 milliLiter(s) Swish and Spit every 6 hours  pancrelipase  (CREON 24,000 Lipase Units) 1 Capsule(s) Oral four times a day with meals  piperacillin/tazobactam IVPB.. 3.375 Gram(s) IV Intermittent every 8 hours

## 2021-09-21 NOTE — PHYSICAL THERAPY INITIAL EVALUATION ADULT - LEVEL OF INDEPENDENCE: SIT/STAND, REHAB EVAL
performed x 2 with 5 minute seated rest break between repetitions/minimum assist (75% patients effort)

## 2021-09-21 NOTE — PHYSICAL THERAPY INITIAL EVALUATION ADULT - LEVEL OF INDEPENDENCE: GAIT, REHAB EVAL
deferred secondary to fatigue, SOB and "whooziness" s/p sit-stand transfers. Pt states symptoms subside when returned to sitting in bedside chair. RN made aware.

## 2021-09-21 NOTE — PROGRESS NOTE ADULT - SUBJECTIVE AND OBJECTIVE BOX
Pt seen, said he does not feel well but when I asked further, reports that he is feeling better. Diarrhea better.    MEDICATIONS  (STANDING):  albumin human 25% IVPB 50 milliLiter(s) IV Intermittent every 6 hours  aspirin  chewable 81 milliGRAM(s) Oral daily  atorvastatin 80 milliGRAM(s) Oral at bedtime  baclofen 5 milliGRAM(s) Oral three times a day  diphenoxylate/atropine 1 Tablet(s) Oral daily  enoxaparin Injectable 70 milliGRAM(s) SubCutaneous two times a day  finasteride 5 milliGRAM(s) Oral at bedtime  furosemide   Injectable 40 milliGRAM(s) IV Push two times a day  influenza  Vaccine (HIGH DOSE) 0.7 milliLiter(s) IntraMuscular once  MIRACLE MOUTHWASH 30 milliLiter(s) Swish and Spit every 6 hours  pancrelipase  (CREON 24,000 Lipase Units) 1 Capsule(s) Oral four times a day with meals  piperacillin/tazobactam IVPB.. 3.375 Gram(s) IV Intermittent every 8 hours  potassium phosphate / sodium phosphate Tablet (K-PHOS No. 2) 1 Tablet(s) Oral three times a day with meals    MEDICATIONS  (PRN):  acetaminophen   Tablet .. 650 milliGRAM(s) Oral every 6 hours PRN Temp greater or equal to 38.5C (101.3F), Mild Pain (1 - 3)  aluminum hydroxide/magnesium hydroxide/simethicone Suspension 30 milliLiter(s) Oral every 4 hours PRN Dyspepsia  calcium carbonate    500 mG (Tums) Chewable 1 Tablet(s) Chew three times a day PRN Dyspepsia  melatonin 3 milliGRAM(s) Oral at bedtime PRN Insomnia  ondansetron Injectable 4 milliGRAM(s) IV Push every 8 hours PRN Nausea and/or Vomiting      ROS  limited 2/2 participation  as above  no pain, SOB, CP    Vital Signs Last 24 Hrs  T(C): 36.7 (21 Sep 2021 01:53), Max: 36.9 (20 Sep 2021 16:56)  T(F): 98.1 (21 Sep 2021 01:53), Max: 98.4 (20 Sep 2021 16:56)  HR: 75 (21 Sep 2021 01:53) (75 - 96)  BP: 124/59 (21 Sep 2021 01:53) (105/60 - 124/59)  BP(mean): --  RR: 17 (21 Sep 2021 01:53) (17 - 18)  SpO2: 99% (21 Sep 2021 01:53) (97% - 99%)    PE  NAD  Awake, alert  Anicteric  limited 2/2 participation                          9.8    3.20  )-----------( 40       ( 21 Sep 2021 07:22 )             27.8       09-21    126<L>  |  89<L>  |  37<H>  ----------------------------<  175<H>  4.6   |  21<L>  |  1.13    Ca    8.2<L>      21 Sep 2021 07:22  Phos  2.2     09-21  Mg     2.50     09-21    TPro  5.8<L>  /  Alb  3.0<L>  /  TBili  1.2  /  DBili  x   /  AST  201<H>  /  ALT  421<H>  /  AlkPhos  161<H>  09-21

## 2021-09-22 NOTE — PROGRESS NOTE ADULT - ASSESSMENT
lfts improigng, likely medicaiton, can increase lomotil, pt wants "team input"   conservattive gi magnemnt  lfts partial from metastasis as kya howard mutlifactoral

## 2021-09-22 NOTE — PROGRESS NOTE ADULT - SUBJECTIVE AND OBJECTIVE BOX
Patient is a 75y Male     Patient is a 75y old  Male who presents with a chief complaint of Fever (22 Sep 2021 08:38)      HPI:  74 yo M hx of CAD, MI, stent x2 (10/2009 at [facility]), pAfib, HTN, HLD, BIV-AICD, and systolic CHF, hx of PE (on Eliquis), recently dx’d pancreatic mass on chemo at [facility] now presenting with fever of 101 at home. He also endorsed diarrhea x3-4 days, watery. 4-5x daily. No recent abx exposure. No abdominal pain. Denies chest pain, dyspnea, palpitations. Just finished chemo today. Called his onc who advised him to come in. No abx exposure.  In the ED, vitals stable. Labs with leukopenia without neutropenia. Elevated ASt/ALT, hyponatremia. EKG paced. CXR clear. (15 Sep 2021 01:36)      PAST MEDICAL & SURGICAL HISTORY:  Hypertension (ICD9 401.9)    Hyperlipidemia (ICD9 272.4)    BPH (Benign Prostatic Hyperplasia)    Borderline diabetes mellitus    MI (myocardial infarction)  2009    Systolic heart failure    Hernia    Biventricular ICD (implantable cardioverter-defibrillator) in place  2010    Stented coronary artery  X 2, 2009        MEDICATIONS  (STANDING):  albumin human 25% IVPB 50 milliLiter(s) IV Intermittent every 6 hours  aspirin  chewable 81 milliGRAM(s) Oral daily  atorvastatin 80 milliGRAM(s) Oral at bedtime  baclofen 5 milliGRAM(s) Oral three times a day  carvedilol 6.25 milliGRAM(s) Oral every 12 hours  dextrose 40% Gel 15 Gram(s) Oral once  dextrose 5%. 1000 milliLiter(s) (50 mL/Hr) IV Continuous <Continuous>  dextrose 5%. 1000 milliLiter(s) (100 mL/Hr) IV Continuous <Continuous>  dextrose 50% Injectable 25 Gram(s) IV Push once  dextrose 50% Injectable 25 Gram(s) IV Push once  dextrose 50% Injectable 12.5 Gram(s) IV Push once  diphenoxylate/atropine 1 Tablet(s) Oral daily  enoxaparin Injectable 70 milliGRAM(s) SubCutaneous two times a day  finasteride 5 milliGRAM(s) Oral at bedtime  furosemide   Injectable 80 milliGRAM(s) IV Push two times a day  glucagon  Injectable 1 milliGRAM(s) IntraMuscular once  influenza  Vaccine (HIGH DOSE) 0.7 milliLiter(s) IntraMuscular once  insulin lispro (ADMELOG) corrective regimen sliding scale   SubCutaneous three times a day before meals  insulin lispro (ADMELOG) corrective regimen sliding scale   SubCutaneous at bedtime  MIRACLE MOUTHWASH 30 milliLiter(s) Swish and Spit every 6 hours  pancrelipase  (CREON 24,000 Lipase Units) 1 Capsule(s) Oral four times a day with meals  piperacillin/tazobactam IVPB.. 3.375 Gram(s) IV Intermittent every 8 hours  potassium phosphate / sodium phosphate Tablet (K-PHOS No. 2) 1 Tablet(s) Oral three times a day with meals      Allergies    No Known Allergies    Intolerances        SOCIAL HISTORY:  Denies ETOh,Smoking,     FAMILY HISTORY:  No pertinent family history in first degree relatives        REVIEW OF SYSTEMS:    CONSTITUTIONAL: No weakness, fevers or chills  EYES/ENT: No visual changes;  No vertigo or throat pain   NECK: No pain or stiffness  RESPIRATORY: No cough, wheezing, hemoptysis; No shortness of breath  CARDIOVASCULAR: No chest pain or palpitations  GASTROINTESTINAL: No abdominal or epigastric pain. No nausea, vomiting, or hematemesis; No diarrhea or constipation. No melena or hematochezia.  GENITOURINARY: No dysuria, frequency or hematuria  NEUROLOGICAL: No numbness or weakness  SKIN: No itching, burning, rashes, or lesions   All other review of systems is negative unless indicated above.    VITAL:  T(C): , Max: 36.9 (09-21-21 @ 21:57)  T(F): , Max: 98.4 (09-21-21 @ 21:57)  HR: 90 (09-22-21 @ 06:33)  BP: 109/60 (09-22-21 @ 06:33)  BP(mean): --  RR: 16 (09-22-21 @ 06:33)  SpO2: 98% (09-22-21 @ 06:33)  Wt(kg): --    I and O's:    09-20 @ 07:01  -  09-21 @ 07:00  --------------------------------------------------------  IN: 905 mL / OUT: 1150 mL / NET: -245 mL    09-21 @ 07:01  -  09-22 @ 07:00  --------------------------------------------------------  IN: 350 mL / OUT: 950 mL / NET: -600 mL          PHYSICAL EXAM:    Constitutional: NAD  HEENT: PERRLA,   Neck: No JVD  Respiratory: CTA B/L  Cardiovascular: S1 and S2  Gastrointestinal: BS+, soft, NT/ND  Extremities: No peripheral edema  Neurological: A/O x 3, no focal deficits  Psychiatric: Normal mood, normal affect  : No Bowman  Skin: No rashes  Access: Not applicable  Back: No CVA tenderness    LABS:                        8.4    13.75 )-----------( 45       ( 22 Sep 2021 07:57 )             24.0     09-22    128<L>  |  91<L>  |  43<H>  ----------------------------<  171<H>  3.6   |  24  |  1.16    Ca    7.8<L>      22 Sep 2021 07:57  Phos  2.0     09-22  Mg     2.40     09-22    TPro  5.8<L>  /  Alb  3.0<L>  /  TBili  1.2  /  DBili  x   /  AST  201<H>  /  ALT  421<H>  /  AlkPhos  161<H>  09-21          RADIOLOGY & ADDITIONAL STUDIES:

## 2021-09-22 NOTE — PROGRESS NOTE ADULT - SUBJECTIVE AND OBJECTIVE BOX
Pt seen, wife at bedside. She woke him up this pm and noted that he was not quite himself, though got him up to a chair and now feels that he's more back to his baseline. He cont to report loose stools, she is concerned about his PO intake, which is minimal. Cont with LE edema.    MEDICATIONS  (STANDING):  albumin human 25% IVPB 50 milliLiter(s) IV Intermittent every 6 hours  aspirin  chewable 81 milliGRAM(s) Oral daily  atorvastatin 80 milliGRAM(s) Oral at bedtime  baclofen 5 milliGRAM(s) Oral three times a day  carvedilol 6.25 milliGRAM(s) Oral every 12 hours  dextrose 40% Gel 15 Gram(s) Oral once  dextrose 5%. 1000 milliLiter(s) (50 mL/Hr) IV Continuous <Continuous>  dextrose 5%. 1000 milliLiter(s) (100 mL/Hr) IV Continuous <Continuous>  dextrose 50% Injectable 25 Gram(s) IV Push once  dextrose 50% Injectable 12.5 Gram(s) IV Push once  dextrose 50% Injectable 25 Gram(s) IV Push once  diphenoxylate/atropine 1 Tablet(s) Oral daily  DOBUTamine Infusion 2.604 MICROgram(s)/kG/Min (7.5 mL/Hr) IV Continuous <Continuous>  dronabinol 2.5 milliGRAM(s) Oral at bedtime  enoxaparin Injectable 70 milliGRAM(s) SubCutaneous two times a day  finasteride 5 milliGRAM(s) Oral at bedtime  furosemide   Injectable 80 milliGRAM(s) IV Push two times a day  glucagon  Injectable 1 milliGRAM(s) IntraMuscular once  influenza  Vaccine (HIGH DOSE) 0.7 milliLiter(s) IntraMuscular once  insulin lispro (ADMELOG) corrective regimen sliding scale   SubCutaneous three times a day before meals  insulin lispro (ADMELOG) corrective regimen sliding scale   SubCutaneous at bedtime  MIRACLE MOUTHWASH 30 milliLiter(s) Swish and Spit every 6 hours  pancrelipase  (CREON 24,000 Lipase Units) 1 Capsule(s) Oral four times a day with meals    MEDICATIONS  (PRN):  acetaminophen   Tablet .. 650 milliGRAM(s) Oral every 6 hours PRN Temp greater or equal to 38.5C (101.3F), Mild Pain (1 - 3)  aluminum hydroxide/magnesium hydroxide/simethicone Suspension 30 milliLiter(s) Oral every 4 hours PRN Dyspepsia  calcium carbonate    500 mG (Tums) Chewable 1 Tablet(s) Chew three times a day PRN Dyspepsia  melatonin 3 milliGRAM(s) Oral at bedtime PRN Insomnia  metoclopramide Injectable 5 milliGRAM(s) IV Push once PRN nausea  ondansetron Injectable 4 milliGRAM(s) IV Push every 8 hours PRN Nausea and/or Vomiting      ROS  as above, limited 2/2 pt participation    Vital Signs Last 24 Hrs  T(C): 37 (22 Sep 2021 17:32), Max: 37 (22 Sep 2021 17:32)  T(F): 98.6 (22 Sep 2021 17:32), Max: 98.6 (22 Sep 2021 17:32)  HR: 66 (22 Sep 2021 17:32) (56 - 101)  BP: 98/59 (22 Sep 2021 17:32) (89/62 - 109/60)  BP(mean): --  RR: 17 (22 Sep 2021 17:32) (16 - 18)  SpO2: 97% (22 Sep 2021 17:32) (95% - 99%)    PE  NAD  Awake, alert  Anicteric  + LE b/l edema  remainder of exam limited 2/2 participation                        8.4    13.75 )-----------( 45       ( 22 Sep 2021 07:57 )             24.0       09-22    128<L>  |  91<L>  |  43<H>  ----------------------------<  171<H>  3.6   |  24  |  1.16    Ca    7.8<L>      22 Sep 2021 07:57  Phos  2.0     09-22  Mg     2.40     09-22    TPro  5.8<L>  /  Alb  3.0<L>  /  TBili  1.2  /  DBili  x   /  AST  201<H>  /  ALT  421<H>  /  AlkPhos  161<H>  09-21

## 2021-09-22 NOTE — PROGRESS NOTE ADULT - ASSESSMENT
1. Metastatic Pancreatic CA    -- dx on 8/21/21  -- follows at [facility] [place]  -- Started treatment on 8/31/21 per protocol , randomized to gemcitabine/nab-paclitaxel and dual immmunotherapy. Received Hemet/Abraxane on 9/14  -- further care as outpt after d/c    2. Diarrhea    -- C diff negative  -- stool studies negative   -- may be sec to immunotx  -- appears to be improving previously but now pt reports diarrhea/loose stools still. GI eval appreciated, on lomotil  --  holding off on steroids for now but if still no improvement, would need to consider    3. Fevers    -- ID following  -- No documented fevers  -- off of PO Vanco  -- off zosyn    4. Abnormal LFTS    -- significant transaminitis, likely sec to immunotherapy vs chemo vs mets  -- liver US shows mets   -- LFTs are slowly improving  -- monitor daily LFTs  -- Dr Pisano spoke to clinical trial staff at [facility] [place] about starting steroids. Recommend holding off for now    5. pancytopenia     -- can d/c zarxio, WBC 13 today  -- pan culture and broad spectrum abx if T > 100.4  -- likely form recent chemo   -- hg>7  -- plt>10k, if bleeding >50k     6. Hyponatremia    -- stable today  -- renal following    7. anorexia -- wife is concerned. Initially was holding marinol in the acute setting. Since he cont to improve overall, will resume home dose of 2.5mg daily for now  -- nutrition f/u    8. LE edema -- per renal      D/w wife, will follow, total time spent 35min, >50% spent in discussion and coordination of care.

## 2021-09-22 NOTE — PROGRESS NOTE ADULT - ATTENDING COMMENTS
75 year old male with a PMH of HTN, HLD, CAD, MI, PCI with stent x2 2009, chronic HFrEF, s/p BIV ICD ([employer]tr[employer]), BPH, recently diagnosed DVT/PE,  on anticoagulation ( Lovenox then Eliquis) and metastatic pancreatic cancer (diag in August), on chemotherapy at [facility] [place] for past few weeks, who presented to our ED with fever, nausea/vomiting, diarrhea, poor po intake and fluid overload. Noted to have severe hyponatremia, leukopenia and persistent high transaminitis.  BP meds BB/ARB were held due to borderline SBP 's and severe nausea.  He was started on Albumin and IV Lasix assisted  for fluid overload/ hyponatremia. Improving leukopenia and down trending transaminitis noted.   BiV ICD interrogation revealed a recent onset of PAF episodes with RVR since Sep 5, 2021. The longest episode occurred on 9/20 lasting 4 hours with ventricular rates to 194 bpm.  A device alert  showed average ventricular rates >100 during AT/AF (>6 hours) for 5 days. Of note, the onset of AFib episodes correlates to an increased OptiVol index  (fluid accumulation) recorded by his ICD suggesting the AFib  likely contributed to his worsening heart failure.  In light of his comorbidities, a rate control strategy is preferred.  In addition his transaminitis (likely multifactorial including metastatic disease)  will preclude use of antiarrhythmic drug therapy.   PLAN:    -Consider continuous telemetry monitoring for PAF with RVR (daily EKG if not on telemetry)  -Continue diuresis (currently receiving albumin and IV Lasix 80mg bid).   -May use IV Lopressor for rate control of AFib with RVR.   -BP improved off BB/ARB. Continue Coreg 6.25mg bid.   -Continue anticoagulation therapy Eliquis 5mg BID for elevated [id number] score of >5   -Will follow.

## 2021-09-22 NOTE — PROGRESS NOTE ADULT - SUBJECTIVE AND OBJECTIVE BOX
Patient denies chest pain, shortness of breath at rest, palpitations or lightheadedness.         Vital Signs Last 24 Hrs  T(C): 36.3 (22 Sep 2021 09:33), Max: 36.9 (21 Sep 2021 21:57)  T(F): 97.4 (22 Sep 2021 09:33), Max: 98.4 (21 Sep 2021 21:57)  HR: 97 (22 Sep 2021 09:33) (57 - 112)  BP: 101/62 (22 Sep 2021 09:33) (89/62 - 109/60)  BP(mean): --  RR: 16 (22 Sep 2021 09:33) (16 - 19)  SpO2: 97% (22 Sep 2021 09:33) (93% - 99%)        Telemetry: Not on telemetry  MEDICATIONS  (STANDING):  albumin human 25% IVPB 50 milliLiter(s) IV Intermittent every 6 hours  aspirin  chewable 81 milliGRAM(s) Oral daily  atorvastatin 80 milliGRAM(s) Oral at bedtime  baclofen 5 milliGRAM(s) Oral three times a day  carvedilol 6.25 milliGRAM(s) Oral every 12 hours  dextrose 40% Gel 15 Gram(s) Oral once  dextrose 5%. 1000 milliLiter(s) (50 mL/Hr) IV Continuous <Continuous>  dextrose 5%. 1000 milliLiter(s) (100 mL/Hr) IV Continuous <Continuous>  dextrose 50% Injectable 25 Gram(s) IV Push once  dextrose 50% Injectable 12.5 Gram(s) IV Push once  dextrose 50% Injectable 25 Gram(s) IV Push once  diphenoxylate/atropine 1 Tablet(s) Oral daily  enoxaparin Injectable 70 milliGRAM(s) SubCutaneous two times a day  finasteride 5 milliGRAM(s) Oral at bedtime  furosemide   Injectable 80 milliGRAM(s) IV Push two times a day  glucagon  Injectable 1 milliGRAM(s) IntraMuscular once  influenza  Vaccine (HIGH DOSE) 0.7 milliLiter(s) IntraMuscular once  insulin lispro (ADMELOG) corrective regimen sliding scale   SubCutaneous three times a day before meals  insulin lispro (ADMELOG) corrective regimen sliding scale   SubCutaneous at bedtime  MIRACLE MOUTHWASH 30 milliLiter(s) Swish and Spit every 6 hours  pancrelipase  (CREON 24,000 Lipase Units) 1 Capsule(s) Oral four times a day with meals  piperacillin/tazobactam IVPB.. 3.375 Gram(s) IV Intermittent every 8 hours    MEDICATIONS  (PRN):  acetaminophen   Tablet .. 650 milliGRAM(s) Oral every 6 hours PRN Temp greater or equal to 38.5C (101.3F), Mild Pain (1 - 3)  aluminum hydroxide/magnesium hydroxide/simethicone Suspension 30 milliLiter(s) Oral every 4 hours PRN Dyspepsia  calcium carbonate    500 mG (Tums) Chewable 1 Tablet(s) Chew three times a day PRN Dyspepsia  melatonin 3 milliGRAM(s) Oral at bedtime PRN Insomnia  metoclopramide Injectable 5 milliGRAM(s) IV Push once PRN nausea  ondansetron Injectable 4 milliGRAM(s) IV Push every 8 hours PRN Nausea and/or Vomiting          Physical exam:   Gen- well developed well nourished NAD.  Resp- No wheezing, rales or rhonchi.  No cough  CV- S1 and S2 irregular irregular. No murmurs, gallops or rubs   ABD- + distended. Nontender + bowel sounds  EXT- 3+ edema lower legs bilaterally  Neuro- grossly nonfocal                            8.4    13.75 )-----------( 45       ( 22 Sep 2021 07:57 )             24.0       09-22    128<L>  |  91<L>  |  43<H>  ----------------------------<  171<H>  3.6   |  24  |  1.16    Ca    7.8<L>      22 Sep 2021 07:57  Phos  2.0     09-22  Mg     2.40     09-22    TPro  5.8<L>  /  Alb  3.0<L>  /  TBili  1.2  /  DBili  x   /  AST  201<H>  /  ALT  421<H>  /  AlkPhos  161<H>  09-21                           Patient denies chest pain, shortness of breath at rest, palpitations or lightheadedness.   Patient lethargic.     Vital Signs Last 24 Hrs  T(C): 36.3 (22 Sep 2021 09:33), Max: 36.9 (21 Sep 2021 21:57)  T(F): 97.4 (22 Sep 2021 09:33), Max: 98.4 (21 Sep 2021 21:57)  HR: 97 (22 Sep 2021 09:33) (57 - 112)  BP: 101/62 (22 Sep 2021 09:33) (89/62 - 109/60)  BP(mean): --  RR: 16 (22 Sep 2021 09:33) (16 - 19)  SpO2: 97% (22 Sep 2021 09:33) (93% - 99%)        Telemetry: Not on telemetry  MEDICATIONS  (STANDING):  albumin human 25% IVPB 50 milliLiter(s) IV Intermittent every 6 hours  aspirin  chewable 81 milliGRAM(s) Oral daily  atorvastatin 80 milliGRAM(s) Oral at bedtime  baclofen 5 milliGRAM(s) Oral three times a day  carvedilol 6.25 milliGRAM(s) Oral every 12 hours  dextrose 40% Gel 15 Gram(s) Oral once  dextrose 5%. 1000 milliLiter(s) (50 mL/Hr) IV Continuous <Continuous>  dextrose 5%. 1000 milliLiter(s) (100 mL/Hr) IV Continuous <Continuous>  dextrose 50% Injectable 25 Gram(s) IV Push once  dextrose 50% Injectable 12.5 Gram(s) IV Push once  dextrose 50% Injectable 25 Gram(s) IV Push once  diphenoxylate/atropine 1 Tablet(s) Oral daily  enoxaparin Injectable 70 milliGRAM(s) SubCutaneous two times a day  finasteride 5 milliGRAM(s) Oral at bedtime  furosemide   Injectable 80 milliGRAM(s) IV Push two times a day  glucagon  Injectable 1 milliGRAM(s) IntraMuscular once  influenza  Vaccine (HIGH DOSE) 0.7 milliLiter(s) IntraMuscular once  insulin lispro (ADMELOG) corrective regimen sliding scale   SubCutaneous three times a day before meals  insulin lispro (ADMELOG) corrective regimen sliding scale   SubCutaneous at bedtime  MIRACLE MOUTHWASH 30 milliLiter(s) Swish and Spit every 6 hours  pancrelipase  (CREON 24,000 Lipase Units) 1 Capsule(s) Oral four times a day with meals  piperacillin/tazobactam IVPB.. 3.375 Gram(s) IV Intermittent every 8 hours    MEDICATIONS  (PRN):  acetaminophen   Tablet .. 650 milliGRAM(s) Oral every 6 hours PRN Temp greater or equal to 38.5C (101.3F), Mild Pain (1 - 3)  aluminum hydroxide/magnesium hydroxide/simethicone Suspension 30 milliLiter(s) Oral every 4 hours PRN Dyspepsia  calcium carbonate    500 mG (Tums) Chewable 1 Tablet(s) Chew three times a day PRN Dyspepsia  melatonin 3 milliGRAM(s) Oral at bedtime PRN Insomnia  metoclopramide Injectable 5 milliGRAM(s) IV Push once PRN nausea  ondansetron Injectable 4 milliGRAM(s) IV Push every 8 hours PRN Nausea and/or Vomiting          Physical exam:   Gen- +lethargic but NAD.  Resp- No wheezing, rales or rhonchi.  No cough  CV- S1 and S2 irregular irregular. No murmurs, gallops or rubs   ABD- + distended. Nontender + bowel sounds  EXT- 3+ edema lower legs bilaterally  Neuro- grossly nonfocal                            8.4    13.75 )-----------( 45       ( 22 Sep 2021 07:57 )             24.0       09-22    128<L>  |  91<L>  |  43<H>  ----------------------------<  171<H>  3.6   |  24  |  1.16    Ca    7.8<L>      22 Sep 2021 07:57  Phos  2.0     09-22  Mg     2.40     09-22    TPro  5.8<L>  /  Alb  3.0<L>  /  TBili  1.2  /  DBili  x   /  AST  201<H>  /  ALT  421<H>  /  AlkPhos  161<H>  09-21

## 2021-09-22 NOTE — PROGRESS NOTE ADULT - ASSESSMENT
74 yo M hx of CAD, MI, stent x2 (10/2009 at [facility]), pAfib, HTN, HLD, BIV-AICD, and systolic CHF, hx of PE (on Eliquis), [place] [facility] [place] with fever of 101 at home and several days of watery diarrhea concerning for C diff colitis.

## 2021-09-22 NOTE — PROGRESS NOTE ADULT - SUBJECTIVE AND OBJECTIVE BOX
[facility] NEPHROLOGY- PROGRESS NOTE    75y Male with history of HTN, CHF, pancreatic CA on chemo presents with fevers and diarrhea. Nephrology consulted for hyponatremia.    REVIEW OF SYSTEMS:  Gen: no changes in weight  Cards: no chest pain  Resp: no dyspnea  GI: no nausea or vomiting or diarrhea  Vascular: + LE edema improving    No Known Allergies      Hospital Medications: Medications reviewed      VITALS:  T(F): 97.7 (09-22-21 @ 06:33), Max: 98.4 (09-21-21 @ 21:57)  HR: 90 (09-22-21 @ 06:33)  BP: 109/60 (09-22-21 @ 06:33)  RR: 16 (09-22-21 @ 06:33)  SpO2: 98% (09-22-21 @ 06:33)  Wt(kg): --    09-21 @ 07:01  -  09-22 @ 07:00  --------------------------------------------------------  IN: 350 mL / OUT: 950 mL / NET: -600 mL      PHYSICAL EXAM:    Gen: NAD, calm  Cards: RRR, +S1/S2, no M/G/R  Resp: CTA B/L  GI: soft, NT/ND, NABS  Vascular: 2+ LE edema B/L, LUE edema      LABS:  09-21    126<L>  |  89<L>  |  37<H>  ----------------------------<  175<H>  4.6   |  21<L>  |  1.13    Ca    8.2<L>      21 Sep 2021 07:22  Phos  2.2     09-21  Mg     2.50     09-21    TPro  5.8<L>  /  Alb  3.0<L>  /  TBili  1.2  /  DBili      /  AST  201<H>  /  ALT  421<H>  /  AlkPhos  161<H>  09-21    Creatinine Trend: 1.13 <--, 1.07 <--, 1.01 <--, 1.02 <--, 0.96 <--, 0.98 <--, 0.93 <--, 0.91 <--                        9.8    3.20  )-----------( 40       ( 21 Sep 2021 07:22 )             27.8     Urine Studies:    Osmolality, Random Urine: 825 mosm/kg (09-18 @ 15:37)  Sodium, Random Urine: <20 mmol/L (09-18 @ 15:37)  Sodium, Random Urine: <20 mmol/L (09-17 @ 15:20)  Osmolality, Random Urine: 797 mosm/kg (09-17 @ 15:20)  Osmolality, Random Urine: 774 mosm/kg (09-16 @ 20:51)

## 2021-09-22 NOTE — PROGRESS NOTE ADULT - PROBLEM SELECTOR PLAN 1
Likely source from GI: C diff vs colitis  stool c diff neg  - Continue Zosyn until ANC >500 and afebrile x 48 hours (can discontinue after this timeframe)  - Monitor for focal signs infection

## 2021-09-22 NOTE — PROGRESS NOTE ADULT - ASSESSMENT
75 year old male with a PMH of HTN, HLD, CAD, MI, PCI with stent x2 2009, chronic HFrEF, s/p BIV ICD (Medtronic), BPH, recently diagnosed with DVT/PE-was started on AC therapy Lovenox then Eliquis and recently diagnosed with metastatic pancreatic cancer in August, [place] [facility] [place].  Patient presented to ED with fever and N/V and frequent diarrhea for few days and decrease po intake for past few weeks.  Patient reports "low BP".  Carvedilol was reduced to 6.25mg Bid, Spironolactone and Losartan held.  Upon admission, patient was noted to have severe hyponatremia, leukopenia and persistent high transaminitis.  BP meds include BB/ARB were held due to borderline SBP 's and severe nausea.  He was started on Albumin assisted diuresis for fluid overload/ hyponatremia. Improving leukopenia and down trending transaminitis noted.   BiV ICD interrogation revealed recently onset of multiple PAF with RVR since Sep 5, 2021 with a longest episode lasted 4 hours with ventricular rate up to 194 bpm recorded on 9/20.  Alert from his device showing average ventricular>100 during AT/AF (>6 hours) for 5 days.  Noted his onset of PAF with RVR correlated to his increased OptiVol (fluid accumulation) index recorded by his ICD.  suspect his PAF with RVR likely contributed to his worsening HF.  In light of his comorbidity, conservative approach with rate control for his PAF is preferred over rhythm control strategy.  In addition his transaminitis will preclude use of antiarrhythmic drug.     -Consider continuous telemetry monitoring for PAF with RVR (daily EKG if not on telemetry)  -Continue care per nephology/ID/heme, tolerating Albumin assisted diuresis   -May use IV Lopressor for rate control if PAF with RVR recur, BP improved off BP meds (BB/ARB on hold), resume lower dose of BB (coreg 6.25mg BID) if BP stable and tolerating oral intake  -Continue anticoagulation therapy Eliquis 5mg BID for elevated [id number] score of >5   -Will follow up        75 year old male with a PMH of HTN, HLD, CAD, MI, PCI with stent x2 2009, chronic HFrEF, s/p BIV ICD (Medtronic), BPH, recently diagnosed DVT/PE,  on anticoagulation ( Lovenox then Eliquis) and metastatic pancreatic cancer (diag in August), [place] [facility] [place], who presented to our ED with fever, nausea/vomiting, diarrhea, poor po intake and fluid overload. Noted to have severe hyponatremia, leukopenia and persistent high transaminitis.  BP meds BB/ARB were held due to borderline SBP 's and severe nausea.  He was started on Albumin and IV Lasix assisted  for fluid overload/ hyponatremia. Improving leukopenia and down trending transaminitis noted.   BiV ICD interrogation revealed the recent onset of PAF episodes with RVR since Sep 5, 2021. The longest episode occurred on 9/20 lasting 4 hours with ventricular rates to 194 bpm.  A device alert  showed average ventricular rates >100 during AT/AF (>6 hours) for 5 days. Of note, the onset of AFib episodes correlates to the increased OptiVol index  (fluid accumulation) recorded by his ICD suggesting the AFib  likely contributed to his worsening heart failure.  In light of his comorbidities, a rate control strategy is preferred.  In addition his transaminitis (likely multifactorial including metastatic disease)  will preclude use of antiarrhythmic drug therapy.   PLAN:    -Consider continuous telemetry monitoring for PAF with RVR (daily EKG if not on telemetry)  -Continue diuresis (currently receiving albumin and IV Lasix 80mg bid).   -May use IV Lopressor for rate control of AFib with RVR.   -BP improved off BB/ARB. Continue Coreg 6.25mg bid.   -Continue anticoagulation therapy Eliquis 5mg BID for elevated [id number] score of >5   -Will follow.        75 year old male with a PMH of HTN, HLD, CAD, MI, PCI with stent x2 2009, chronic HFrEF, s/p BIV ICD (Medtronic), BPH, recently diagnosed DVT/PE,  on anticoagulation ( Lovenox then Eliquis) and metastatic pancreatic cancer (diag in August), [place] [facility] [place], who presented to our ED with fever, nausea/vomiting, diarrhea, poor po intake and fluid overload. Noted to have severe hyponatremia, leukopenia and persistent high transaminitis.  BP meds BB/ARB were held due to borderline SBP 's and severe nausea.  He was started on Albumin and IV Lasix assisted  for fluid overload/ hyponatremia. Improving leukopenia and down trending transaminitis noted.   BiV ICD interrogation revealed a recent onset of PAF episodes with RVR since Sep 5, 2021. The longest episode occurred on 9/20 lasting 4 hours with ventricular rates to 194 bpm.  A device alert  showed average ventricular rates >100 during AT/AF (>6 hours) for 5 days. Of note, the onset of AFib episodes correlates to an increased OptiVol index  (fluid accumulation) recorded by his ICD suggesting the AFib  likely contributed to his worsening heart failure.  In light of his comorbidities, a rate control strategy is preferred.  In addition his transaminitis (likely multifactorial including metastatic disease)  will preclude use of antiarrhythmic drug therapy.   PLAN:    -Consider continuous telemetry monitoring for PAF with RVR (daily EKG if not on telemetry)  -Continue diuresis (currently receiving albumin and IV Lasix 80mg bid).   -May use IV Lopressor for rate control of AFib with RVR.   -BP improved off BB/ARB. Continue Coreg 6.25mg bid.   -Continue anticoagulation therapy Eliquis 5mg BID for elevated [id number] score of >5   -Will follow.

## 2021-09-22 NOTE — CHART NOTE - NSCHARTNOTEFT_GEN_A_CORE
Tried to reach daughter Hannah at [phone] for updates however was unsuccessful. Left message for callback. Tried to reach daughter Hannah at [phone] for updates however was unsuccessful. Left message for callback.     6:30 - spoke with and updated. Daughter is concerned pt might be depressed however does not want pt to know she recommended a possible evaluation. Would also like Endocrinology on board for possible d/c med planning.

## 2021-09-23 NOTE — CHART NOTE - NSCHARTNOTEFT_GEN_A_CORE
Chart reviewed  pancreatic Ca may be etiology for diabetes  check c-peptide with BMP in AM  check BHB in AM  Start Lantus 3 units qhs  low Admelog scales  consult will follow tomorrow.  Discussed with BI Chaney.    Rafael Flores MD  Division of Endocrinology  Pager: [phone]    If after 6PM or before 9AM, or on weekends/holidays, please call endocrine answering service for assistance ([phone]).  For nonurgent matters email [email or web address] for assistance.

## 2021-09-23 NOTE — PROGRESS NOTE ADULT - SUBJECTIVE AND OBJECTIVE BOX
DATE OF SERVICE:  2021  Patient was seen and examined on  2021   .Interim events noted.Consultant notes ,Labs,Telemetry reviewed by me    PRESENTING CC:Fever    HPI and HOSPITAL COURSE: HPI:  76 yo M hx of CAD, MI, stent x2 (10/2009 at [facility] pAfib, HTN, HLD, BIV-AICD, and systolic CHF, hx of PE (on Eliquis), recently [place] [facility] [place] with fever of 101 at home. He also endorsed diarrhea x3-4 days, watery. 4-5x daily. No recent abx exposure. No abdominal pain. Denies chest pain, dyspnea, palpitations. Just finished chemo today. Called his onc who advised him to come in. No abx exposure.  In the ED, vitals stable. Labs with leukopenia without neutropenia. Elevated ASt/ALT, hyponatremia. EKG paced. CXR clear. (15 Sep 2021 01:36)      INTERIM EVENTS:Awake started on Dobutamine is voiding more      PMH -reviewed admission note, no change since admission  Heart Failure: Acute [ ] Chronic [ ] Acute on Chronic [x ] Diastolic [ ] Systolic [x ] Combined Systolic and Diastolic[ ]  GLEN[ ]  ATN[ ]  CKD I [ ] CKDII [ ] CKD III [ ] CKD IV [ ] CKD V [ ] ESRD[ ]  HTN[ ] CVA[ ] DM[ ] COPD[ ] COVID[ ] AF[ ]  PPM[ ] ICD[x ]    MEDICATIONS  (STANDING):  albumin human 25% IVPB 50 milliLiter(s) IV Intermittent every 6 hours  aspirin  chewable 81 milliGRAM(s) Oral daily  atorvastatin 80 milliGRAM(s) Oral at bedtime  baclofen 5 milliGRAM(s) Oral three times a day  carvedilol 6.25 milliGRAM(s) Oral every 12 hours  diphenoxylate/atropine 1 Tablet(s) Oral daily  DOBUTamine Infusion 2.604 MICROgram(s)/kG/Min (7.5 mL/Hr) IV Continuous <Continuous>  dronabinol 2.5 milliGRAM(s) Oral at bedtime  enoxaparin Injectable 70 milliGRAM(s) SubCutaneous two times a day  finasteride 5 milliGRAM(s) Oral at bedtime  furosemide   Injectable 80 milliGRAM(s) IV Push two times a day  glucagon  Injectable 1 milliGRAM(s) IntraMuscular once  influenza  Vaccine (HIGH DOSE) 0.7 milliLiter(s) IntraMuscular once  insulin lispro (ADMELOG) corrective regimen sliding scale   SubCutaneous three times a day before meals  insulin lispro (ADMELOG) corrective regimen sliding scale   SubCutaneous at bedtime  MIRACLE MOUTHWASH 30 milliLiter(s) Swish and Spit every 6 hours  pancrelipase  (CREON 24,000 Lipase Units) 1 Capsule(s) Oral four times a day with meals    MEDICATIONS  (PRN):  acetaminophen   Tablet .. 650 milliGRAM(s) Oral every 6 hours PRN Temp greater or equal to 38.5C (101.3F), Mild Pain (1 - 3)  aluminum hydroxide/magnesium hydroxide/simethicone Suspension 30 milliLiter(s) Oral every 4 hours PRN Dyspepsia  calcium carbonate    500 mG (Tums) Chewable 1 Tablet(s) Chew three times a day PRN Dyspepsia  melatonin 3 milliGRAM(s) Oral at bedtime PRN Insomnia  metoclopramide Injectable 5 milliGRAM(s) IV Push once PRN nausea  ondansetron Injectable 4 milliGRAM(s) IV Push every 8 hours PRN Nausea and/or Vomiting            REVIEW OF SYSTEMS:  Constitutional: [ ] fever, [ ]weight loss,  [x ]fatigue  Eyes: [ ] visual changes  Respiratory: [ ]shortness of breath;  [ ] cough, [ ]wheezing, [ ]chills, [ ]hemoptysis  Cardiovascular: [ ] chest pain, [ ]palpitations, [ ]dizziness,  [x ]leg swelling[ ]orthopnea[ ]PND  Gastrointestinal: [ ] abdominal pain, [ ]nausea, [ ]vomiting,  [ ]diarrhea [ ]Constipation [ ]Melena  Genitourinary: [ ] dysuria, [ ] hematuria [ ]Bowman  Neurologic: [ ] headaches [ ] tremors[ ]weakness [ ]Paralysis Right[ ] Left[ ]  Skin: [ ] itching, [ ]burning, [ ] rashes  Endocrine: [ ] heat or cold intolerance  Musculoskeletal: [ ] joint pain or swelling; [ ] muscle, back, or extremity pain  Psychiatric: [x ] depression, [ ]anxiety, [ ]mood swings, or [ ]difficulty sleeping  Hematologic: [ ] easy bruising, [ ] bleeding gums    [x] All remaining systems negative except as per above.   [ ]Unable to obtain.    Vital Signs Last 24 Hrs  T(C): 36.3 (23 Sep 2021 06:00), Max: 37 (22 Sep 2021 17:32)  T(F): 97.4 (23 Sep 2021 06:00), Max: 98.6 (22 Sep 2021 17:32)  HR: 98 (23 Sep 2021 06:00) (56 - 98)  BP: 101/606 (23 Sep 2021 06:00) (95/53 - 106/60)  RR: 18 (23 Sep 2021 06:00) (16 - 18)  SpO2: 97% (23 Sep 2021 06:00) (95% - 97%)  I&O's Summary    22 Sep 2021 07:01  -  23 Sep 2021 07:00  --------------------------------------------------------  IN: 717.5 mL / OUT: 700 mL / NET: 17.5 mL        PHYSICAL EXAM:  General: No acute distress BMI-32  HEENT: EOMI, PERRL  Neck: Supple, [ ] JVD  Lungs: Equal air entry bilaterally; [ ] rales [ ] wheezing [ ] rhonchi  Heart: Regular rate and rhythm; [ x] murmur   2/6 [x ] systolic [ ] diastolic [ ] radiation[ ] rubs [ ]  gallops  Abdomen: Nontender, bowel sounds present  Extremities: No clubbing, cyanosis, [x ] edema [ ]Pulses  equal and intact  Nervous system:  Alert & Oriented X3, no focal deficits  Psychiatric: Normal affect  Skin: No rashes or lesions    LABS:      128<L>  |  91<L>  |  43<H>  ----------------------------<  171<H>  3.6   |  24  |  1.16    Ca    7.8<L>      22 Sep 2021 07:57  Phos  2.0       Mg     2.40           Creatinine Trend: 1.16<--, 1.13<--, 1.07<--, 1.01<--, 1.02<--, 0.96<--                        8.4    13.75 )-----------( 45       ( 22 Sep 2021 07:57 )             24.0         IMPRESSION AND PLAN:  Acute on Chronic Systolic Heart Failure  EF 30%  Continue  assisted diuresis

## 2021-09-23 NOTE — PROGRESS NOTE ADULT - SUBJECTIVE AND OBJECTIVE BOX
Patient is a 75y Male     Patient is a 75y old  Male who presents with a chief complaint of Fever (22 Sep 2021 16:50)      HPI:  76 yo M hx of CAD, MI, stent x2 (10/2009 at [facility]), pAfib, HTN, HLD, BIV-AICD, and systolic CHF, hx of PE (on Eliquis), recently dx’d pancreatic mass on chemo at [facility] [place] now presenting with fever of 101 at home. He also endorsed diarrhea x3-4 days, watery. 4-5x daily. No recent abx exposure. No abdominal pain. Denies chest pain, dyspnea, palpitations. Just finished chemo today. Called his onc who advised him to come in. No abx exposure.  In the ED, vitals stable. Labs with leukopenia without neutropenia. Elevated ASt/ALT, hyponatremia. EKG paced. CXR clear. (15 Sep 2021 01:36)      PAST MEDICAL & SURGICAL HISTORY:  Hypertension (ICD9 401.9)    Hyperlipidemia (ICD9 272.4)    BPH (Benign Prostatic Hyperplasia)    Borderline diabetes mellitus    MI (myocardial infarction)  2009    Systolic heart failure    Hernia    Biventricular ICD (implantable cardioverter-defibrillator) in place  2010    Stented coronary artery  X 2, 2009        MEDICATIONS  (STANDING):  albumin human 25% IVPB 50 milliLiter(s) IV Intermittent every 6 hours  aspirin  chewable 81 milliGRAM(s) Oral daily  atorvastatin 80 milliGRAM(s) Oral at bedtime  baclofen 5 milliGRAM(s) Oral three times a day  carvedilol 6.25 milliGRAM(s) Oral every 12 hours  dextrose 40% Gel 15 Gram(s) Oral once  dextrose 5%. 1000 milliLiter(s) (50 mL/Hr) IV Continuous <Continuous>  dextrose 5%. 1000 milliLiter(s) (100 mL/Hr) IV Continuous <Continuous>  dextrose 50% Injectable 25 Gram(s) IV Push once  dextrose 50% Injectable 12.5 Gram(s) IV Push once  dextrose 50% Injectable 25 Gram(s) IV Push once  diphenoxylate/atropine 1 Tablet(s) Oral daily  DOBUTamine Infusion 2.604 MICROgram(s)/kG/Min (7.5 mL/Hr) IV Continuous <Continuous>  dronabinol 2.5 milliGRAM(s) Oral at bedtime  enoxaparin Injectable 70 milliGRAM(s) SubCutaneous two times a day  finasteride 5 milliGRAM(s) Oral at bedtime  furosemide   Injectable 80 milliGRAM(s) IV Push two times a day  glucagon  Injectable 1 milliGRAM(s) IntraMuscular once  influenza  Vaccine (HIGH DOSE) 0.7 milliLiter(s) IntraMuscular once  insulin lispro (ADMELOG) corrective regimen sliding scale   SubCutaneous three times a day before meals  insulin lispro (ADMELOG) corrective regimen sliding scale   SubCutaneous at bedtime  MIRACLE MOUTHWASH 30 milliLiter(s) Swish and Spit every 6 hours  pancrelipase  (CREON 24,000 Lipase Units) 1 Capsule(s) Oral four times a day with meals      Allergies    No Known Allergies    Intolerances        SOCIAL HISTORY:  Denies ETOh,Smoking,     FAMILY HISTORY:  No pertinent family history in first degree relatives        REVIEW OF SYSTEMS:    CONSTITUTIONAL: No weakness, fevers or chills  EYES/ENT: No visual changes;  No vertigo or throat pain   NECK: No pain or stiffness  RESPIRATORY: No cough, wheezing, hemoptysis; No shortness of breath  CARDIOVASCULAR: No chest pain or palpitations  GASTROINTESTINAL: No abdominal or epigastric pain. No nausea, vomiting, or hematemesis; No diarrhea or constipation. No melena or hematochezia.  GENITOURINARY: No dysuria, frequency or hematuria  NEUROLOGICAL: No numbness or weakness  SKIN: No itching, burning, rashes, or lesions   All other review of systems is negative unless indicated above.    VITAL:  T(C): , Max: 37 (09-22-21 @ 17:32)  T(F): , Max: 98.6 (09-22-21 @ 17:32)  HR: 95 (09-23-21 @ 02:55)  BP: 106/60 (09-23-21 @ 02:55)  BP(mean): --  RR: 18 (09-23-21 @ 02:55)  SpO2: 96% (09-23-21 @ 02:55)  Wt(kg): --    I and O's:    09-20 @ 07:01  -  09-21 @ 07:00  --------------------------------------------------------  IN: 905 mL / OUT: 1150 mL / NET: -245 mL    09-21 @ 07:01  -  09-22 @ 07:00  --------------------------------------------------------  IN: 350 mL / OUT: 950 mL / NET: -600 mL    09-22 @ 07:01  -  09-23 @ 06:35  --------------------------------------------------------  IN: 717.5 mL / OUT: 700 mL / NET: 17.5 mL        Weight (kg): 96 (09-22 @ 15:47)    PHYSICAL EXAM:    Constitutional: NAD  HEENT: PERRLA,   Neck: No JVD  Respiratory: CTA B/L  Cardiovascular: S1 and S2  Gastrointestinal: BS+, soft, NT/ND  Extremities: No peripheral edema  Neurological: A/O x 3, no focal deficits  Psychiatric: Normal mood, normal affect  : No Bowman  Skin: No rashes  Access: Not applicable  Back: No CVA tenderness    LABS:                        8.4    13.75 )-----------( 45       ( 22 Sep 2021 07:57 )             24.0     09-22    128<L>  |  91<L>  |  43<H>  ----------------------------<  171<H>  3.6   |  24  |  1.16    Ca    7.8<L>      22 Sep 2021 07:57  Phos  2.0     09-22  Mg     2.40     09-22    TPro  5.8<L>  /  Alb  3.0<L>  /  TBili  1.2  /  DBili  x   /  AST  201<H>  /  ALT  421<H>  /  AlkPhos  161<H>  09-21          RADIOLOGY & ADDITIONAL STUDIES:

## 2021-09-23 NOTE — PROGRESS NOTE ADULT - ASSESSMENT
75y Male with history of HTN, CHF, pancreatic CA on chemo presents with fevers and diarrhea. Nephrology consulted for hyponatremia.    1. Hyponatremia: Secondary to volume overload. Serum Na mildly decreased this morning. Continue with  assisted diuresis and 1L FR. Urine studies consistent with decreased EABV. Will consider tolvaptan if LFT's continue to improve. Monitor serum Na.    2. HTN: BP low normal. Holding anti-hypertensive medications. On  as per cardiology. Monitor BP.    3. LE edema: Continue with  assisted diuresis but change lasix to bumex gtt @ 1 mg/hour. Continue with ensure to increase oncotic pressure. TTE with severe global LVSD. Monitor UO.    4. Acute on chronic systolic HF: As per cardiology.       [facility] NEPHROLOGY  Matthias Keenan M.D.  Tobi Goodman D.O.  Portia Daily M.D.  Hanna Trammell, MSN, ANP-C    Telephone: [phone]  Facsimile: [phone]    [address] [place] [address]   75y Male with history of HTN, CHF, pancreatic CA on chemo presents with fevers and diarrhea. Nephrology consulted for hyponatremia.    1. Hyponatremia: Secondary to volume overload. Serum Na mildly decreased this morning. Continue with  assisted diuresis and 1L FR. Urine studies consistent with decreased EABV. Will consider tolvaptan if LFT's continue to improve. Monitor serum Na.    2. HTN: BP low. Holding anti-hypertensive medications. On  as per cardiology. Monitor BP.    3. LE edema: Continue with  assisted diuresis but change lasix to bumex gtt @ 0.5 mg/hour. Continue with ensure to increase oncotic pressure. TTE with severe global LVSD. Monitor UO.    4. Acute on chronic systolic HF: As per cardiology. Can consider HF consultation.      [facility] NEPHROLOGY  Matthias Keenan M.D.  Tobi Goodman D.O.  Portia Daily M.D.  Hanna Trammell, MSN, ANP-C    Telephone: [phone]  Facsimile: [phone]    [address] [place] [address]

## 2021-09-23 NOTE — CONSULT NOTE ADULT - ASSESSMENT
75 year old Male with PMH of HTN, HLD, CAD/MI, stent x2 (2009), HFrEF (EF 30%) s/p BiV ICD, recently diagnosed with metastatic pancreatic CA. [place] [facility] [place] clinical research trial (immunotherapy and chemotherapy) which began on 9/1 and last dose 9/14. Patient presented to ED with c/o lethargy, poor appetite, fever and diarrhea X 3 days. Labs revealed leukopenia without neutropenia, elevated AST/ALT and hyponatremia.    75 year old Male with PMH of HTN, HLD, CAD/MI, stent x2 (2009), HFrEF (EF 30%) s/p BiV ICD. He was recently diagnosed with pAFib, PE/DVT (treated with Lovenox and Eliquis) on 8/20 at [facility] with incidental finding of pancreatic mass on Chest CT. He sought follow up care for metastatic pancreatic CA at [facility] [place], participating in clinical research trial (immunotherapy and chemotherapy) which began on 9/1 and last dose 9/14. Patient presented to ED with c/o lethargy, poor appetite, fever and diarrhea X 3 days. Labs revealed leukopenia without neutropenia, elevated AST/ALT and hyponatremia. Initially treated with fluid and albumin for dehydration. Now with volume overload treated with  IV Lasix, switched to Bumex drip today and started on low dose Dobutamine with improved urine output.    75 year old Male with PMH of HTN, HLD, CAD/MI, stent x2 (2009), HFrEF (EF 30%) s/p BiV ICD (2010). He was recently diagnosed with pAFib, PE/DVT (treated with Lovenox and Eliquis) on 8/20 at [facility] with incidental finding of pancreatic mass on Chest CT. [place] [facility] [place] clinical [place] trial (immunotherapy and chemotherapy) which began on 9/1 and last dose 9/14. Patient presented to ED with c/o lethargy, poor appetite, fever and diarrhea X 3 days. Labs revealed leukopenia without neutropenia, elevated AST/ALT and hyponatremia. Initially treated with fluid and albumin for dehydration. Now with volume overload treated with IV Lasix, switched to Bumex drip today and started on low dose Dobutamine with improved urine output.

## 2021-09-23 NOTE — CONSULT NOTE ADULT - SUBJECTIVE AND OBJECTIVE BOX
Patient is a 75y old  Male who presents with a chief complaint of Fever (23 Sep 2021 16:42)      HPI:    PAST MEDICAL & SURGICAL HISTORY:  Hypertension (ICD9 401.9)    Hyperlipidemia (ICD9 272.4)    BPH (Benign Prostatic Hyperplasia)    Borderline diabetes mellitus    MI (myocardial infarction)  2009    Systolic heart failure    Hernia    Biventricular ICD (implantable cardioverter-defibrillator) in place  2010    Stented coronary artery  X 2, 2009        FAMILY HISTORY:  No pertinent family history in first degree relatives        Home Medications:  aspirin 81 mg oral tablet: 1 tab(s) orally once a day (05 Aug 2021 20:34)  carvedilol 6.25 mg oral tablet: 1 tab(s) orally 2 times a day (15 Sep 2021 02:31)  Centrum Silver oral tablet: 1 tab(s) orally once a day (05 Aug 2021 20:34)  Eliquis 5 mg oral tablet: 1 tab(s) orally 2 times a day (15 Sep 2021 02:32)  finasteride 5 mg oral tablet: 1 tab(s) orally once a day (05 Aug 2021 20:34)  losartan 50 mg oral tablet: 1 tab(s) orally once a day (05 Aug 2021 20:34)  rosuvastatin 20 mg oral tablet: 1 tab(s) orally once a day (at bedtime) (05 Aug 2021 20:34)      Medications:  acetaminophen   Tablet .. 650 milliGRAM(s) Oral every 6 hours PRN  albumin human 25% IVPB 50 milliLiter(s) IV Intermittent every 6 hours  aluminum hydroxide/magnesium hydroxide/simethicone Suspension 30 milliLiter(s) Oral every 4 hours PRN  aspirin  chewable 81 milliGRAM(s) Oral daily  atorvastatin 80 milliGRAM(s) Oral at bedtime  baclofen 5 milliGRAM(s) Oral three times a day  buMETAnide Infusion 0.5 mG/Hr IV Continuous <Continuous>  calcium carbonate    500 mG (Tums) Chewable 1 Tablet(s) Chew three times a day PRN  carvedilol 6.25 milliGRAM(s) Oral every 12 hours  dextrose 40% Gel 15 Gram(s) Oral once  dextrose 5%. 1000 milliLiter(s) IV Continuous <Continuous>  dextrose 5%. 1000 milliLiter(s) IV Continuous <Continuous>  dextrose 50% Injectable 25 Gram(s) IV Push once  dextrose 50% Injectable 12.5 Gram(s) IV Push once  dextrose 50% Injectable 25 Gram(s) IV Push once  diphenoxylate/atropine 1 Tablet(s) Oral daily  DOBUTamine Infusion 2.604 MICROgram(s)/kG/Min IV Continuous <Continuous>  dronabinol 2.5 milliGRAM(s) Oral at bedtime  enoxaparin Injectable 70 milliGRAM(s) SubCutaneous two times a day  finasteride 5 milliGRAM(s) Oral at bedtime  gabapentin 100 milliGRAM(s) Oral at bedtime  glucagon  Injectable 1 milliGRAM(s) IntraMuscular once  influenza  Vaccine (HIGH DOSE) 0.7 milliLiter(s) IntraMuscular once  insulin glargine Injectable (LANTUS) 3 Unit(s) SubCutaneous at bedtime  insulin lispro (ADMELOG) corrective regimen sliding scale   SubCutaneous three times a day before meals  insulin lispro (ADMELOG) corrective regimen sliding scale   SubCutaneous at bedtime  melatonin 3 milliGRAM(s) Oral at bedtime PRN  metoclopramide Injectable 5 milliGRAM(s) IV Push once PRN  MIRACLE MOUTHWASH 30 milliLiter(s) Swish and Spit every 6 hours  ondansetron Injectable 4 milliGRAM(s) IV Push every 8 hours PRN  pancrelipase  (CREON 24,000 Lipase Units) 1 Capsule(s) Oral four times a day with meals      Review of systems:  10 point review of systems completed and are negative unless noted in HPI    Vitals:  T(C): 36.6 (09-23-21 @ 14:00), Max: 37 (09-22-21 @ 17:32)  HR: 82 (09-23-21 @ 14:00) (66 - 98)  BP: 96/53 (09-23-21 @ 14:00) (95/53 - 106/60)  BP(mean): 62 (09-23-21 @ 14:00) (62 - 66)  RR: 18 (09-23-21 @ 14:00) (17 - 18)  SpO2: 96% (09-23-21 @ 14:00) (96% - 97%)    Daily     Daily     Weight (kg): 96 (09-22 @ 15:47)    I&O's Summary    22 Sep 2021 07:01  -  23 Sep 2021 07:00  --------------------------------------------------------  IN: 717.5 mL / OUT: 700 mL / NET: 17.5 mL        Physical Exam:  Appearance: No Acute Distress  HEENT: PERRL  Neck:   Cardiovascular: Normal S1 S2, No murmurs/rubs/gallops  Respiratory: Clear to auscultation bilaterally  Gastrointestinal: Soft, Non-tender	  Skin: No cyanosis	  Neurologic: Non-focal  Extremities: No LE edema  Psychiatry: A & O x 3, Mood & affect appropriate    Labs:                        10.3   26.11 )-----------( 61       ( 23 Sep 2021 07:08 )             29.0     09-23    126<L>  |  88<L>  |  45<H>  ----------------------------<  173<H>  3.7   |  22  |  1.12    Ca    7.5<L>      23 Sep 2021 07:08  Phos  2.2     09-23  Mg     2.40     09-23    TPro  5.8<L>  /  Alb  3.1<L>  /  TBili  1.1  /  DBili  0.5<H>  /  AST  87<H>  /  ALT  211<H>  /  AlkPhos  183<H>  09-23              TELEMETRY:    Echocardiogram:    EKG: Patient is a 75y old  Male who presents with a chief complaint of Fever (23 Sep 2021 16:42)      HPI:  75 year old Male with PMH of HTN, HLD, CAD/MI, stent x2 (), HFrEF (EF 30%) s/p BiV ICD (). He was recently diagnosed with pAFib, PE/DVT (treated with Lovenox and Eliquis) on  at [facility] with incidental finding of pancreatic mass on Chest CT. He sought follow up care for metastatic pancreatic CA at [facility] [place], participating in clinical research trial (immunotherapy and chemotherapy) which began on  and last dose . Patient presented to ED with c/o lethargy, poor appetite, fever and diarrhea X 3 days. Labs revealed leukopenia without neutropenia, elevated AST/ALT and hyponatremia. Initially treated with fluid and albumin for dehydration. Now with volume overload treated with  IV Lasix, switched to Bumex drip today and started on low dose Dobutamine with improved urine output.     PAST MEDICAL & SURGICAL HISTORY:  Hypertension (ICD9 401.9)    Hyperlipidemia (ICD9 272.4)    BPH (Benign Prostatic Hyperplasia)    Borderline diabetes mellitus    MI (myocardial infarction)  2009    Systolic heart failure    Hernia    Biventricular ICD (implantable cardioverter-defibrillator) in place  2010    Stented coronary artery  X 2,         FAMILY HISTORY:  No pertinent family history in first degree relatives        Home Medications:  aspirin 81 mg oral tablet: 1 tab(s) orally once a day (05 Aug 2021 20:34)  carvedilol 6.25 mg oral tablet: 1 tab(s) orally 2 times a day (15 Sep 2021 02:31)  Centrum Silver oral tablet: 1 tab(s) orally once a day (05 Aug 2021 20:34)  Eliquis 5 mg oral tablet: 1 tab(s) orally 2 times a day (15 Sep 2021 02:32)  finasteride 5 mg oral tablet: 1 tab(s) orally once a day (05 Aug 2021 20:34)  losartan 50 mg oral tablet: 1 tab(s) orally once a day (05 Aug 2021 20:34)  rosuvastatin 20 mg oral tablet: 1 tab(s) orally once a day (at bedtime) (05 Aug 2021 20:34)      Medications:  acetaminophen   Tablet .. 650 milliGRAM(s) Oral every 6 hours PRN  albumin human 25% IVPB 50 milliLiter(s) IV Intermittent every 6 hours  aluminum hydroxide/magnesium hydroxide/simethicone Suspension 30 milliLiter(s) Oral every 4 hours PRN  aspirin  chewable 81 milliGRAM(s) Oral daily  atorvastatin 80 milliGRAM(s) Oral at bedtime  baclofen 5 milliGRAM(s) Oral three times a day  buMETAnide Infusion 0.5 mG/Hr IV Continuous <Continuous>  calcium carbonate    500 mG (Tums) Chewable 1 Tablet(s) Chew three times a day PRN  carvedilol 6.25 milliGRAM(s) Oral every 12 hours  dextrose 40% Gel 15 Gram(s) Oral once  dextrose 5%. 1000 milliLiter(s) IV Continuous <Continuous>  dextrose 5%. 1000 milliLiter(s) IV Continuous <Continuous>  dextrose 50% Injectable 25 Gram(s) IV Push once  dextrose 50% Injectable 12.5 Gram(s) IV Push once  dextrose 50% Injectable 25 Gram(s) IV Push once  diphenoxylate/atropine 1 Tablet(s) Oral daily  DOBUTamine Infusion 2.604 MICROgram(s)/kG/Min IV Continuous <Continuous>  dronabinol 2.5 milliGRAM(s) Oral at bedtime  enoxaparin Injectable 70 milliGRAM(s) SubCutaneous two times a day  finasteride 5 milliGRAM(s) Oral at bedtime  gabapentin 100 milliGRAM(s) Oral at bedtime  glucagon  Injectable 1 milliGRAM(s) IntraMuscular once  influenza  Vaccine (HIGH DOSE) 0.7 milliLiter(s) IntraMuscular once  insulin glargine Injectable (LANTUS) 3 Unit(s) SubCutaneous at bedtime  insulin lispro (ADMELOG) corrective regimen sliding scale   SubCutaneous three times a day before meals  insulin lispro (ADMELOG) corrective regimen sliding scale   SubCutaneous at bedtime  melatonin 3 milliGRAM(s) Oral at bedtime PRN  metoclopramide Injectable 5 milliGRAM(s) IV Push once PRN  MIRACLE MOUTHWASH 30 milliLiter(s) Swish and Spit every 6 hours  ondansetron Injectable 4 milliGRAM(s) IV Push every 8 hours PRN  pancrelipase  (CREON 24,000 Lipase Units) 1 Capsule(s) Oral four times a day with meals      Review of systems:  10 point review of systems completed and are negative unless noted in HPI    Vitals:  T(C): 36.6 (21 @ 14:00), Max: 37 (21 @ 17:32)  HR: 82 (21 @ 14:00) (66 - 98)  BP: 96/53 (21 @ 14:00) (95/53 - 106/60)  BP(mean): 62 (21 @ 14:00) (62 - 66)  RR: 18 (21 @ 14:00) (17 - 18)  SpO2: 96% (21 @ 14:00) (96% - 97%)    Daily     Daily     Weight (kg): 96 ( @ 15:47)    I&O's Summary    22 Sep 2021 07:01  -  23 Sep 2021 07:00  --------------------------------------------------------  IN: 717.5 mL / OUT: 700 mL / NET: 17.5 mL        Physical Exam:  Appearance: No Acute Distress  Neck: JVD  Cardiovascular: Normal S1 S2  Respiratory: Clear to auscultation diminished bilaterally L>R with fine   Gastrointestinal: slightly firm,distended,  Non-tender	  Skin: No cyanosis	  Neurologic: Non-focal  Extremities: 3/4 + pitting BLE edema  Psychiatry: A & O x 3, Mood & affect appropriate    Labs:                        10.3   26.11 )-----------( 61       ( 23 Sep 2021 07:08 )             29.0         126<L>  |  88<L>  |  45<H>  ----------------------------<  173<H>  3.7   |  22  |  1.12    Ca    7.5<L>      23 Sep 2021 07:08  Phos  2.2       Mg     2.40         TPro  5.8<L>  /  Alb  3.1<L>  /  TBili  1.1  /  DBili  0.5<H>  /  AST  87<H>  /  ALT  211<H>  /  AlkPhos  183<H>        TELEMETRY: Vpaced@85bpm    Echocardiogram:  TTE with Doppler (w/Cont) (21 @ 13:59)   ------------------------------------------------------------------------  DIMENSIONS:  Dimensions:     Normal Values:  LA:     3.3 cm    2.0 - 4.0 cm  Ao:     3.7 cm    2.0 - 3.8 cm  SEPTUM: 0.7 cm    0.6 - 1.2 cm  PWT:    0.7 cm    0.6 - 1.1 cm  LVIDd:  5.3 cm    3.0 - 5.6 cm  LVIDs:  4.0 cm    1.8 - 4.0 cm  Derived Variables:  LVMI: 60 g/m2  RWT: 0.26  Ejection Fraction (Visual Estimate): 30 %  ------------------------------------------------------------------------  OBSERVATIONS:  Mitral Valve: Mitral annular calcification, otherwise  normal mitral valve. Minimal mitral regurgitation.  Aortic Root: Normal aortic root.  Aortic Valve: Calcified trileaflet aortic valve with normal  opening.  Left Atrium: Normal left atrium.  Left Ventricle: Endocardium not well visualized; grossly  severe global left ventricular systolic dysfunction.  Endocardial visualization enhanced with intravenous  injection of echo contrast (Definity). Normal left  ventricular internal dimensions and wall thicknesses. Mild  diastolic dysfunction (Stage I).  Right Heart: Normal right atrium. Unable to accurately  evaluate right ventricular size or systolic function.  Tricuspid valve not well visualized. Pulmonic valve not  well visualized.  Pericardium/PleuraNormal pericardium with no pericardial  effusion.  ------------------------------------------------------------------------  CONCLUSIONS:  Technically difficult study.  1. Mitral annular calcification, otherwise normal mitral  valve. Minimal mitralregurgitation.  2. Normal left ventricular internal dimensions and wall  thicknesses.  3. Endocardium not well visualized; grossly severe global  left ventricular systolic dysfunction.  Endocardial  visualization enhanced with intravenous injection of echo  contrast (Definity).  4. Mild diastolic dysfunction (Stage I).  5. Unable to accurately evaluate right ventricular size or  systolic function.      EK Lead ECG (09.15.21 @ 03:07)     Ventricular Rate 81 BPM    Atrial Rate 81 BPM    P-R Interval 164 ms    QRS Duration 144 ms    Q-T Interval 454 ms    QTC Calculation(Bazett) 527 ms    P Axis 77 degrees    R Axis -86 degrees    T Axis 72 degrees    Diagnosis Line Electronic ventricular pacemaker      < from: Xray Chest 1 View- PORTABLE-Urgent (Xray Chest 1 View- PORTABLE-Urgent .) (21 @ 22:37) >    INTERPRETATION:  CLINICAL INFORMATION: Fever. Infectious and malignancy workup.    TECHNIQUE: AP view of the chest.    COMPARISON: CT chest from 2021. CT abdomen andpelvis from 2021    FINDINGS:    Lungs are clear. No pleural effusion or pneumothorax. Heart size is unchanged. Left chest wall biventricular AICD. Degenerative osseous disease.    IMPRESSION:    Clear lungs.      CT Angio Chest PE Protocol w/ IV Cont (21 @ 21:16)    LUNGS AND AIRWAYS: Patent central airways.  Mild bibasilar atelectasis. Small calcified granuloma within left upper lobe (series 2 image 40) is unchanged from 2009. The lungs are otherwise clear. No suspicious lung nodule.  PLEURA: No pleural effusion.  MEDIASTINUM AND SARATH: 1.2 cm short axis left-sided supraclavicular lymph node.  VESSELS: Pulmonary emboli in the bilateral upper, right middle, right lower lobe with extension into the segmental branches of the right middle and lower lobes. No aortic dissection.  HEART: No sign of right heart strain. Thinning of the myocardium of the left ventricle. 2-lead ICD in place. No pericardial effusion.  CHEST WALL AND LOWER NECK: 1.2 cm left-sided supraclavicular lymph node.  VISUALIZED UPPER ABDOMEN: There is a large ill-defined mass in the tail of the pancreas. Numerous hepatic hypodensities the largest of which  measure 2 x 2 cm (2, 92). There is also a large stone in the neck of the gallbladder.  BONES: Degenerative changes of the spine.    IMPRESSION:    Pulmonary emboli in the bilateral upper, right middle, right lower lobe with extension to the segmental branches of the right middle and lower lobes.    Large ill-defined mass in the tail of the pancreas with numerous extensive liver metastases.    1.2 cm left-sided supraclavicular lymph node.         Patient is a 75y old  Male who presents with a chief complaint of Fever (23 Sep 2021 16:42)      HPI:  75 year old Male with PMH of HTN, HLD, CAD/MI, stent x2 (), HFrEF (EF 30%) s/p BiV ICD (). He was recently diagnosed with pAFib, PE/DVT (treated with Lovenox and Eliquis) on  at [facility] with incidental finding of pancreatic mass on Chest CT. [place] [facility] [place] clinical research trial (immunotherapy and chemotherapy) which began on  and last dose . Patient presented to ED with c/o lethargy, poor appetite, fever and diarrhea X 3 days. Labs revealed leukopenia without neutropenia, elevated AST/ALT and hyponatremia. Initially treated with fluid and albumin for dehydration. Now with volume overload treated with  IV Lasix, switched to Bumex drip today and started on low dose Dobutamine with improved urine output.     PAST MEDICAL & SURGICAL HISTORY:  Hypertension (ICD9 401.9)    Hyperlipidemia (ICD9 272.4)    BPH (Benign Prostatic Hyperplasia)    Borderline diabetes mellitus    MI (myocardial infarction)      Systolic heart failure    Hernia    Biventricular ICD (implantable cardioverter-defibrillator) in place  2010    Stented coronary artery  X 2,         FAMILY HISTORY:  No pertinent family history in first degree relatives        Home Medications:  aspirin 81 mg oral tablet: 1 tab(s) orally once a day (05 Aug 2021 20:34)  carvedilol 6.25 mg oral tablet: 1 tab(s) orally 2 times a day (15 Sep 2021 02:31)  Centrum Silver oral tablet: 1 tab(s) orally once a day (05 Aug 2021 20:34)  Eliquis 5 mg oral tablet: 1 tab(s) orally 2 times a day (15 Sep 2021 02:32)  finasteride 5 mg oral tablet: 1 tab(s) orally once a day (05 Aug 2021 20:34)  losartan 50 mg oral tablet: 1 tab(s) orally once a day (05 Aug 2021 20:34)  rosuvastatin 20 mg oral tablet: 1 tab(s) orally once a day (at bedtime) (05 Aug 2021 20:34)      Medications:  acetaminophen   Tablet .. 650 milliGRAM(s) Oral every 6 hours PRN  albumin human 25% IVPB 50 milliLiter(s) IV Intermittent every 6 hours  aluminum hydroxide/magnesium hydroxide/simethicone Suspension 30 milliLiter(s) Oral every 4 hours PRN  aspirin  chewable 81 milliGRAM(s) Oral daily  atorvastatin 80 milliGRAM(s) Oral at bedtime  baclofen 5 milliGRAM(s) Oral three times a day  buMETAnide Infusion 0.5 mG/Hr IV Continuous <Continuous>  calcium carbonate    500 mG (Tums) Chewable 1 Tablet(s) Chew three times a day PRN  carvedilol 6.25 milliGRAM(s) Oral every 12 hours  dextrose 40% Gel 15 Gram(s) Oral once  dextrose 5%. 1000 milliLiter(s) IV Continuous <Continuous>  dextrose 5%. 1000 milliLiter(s) IV Continuous <Continuous>  dextrose 50% Injectable 25 Gram(s) IV Push once  dextrose 50% Injectable 12.5 Gram(s) IV Push once  dextrose 50% Injectable 25 Gram(s) IV Push once  diphenoxylate/atropine 1 Tablet(s) Oral daily  DOBUTamine Infusion 2.604 MICROgram(s)/kG/Min IV Continuous <Continuous>  dronabinol 2.5 milliGRAM(s) Oral at bedtime  enoxaparin Injectable 70 milliGRAM(s) SubCutaneous two times a day  finasteride 5 milliGRAM(s) Oral at bedtime  gabapentin 100 milliGRAM(s) Oral at bedtime  glucagon  Injectable 1 milliGRAM(s) IntraMuscular once  influenza  Vaccine (HIGH DOSE) 0.7 milliLiter(s) IntraMuscular once  insulin glargine Injectable (LANTUS) 3 Unit(s) SubCutaneous at bedtime  insulin lispro (ADMELOG) corrective regimen sliding scale   SubCutaneous three times a day before meals  insulin lispro (ADMELOG) corrective regimen sliding scale   SubCutaneous at bedtime  melatonin 3 milliGRAM(s) Oral at bedtime PRN  metoclopramide Injectable 5 milliGRAM(s) IV Push once PRN  MIRACLE MOUTHWASH 30 milliLiter(s) Swish and Spit every 6 hours  ondansetron Injectable 4 milliGRAM(s) IV Push every 8 hours PRN  pancrelipase  (CREON 24,000 Lipase Units) 1 Capsule(s) Oral four times a day with meals      Review of systems:  10 point review of systems completed and are negative unless noted in HPI    Vitals:  T(C): 36.6 (21 @ 14:00), Max: 37 (21 @ 17:32)  HR: 82 (21 @ 14:00) (66 - 98)  BP: 96/53 (21 @ 14:00) (95/53 - 106/60)  BP(mean): 62 (21 @ 14:00) (62 - 66)  RR: 18 (21 @ 14:00) (17 - 18)  SpO2: 96% (21 @ 14:00) (96% - 97%)    Daily     Daily     Weight (kg): 96 ( @ 15:47)    I&O's Summary    22 Sep 2021 07:01  -  23 Sep 2021 07:00  --------------------------------------------------------  IN: 717.5 mL / OUT: 700 mL / NET: 17.5 mL        Physical Exam:  Appearance: No Acute Distress  Neck: JVD  Cardiovascular: Normal S1 S2  Respiratory: Clear to auscultation diminished bilaterally L>R with fine   Gastrointestinal: slightly firm,distended,  Non-tender	  Skin: No cyanosis	  Neurologic: Non-focal  Extremities: 3/4 + pitting BLE edema  Psychiatry: A & O x 3, Mood & affect appropriate    Labs:                        10.3   26.11 )-----------( 61       ( 23 Sep 2021 07:08 )             29.0         126<L>  |  88<L>  |  45<H>  ----------------------------<  173<H>  3.7   |  22  |  1.12    Ca    7.5<L>      23 Sep 2021 07:08  Phos  2.2       Mg     2.40         TPro  5.8<L>  /  Alb  3.1<L>  /  TBili  1.1  /  DBili  0.5<H>  /  AST  87<H>  /  ALT  211<H>  /  AlkPhos  183<H>        TELEMETRY: Vpaced@85bpm    Echocardiogram:  TTE with Doppler (w/Cont) (21 @ 13:59)   ------------------------------------------------------------------------  DIMENSIONS:  Dimensions:     Normal Values:  LA:     3.3 cm    2.0 - 4.0 cm  Ao:     3.7 cm    2.0 - 3.8 cm  SEPTUM: 0.7 cm    0.6 - 1.2 cm  PWT:    0.7 cm    0.6 - 1.1 cm  LVIDd:  5.3 cm    3.0 - 5.6 cm  LVIDs:  4.0 cm    1.8 - 4.0 cm  Derived Variables:  LVMI: 60 g/m2  RWT: 0.26  Ejection Fraction (Visual Estimate): 30 %  ------------------------------------------------------------------------  OBSERVATIONS:  Mitral Valve: Mitral annular calcification, otherwise  normal mitral valve. Minimal mitral regurgitation.  Aortic Root: Normal aortic root.  Aortic Valve: Calcified trileaflet aortic valve with normal  opening.  Left Atrium: Normal left atrium.  Left Ventricle: Endocardium not well visualized; grossly  severe global left ventricular systolic dysfunction.  Endocardial visualization enhanced with intravenous  injection of echo contrast (Definity). Normal left  ventricular internal dimensions and wall thicknesses. Mild  diastolic dysfunction (Stage I).  Right Heart: Normal right atrium. Unable to accurately  evaluate right ventricular size or systolic function.  Tricuspid valve not well visualized. Pulmonic valve not  well visualized.  Pericardium/PleuraNormal pericardium with no pericardial  effusion.  ------------------------------------------------------------------------  CONCLUSIONS:  Technically difficult study.  1. Mitral annular calcification, otherwise normal mitral  valve. Minimal mitralregurgitation.  2. Normal left ventricular internal dimensions and wall  thicknesses.  3. Endocardium not well visualized; grossly severe global  left ventricular systolic dysfunction.  Endocardial  visualization enhanced with intravenous injection of echo  contrast (Definity).  4. Mild diastolic dysfunction (Stage I).  5. Unable to accurately evaluate right ventricular size or  systolic function.      EK Lead ECG (09.15.21 @ 03:07)     Ventricular Rate 81 BPM    Atrial Rate 81 BPM    P-R Interval 164 ms    QRS Duration 144 ms    Q-T Interval 454 ms    QTC Calculation(Bazett) 527 ms    P Axis 77 degrees    R Axis -86 degrees    T Axis 72 degrees    Diagnosis Line Electronic ventricular pacemaker      < from: Xray Chest 1 View- PORTABLE-Urgent (Xray Chest 1 View- PORTABLE-Urgent .) (21 @ 22:37) >    INTERPRETATION:  CLINICAL INFORMATION: Fever. Infectious and malignancy workup.    TECHNIQUE: AP view of the chest.    COMPARISON: CT chest from 2021. CT abdomen andpelvis from 2021    FINDINGS:    Lungs are clear. No pleural effusion or pneumothorax. Heart size is unchanged. Left chest wall biventricular AICD. Degenerative osseous disease.    IMPRESSION:    Clear lungs.      CT Angio Chest PE Protocol w/ IV Cont (21 @ 21:16)    LUNGS AND AIRWAYS: Patent central airways.  Mild bibasilar atelectasis. Small calcified granuloma within left upper lobe (series 2 image 40) is unchanged from 2009. The lungs are otherwise clear. No suspicious lung nodule.  PLEURA: No pleural effusion.  MEDIASTINUM AND SARATH: 1.2 cm short axis left-sided supraclavicular lymph node.  VESSELS: Pulmonary emboli in the bilateral upper, right middle, right lower lobe with extension into the segmental branches of the right middle and lower lobes. No aortic dissection.  HEART: No sign of right heart strain. Thinning of the myocardium of the left ventricle. 2-lead ICD in place. No pericardial effusion.  CHEST WALL AND LOWER NECK: 1.2 cm left-sided supraclavicular lymph node.  VISUALIZED UPPER ABDOMEN: There is a large ill-defined mass in the tail of the pancreas. Numerous hepatic hypodensities the largest of which  measure 2 x 2 cm (2, 92). There is also a large stone in the neck of the gallbladder.  BONES: Degenerative changes of the spine.    IMPRESSION:    Pulmonary emboli in the bilateral upper, right middle, right lower lobe with extension to the segmental branches of the right middle and lower lobes.    Large ill-defined mass in the tail of the pancreas with numerous extensive liver metastases.    1.2 cm left-sided supraclavicular lymph node.         Patient is a 75y old  Male who presents with a chief complaint of Fever (23 Sep 2021 16:42)      HPI:  75 year old Male with PMH of HTN, HLD, CAD/MI, stent x2 (), HFrEF (EF 30%) s/p BiV ICD (). He was recently diagnosed with pAFib, PE/DVT (treated with Lovenox and Eliquis) on  at [facility] with incidental finding of pancreatic mass on Chest CT. He sought follow up care for metastatic pancreatic CA at [facility] [place], participating in clinical research trial (immunotherapy and chemotherapy) which began on  and last dose . Patient presented to ED with c/o lethargy, poor appetite, fever and diarrhea X 3 days. Labs revealed leukopenia without neutropenia, elevated AST/ALT and hyponatremia. Initially treated with fluid and albumin for dehydration. Now with volume overload treated with  IV Lasix, switched to Bumex drip today and started on low dose Dobutamine with improved urine output.     PAST MEDICAL & SURGICAL HISTORY:  Hypertension (ICD9 401.9)    Hyperlipidemia (ICD9 272.4)    BPH (Benign Prostatic Hyperplasia)    Borderline diabetes mellitus    MI (myocardial infarction)      Systolic heart failure    Hernia    Biventricular ICD (implantable cardioverter-defibrillator) in place  2010    Stented coronary artery  X 2,         FAMILY HISTORY:  No pertinent family history in first degree relatives        Home Medications:  aspirin 81 mg oral tablet: 1 tab(s) orally once a day (05 Aug 2021 20:34)  carvedilol 6.25 mg oral tablet: 1 tab(s) orally 2 times a day (15 Sep 2021 02:31)  Centrum Silver oral tablet: 1 tab(s) orally once a day (05 Aug 2021 20:34)  Eliquis 5 mg oral tablet: 1 tab(s) orally 2 times a day (15 Sep 2021 02:32)  finasteride 5 mg oral tablet: 1 tab(s) orally once a day (05 Aug 2021 20:34)  losartan 50 mg oral tablet: 1 tab(s) orally once a day (05 Aug 2021 20:34)  rosuvastatin 20 mg oral tablet: 1 tab(s) orally once a day (at bedtime) (05 Aug 2021 20:34)      Medications:  acetaminophen   Tablet .. 650 milliGRAM(s) Oral every 6 hours PRN  albumin human 25% IVPB 50 milliLiter(s) IV Intermittent every 6 hours  aluminum hydroxide/magnesium hydroxide/simethicone Suspension 30 milliLiter(s) Oral every 4 hours PRN  aspirin  chewable 81 milliGRAM(s) Oral daily  atorvastatin 80 milliGRAM(s) Oral at bedtime  baclofen 5 milliGRAM(s) Oral three times a day  buMETAnide Infusion 0.5 mG/Hr IV Continuous <Continuous>  calcium carbonate    500 mG (Tums) Chewable 1 Tablet(s) Chew three times a day PRN  carvedilol 6.25 milliGRAM(s) Oral every 12 hours  dextrose 40% Gel 15 Gram(s) Oral once  dextrose 5%. 1000 milliLiter(s) IV Continuous <Continuous>  dextrose 5%. 1000 milliLiter(s) IV Continuous <Continuous>  dextrose 50% Injectable 25 Gram(s) IV Push once  dextrose 50% Injectable 12.5 Gram(s) IV Push once  dextrose 50% Injectable 25 Gram(s) IV Push once  diphenoxylate/atropine 1 Tablet(s) Oral daily  DOBUTamine Infusion 2.604 MICROgram(s)/kG/Min IV Continuous <Continuous>  dronabinol 2.5 milliGRAM(s) Oral at bedtime  enoxaparin Injectable 70 milliGRAM(s) SubCutaneous two times a day  finasteride 5 milliGRAM(s) Oral at bedtime  gabapentin 100 milliGRAM(s) Oral at bedtime  glucagon  Injectable 1 milliGRAM(s) IntraMuscular once  influenza  Vaccine (HIGH DOSE) 0.7 milliLiter(s) IntraMuscular once  insulin glargine Injectable (LANTUS) 3 Unit(s) SubCutaneous at bedtime  insulin lispro (ADMELOG) corrective regimen sliding scale   SubCutaneous three times a day before meals  insulin lispro (ADMELOG) corrective regimen sliding scale   SubCutaneous at bedtime  melatonin 3 milliGRAM(s) Oral at bedtime PRN  metoclopramide Injectable 5 milliGRAM(s) IV Push once PRN  MIRACLE MOUTHWASH 30 milliLiter(s) Swish and Spit every 6 hours  ondansetron Injectable 4 milliGRAM(s) IV Push every 8 hours PRN  pancrelipase  (CREON 24,000 Lipase Units) 1 Capsule(s) Oral four times a day with meals      Review of systems:  10 point review of systems completed and are negative unless noted in HPI    Vitals:  T(C): 36.6 (21 @ 14:00), Max: 37 (21 @ 17:32)  HR: 82 (21 @ 14:00) (66 - 98)  BP: 96/53 (21 @ 14:00) (95/53 - 106/60)  BP(mean): 62 (21 @ 14:00) (62 - 66)  RR: 18 (21 @ 14:00) (17 - 18)  SpO2: 96% (21 @ 14:00) (96% - 97%)    Daily     Daily     Weight (kg): 96 ( @ 15:47)    I&O's Summary    22 Sep 2021 07:01  -  23 Sep 2021 07:00  --------------------------------------------------------  IN: 717.5 mL / OUT: 700 mL / NET: 17.5 mL        Physical Exam:  Appearance: No Acute Distress  Neck: JVD  Cardiovascular: Normal S1 S2  Respiratory: Clear to auscultation diminished bilaterally L>R with fine   Gastrointestinal: slightly firm,distended,  Non-tender	  Skin: No cyanosis	  Neurologic: Non-focal  Extremities: 3/4 + pitting BLE edema  Psychiatry: A & O x 3, Mood & affect appropriate    Labs:                        10.3   26.11 )-----------( 61       ( 23 Sep 2021 07:08 )             29.0         126<L>  |  88<L>  |  45<H>  ----------------------------<  173<H>  3.7   |  22  |  1.12    Ca    7.5<L>      23 Sep 2021 07:08  Phos  2.2       Mg     2.40         TPro  5.8<L>  /  Alb  3.1<L>  /  TBili  1.1  /  DBili  0.5<H>  /  AST  87<H>  /  ALT  211<H>  /  AlkPhos  183<H>        TELEMETRY: Vpaced@85bpm    Echocardiogram:  TTE with Doppler (w/Cont) (21 @ 13:59)   ------------------------------------------------------------------------  DIMENSIONS:  Dimensions:     Normal Values:  LA:     3.3 cm    2.0 - 4.0 cm  Ao:     3.7 cm    2.0 - 3.8 cm  SEPTUM: 0.7 cm    0.6 - 1.2 cm  PWT:    0.7 cm    0.6 - 1.1 cm  LVIDd:  5.3 cm    3.0 - 5.6 cm  LVIDs:  4.0 cm    1.8 - 4.0 cm  Derived Variables:  LVMI: 60 g/m2  RWT: 0.26  Ejection Fraction (Visual Estimate): 30 %  ------------------------------------------------------------------------  OBSERVATIONS:  Mitral Valve: Mitral annular calcification, otherwise  normal mitral valve. Minimal mitral regurgitation.  Aortic Root: Normal aortic root.  Aortic Valve: Calcified trileaflet aortic valve with normal  opening.  Left Atrium: Normal left atrium.  Left Ventricle: Endocardium not well visualized; grossly  severe global left ventricular systolic dysfunction.  Endocardial visualization enhanced with intravenous  injection of echo contrast (Definity). Normal left  ventricular internal dimensions and wall thicknesses. Mild  diastolic dysfunction (Stage I).  Right Heart: Normal right atrium. Unable to accurately  evaluate right ventricular size or systolic function.  Tricuspid valve not well visualized. Pulmonic valve not  well visualized.  Pericardium/PleuraNormal pericardium with no pericardial  effusion.  ------------------------------------------------------------------------  CONCLUSIONS:  Technically difficult study.  1. Mitral annular calcification, otherwise normal mitral  valve. Minimal mitralregurgitation.  2. Normal left ventricular internal dimensions and wall  thicknesses.  3. Endocardium not well visualized; grossly severe global  left ventricular systolic dysfunction.  Endocardial  visualization enhanced with intravenous injection of echo  contrast (Definity).  4. Mild diastolic dysfunction (Stage I).  5. Unable to accurately evaluate right ventricular size or  systolic function.      EK Lead ECG (09.15.21 @ 03:07)     Ventricular Rate 81 BPM    Atrial Rate 81 BPM    P-R Interval 164 ms    QRS Duration 144 ms    Q-T Interval 454 ms    QTC Calculation(Bazett) 527 ms    P Axis 77 degrees    R Axis -86 degrees    T Axis 72 degrees    Diagnosis Line Electronic ventricular pacemaker      < from: Xray Chest 1 View- PORTABLE-Urgent (Xray Chest 1 View- PORTABLE-Urgent .) (21 @ 22:37) >    INTERPRETATION:  CLINICAL INFORMATION: Fever. Infectious and malignancy workup.    TECHNIQUE: AP view of the chest.    COMPARISON: CT chest from 2021. CT abdomen andpelvis from 2021    FINDINGS:    Lungs are clear. No pleural effusion or pneumothorax. Heart size is unchanged. Left chest wall biventricular AICD. Degenerative osseous disease.    IMPRESSION:    Clear lungs.      CT Angio Chest PE Protocol w/ IV Cont (21 @ 21:16)    LUNGS AND AIRWAYS: Patent central airways.  Mild bibasilar atelectasis. Small calcified granuloma within left upper lobe (series 2 image 40) is unchanged from 2009. The lungs are otherwise clear. No suspicious lung nodule.  PLEURA: No pleural effusion.  MEDIASTINUM AND SARATH: 1.2 cm short axis left-sided supraclavicular lymph node.  VESSELS: Pulmonary emboli in the bilateral upper, right middle, right lower lobe with extension into the segmental branches of the right middle and lower lobes. No aortic dissection.  HEART: No sign of right heart strain. Thinning of the myocardium of the left ventricle. 2-lead ICD in place. No pericardial effusion.  CHEST WALL AND LOWER NECK: 1.2 cm left-sided supraclavicular lymph node.  VISUALIZED UPPER ABDOMEN: There is a large ill-defined mass in the tail of the pancreas. Numerous hepatic hypodensities the largest of which  measure 2 x 2 cm (2, 92). There is also a large stone in the neck of the gallbladder.  BONES: Degenerative changes of the spine.    IMPRESSION:    Pulmonary emboli in the bilateral upper, right middle, right lower lobe with extension to the segmental branches of the right middle and lower lobes.    Large ill-defined mass in the tail of the pancreas with numerous extensive liver metastases.    1.2 cm left-sided supraclavicular lymph node.      US Abdomen Doppler (09.15.21 @ 10:20)     FINDINGS:    Liver: Heterogeneous liver with numerous scattered hyperechoic lesions consistent with hepatic metastases.  Common bile duct: Normal caliber. Common bile duct measures 5 mm.  Gallbladder: Contracted. Cholelithiasis.  Pancreas: Poorly visualized.  Right kidney: 13.1 cm. No hydronephrosis, renal mass, or calculi.  Ascites: Mild perihepatic ascites.    Splenic Vein: Not visualized.  Main Portal Vein: Patent with hepatopetal flow.  Left Portal Vein: Patent with hepatopetal flow.  Right Portal Vein: Hepatic Artery: Patent with normal direction of flow.    Hepatic Veins and Inferior Vena Cava: Patent with normal direction of flow.    IMPRESSION:    No portal vein thrombosis.    Redemonstrated hepatic metastases with mild perihepatic ascites.

## 2021-09-23 NOTE — PROGRESS NOTE ADULT - SUBJECTIVE AND OBJECTIVE BOX
Pt seen, continues to c/o diarrhea      MEDICATIONS  (STANDING):  albumin human 25% IVPB 50 milliLiter(s) IV Intermittent every 6 hours  aspirin  chewable 81 milliGRAM(s) Oral daily  atorvastatin 80 milliGRAM(s) Oral at bedtime  baclofen 5 milliGRAM(s) Oral three times a day  carvedilol 6.25 milliGRAM(s) Oral every 12 hours  dextrose 40% Gel 15 Gram(s) Oral once  dextrose 5%. 1000 milliLiter(s) (50 mL/Hr) IV Continuous <Continuous>  dextrose 5%. 1000 milliLiter(s) (100 mL/Hr) IV Continuous <Continuous>  dextrose 50% Injectable 12.5 Gram(s) IV Push once  dextrose 50% Injectable 25 Gram(s) IV Push once  dextrose 50% Injectable 25 Gram(s) IV Push once  diphenoxylate/atropine 1 Tablet(s) Oral daily  DOBUTamine Infusion 2.604 MICROgram(s)/kG/Min (7.5 mL/Hr) IV Continuous <Continuous>  dronabinol 2.5 milliGRAM(s) Oral at bedtime  enoxaparin Injectable 70 milliGRAM(s) SubCutaneous two times a day  finasteride 5 milliGRAM(s) Oral at bedtime  furosemide   Injectable 80 milliGRAM(s) IV Push two times a day  glucagon  Injectable 1 milliGRAM(s) IntraMuscular once  influenza  Vaccine (HIGH DOSE) 0.7 milliLiter(s) IntraMuscular once  insulin lispro (ADMELOG) corrective regimen sliding scale   SubCutaneous three times a day before meals  insulin lispro (ADMELOG) corrective regimen sliding scale   SubCutaneous at bedtime  MIRACLE MOUTHWASH 30 milliLiter(s) Swish and Spit every 6 hours  pancrelipase  (CREON 24,000 Lipase Units) 1 Capsule(s) Oral four times a day with meals    MEDICATIONS  (PRN):  acetaminophen   Tablet .. 650 milliGRAM(s) Oral every 6 hours PRN Temp greater or equal to 38.5C (101.3F), Mild Pain (1 - 3)  aluminum hydroxide/magnesium hydroxide/simethicone Suspension 30 milliLiter(s) Oral every 4 hours PRN Dyspepsia  calcium carbonate    500 mG (Tums) Chewable 1 Tablet(s) Chew three times a day PRN Dyspepsia  melatonin 3 milliGRAM(s) Oral at bedtime PRN Insomnia  metoclopramide Injectable 5 milliGRAM(s) IV Push once PRN nausea  ondansetron Injectable 4 milliGRAM(s) IV Push every 8 hours PRN Nausea and/or Vomiting      ROS  No fever, sweats, chills  +diarrhea    Vital Signs Last 24 Hrs  T(C): 36.3 (23 Sep 2021 06:00), Max: 37 (22 Sep 2021 17:32)  T(F): 97.4 (23 Sep 2021 06:00), Max: 98.6 (22 Sep 2021 17:32)  HR: 98 (23 Sep 2021 06:00) (56 - 98)  BP: 101/606 (23 Sep 2021 06:00) (95/53 - 106/60)  BP(mean): --  RR: 18 (23 Sep 2021 06:00) (16 - 18)  SpO2: 97% (23 Sep 2021 06:00) (95% - 97%)    PE  NAD  Awake, alert  Anicteric, MMM  No rash grossly  FROM                          10.3   26.11 )-----------( 61       ( 23 Sep 2021 07:08 )             29.0       09-23    126<L>  |  88<L>  |  45<H>  ----------------------------<  173<H>  3.7   |  22  |  1.12    Ca    7.5<L>      23 Sep 2021 07:08  Phos  2.2     09-23  Mg     2.40     09-23    TPro  5.8<L>  /  Alb  3.1<L>  /  TBili  1.1  /  DBili  0.5<H>  /  AST  87<H>  /  ALT  211<H>  /  AlkPhos  183<H>  09-23

## 2021-09-23 NOTE — PROGRESS NOTE ADULT - SUBJECTIVE AND OBJECTIVE BOX
SUBJECTIVE / OVERNIGHT EVENTS:pt seen and examined    MEDICATIONS  (STANDING):  albumin human 25% IVPB 50 milliLiter(s) IV Intermittent every 6 hours  aspirin  chewable 81 milliGRAM(s) Oral daily  atorvastatin 80 milliGRAM(s) Oral at bedtime  baclofen 5 milliGRAM(s) Oral three times a day  buMETAnide Infusion 0.5 mG/Hr (2.5 mL/Hr) IV Continuous <Continuous>  carvedilol 3.125 milliGRAM(s) Oral every 12 hours  dextrose 40% Gel 15 Gram(s) Oral once  dextrose 5%. 1000 milliLiter(s) (50 mL/Hr) IV Continuous <Continuous>  dextrose 5%. 1000 milliLiter(s) (100 mL/Hr) IV Continuous <Continuous>  dextrose 50% Injectable 25 Gram(s) IV Push once  dextrose 50% Injectable 12.5 Gram(s) IV Push once  dextrose 50% Injectable 25 Gram(s) IV Push once  diphenoxylate/atropine 1 Tablet(s) Oral daily  DOBUTamine Infusion 2.604 MICROgram(s)/kG/Min (7.5 mL/Hr) IV Continuous <Continuous>  dronabinol 2.5 milliGRAM(s) Oral at bedtime  enoxaparin Injectable 70 milliGRAM(s) SubCutaneous two times a day  finasteride 5 milliGRAM(s) Oral at bedtime  gabapentin 100 milliGRAM(s) Oral at bedtime  glucagon  Injectable 1 milliGRAM(s) IntraMuscular once  influenza  Vaccine (HIGH DOSE) 0.7 milliLiter(s) IntraMuscular once  insulin glargine Injectable (LANTUS) 3 Unit(s) SubCutaneous at bedtime  insulin lispro (ADMELOG) corrective regimen sliding scale   SubCutaneous three times a day before meals  insulin lispro (ADMELOG) corrective regimen sliding scale   SubCutaneous at bedtime  MIRACLE MOUTHWASH 30 milliLiter(s) Swish and Spit every 6 hours  pancrelipase  (CREON 24,000 Lipase Units) 1 Capsule(s) Oral four times a day with meals    MEDICATIONS  (PRN):  acetaminophen   Tablet .. 650 milliGRAM(s) Oral every 6 hours PRN Temp greater or equal to 38.5C (101.3F), Mild Pain (1 - 3)  aluminum hydroxide/magnesium hydroxide/simethicone Suspension 30 milliLiter(s) Oral every 4 hours PRN Dyspepsia  calcium carbonate    500 mG (Tums) Chewable 1 Tablet(s) Chew three times a day PRN Dyspepsia  melatonin 3 milliGRAM(s) Oral at bedtime PRN Insomnia  metoclopramide Injectable 5 milliGRAM(s) IV Push once PRN nausea  ondansetron Injectable 4 milliGRAM(s) IV Push every 8 hours PRN Nausea and/or Vomiting    Vital Signs Last 24 Hrs  T(C): 36.5 (09-23-21 @ 18:00), Max: 36.7 (09-23-21 @ 10:00)  T(F): 97.7 (09-23-21 @ 18:00), Max: 98 (09-23-21 @ 10:00)  HR: 89 (09-23-21 @ 18:00) (82 - 98)  BP: 107/63 (09-23-21 @ 18:00) (95/53 - 107/63)  BP(mean): 73 (09-23-21 @ 18:00) (62 - 73)  RR: 18 (09-23-21 @ 18:00) (17 - 18)  SpO2: 96% (09-23-21 @ 18:00) (96% - 97%)        Constitutional: No fever, fatigue  Skin: No rash.  Eyes: No recent vision problems or eye pain.  ENT: No congestion, ear pain, or sore throat.  Cardiovascular: No chest pain or palpation.  Respiratory: No cough, shortness of breath, congestion, or wheezing.  Gastrointestinal: No abdominal pain, nausea, vomiting, or diarrhea.  Genitourinary: No dysuria.  Musculoskeletal: No joint swelling.  Neurologic: No headache.    PHYSICAL EXAM:  GENERAL: NAD  EYES: EOMI, PERRLA  NECK: Supple, No JVD  CHEST/LUNG: dec breath sounds at bases  HEART:  S1 , S2 +  ABDOMEN: soft , bs+  EXTREMITIES:no edema    NEUROLOGY:alert awake    LABS:  09-23    126<L>  |  88<L>  |  45<H>  ----------------------------<  173<H>  3.7   |  22  |  1.12    Ca    7.5<L>      23 Sep 2021 07:08  Phos  2.2     09-23  Mg     2.40     09-23    TPro  5.8<L>  /  Alb  3.1<L>  /  TBili  1.1  /  DBili  0.5<H>  /  AST  87<H>  /  ALT  211<H>  /  AlkPhos  183<H>  09-23    Creatinine Trend: 1.12 <--, 1.16 <--, 1.13 <--, 1.07 <--, 1.01 <--, 1.02 <--, 0.96 <--, 0.98 <--, 0.93 <--                        10.3   26.11 )-----------( 61       ( 23 Sep 2021 07:08 )             29.0     Urine Studies:    Osmolality, Random Urine: 825 mosm/kg (09-18 @ 15:37)  Sodium, Random Urine: <20 mmol/L (09-18 @ 15:37)  Sodium, Random Urine: <20 mmol/L (09-17 @ 15:20)  Osmolality, Random Urine: 797 mosm/kg (09-17 @ 15:20)          LIVER FUNCTIONS - ( 23 Sep 2021 07:08 )  Alb: 3.1 g/dL / Pro: 5.8 g/dL / ALK PHOS: 183 U/L / ALT: 211 U/L / AST: 87 U/L / GGT: x             LIVER FUNCTIONS - ( 21 Sep 2021 07:22 )  Alb: 3.0 g/dL / Pro: 5.8 g/dL / ALK PHOS: 161 U/L / ALT: 421 U/L / AST: 201 U/L / GGT: x

## 2021-09-23 NOTE — PROGRESS NOTE ADULT - ASSESSMENT
74 yo M hx of CAD, MI, stent x2 (10/2009 at [facility]), pAfib, HTN, HLD, BIV-AICD, and systolic CHF, hx of PE (on Eliquis), recently dx’d pancreatic mass on chemo at [facility] [place] now presenting with fever of 101 at home and several days of watery diarrhea concerning for C diff colitis.

## 2021-09-23 NOTE — PROGRESS NOTE ADULT - SUBJECTIVE AND OBJECTIVE BOX
Patient denies chest pain, shortness of breath, palpitations or lightheadedness.   Refusing breakfast. States he has no appetite. No nausea or vomiting. Wants to sleep.       Vital Signs Last 24 Hrs  T(C): 36.3 (23 Sep 2021 06:00), Max: 37 (22 Sep 2021 17:32)  T(F): 97.4 (23 Sep 2021 06:00), Max: 98.6 (22 Sep 2021 17:32)  HR: 98 (23 Sep 2021 06:00) (56 - 98)  BP: 101/606 (23 Sep 2021 06:00) (95/53 - 106/60)  BP(mean): --  RR: 18 (23 Sep 2021 06:00) (16 - 18)  SpO2: 97% (23 Sep 2021 06:00) (95% - 97%)      EKG  Telemetry:  A sensed V paced PVC's. Episode of atrial fibrillation overnight at 2:47am.   MEDICATIONS  (STANDING):  albumin human 25% IVPB 50 milliLiter(s) IV Intermittent every 6 hours  aspirin  chewable 81 milliGRAM(s) Oral daily  atorvastatin 80 milliGRAM(s) Oral at bedtime  baclofen 5 milliGRAM(s) Oral three times a day  carvedilol 6.25 milliGRAM(s) Oral every 12 hours  dextrose 40% Gel 15 Gram(s) Oral once  dextrose 5%. 1000 milliLiter(s) (50 mL/Hr) IV Continuous <Continuous>  dextrose 5%. 1000 milliLiter(s) (100 mL/Hr) IV Continuous <Continuous>  dextrose 50% Injectable 25 Gram(s) IV Push once  dextrose 50% Injectable 12.5 Gram(s) IV Push once  dextrose 50% Injectable 25 Gram(s) IV Push once  diphenoxylate/atropine 1 Tablet(s) Oral daily  DOBUTamine Infusion 2.604 MICROgram(s)/kG/Min (7.5 mL/Hr) IV Continuous <Continuous>  dronabinol 2.5 milliGRAM(s) Oral at bedtime  enoxaparin Injectable 70 milliGRAM(s) SubCutaneous two times a day  finasteride 5 milliGRAM(s) Oral at bedtime  furosemide   Injectable 80 milliGRAM(s) IV Push two times a day  glucagon  Injectable 1 milliGRAM(s) IntraMuscular once  influenza  Vaccine (HIGH DOSE) 0.7 milliLiter(s) IntraMuscular once  insulin lispro (ADMELOG) corrective regimen sliding scale   SubCutaneous three times a day before meals  insulin lispro (ADMELOG) corrective regimen sliding scale   SubCutaneous at bedtime  MIRACLE MOUTHWASH 30 milliLiter(s) Swish and Spit every 6 hours  pancrelipase  (CREON 24,000 Lipase Units) 1 Capsule(s) Oral four times a day with meals  potassium phosphate IVPB 30 milliMole(s) IV Intermittent once    MEDICATIONS  (PRN):  acetaminophen   Tablet .. 650 milliGRAM(s) Oral every 6 hours PRN Temp greater or equal to 38.5C (101.3F), Mild Pain (1 - 3)  aluminum hydroxide/magnesium hydroxide/simethicone Suspension 30 milliLiter(s) Oral every 4 hours PRN Dyspepsia  calcium carbonate    500 mG (Tums) Chewable 1 Tablet(s) Chew three times a day PRN Dyspepsia  melatonin 3 milliGRAM(s) Oral at bedtime PRN Insomnia  metoclopramide Injectable 5 milliGRAM(s) IV Push once PRN nausea  ondansetron Injectable 4 milliGRAM(s) IV Push every 8 hours PRN Nausea and/or Vomiting          Physical exam:   Gen- patient lethargic NAD.   Resp- clear to auscultation. No wheezing, rales or rhonchi  CV- S1 and S2 RRR. NO murmurs, gallops or rubs  ABD- large, distended, nontender.  Quiet  bowel sounds  EXT- grossly edematous lower extremities.   Neuro- grossly nonfocal                            10.3   26.11 )-----------( 61       ( 23 Sep 2021 07:08 )             29.0       09-23    126<L>  |  88<L>  |  45<H>  ----------------------------<  173<H>  3.7   |  22  |  1.12    Ca    7.5<L>      23 Sep 2021 07:08  Phos  2.2     09-23  Mg     2.40     09-23    TPro  5.8<L>  /  Alb  3.1<L>  /  TBili  1.1  /  DBili  0.5<H>  /  AST  87<H>  /  ALT  211<H>  /  AlkPhos  183<H>  09-23                Assessment and Plan:         Patient denies chest pain, shortness of breath, palpitations or lightheadedness.   Refusing breakfast. States he has no appetite. No nausea or vomiting. Wants to sleep.   Persistent loose stools.     Vital Signs Last 24 Hrs  T(C): 36.3 (23 Sep 2021 06:00), Max: 37 (22 Sep 2021 17:32)  T(F): 97.4 (23 Sep 2021 06:00), Max: 98.6 (22 Sep 2021 17:32)  HR: 98 (23 Sep 2021 06:00) (56 - 98)  BP: 101/606 (23 Sep 2021 06:00) (95/53 - 106/60)  BP(mean): --  RR: 18 (23 Sep 2021 06:00) (16 - 18)  SpO2: 97% (23 Sep 2021 06:00) (95% - 97%)      EKG  Telemetry:  A sensed V paced PVC's. Episode of atrial fibrillation overnight at 2:47am.   MEDICATIONS  (STANDING):  albumin human 25% IVPB 50 milliLiter(s) IV Intermittent every 6 hours  aspirin  chewable 81 milliGRAM(s) Oral daily  atorvastatin 80 milliGRAM(s) Oral at bedtime  baclofen 5 milliGRAM(s) Oral three times a day  carvedilol 6.25 milliGRAM(s) Oral every 12 hours  dextrose 40% Gel 15 Gram(s) Oral once  dextrose 5%. 1000 milliLiter(s) (50 mL/Hr) IV Continuous <Continuous>  dextrose 5%. 1000 milliLiter(s) (100 mL/Hr) IV Continuous <Continuous>  dextrose 50% Injectable 25 Gram(s) IV Push once  dextrose 50% Injectable 12.5 Gram(s) IV Push once  dextrose 50% Injectable 25 Gram(s) IV Push once  diphenoxylate/atropine 1 Tablet(s) Oral daily  DOBUTamine Infusion 2.604 MICROgram(s)/kG/Min (7.5 mL/Hr) IV Continuous <Continuous>  dronabinol 2.5 milliGRAM(s) Oral at bedtime  enoxaparin Injectable 70 milliGRAM(s) SubCutaneous two times a day  finasteride 5 milliGRAM(s) Oral at bedtime  furosemide   Injectable 80 milliGRAM(s) IV Push two times a day  glucagon  Injectable 1 milliGRAM(s) IntraMuscular once  influenza  Vaccine (HIGH DOSE) 0.7 milliLiter(s) IntraMuscular once  insulin lispro (ADMELOG) corrective regimen sliding scale   SubCutaneous three times a day before meals  insulin lispro (ADMELOG) corrective regimen sliding scale   SubCutaneous at bedtime  MIRACLE MOUTHWASH 30 milliLiter(s) Swish and Spit every 6 hours  pancrelipase  (CREON 24,000 Lipase Units) 1 Capsule(s) Oral four times a day with meals  potassium phosphate IVPB 30 milliMole(s) IV Intermittent once    MEDICATIONS  (PRN):  acetaminophen   Tablet .. 650 milliGRAM(s) Oral every 6 hours PRN Temp greater or equal to 38.5C (101.3F), Mild Pain (1 - 3)  aluminum hydroxide/magnesium hydroxide/simethicone Suspension 30 milliLiter(s) Oral every 4 hours PRN Dyspepsia  calcium carbonate    500 mG (Tums) Chewable 1 Tablet(s) Chew three times a day PRN Dyspepsia  melatonin 3 milliGRAM(s) Oral at bedtime PRN Insomnia  metoclopramide Injectable 5 milliGRAM(s) IV Push once PRN nausea  ondansetron Injectable 4 milliGRAM(s) IV Push every 8 hours PRN Nausea and/or Vomiting          Physical exam:   Gen- patient lethargic NAD.   Resp- clear to auscultation. No wheezing, rales or rhonchi  CV- S1 and S2 RRR.  Grade 1/6 systolic murmur. No gallops or rubs  ABD- large, distended, nontender.  Quiet  bowel sounds  EXT- grossly edematous lower extremities.   Neuro- grossly nonfocal                            10.3   26.11 )-----------( 61       ( 23 Sep 2021 07:08 )             29.0       09-23    126<L>  |  88<L>  |  45<H>  ----------------------------<  173<H>  3.7   |  22  |  1.12    Ca    7.5<L>      23 Sep 2021 07:08  Phos  2.2     09-23  Mg     2.40     09-23    TPro  5.8<L>  /  Alb  3.1<L>  /  TBili  1.1  /  DBili  0.5<H>  /  AST  87<H>  /  ALT  211<H>  /  AlkPhos  183<H>  09-23

## 2021-09-23 NOTE — PROGRESS NOTE ADULT - ATTENDING COMMENTS
75 year old male with a PMH of HTN, HLD, CAD, MI, PCI with stent x2 2009, chronic HFrEF, s/p BIV ICD (Medtronic), BPH, recently diagnosed DVT/PE,  on anticoagulation ( Lovenox then Eliquis) and metastatic pancreatic cancer (diag in August), [place] [facility] [place], who presented to our ED with fever, nausea/vomiting, diarrhea, poor po intake and fluid overload. Noted to have severe hyponatremia, leukopenia and persistent high transaminitis.  BP meds BB/ARB were held due to borderline SBP 's and severe nausea.  He was started on Albumin and IV Lasix for fluid overload.  Improving leukopenia and down trending transaminitis noted.   BiV ICD interrogation revealed a recent onset of PAF episodes with RVR since Sep 5, 2021. The longest episode occurred on 9/20 lasting 4 hours with ventricular rates to 194 bpm.  A device alert  showed average ventricular rates >100 during AT/AF (>6 hours) for 5 days. Of note, the onset of AFib episodes correlates to an increased OptiVol index  (fluid accumulation) recorded by his ICD suggesting the AFib  likely contributed to his worsening heart failure.  In light of his comorbidities, a rate control strategy is preferred.  In addition his transaminitis (likely multifactorial including metastatic disease)  will preclude use of antiarrhythmic drug therapy.   PLAN:    -Continuous telemetry monitoring for PAF with RVR.  Brief episode overnight with RVR (160's).   -Continue diuresis (currently receiving Dobutamine, albumin and IV Lasix 80mg bid).   -May use IV Lopressor for rate control of AFib with RVR as needed.   -Continue Coreg 6.25mg bid. (Hold for inotropic assisted diuresis).  Could use digoxin instead. Would keep level < 1.0.   -Continue anticoagulation therapy with Lovenox. To transition to Eliquis when H/H stable.   -Will follow.

## 2021-09-23 NOTE — PROGRESS NOTE ADULT - SUBJECTIVE AND OBJECTIVE BOX
[facility] NEPHROLOGY- PROGRESS NOTE    75y Male with history of HTN, CHF, pancreatic CA on chemo presents with fevers and diarrhea. Nephrology consulted for hyponatremia.    REVIEW OF SYSTEMS:  Gen: no changes in weight  Cards: no chest pain  Resp: no dyspnea  GI: no nausea or vomiting or diarrhea  Vascular: + LE edema improving    No Known Allergies      Hospital Medications: Medications reviewed      VITALS:  T(F): 98 (09-23-21 @ 10:00), Max: 98.6 (09-22-21 @ 17:32)  HR: 86 (09-23-21 @ 10:00)  BP: 99/56 (09-23-21 @ 10:00)  RR: 18 (09-23-21 @ 10:00)  SpO2: 96% (09-23-21 @ 10:00)  Wt(kg): --    09-22 @ 07:01  -  09-23 @ 07:00  --------------------------------------------------------  IN: 717.5 mL / OUT: 700 mL / NET: 17.5 mL        Weight (kg): 96 (09-22 @ 15:47)      PHYSICAL EXAM:    Gen: NAD, calm  Cards: RRR, +S1/S2, no M/G/R  Resp: CTA B/L  GI: soft, NT/ND, NABS  Vascular: 2+ LE edema B/L, LUE edema      LABS:  09-23    126<L>  |  88<L>  |  45<H>  ----------------------------<  173<H>  3.7   |  22  |  1.12    Ca    7.5<L>      23 Sep 2021 07:08  Phos  2.2     09-23  Mg     2.40     09-23    TPro  5.8<L>  /  Alb  3.1<L>  /  TBili  1.1  /  DBili  0.5<H>  /  AST  87<H>  /  ALT  211<H>  /  AlkPhos  183<H>  09-23    Creatinine Trend: 1.12 <--, 1.16 <--, 1.13 <--, 1.07 <--, 1.01 <--, 1.02 <--, 0.96 <--, 0.98 <--, 0.93 <--, 0.91 <--                        10.3   26.11 )-----------( 61       ( 23 Sep 2021 07:08 )             29.0     Urine Studies:    Osmolality, Random Urine: 825 mosm/kg (09-18 @ 15:37)  Sodium, Random Urine: <20 mmol/L (09-18 @ 15:37)  Sodium, Random Urine: <20 mmol/L (09-17 @ 15:20)  Osmolality, Random Urine: 797 mosm/kg (09-17 @ 15:20)  Osmolality, Random Urine: 774 mosm/kg (09-16 @ 20:51)

## 2021-09-23 NOTE — PROGRESS NOTE ADULT - ASSESSMENT
1. Metastatic Pancreatic CA    -- dx on 8/21/21  -- follows at [facility] [place]  -- Started treatment on 8/31/21 per protocol , randomized to gemcitabine/nab-paclitaxel and dual immmunotherapy. Received Wilson/Abraxane on 9/14  -- further care as outpt after d/c    2. Diarrhea    -- C diff negative  -- stool studies negative   -- may be sec to immunotx  -- appears to be improving previously but now pt reports diarrhea/loose stools still. GI eval appreciated, on lomotil  --  holding off on steroids for now but if still no improvement, would need to consider    3. Fevers    -- ID following  -- No documented fevers  -- off of PO Vanco  -- off zosyn    4. Abnormal LFTS    -- significant transaminitis, likely sec to immunotherapy vs chemo vs mets  -- liver US shows mets   -- LFTs are slowly improving  -- monitor daily LFTs, they are elevated but improved from 2 days prior  -- Dr Pisano spoke to clinical trial staff at [facility] about starting steroids. Recommend holding off for now    5. pancytopenia     --s/p gcsf, dc'd yesterday, may be cause of elevated wbc today  -- pan culture and broad spectrum abx if T > 100.4  -- likely form recent chemo   -- hg>7  -- plt>10k, if bleeding >50k     6. Hyponatremia    -- slightly worse with na 126 today  -- renal following    7. anorexia -- wife is concerned. Initially was holding marinol in the acute setting. Since he cont to improve overall, will resume home dose of 2.5mg daily for now  -- nutrition f/u    8. LE edema -- per renal    9. ? AMS  -monitor mental status, seems a bit more lethargic today, if worsens would check CT or MRI to r/o cva    Thank you for the courtesy of this consultation and we will continue to follow.    Vel Geiger MD  [facility]  Cell: [phone]

## 2021-09-23 NOTE — CONSULT NOTE ADULT - ATTENDING COMMENTS
75 year old male with a PMH of HTN, HLD, CAD, MI, PCI with stent x2 2009, chronic HFrEF, s/p BIV ICD (Medtronic), BPH, recently diagnosed with DVT/PE-was started on AC therapy Lovenox then Eliquis and recently diagnosed with metastatic pancreatic cancer in August, [place] [facility] [place].  Patient presented to ED with fever and N/V and frequent diarrhea for few days and decrease po intake for past few weeks.  Patient reports "low BP" and his BB Carvedilol was reduced to 6.25mg Bid, Spironolactone and Losartan were held.  Upon admission, patient was noted to have severe hyponatremia, leukopenia and persistent high transaminitis.  BP remains borderline with SBP 's bpm.   BiV ICD interrogation today revealed recently onset of multiple PAF with RVR since Sep 5, 2021 with a longest episode lasted 4 hours with ventricular rate up to 194 bpm recorded this am.  Alert from his device showing average ventricular>100 during AT/AF (>6 hours) for 5 days.  No VT therapy seen.  BiV paced at 98% with underlying rhythm of SR with PAF.    -Telemetry monitor for PAF with RVR    Agree with IV beta blockers for rate control for now.
75M with metastatic pancreatic cancer, pulmonary emboli, a/w hypovolemia, fever, diarrhea, poor po intake. He got IVF and albumin and developed LE edema. However, he denies orthopnea and PND. CXR done on 9/15 does not show any significant pulmonary edema. It has not been repeated.    He has been started on Bumex and dobutamine drips.    On exam, he has scattered lung crackles. Jugular veins do not appear distended. He has 2-3+ LE edema, but this likely reflects low oncotic, and not high hydrostatic, pressure.    Suggest:  -D/c Bumex gtt. Because of mouth ulcers, he has almost nil po intake.  -Decrease  to 1.25 mcg/kg/min in the AM, with an aim to discontinue drip on .  -Encourage po intake. Analgesia for mouth sores.  -Avoid albumin. Unlikely to be of benefit.    Will continue to follow.

## 2021-09-23 NOTE — PROGRESS NOTE ADULT - ASSESSMENT
75 year old male with a PMH of HTN, HLD, CAD, MI, PCI with stent x2 2009, chronic HFrEF, s/p BIV ICD (Medtronic), BPH, recently diagnosed DVT/PE,  on anticoagulation ( Lovenox then Eliquis) and metastatic pancreatic cancer (diag in August), on chemotherapy at [facility] [place] for past few weeks, who presented to our ED with fever, nausea/vomiting, diarrhea, poor po intake and fluid overload. Noted to have severe hyponatremia, leukopenia and persistent high transaminitis.  BP meds BB/ARB were held due to borderline SBP 's and severe nausea.  He was started on Albumin and IV Lasix for fluid overload.  Improving leukopenia and down trending transaminitis noted.   BiV ICD interrogation revealed a recent onset of PAF episodes with RVR since Sep 5, 2021. The longest episode occurred on 9/20 lasting 4 hours with ventricular rates to 194 bpm.  A device alert  showed average ventricular rates >100 during AT/AF (>6 hours) for 5 days. Of note, the onset of AFib episodes correlates to an increased OptiVol index  (fluid accumulation) recorded by his ICD suggesting the AFib  likely contributed to his worsening heart failure.  In light of his comorbidities, a rate control strategy is preferred.  In addition his transaminitis (likely multifactorial including metastatic disease)  will preclude use of antiarrhythmic drug therapy.   PLAN:    -Continuous telemetry monitoring for PAF with RVR.  Brief episode overnight with RVR (160's).   -Continue diuresis (currently receiving Dobutamine, albumin and IV Lasix 80mg bid).   -May use IV Lopressor for rate control of AFib with RVR as needed.   -Continue Coreg 6.25mg bid.   -Continue anticoagulation therapy with Lovenox. To transition to Eliquis when H/H stable.   -Will follow.

## 2021-09-24 NOTE — PROGRESS NOTE ADULT - SUBJECTIVE AND OBJECTIVE BOX
SUBJECTIVE / OVERNIGHT EVENTS:pt seen and examined    MEDICATIONS  (STANDING):  aspirin  chewable 81 milliGRAM(s) Oral daily  atorvastatin 80 milliGRAM(s) Oral at bedtime  baclofen 5 milliGRAM(s) Oral three times a day  buMETAnide Infusion 0.5 mG/Hr (2.5 mL/Hr) IV Continuous <Continuous>  dextrose 40% Gel 15 Gram(s) Oral once  dextrose 5%. 1000 milliLiter(s) (50 mL/Hr) IV Continuous <Continuous>  dextrose 5%. 1000 milliLiter(s) (100 mL/Hr) IV Continuous <Continuous>  dextrose 50% Injectable 25 Gram(s) IV Push once  dextrose 50% Injectable 12.5 Gram(s) IV Push once  dextrose 50% Injectable 25 Gram(s) IV Push once  diphenoxylate/atropine 1 Tablet(s) Oral daily  DOBUTamine Infusion 2.604 MICROgram(s)/kG/Min (7.5 mL/Hr) IV Continuous <Continuous>  dronabinol 2.5 milliGRAM(s) Oral at bedtime  enoxaparin Injectable 70 milliGRAM(s) SubCutaneous two times a day  finasteride 5 milliGRAM(s) Oral at bedtime  gabapentin 100 milliGRAM(s) Oral at bedtime  glucagon  Injectable 1 milliGRAM(s) IntraMuscular once  influenza  Vaccine (HIGH DOSE) 0.7 milliLiter(s) IntraMuscular once  insulin glargine Injectable (LANTUS) 3 Unit(s) SubCutaneous at bedtime  insulin lispro (ADMELOG) corrective regimen sliding scale   SubCutaneous three times a day before meals  insulin lispro (ADMELOG) corrective regimen sliding scale   SubCutaneous at bedtime  MIRACLE MOUTHWASH 30 milliLiter(s) Swish and Spit every 6 hours  pancrelipase  (CREON 24,000 Lipase Units) 1 Capsule(s) Oral four times a day with meals    MEDICATIONS  (PRN):  acetaminophen   Tablet .. 650 milliGRAM(s) Oral every 6 hours PRN Temp greater or equal to 38.5C (101.3F), Mild Pain (1 - 3)  aluminum hydroxide/magnesium hydroxide/simethicone Suspension 30 milliLiter(s) Oral every 4 hours PRN Dyspepsia  calcium carbonate    500 mG (Tums) Chewable 1 Tablet(s) Chew three times a day PRN Dyspepsia  melatonin 3 milliGRAM(s) Oral at bedtime PRN Insomnia  metoclopramide Injectable 5 milliGRAM(s) IV Push once PRN nausea  ondansetron Injectable 4 milliGRAM(s) IV Push every 8 hours PRN Nausea and/or Vomiting    Vital Signs Last 24 Hrs  T(C): 36.6 (09-24-21 @ 12:00), Max: 37.1 (09-23-21 @ 21:56)  T(F): 97.9 (09-24-21 @ 12:00), Max: 98.8 (09-23-21 @ 21:56)  HR: 97 (09-24-21 @ 16:00) (84 - 97)  BP: 97/68 (09-24-21 @ 16:00) (90/58 - 105/62)  BP(mean): --  RR: 18 (09-24-21 @ 16:00) (18 - 18)  SpO2: 96% (09-24-21 @ 16:00) (96% - 100%)      Constitutional: No fever, fatigue  Skin: No rash.  Eyes: No recent vision problems or eye pain.  ENT: No congestion, ear pain, or sore throat.  Cardiovascular: No chest pain or palpation.  Respiratory: No cough, shortness of breath, congestion, or wheezing.  Gastrointestinal: No abdominal pain, nausea, vomiting, or diarrhea.  Genitourinary: No dysuria.  Musculoskeletal: No joint swelling.  Neurologic: No headache.    PHYSICAL EXAM:  GENERAL: NAD  EYES: EOMI, PERRLA  NECK: Supple, No JVD  CHEST/LUNG: dec breath sounds at bases  HEART:  S1 , S2 +  ABDOMEN: soft , bs+  EXTREMITIES:no edema    NEUROLOGY:alert awake    LABS:  09-24    131<L>  |  87<L>  |  39<H>  ----------------------------<  161<H>  2.8<LL>   |  25  |  1.05    Ca    7.4<L>      24 Sep 2021 07:52  Phos  2.5     09-24  Mg     2.00     09-24    TPro  5.9<L>  /  Alb  3.8  /  TBili  1.1  /  DBili      /  AST  92<H>  /  ALT  154<H>  /  AlkPhos  187<H>  09-24    Creatinine Trend: 1.05 <--, 0.65 <--, 1.12 <--, 1.16 <--, 1.13 <--, 1.07 <--, 1.01 <--, 1.02 <--, 0.96 <--, 0.98 <--                        9.3    25.50 )-----------( 53       ( 24 Sep 2021 07:52 )             26.6     Urine Studies:    Osmolality, Random Urine: 825 mosm/kg (09-18 @ 15:37)  Sodium, Random Urine: <20 mmol/L (09-18 @ 15:37)          LIVER FUNCTIONS - ( 24 Sep 2021 07:52 )  Alb: 3.8 g/dL / Pro: 5.9 g/dL / ALK PHOS: 187 U/L / ALT: 154 U/L / AST: 92 U/L / GGT: x

## 2021-09-24 NOTE — PROGRESS NOTE ADULT - ASSESSMENT
75y Male with history of HTN, CHF, pancreatic CA on chemo presents with fevers and diarrhea. Nephrology consulted for hyponatremia.    1. Hyponatremia: Secondary to volume overload. Serum Na improving. Continue with  assisted diuresis and 1L FR. Urine studies consistent with decreased EABV. Monitor serum Na.    2. HTN: BP low. Holding anti-hypertensive medications. On  as per cardiology. Monitor BP.    3. LE edema: Improving with good UO (2.2L). Continue with  gtt as per cardiology and bumex gtt @ 0.5 mg/hour. Continue with ensure to increase oncotic pressure. Check electrolytes twice daily while on bumex gtt and supplement potassium as needed. TTE with severe global LVSD. Monitor UO.    4. Acute on chronic systolic HF: As per cardiology/CHF team.      [facility] NEPHROLOGY  Matthias Keenan M.D.  Tobi Goodman D.O.  Portia Daily M.D.  Hanna Trammell, MSN, ANP-C    Telephone: [phone]  Facsimile: [phone]    [address] [place] [address]

## 2021-09-24 NOTE — SWALLOW BEDSIDE ASSESSMENT ADULT - SWALLOW EVAL: DIAGNOSIS
1) Functional oral stage of swallow for nectar thick fluids and thin fluids marked by adequate bolus acceptance, coordination, and collection. Timely transfer and posterior transport, with adequate oral clearance noted. 2) Mild-moderate oral dysphagia for mechanical soft solids and puree marked by prolonged manipulation and mastication resulting in delayed collection, transfer and posterior transport. Lingual stasis noted, which was cleared with liquid wash. Of note, pt complaints of odynophagia likely exasperated by mouth sores. 3) Functional pharyngeal phase of swallow for puree, mechanical soft solids, nectar thick fluids, and thin fluids marked by a present pharyngeal swallow trigger with hyolaryngeal elevation noted upon digital palpation without evidence of airway penetration/aspiration.

## 2021-09-24 NOTE — CHART NOTE - NSCHARTNOTEFT_GEN_A_CORE
Patient seen for consult for New Diabetes. Chart reviewed, events noted.   Source: Patient, EMR, wife and son at bedside    Per chart, patient is a 74 yo M hx of CAD, MI, stent x2 (10/2009 at [facility]), pAfib, HTN, HLD, BIV-AICD, and systolic CHF, hx of PE (on Eliquis), recently dx’d pancreatic mass on chemo at [facility] now presenting with fever of 101 at home and several days of watery diarrhea concerning for C diff colitis.    Nutrition Events:  - Patient reports continued poor appetite and PO intake. Per wife, the most patient has eaten has been 1/2 magic cup  - Patient seen for swallow evaluation 9/24, recommended for Dysphagia II mechanical soft with thin liquids  - Patient's wife expresses concern for patient's mouth sores and wishes for soothing foods to be sent with meals (jello, pudding, Italian ice).  - Per RN flowsheets, patient with loose BM 9/24  - DM education not appropriate at this time as patient persists with very poor PO intake    Diet : Diet, Dysphagia 2 Mechanical Soft-Thin Liquids:   DASH/TLC {Sodium & Cholesterol Restricted} (DASH)  1000mL Fluid Restriction ([id number])  Supplement Feeding Modality:  Oral  Ensure Enlive Servings Per Day:  1       Frequency:  Three Times a day (09-24-21 @ 14:07)    Current Weight: 96kg (09-22 @ 15:47), no new weights to assess  Height: 177.8 cm  BMI: 30.4 kg/m2  IBW: 75.2 kg    Pertinent Medications: MEDICATIONS  (STANDING):  aspirin  chewable 81 milliGRAM(s) Oral daily  atorvastatin 80 milliGRAM(s) Oral at bedtime  baclofen 5 milliGRAM(s) Oral three times a day  buMETAnide Infusion 0.5 mG/Hr (2.5 mL/Hr) IV Continuous <Continuous>  dextrose 5%. 1000 milliLiter(s) (50 mL/Hr) IV Continuous <Continuous>  dextrose 5%. 1000 milliLiter(s) (100 mL/Hr) IV Continuous <Continuous>  diphenoxylate/atropine 1 Tablet(s) Oral daily  DOBUTamine Infusion 2.604 MICROgram(s)/kG/Min (7.5 mL/Hr) IV Continuous <Continuous>  dronabinol 2.5 milliGRAM(s) Oral at bedtime  enoxaparin Injectable 70 milliGRAM(s) SubCutaneous two times a day  finasteride 5 milliGRAM(s) Oral at bedtime  gabapentin 100 milliGRAM(s) Oral at bedtime  insulin glargine Injectable (LANTUS) 3 Unit(s) SubCutaneous at bedtime  insulin lispro (ADMELOG) corrective regimen sliding scale   SubCutaneous three times a day before meals  insulin lispro (ADMELOG) corrective regimen sliding scale   SubCutaneous at bedtime  MIRACLE MOUTHWASH 30 milliLiter(s) Swish and Spit every 6 hours  pancrelipase  (CREON 24,000 Lipase Units) 1 Capsule(s) Oral four times a day with meals  potassium chloride  10 mEq/100 mL IVPB 10 milliEquivalent(s) IV Intermittent every 1 hour    Pertinent Labs:  09-24 Na131 mmol/L<L> Glu 161 mg/dL<H> K+ 2.8 mmol/L<LL> Cr  1.05 mg/dL BUN 39 mg/dL<H> 09-24 Phos 2.5 mg/dL 09-24 Alb 3.8 g/dL  POCT Blood Glucose.: 171 mg/dL (24 Sep 2021 12:56)  POCT Blood Glucose.: 168 mg/dL (24 Sep 2021 09:01)  POCT Blood Glucose.: 166 mg/dL (23 Sep 2021 20:47)  POCT Blood Glucose.: 161 mg/dL (23 Sep 2021 18:08)    Physical Assessment per RN flowsheets:  Skin: no known pressure injuries per RN documentation   Fluids: +1 generalized, +2 L arm, +3 L/R leg, ankle, foot    Estimated Needs:   [x] no change since previous assessment: based on IBW 75.2kg   Estimated Energy Needs: 25-30kcal/kg provides 2547-1271 kcal/d  Estimated Protein Needs: 1.3-1.5g/kg provides 97..8 g/d  Defer fluid needs to team    Previous Nutrition Diagnosis: severe malnutrition in the setting of acute illness  Nutrition Diagnosis is: [x] ongoing, being managed with PO diet and supplements    New Nutrition Diagnosis: [x] not applicable    Recommendations:  1) Please change diet to Dysphagia II mechanical soft with thin liquids, low sodium, 1000mL fluid restriction - remove DASH/TLC restrictions to promote better PO intake  2) Continue with Ensure Enlive 3x daily (provides 1050kcal, 60g protein, 540mL free water)   3) Encourage and assist with PO intake as needed  4) RD to provide 2x Magic Cup daily (580kcal, 18g protein)   5) Honor food preferences as able  6) Please document % PO intake in flowsheets       RD remains available, reconsult as needed: Anna Marie Montelongo, RDN #[id number]

## 2021-09-24 NOTE — CONSULT NOTE ADULT - ASSESSMENT
75M newly diagnosed DM2 HbA1c 6.8%, prior history of prediabetes and recent diagnosis of pancreatic cancer.    1) DM2  HbA1c 6.8% - given age this A1c is controlled and at goal and may not require therapy at discharge  Checked c-peptide to ensure pancreatic mass is not inhibiting insulin secretion - c-peptide 3.9 (glu 161) is normal range.  This is reassuring that he likely would not require insulin for discharge.  Anion gap 19, BHB 0.7 not consistent with DKA  Lactate was elevated initially then improved  consider other causes of anion gap elevation.    Inpatient - continue with Lantus 3 units qhs and low Admelog scale premeal and low bedtime scale.  BG target 100-180 mg/dl  Consider update diet to include consistent carb although patient is not eating much at this time.  Consider changing supplemental shake to Glucerna if ok per RD.    Discharge plan at this point likely on no DM meds with monitoring of glucose levels at home using glucometer and following up with enodcrinologist Dr. Sam Fields to trend and carb consistent diet.  Explained to patient and family that glucose levels and A1c would need to be followed as outpatient in case pancreatic mass enlarges and further inhibits insulin secretion possibly requiring insulin therapy in the future.    2) HTN  BP goal < 130/80    3) Hyperlipidemia   atorvastatin 80mg    Rafael Flores MD  Division of Endocrinology  Pager: [phone]    If after 6PM or before 9AM, or on weekends/holidays, please call endocrine answering service for assistance ([phone]).  For nonurgent matters email [email or web address] for assistance.

## 2021-09-24 NOTE — PROGRESS NOTE ADULT - ASSESSMENT
75 year old male with a PMH of HTN, HLD, CAD, MI, PCI with stent x2 2009, chronic HFrEF, s/p BIV ICD (Medtronic), BPH, recently diagnosed DVT/PE,  on anticoagulation ( Lovenox then Eliquis) and metastatic pancreatic cancer (diag in August), [place] [facility], who presented to our ED with fever, nausea/vomiting, diarrhea, poor po intake and fluid overload. Noted to have severe hyponatremia, leukopenia and persistent high transaminitis.  BP meds BB/ARB were held due to hypotension and severe nausea.  He was started on Albumin and IV Lasix for fluid overload.  Improving leukopenia and down trending transaminitis noted.   BiV ICD interrogation revealed a recent onset of PAF episodes with RVR since Sep 5, 2021. The longest episode occurred on 9/20 lasting 4 hours with ventricular rates to 194 bpm.  A device alert  showed average ventricular rates >100 during AT/AF (>6 hours) for 5 days. Of note, the onset of AFib episodes correlates to an increased OptiVol index  (fluid accumulation) recorded by his ICD suggesting the AFib  likely contributed to his worsening heart failure.  In light of his comorbidities and transaminitis,  rate control strategy  was preferred to rhythm control.  Now on dobutamine assisted diuresis. Coreg discontinued.   PLAN:    -Continuous telemetry monitoring for PAF with RVR.    -Continue diuresis (currently receiving Dobutamine, albumin and IV Lasix 80mg bid).   -May use IV Lopressor for rate control of AFib with RVR as needed.   -Discontinue  Coreg 6.25mg bid. Hesitant to start digoxin for rate control given hypokalemia ( K+ 2.8 today) despite repletion. Can start dig 0.125mg daily when K > 4.0   -Continue anticoagulation therapy with Lovenox. To transition to Eliquis when H/H stable.   -Will follow.

## 2021-09-24 NOTE — PROGRESS NOTE ADULT - ATTENDING COMMENTS
75 year old male with a PMH of HTN, HLD, CAD, MI, PCI with stent x2 2009, chronic HFrEF, s/p BIV ICD ([employer]), BPH, recently diagnosed DVT/PE,  on anticoagulation ( Lovenox then Eliquis) and metastatic pancreatic cancer (diag in August), on chemotherapy at [facility] [place] few weeks, who presented to our ED with fever, nausea/vomiting, diarrhea, poor po intake and fluid overload. Noted to have severe hyponatremia, leukopenia and persistent high transaminitis.  BP meds BB/ARB were held due to hypotension and severe nausea.  He was started on Albumin and IV Lasix for fluid overload.  Improving leukopenia and down trending transaminitis noted.   BiV ICD interrogation revealed a recent onset of PAF episodes with RVR since Sep 5, 2021. The longest episode occurred on 9/20 lasting 4 hours with ventricular rates to 194 bpm.  A device alert  showed average ventricular rates >100 during AT/AF (>6 hours) for 5 days. Of note, the onset of AFib episodes correlates to an increased OptiVol index  (fluid accumulation) recorded by his ICD suggesting the AFib  likely contributed to his worsening heart failure.  In light of his comorbidities and transaminitis,  rate control strategy  was preferred to rhythm control.  Now on dobutamine assisted diuresis. Coreg discontinued.   PLAN:    -Continuous telemetry monitoring for PAF with RVR.    -Continue diuresis (currently receiving Dobutamine, albumin and IV Lasix 80mg bid).   -May use IV Lopressor for rate control of AFib with RVR as needed.   -Discontinue  Coreg 6.25mg bid. Hesitant to start digoxin for rate control given hypokalemia ( K+ 2.8 today) despite repletion. Can start dig 0.125mg daily when K > 4.0   -Continue anticoagulation therapy with Lovenox. To transition to Eliquis when H/H stable.   -Will follow.

## 2021-09-24 NOTE — PROGRESS NOTE ADULT - PROBLEM SELECTOR PLAN 1
Likely source from GI: C diff vs colitis  stool c diff neg  - Continue Zosyn until ANC >500 and afebrile x 48 hours (can discontinue after this timeframe)

## 2021-09-24 NOTE — PROGRESS NOTE ADULT - SUBJECTIVE AND OBJECTIVE BOX
Pt seen, continues to c/o diarrhea  Nurse says diarrhea still loose but not as watery   pt c/o mucositis     MEDICATIONS  (STANDING):  aspirin  chewable 81 milliGRAM(s) Oral daily  atorvastatin 80 milliGRAM(s) Oral at bedtime  baclofen 5 milliGRAM(s) Oral three times a day  buMETAnide Infusion 0.5 mG/Hr (2.5 mL/Hr) IV Continuous <Continuous>  dextrose 40% Gel 15 Gram(s) Oral once  dextrose 5%. 1000 milliLiter(s) (50 mL/Hr) IV Continuous <Continuous>  dextrose 5%. 1000 milliLiter(s) (100 mL/Hr) IV Continuous <Continuous>  dextrose 50% Injectable 25 Gram(s) IV Push once  dextrose 50% Injectable 12.5 Gram(s) IV Push once  dextrose 50% Injectable 25 Gram(s) IV Push once  diphenoxylate/atropine 1 Tablet(s) Oral daily  DOBUTamine Infusion 2.604 MICROgram(s)/kG/Min (7.5 mL/Hr) IV Continuous <Continuous>  dronabinol 2.5 milliGRAM(s) Oral at bedtime  enoxaparin Injectable 70 milliGRAM(s) SubCutaneous two times a day  finasteride 5 milliGRAM(s) Oral at bedtime  gabapentin 100 milliGRAM(s) Oral at bedtime  glucagon  Injectable 1 milliGRAM(s) IntraMuscular once  influenza  Vaccine (HIGH DOSE) 0.7 milliLiter(s) IntraMuscular once  insulin glargine Injectable (LANTUS) 3 Unit(s) SubCutaneous at bedtime  insulin lispro (ADMELOG) corrective regimen sliding scale   SubCutaneous three times a day before meals  insulin lispro (ADMELOG) corrective regimen sliding scale   SubCutaneous at bedtime  MIRACLE MOUTHWASH 30 milliLiter(s) Swish and Spit every 6 hours  pancrelipase  (CREON 24,000 Lipase Units) 1 Capsule(s) Oral four times a day with meals  potassium chloride  10 mEq/100 mL IVPB 10 milliEquivalent(s) IV Intermittent every 1 hour    MEDICATIONS  (PRN):  acetaminophen   Tablet .. 650 milliGRAM(s) Oral every 6 hours PRN Temp greater or equal to 38.5C (101.3F), Mild Pain (1 - 3)  aluminum hydroxide/magnesium hydroxide/simethicone Suspension 30 milliLiter(s) Oral every 4 hours PRN Dyspepsia  calcium carbonate    500 mG (Tums) Chewable 1 Tablet(s) Chew three times a day PRN Dyspepsia  melatonin 3 milliGRAM(s) Oral at bedtime PRN Insomnia  metoclopramide Injectable 5 milliGRAM(s) IV Push once PRN nausea  ondansetron Injectable 4 milliGRAM(s) IV Push every 8 hours PRN Nausea and/or Vomiting        ROS  No fever, sweats, chills  +diarrhea    Vital Signs Last 24 Hrs  T(C): 36.6 (24 Sep 2021 08:00), Max: 37.1 (23 Sep 2021 21:56)  T(F): 97.9 (24 Sep 2021 08:00), Max: 98.8 (23 Sep 2021 21:56)  HR: 93 (24 Sep 2021 08:00) (82 - 95)  BP: 90/58 (24 Sep 2021 08:00) (90/58 - 107/63)  BP(mean): 73 (23 Sep 2021 18:00) (62 - 73)  RR: 18 (24 Sep 2021 08:00) (18 - 18)  SpO2: 98% (24 Sep 2021 08:00) (96% - 98%)      PE  NAD  Awake, alert  Anicteric, MMM  No rash grossly  FROM                          9.3    25.50 )-----------( 53       ( 24 Sep 2021 07:52 )             26.6                           10.3   26.11 )-----------( 61       ( 23 Sep 2021 07:08 )             29.0       09-23    126<L>  |  88<L>  |  45<H>  ----------------------------<  173<H>  3.7   |  22  |  1.12    Ca    7.5<L>      23 Sep 2021 07:08  Phos  2.2     09-23  Mg     2.40     09-23    TPro  5.8<L>  /  Alb  3.1<L>  /  TBili  1.1  /  DBili  0.5<H>  /  AST  87<H>  /  ALT  211<H>  /  AlkPhos  183<H>  09-23

## 2021-09-24 NOTE — PROGRESS NOTE ADULT - ASSESSMENT
1. Metastatic Pancreatic CA    -- dx on 8/21/21  -- follows at [facility] [place]  -- Started treatment on 8/31/21 per protocol , randomized to gemcitabine/nab-paclitaxel and dual immmunotherapy. Received Dunlap/Abraxane on 9/14  -- further care as outpt after d/c    2. Diarrhea    -- C diff negative  -- stool studies negative   -- may be sec to immunotx  -- appears to be improving previously but now pt reports diarrhea/loose stools still. GI eval appreciated, on lomotil  --  holding off on steroids for now but if still no improvement, would need to consider    3. Fevers - now resolved     -- ID following  -- No documented fevers  -- off of PO Vanco  -- off zosyn    4. Abnormal LFTS    -- significant transaminitis, likely sec to immunotherapy vs chemo vs mets  -- liver US shows mets   -- LFTs are slowly improving  -- monitor daily LFTs, they are elevated but improved from 2 days prior  -- Dr Pisano spoke to clinical trial staff at [facility] about starting steroids. Recommend holding off for now    5. pancytopenia     --s/p gcsf, dc'd yesterday, may be cause of elevated wbc today  -- pan culture and broad spectrum abx if T > 100.4  -- likely form recent chemo   -- hg>7  -- plt>10k, if bleeding >50k     6. Hyponatremia    -- better today =131  -- hypokalemia sec to diarrhea; supplement   -- renal following    7. anorexia -- wife is concerned. Initially was holding marinol in the acute setting. Since he cont to improve overall, will resume home dose of 2.5mg daily for now  -- nutrition f/u    8. LE edema -- per renal    9. ? AMS  -monitor mental status, pt lethargic but communicating appropriately     Naun Ortiz MD  [facility]  [phone]

## 2021-09-24 NOTE — PROGRESS NOTE ADULT - SUBJECTIVE AND OBJECTIVE BOX
Patient is a 75y Male     Patient is a 75y old  Male who presents with a chief complaint of Fever (23 Sep 2021 16:42)      HPI:  74 yo M hx of CAD, MI, stent x2 (10/2009 at [facility]), pAfib, HTN, HLD, BIV-AICD, and systolic CHF, hx of PE (on Eliquis), recently dx’d pancreatic mass on chemo [facility] now presenting with fever of 101 at home. He also endorsed diarrhea x3-4 days, watery. 4-5x daily. No recent abx exposure. No abdominal pain. Denies chest pain, dyspnea, palpitations. Just finished chemo today. Called his onc who advised him to come in. No abx exposure.  In the ED, vitals stable. Labs with leukopenia without neutropenia. Elevated ASt/ALT, hyponatremia. EKG paced. CXR clear. (15 Sep 2021 01:36)      PAST MEDICAL & SURGICAL HISTORY:  Hypertension (ICD9 401.9)    Hyperlipidemia (ICD9 272.4)    BPH (Benign Prostatic Hyperplasia)    Borderline diabetes mellitus    MI (myocardial infarction)  2009    Systolic heart failure    Hernia    Biventricular ICD (implantable cardioverter-defibrillator) in place  2010    Stented coronary artery  X 2, 2009        MEDICATIONS  (STANDING):  aspirin  chewable 81 milliGRAM(s) Oral daily  atorvastatin 80 milliGRAM(s) Oral at bedtime  baclofen 5 milliGRAM(s) Oral three times a day  buMETAnide Infusion 0.5 mG/Hr (2.5 mL/Hr) IV Continuous <Continuous>  carvedilol 3.125 milliGRAM(s) Oral every 12 hours  dextrose 40% Gel 15 Gram(s) Oral once  dextrose 5%. 1000 milliLiter(s) (50 mL/Hr) IV Continuous <Continuous>  dextrose 5%. 1000 milliLiter(s) (100 mL/Hr) IV Continuous <Continuous>  dextrose 50% Injectable 25 Gram(s) IV Push once  dextrose 50% Injectable 12.5 Gram(s) IV Push once  dextrose 50% Injectable 25 Gram(s) IV Push once  diphenoxylate/atropine 1 Tablet(s) Oral daily  DOBUTamine Infusion 2.604 MICROgram(s)/kG/Min (7.5 mL/Hr) IV Continuous <Continuous>  dronabinol 2.5 milliGRAM(s) Oral at bedtime  enoxaparin Injectable 70 milliGRAM(s) SubCutaneous two times a day  finasteride 5 milliGRAM(s) Oral at bedtime  gabapentin 100 milliGRAM(s) Oral at bedtime  glucagon  Injectable 1 milliGRAM(s) IntraMuscular once  influenza  Vaccine (HIGH DOSE) 0.7 milliLiter(s) IntraMuscular once  insulin glargine Injectable (LANTUS) 3 Unit(s) SubCutaneous at bedtime  insulin lispro (ADMELOG) corrective regimen sliding scale   SubCutaneous three times a day before meals  insulin lispro (ADMELOG) corrective regimen sliding scale   SubCutaneous at bedtime  MIRACLE MOUTHWASH 30 milliLiter(s) Swish and Spit every 6 hours  pancrelipase  (CREON 24,000 Lipase Units) 1 Capsule(s) Oral four times a day with meals      Allergies    No Known Allergies    Intolerances        SOCIAL HISTORY:  Denies ETOh,Smoking,     FAMILY HISTORY:  No pertinent family history in first degree relatives        REVIEW OF SYSTEMS:    CONSTITUTIONAL: No weakness, fevers or chills  EYES/ENT: No visual changes;  No vertigo or throat pain   NECK: No pain or stiffness  RESPIRATORY: No cough, wheezing, hemoptysis; No shortness of breath  CARDIOVASCULAR: No chest pain or palpitations  GASTROINTESTINAL: No abdominal or epigastric pain. No nausea, vomiting, or hematemesis; No diarrhea or constipation. No melena or hematochezia.  GENITOURINARY: No dysuria, frequency or hematuria  NEUROLOGICAL: No numbness or weakness  SKIN: No itching, burning, rashes, or lesions   All other review of systems is negative unless indicated above.    VITAL:  T(C): , Max: 37.1 (09-23-21 @ 21:56)  T(F): , Max: 98.8 (09-23-21 @ 21:56)  HR: 95 (09-24-21 @ 05:08)  BP: 105/62 (09-24-21 @ 05:08)  BP(mean): 73 (09-23-21 @ 18:00)  RR: 18 (09-24-21 @ 05:08)  SpO2: 96% (09-24-21 @ 05:08)  Wt(kg): --    I and O's:    09-22 @ 07:01  -  09-23 @ 07:00  --------------------------------------------------------  IN: 717.5 mL / OUT: 700 mL / NET: 17.5 mL    09-23 @ 07:01  -  09-24 @ 07:00  --------------------------------------------------------  IN: 1530 mL / OUT: 2200 mL / NET: -670 mL          PHYSICAL EXAM:    Constitutional: NAD  HEENT: PERRLA,   Neck: No JVD  Respiratory: CTA B/L  Cardiovascular: S1 and S2  Gastrointestinal: BS+, soft, NT/ND  Extremities: No peripheral edema  Neurological: A/O x 3, no focal deficits  Psychiatric: Normal mood, normal affect  : No Bowman  Skin: No rashes  Access: Not applicable  Back: No CVA tenderness    LABS:                        10.3   26.11 )-----------( 61       ( 23 Sep 2021 07:08 )             29.0     09-23    128<L>  |  90<L>  |  38<H>  ----------------------------<  245<H>  2.9<LL>   |  26  |  0.65    Ca    7.3<L>      23 Sep 2021 23:05  Phos  2.2     09-23  Mg     2.00     09-23    TPro  5.8<L>  /  Alb  3.1<L>  /  TBili  1.1  /  DBili  0.5<H>  /  AST  87<H>  /  ALT  211<H>  /  AlkPhos  183<H>  09-23          RADIOLOGY & ADDITIONAL STUDIES:

## 2021-09-24 NOTE — PROGRESS NOTE ADULT - SUBJECTIVE AND OBJECTIVE BOX
[facility] NEPHROLOGY- PROGRESS NOTE    75y Male with history of HTN, CHF, pancreatic CA on chemo presents with fevers and diarrhea. Nephrology consulted for hyponatremia.    REVIEW OF SYSTEMS:  Gen: no changes in weight  Cards: no chest pain  Resp: no dyspnea  GI: no nausea or vomiting or diarrhea  Vascular: + LE edema improving    No Known Allergies      Hospital Medications: Medications reviewed      VITALS:  T(F): 97.9 (09-24-21 @ 08:00), Max: 98.8 (09-23-21 @ 21:56)  HR: 93 (09-24-21 @ 08:00)  BP: 90/58 (09-24-21 @ 08:00)  RR: 18 (09-24-21 @ 08:00)  SpO2: 98% (09-24-21 @ 08:00)  Wt(kg): --    09-23 @ 07:01  -  09-24 @ 07:00  --------------------------------------------------------  IN: 1530 mL / OUT: 2200 mL / NET: -670 mL      PHYSICAL EXAM:    Gen: NAD, calm  Cards: RRR, +S1/S2, no M/G/R  Resp: CTA B/L  GI: soft, NT/ND, NABS  Vascular: 1+ LE edema B/L significantly improving      LABS:  09-24    131<L>  |  87<L>  |  39<H>  ----------------------------<  161<H>  2.8<LL>   |  25  |  1.05    Ca    7.4<L>      24 Sep 2021 07:52  Phos  2.5     09-24  Mg     2.00     09-24    TPro  5.9<L>  /  Alb  3.8  /  TBili  1.1  /  DBili      /  AST  92<H>  /  ALT  154<H>  /  AlkPhos  187<H>  09-24    Creatinine Trend: 1.05 <--, 0.65 <--, 1.12 <--, 1.16 <--, 1.13 <--, 1.07 <--, 1.01 <--, 1.02 <--, 0.96 <--, 0.98 <--                        9.3    25.50 )-----------( 53       ( 24 Sep 2021 07:52 )             26.6     Urine Studies:    Osmolality, Random Urine: 825 mosm/kg (09-18 @ 15:37)  Sodium, Random Urine: <20 mmol/L (09-18 @ 15:37)  Sodium, Random Urine: <20 mmol/L (09-17 @ 15:20)  Osmolality, Random Urine: 797 mosm/kg (09-17 @ 15:20)

## 2021-09-24 NOTE — PROGRESS NOTE ADULT - SUBJECTIVE AND OBJECTIVE BOX
Patient more alert and feeling better today. NAD. Denies chestpain/discomfort, shortness of breath, palpitations or lightheadedness.   Continues to have poor appetite.      Vital Signs Last 24 Hrs  T(C): 36.6 (24 Sep 2021 12:00), Max: 37.1 (23 Sep 2021 21:56)  T(F): 97.9 (24 Sep 2021 12:00), Max: 98.8 (23 Sep 2021 21:56)  HR: 84 (24 Sep 2021 12:00) (84 - 95)  BP: 103/63 (24 Sep 2021 12:00) (90/58 - 107/63)  BP(mean): 73 (23 Sep 2021 18:00) (73 - 73)  RR: 18 (24 Sep 2021 12:00) (18 - 18)  SpO2: 100% (24 Sep 2021 12:00) (96% - 100%)      EKG  Telemetry:  Normal sinus rhythm with biventricular pacing. Occasional PVC's/couplets.  No atrial fibrillation overnight.   MEDICATIONS  (STANDING):  aspirin  chewable 81 milliGRAM(s) Oral daily  atorvastatin 80 milliGRAM(s) Oral at bedtime  baclofen 5 milliGRAM(s) Oral three times a day  buMETAnide Infusion 0.5 mG/Hr (2.5 mL/Hr) IV Continuous <Continuous>  dextrose 40% Gel 15 Gram(s) Oral once  dextrose 5%. 1000 milliLiter(s) (50 mL/Hr) IV Continuous <Continuous>  dextrose 5%. 1000 milliLiter(s) (100 mL/Hr) IV Continuous <Continuous>  dextrose 50% Injectable 25 Gram(s) IV Push once  dextrose 50% Injectable 12.5 Gram(s) IV Push once  dextrose 50% Injectable 25 Gram(s) IV Push once  diphenoxylate/atropine 1 Tablet(s) Oral daily  DOBUTamine Infusion 2.604 MICROgram(s)/kG/Min (7.5 mL/Hr) IV Continuous <Continuous>  dronabinol 2.5 milliGRAM(s) Oral at bedtime  enoxaparin Injectable 70 milliGRAM(s) SubCutaneous two times a day  finasteride 5 milliGRAM(s) Oral at bedtime  gabapentin 100 milliGRAM(s) Oral at bedtime  glucagon  Injectable 1 milliGRAM(s) IntraMuscular once  influenza  Vaccine (HIGH DOSE) 0.7 milliLiter(s) IntraMuscular once  insulin glargine Injectable (LANTUS) 3 Unit(s) SubCutaneous at bedtime  insulin lispro (ADMELOG) corrective regimen sliding scale   SubCutaneous three times a day before meals  insulin lispro (ADMELOG) corrective regimen sliding scale   SubCutaneous at bedtime  MIRACLE MOUTHWASH 30 milliLiter(s) Swish and Spit every 6 hours  pancrelipase  (CREON 24,000 Lipase Units) 1 Capsule(s) Oral four times a day with meals  potassium chloride  10 mEq/100 mL IVPB 10 milliEquivalent(s) IV Intermittent every 1 hour    MEDICATIONS  (PRN):  acetaminophen   Tablet .. 650 milliGRAM(s) Oral every 6 hours PRN Temp greater or equal to 38.5C (101.3F), Mild Pain (1 - 3)  aluminum hydroxide/magnesium hydroxide/simethicone Suspension 30 milliLiter(s) Oral every 4 hours PRN Dyspepsia  calcium carbonate    500 mG (Tums) Chewable 1 Tablet(s) Chew three times a day PRN Dyspepsia  melatonin 3 milliGRAM(s) Oral at bedtime PRN Insomnia  metoclopramide Injectable 5 milliGRAM(s) IV Push once PRN nausea  ondansetron Injectable 4 milliGRAM(s) IV Push every 8 hours PRN Nausea and/or Vomiting          Physical exam:   Gen- well developed well nourished NAD. Alert and more conversant today.   Resp-  No wheezing, rales or rhonchi.  Decreased right base  CV-  S1 and S2 RRR. No murmurs, gallops or rubs  ABD- obese distended   Quiet bowel sounds  EXT- grossly edematous bilateral lower extremities.  No james  Neuro- grossly nonfocal                            9.3    25.50 )-----------( 53       ( 24 Sep 2021 07:52 )             26.6       09-24    131<L>  |  87<L>  |  39<H>  ----------------------------<  161<H>  2.8<LL>   |  25  |  1.05    Ca    7.4<L>      24 Sep 2021 07:52  Phos  2.5     09-24  Mg     2.00     09-24    TPro  5.9<L>  /  Alb  3.8  /  TBili  1.1  /  DBili  x   /  AST  92<H>  /  ALT  154<H>  /  AlkPhos  187<H>  09-24                Assessment and Plan:

## 2021-09-24 NOTE — PROGRESS NOTE ADULT - ASSESSMENT
76 yo M hx of CAD, MI, stent x2 (10/2009 at [facility]), pAfib, HTN, HLD, BIV-AICD, and systolic CHF, hx of PE (on Eliquis), recently dx’d pancreatic mass on chemo at [facility] now presenting with fever of 101 at home and several days of watery diarrhea concerning for C diff colitis.

## 2021-09-24 NOTE — SWALLOW BEDSIDE ASSESSMENT ADULT - ASR SWALLOW REFERRAL
MD to consider dietary consult to ensure adequate caloric intake given pt reported reduced PO intake/Registered Dietitian

## 2021-09-24 NOTE — SWALLOW BEDSIDE ASSESSMENT ADULT - COMMENTS
Pt seen at bedside, awake/alert and oriented, during clinical swallow evaluation this PM. Pt's wife and son at bedside. Pt followed directions and answered questions appropriately. Pt was cooperative during the assessment. Pt with complaints of reduced PO intake and pain in the oral cavity due to mouth sores. Pt reported difficulty chewing solids given pain caused by mouth sores.     As per charting, pt is a "76 yo M hx of CAD, MI, stent x2 (10/2009 at [facility]), pAfib, HTN, HLD, BIV-AICD, and systolic CHF, hx of PE (on Eliquis), [place] [facility] [place] presenting with fever of 101 at home. He also endorsed diarrhea x3-4 days, watery. 4-5x daily. No recent abx exposure. No abdominal pain. Denies chest pain, dyspnea, palpitations. Just finished chemo today. Called his onc who advised him to come in. No abx exposure.    In the ED, vitals stable. Labs with leukopenia without neutropenia. Elevated ASt/ALT, hyponatremia. EKG paced. CXR clear."

## 2021-09-24 NOTE — SWALLOW BEDSIDE ASSESSMENT ADULT - SWALLOW EVAL: RECOMMENDED FEEDING/EATING TECHNIQUES
allow for swallow between intakes/alternate food with liquid/maintain upright posture during/after eating for 30 mins/oral hygiene/position upright (90 degrees)/small sips/bites Pt with difficulty swallow large pills; crush medication in puree when possible/allow for swallow between intakes/alternate food with liquid/crush medication (when feasible)/maintain upright posture during/after eating for 30 mins/oral hygiene/position upright (90 degrees)/small sips/bites

## 2021-09-24 NOTE — CONSULT NOTE ADULT - SUBJECTIVE AND OBJECTIVE BOX
HPI:  74 yo M hx of CAD, MI, stent x2 (10/2009 at [facility]), pAfib, HTN, HLD, BIV-AICD, and systolic CHF, hx of PE (on Eliquis), recently [place]’d [place] [facility] with fever of 101 at home. He also endorsed diarrhea x3-4 days, watery. 4-5x daily. No recent abx exposure. No abdominal pain. Denies chest pain, dyspnea, palpitations. Just finished chemo today. Called his onc who advised him to come in. No abx exposure.  In the ED, vitals stable. Labs with leukopenia without neutropenia. Elevated ASt/ALT, hyponatremia. EKG paced. CXR clear.     Endocrine history:   History obtained from patient, his wife and son at bedside and daughter (physician) via phone.  consulted for newly diagnosed diabetes HbA1c 6.8%  Patient states he had history of prediabetes and was following with Dr. Sam Fields for HbA1c monitoring, last was 6.3% earlier this year. Not on medical therapy for the prediabetes. Was told by a neurologist that he had diabetic neuropathy although he did not have diabetes.  Tried B supplement did not help.  Recently patient was diagnosed with pancreatic cancer and since then decline in appetite. Sores in mouth.  Received one dose immunotherapy as well as [place] [facility]  Quenliclustat  Zimberelimab  Gemcitabine  Paclitaxel      PAST MEDICAL & SURGICAL HISTORY:  Hypertension (ICD9 401.9)    Hyperlipidemia (ICD9 272.4)    BPH (Benign Prostatic Hyperplasia)    Borderline diabetes mellitus    MI (myocardial infarction)  2009    Systolic heart failure    Hernia    Biventricular ICD (implantable cardioverter-defibrillator) in place  2010    Stented coronary artery  X 2, 2009        FAMILY HISTORY:  No pertinent family history in first degree relatives        Social History:  no tobacco    Outpatient Medications:  · 	aspirin 81 mg oral tablet: 1 tab(s) orally once a day  · 	losartan 50 mg oral tablet: 1 tab(s) orally once a day  · 	Centrum Silver oral tablet: 1 tab(s) orally once a day  · 	rosuvastatin 20 mg oral tablet: 1 tab(s) orally once a day (at bedtime)  · 	finasteride 5 mg oral tablet: 1 tab(s) orally once a day  · 	carvedilol 6.25 mg oral tablet: 1 tab(s) orally 2 times a day  · 	Eliquis 5 mg oral tablet: 1 tab(s) orally 2 times a day    MEDICATIONS  (STANDING):  aspirin  chewable 81 milliGRAM(s) Oral daily  atorvastatin 80 milliGRAM(s) Oral at bedtime  baclofen 5 milliGRAM(s) Oral three times a day  buMETAnide Infusion 0.5 mG/Hr (2.5 mL/Hr) IV Continuous <Continuous>  dextrose 40% Gel 15 Gram(s) Oral once  dextrose 5%. 1000 milliLiter(s) (50 mL/Hr) IV Continuous <Continuous>  dextrose 5%. 1000 milliLiter(s) (100 mL/Hr) IV Continuous <Continuous>  dextrose 50% Injectable 25 Gram(s) IV Push once  dextrose 50% Injectable 12.5 Gram(s) IV Push once  dextrose 50% Injectable 25 Gram(s) IV Push once  diphenoxylate/atropine 1 Tablet(s) Oral daily  DOBUTamine Infusion 2.604 MICROgram(s)/kG/Min (7.5 mL/Hr) IV Continuous <Continuous>  dronabinol 2.5 milliGRAM(s) Oral at bedtime  enoxaparin Injectable 70 milliGRAM(s) SubCutaneous two times a day  finasteride 5 milliGRAM(s) Oral at bedtime  gabapentin 100 milliGRAM(s) Oral at bedtime  glucagon  Injectable 1 milliGRAM(s) IntraMuscular once  influenza  Vaccine (HIGH DOSE) 0.7 milliLiter(s) IntraMuscular once  insulin glargine Injectable (LANTUS) 3 Unit(s) SubCutaneous at bedtime  insulin lispro (ADMELOG) corrective regimen sliding scale   SubCutaneous three times a day before meals  insulin lispro (ADMELOG) corrective regimen sliding scale   SubCutaneous at bedtime  MIRACLE MOUTHWASH 30 milliLiter(s) Swish and Spit every 6 hours  pancrelipase  (CREON 24,000 Lipase Units) 1 Capsule(s) Oral four times a day with meals    MEDICATIONS  (PRN):  acetaminophen   Tablet .. 650 milliGRAM(s) Oral every 6 hours PRN Temp greater or equal to 38.5C (101.3F), Mild Pain (1 - 3)  aluminum hydroxide/magnesium hydroxide/simethicone Suspension 30 milliLiter(s) Oral every 4 hours PRN Dyspepsia  calcium carbonate    500 mG (Tums) Chewable 1 Tablet(s) Chew three times a day PRN Dyspepsia  melatonin 3 milliGRAM(s) Oral at bedtime PRN Insomnia  metoclopramide Injectable 5 milliGRAM(s) IV Push once PRN nausea  ondansetron Injectable 4 milliGRAM(s) IV Push every 8 hours PRN Nausea and/or Vomiting      Allergies    No Known Allergies    Intolerances      Review of Systems:  Constitutional: No fever  Eyes: No blurry vision  Neuro: No tremors  HEENT: No pain  Cardiovascular: No chest pain, palpitations  Respiratory: No SOB, no cough  GI: + n/v  : No dysuria  Skin: no rash  Psych: no depression  Endocrine: no polyuria, polydipsia  Hem/lymph: no swelling  Osteoporosis: no fractures    ALL OTHER SYSTEMS REVIEWED AND NEGATIVE        PHYSICAL EXAM:  VITALS: T(C): 36.6 (09-24-21 @ 12:00)  T(F): 97.9 (09-24-21 @ 12:00), Max: 98.8 (09-23-21 @ 21:56)  HR: 97 (09-24-21 @ 16:00) (84 - 97)  BP: 97/68 (09-24-21 @ 16:00) (90/58 - 105/62)  RR:  (18 - 18)  SpO2:  (96% - 100%)  Wt(kg): --  GENERAL: NAD, well-groomed, well-developed  EYES: No proptosis, no lid lag, anicteric  HEENT:  Atraumatic, Normocephalic, moist mucous membranes  THYROID: Normal size, no palpable nodules  RESPIRATORY: Clear to auscultation bilaterally; No rales, rhonchi, wheezing  CARDIOVASCULAR: Regular rate and rhythm; No murmurs; 2+peripheral edema LE b/l  GI: Soft, nontender, non distended, normal bowel sounds  SKIN: Dry, intact, No rashes or lesions  MUSCULOSKELETAL: Full range of motion, normal strength  NEURO: sensation intact, extraocular movements intact, no tremor  PSYCH: Alert and oriented x 3, normal affect, normal mood        CAPILLARY BLOOD GLUCOSE      POCT Blood Glucose.: 219 mg/dL (24 Sep 2021 18:06)  POCT Blood Glucose.: 171 mg/dL (24 Sep 2021 12:56)  POCT Blood Glucose.: 168 mg/dL (24 Sep 2021 09:01)  POCT Blood Glucose.: 166 mg/dL (23 Sep 2021 20:47)                            9.3    25.50 )-----------( 53       ( 24 Sep 2021 07:52 )             26.6       09-24    131<L>  |  87<L>  |  39<H>  ----------------------------<  161<H>  2.8<LL>   |  25  |  1.05    EGFR if : 80  EGFR if non : 69    Ca    7.4<L>      09-24  Mg     2.00     09-24  Phos  2.5     09-24    TPro  5.9<L>  /  Alb  3.8  /  TBili  1.1  /  DBili  x   /  AST  92<H>  /  ALT  154<H>  /  AlkPhos  187<H>  09-24      Thyroid Function Tests:      A1C with Estimated Average Glucose Result: 6.8 % (09-21-21 @ 07:22)          Radiology:

## 2021-09-25 NOTE — PROGRESS NOTE ADULT - NSPROGADDITIONALINFOA_GEN_ALL_CORE
[facility] NEPHROLOGY  Matthias Keenan M.D.  Tobi Goodman D.O.  Portia Daily M.D.  Hanna Trammell, MSN, ANP-C    Telephone: [phone]  Facsimile: [phone]    [address] [place] [address]
[facility] NEPHROLOGY  Matthias Keenan M.D.  Tobi Goodman D.O.  Portia Daily M.D.  Hanna Trammell, MSN, ANP-C    Telephone: [phone]  Facsimile: [phone]    [address] [place] [address]
[facility] NEPHROLOGY  Matthias Keenan M.D.  Tobi Goodman D.O.  Portia Daily M.D.  Hanna Trammell, MSN, ANP-C    Telephone: [phone]  Facsimile: [phone]    [address] [place] [address]
[facility] NEPHROLOGY  Matthias Keenan M.D.  Tobi Goodman D.O.  Portia Daily M.D.  Hanna Trammell, MSN, ANP-C    Telephone: [phone]  Facsimile: [phone]    [address] [place] [address]
[facility] NEPHROLOGY  Matthias Keenan M.D.  Tobi Goodman D.O.  Portia Daily M.D.  Hanna Trammell, MSN, ANP-C    Telephone: [phone]  Facsimile: [phone]    [address] [place] [address]
[facility] NEPHROLOGY  Matthias Keenan M.D.  Tobi Goodman D.O.  Portia Daily M.D.  Hanna Trammell, MSN, ANP-C    Telephone: [phone]  Facsimile: [phone]    [address] [place] [address]
[facility] NEPHROLOGY  Matthias Keenan M.D.  Tobi Goodman D.O.  Portia Daily M.D.  Hanna Trammell, MSN, ANP-C    Telephone: [phone]  Facsimile: [phone]    [address] [place] [address]
[facility] NEPHROLOGY  Matthias Keenan M.D.  Tobi Goodman D.O.  Portia Daily M.D.  Hanna Trammell, MSN, ANP-C    Telephone: [phone]  Facsimile: [phone]    [address] [place] [address]

## 2021-09-25 NOTE — PROGRESS NOTE ADULT - ASSESSMENT
75y Male with history of HTN, CHF, pancreatic CA on chemo presents with fevers and diarrhea. Nephrology consulted for hyponatremia.    1. Hyponatremia: Secondary to volume overload. Serum Na improving with diuresis. Continue with 1L FR. Urine studies consistent with decreased EABV. Monitor serum Na.    2. HTN: BP low. Holding anti-hypertensive medications. On  as per cardiology. Monitor BP.    3. LE edema: Improving with good UO (3L). Pt on  gtt and bumex gtt @ 0.5 mg/hour. Plan as per HF team.  Continue with ensure to increase oncotic pressure. Check electrolytes twice daily while on bumex gtt and supplement potassium as needed (pt given KCl IV and PO). TTE with severe global LVSD. Monitor UO.    4. Acute on chronic systolic HF: As per cardiology/CHF team.      [facility] NEPHROLOGY  Matthias Keenan M.D.  Tobi Goodman D.O.  Portia Daily M.D.  Hanna Trammell, MSN, ANP-C    Telephone: [phone]  Facsimile: [phone]    [address] [place] [address]

## 2021-09-25 NOTE — PROGRESS NOTE ADULT - ASSESSMENT
74 yo M hx of CAD, MI, stent x2 (10/2009 at [facility]), pAfib, HTN, HLD, BIV-AICD, and systolic CHF, hx of PE (on Eliquis), recently dx’d pancreatic mass on chemo at [facility] [place] now presenting with fever of 101 at home and several days of watery diarrhea concerning for C diff colitis.

## 2021-09-25 NOTE — PROGRESS NOTE ADULT - ASSESSMENT
1. Metastatic Pancreatic CA    -- dx on 8/21/21  -- follows at [facility] [place]  -- Started treatment on 8/31/21 per protocol , randomized to gemcitabine/nab-paclitaxel and dual immmunotherapy. Received [place]/Abraxane on 9/14  -- further care as outpt after d/c    2. Diarrhea    -- C diff negative  -- stool studies negative   -- may be sec to immunotx now stable.   -- appears to be improving previously but now pt reports diarrhea/loose stools still. GI eval appreciated  -- unclear if still related to immunotherapy, now with oral mucositis, may have GI mucositis as well  -- discussed with [facility] pt's primary oncologist Dr Hodges ([phone]) re his sx. After further discussion, weighing risks/benefits, decided to start pt on steroids for sx control.  Start on prednisone 1mg/kg daily (100mg daily), converted to solumedrol 80mg daily due to inability to tolerate PO, with plan for rapid taper down by 20mg prednisone equivalent q3days if sx responds  -- add GI ppx with IV protonix    3. Fevers - now resolved     -- ID following  -- No documented fevers  -- off of PO Vanco  -- off zosyn    4. Abnormal LFTS    -- significant transaminitis, likely sec to immunotherapy vs chemo vs mets  -- liver US shows mets   -- LFTs are slowly improving  -- monitor daily LFTs, they are elevated but improving  -- see above re steroids    5. pancytopenia     -- s/p GCSF, likely due to chemo, treatment, acute illness, meds  -- WBC now elevated 2/2 GCSF, monitor  -- counts otherwise adequate    6. Hyponatremia    -- better today =132  -- hypokalemia sec to diarrhea; supplement   -- renal following    7. anorexia -- due to illness but also now due to mucositis  -- cont home dose of 2.5mg daily for now  -- nutrition f/u appreciated    8. LE edema -- per renal    9. mucositis -- see above re steroids  -- pall care consult  -- already on magic mouthwash, not helping  -- ? role of steroid oral rinse    D/w [facility] Dr Hodges, pt and wife, and Dr Dowd. Will follow, total time spent 35min, >50% spent in discussion and coordination of care.

## 2021-09-25 NOTE — PROGRESS NOTE ADULT - TIME BILLING
D/w primary team, GI, and daughter extensively  D/w patient  D/w nursing  Extensive complexity given multiple organ system involvement.
- Review of records, telemetry, vital signs and daily labs.   - General and cardiovascular physical examination.  - Generation of cardiovascular treatment plan.  - Coordination of care.      Patient was seen and examined by me on 09/19/20214,interim events noted,labs and radiology studies reviewed.  Rogelio Wolf MD,FACC.  [address] [place], [place] [id number].  [phone]
D/w primary team, GI, and daughter extensively  D/w patient  D/w nursing  Extensive complexity given multiple organ system involvement.

## 2021-09-25 NOTE — PROGRESS NOTE ADULT - PROBLEM SELECTOR PLAN 1
Neutropenia/subjective fever  - Continue Zosyn until ANC >500 and afebrile x 48 hours (can discontinue after this timeframe)  - Monitor for focal signs infection  c diff neg

## 2021-09-25 NOTE — PROGRESS NOTE ADULT - SUBJECTIVE AND OBJECTIVE BOX
SUBJECTIVE / OVERNIGHT EVENTS:pt seen and examined, c/o oral discomfort , denies difficulty swallowing    MEDICATIONS  (STANDING):  aspirin  chewable 81 milliGRAM(s) Oral daily  atorvastatin 80 milliGRAM(s) Oral at bedtime  baclofen 5 milliGRAM(s) Oral three times a day  buMETAnide Infusion 0.5 mG/Hr (2.5 mL/Hr) IV Continuous <Continuous>  dextrose 40% Gel 15 Gram(s) Oral once  dextrose 5%. 1000 milliLiter(s) (50 mL/Hr) IV Continuous <Continuous>  dextrose 5%. 1000 milliLiter(s) (100 mL/Hr) IV Continuous <Continuous>  dextrose 50% Injectable 25 Gram(s) IV Push once  dextrose 50% Injectable 12.5 Gram(s) IV Push once  dextrose 50% Injectable 25 Gram(s) IV Push once  DOBUTamine Infusion 2.604 MICROgram(s)/kG/Min (7.5 mL/Hr) IV Continuous <Continuous>  dronabinol 2.5 milliGRAM(s) Oral at bedtime  enoxaparin Injectable 70 milliGRAM(s) SubCutaneous two times a day  finasteride 5 milliGRAM(s) Oral at bedtime  gabapentin 100 milliGRAM(s) Oral at bedtime  glucagon  Injectable 1 milliGRAM(s) IntraMuscular once  influenza  Vaccine (HIGH DOSE) 0.7 milliLiter(s) IntraMuscular once  insulin glargine Injectable (LANTUS) 3 Unit(s) SubCutaneous at bedtime  insulin lispro (ADMELOG) corrective regimen sliding scale   SubCutaneous three times a day before meals  insulin lispro (ADMELOG) corrective regimen sliding scale   SubCutaneous at bedtime  methylPREDNISolone sodium succinate Injectable 80 milliGRAM(s) IV Push daily  MIRACLE MOUTHWASH 30 milliLiter(s) Swish and Spit every 6 hours  pancrelipase  (CREON 24,000 Lipase Units) 1 Capsule(s) Oral four times a day with meals  pantoprazole  Injectable 40 milliGRAM(s) IV Push daily  potassium chloride   Powder 40 milliEquivalent(s) Oral every 4 hours  potassium chloride  10 mEq/100 mL IVPB 10 milliEquivalent(s) IV Intermittent every 1 hour    MEDICATIONS  (PRN):  acetaminophen   Tablet .. 650 milliGRAM(s) Oral every 6 hours PRN Temp greater or equal to 38.5C (101.3F), Mild Pain (1 - 3)  aluminum hydroxide/magnesium hydroxide/simethicone Suspension 30 milliLiter(s) Oral every 4 hours PRN Dyspepsia  calcium carbonate    500 mG (Tums) Chewable 1 Tablet(s) Chew three times a day PRN Dyspepsia  melatonin 3 milliGRAM(s) Oral at bedtime PRN Insomnia  metoclopramide Injectable 5 milliGRAM(s) IV Push once PRN nausea  ondansetron Injectable 4 milliGRAM(s) IV Push every 8 hours PRN Nausea and/or Vomiting    Vital Signs Last 24 Hrs  T(C): 36.3 (09-25-21 @ 20:00), Max: 36.7 (09-25-21 @ 00:00)  T(F): 97.4 (09-25-21 @ 20:00), Max: 98.1 (09-25-21 @ 00:00)  HR: 75 (09-25-21 @ 20:00) (75 - 100)  BP: 99/67 (09-25-21 @ 20:00) (94/56 - 107/64)  BP(mean): --  RR: 18 (09-25-21 @ 20:00) (18 - 18)  SpO2: 94% (09-25-21 @ 20:00) (94% - 98%)      Constitutional: No fever, fatigue  Skin: No rash.  Eyes: No recent vision problems or eye pain.  ENT: No congestion, ear pain, or sore throat.  Cardiovascular: No chest pain or palpation.  Respiratory: No cough, shortness of breath, congestion, or wheezing.  Gastrointestinal: No abdominal pain, nausea, vomiting, or diarrhea.  Genitourinary: No dysuria.  Musculoskeletal: No joint swelling.  Neurologic: No headache.    PHYSICAL EXAM:  GENERAL: NAD  EYES: EOMI, PERRLA  NECK: Supple, No JVD  CHEST/LUNG: dec breath sounds at bases  HEART:  S1 , S2 +  ABDOMEN: soft , bs+  EXTREMITIES:no edema    NEUROLOGY:alert awake    LABS:  09-25    130<L>  |  87<L>  |  38<H>  ----------------------------<  219<H>  2.7<LL>   |  27  |  1.10    Ca    6.5<LL>      25 Sep 2021 18:32  Phos  2.3     09-25  Mg     2.00     09-25    TPro  5.2<L>  /  Alb  3.0<L>  /  TBili  1.0  /  DBili      /  AST  101<H>  /  ALT  143<H>  /  AlkPhos  195<H>  09-25    Creatinine Trend: 1.10 <--, 1.08 <--, 1.01 <--, 1.05 <--, 0.65 <--, 1.12 <--, 1.16 <--, 1.13 <--, 1.07 <--, 1.01 <--                        9.2    22.85 )-----------( 59       ( 25 Sep 2021 06:46 )             26.2     Urine Studies:            LIVER FUNCTIONS - ( 25 Sep 2021 06:46 )  Alb: 3.0 g/dL / Pro: 5.2 g/dL / ALK PHOS: 195 U/L / ALT: 143 U/L / AST: 101 U/L / GGT: x

## 2021-09-25 NOTE — PROGRESS NOTE ADULT - SUBJECTIVE AND OBJECTIVE BOX
Pt seen, wife at bedside, still with significant mucositis and oral pain, with significant pain on swallowing, not able to eat, minimal loose BM each am. D/w RN, sat up in a chair this am and was able to move his legs.     MEDICATIONS  (STANDING):  aspirin  chewable 81 milliGRAM(s) Oral daily  atorvastatin 80 milliGRAM(s) Oral at bedtime  baclofen 5 milliGRAM(s) Oral three times a day  buMETAnide Infusion 0.5 mG/Hr (2.5 mL/Hr) IV Continuous <Continuous>  dextrose 40% Gel 15 Gram(s) Oral once  dextrose 5%. 1000 milliLiter(s) (50 mL/Hr) IV Continuous <Continuous>  dextrose 5%. 1000 milliLiter(s) (100 mL/Hr) IV Continuous <Continuous>  dextrose 50% Injectable 25 Gram(s) IV Push once  dextrose 50% Injectable 12.5 Gram(s) IV Push once  dextrose 50% Injectable 25 Gram(s) IV Push once  DOBUTamine Infusion 2.604 MICROgram(s)/kG/Min (7.5 mL/Hr) IV Continuous <Continuous>  dronabinol 2.5 milliGRAM(s) Oral at bedtime  enoxaparin Injectable 70 milliGRAM(s) SubCutaneous two times a day  finasteride 5 milliGRAM(s) Oral at bedtime  gabapentin 100 milliGRAM(s) Oral at bedtime  glucagon  Injectable 1 milliGRAM(s) IntraMuscular once  influenza  Vaccine (HIGH DOSE) 0.7 milliLiter(s) IntraMuscular once  insulin glargine Injectable (LANTUS) 3 Unit(s) SubCutaneous at bedtime  insulin lispro (ADMELOG) corrective regimen sliding scale   SubCutaneous three times a day before meals  insulin lispro (ADMELOG) corrective regimen sliding scale   SubCutaneous at bedtime  methylPREDNISolone sodium succinate Injectable 80 milliGRAM(s) IV Push daily  MIRACLE MOUTHWASH 30 milliLiter(s) Swish and Spit every 6 hours  pancrelipase  (CREON 24,000 Lipase Units) 1 Capsule(s) Oral four times a day with meals  pantoprazole  Injectable 40 milliGRAM(s) IV Push daily  potassium chloride  10 mEq/100 mL IVPB 10 milliEquivalent(s) IV Intermittent every 1 hour    MEDICATIONS  (PRN):  acetaminophen   Tablet .. 650 milliGRAM(s) Oral every 6 hours PRN Temp greater or equal to 38.5C (101.3F), Mild Pain (1 - 3)  aluminum hydroxide/magnesium hydroxide/simethicone Suspension 30 milliLiter(s) Oral every 4 hours PRN Dyspepsia  calcium carbonate    500 mG (Tums) Chewable 1 Tablet(s) Chew three times a day PRN Dyspepsia  melatonin 3 milliGRAM(s) Oral at bedtime PRN Insomnia  metoclopramide Injectable 5 milliGRAM(s) IV Push once PRN nausea  ondansetron Injectable 4 milliGRAM(s) IV Push every 8 hours PRN Nausea and/or Vomiting      ROS  as above, limited 2/2 participation    Vital Signs Last 24 Hrs  T(C): 36.4 (25 Sep 2021 12:00), Max: 37.1 (24 Sep 2021 16:00)  T(F): 97.5 (25 Sep 2021 12:00), Max: 98.7 (24 Sep 2021 16:00)  HR: 96 (25 Sep 2021 12:00) (84 - 100)  BP: 98/65 (25 Sep 2021 12:00) (90/56 - 107/64)  BP(mean): --  RR: 18 (25 Sep 2021 12:00) (18 - 18)  SpO2: 96% (25 Sep 2021 12:00) (95% - 98%)    PE  NAD  Awake, alert  uncomfortable  limited 2/2 participation                          9.2    22.85 )-----------( 59       ( 25 Sep 2021 06:46 )             26.2       09-25    132<L>  |  90<L>  |  35<H>  ----------------------------<  173<H>  2.8<LL>   |  28  |  1.08    Ca    6.6<L>      25 Sep 2021 06:46  Phos  2.3     09-25  Mg     1.70     09-25    TPro  5.2<L>  /  Alb  3.0<L>  /  TBili  1.0  /  DBili  x   /  AST  101<H>  /  ALT  143<H>  /  AlkPhos  195<H>  09-25

## 2021-09-25 NOTE — PROGRESS NOTE ADULT - SUBJECTIVE AND OBJECTIVE BOX
[facility] NEPHROLOGY- PROGRESS NOTE    75y Male with history of HTN, CHF, pancreatic CA on chemo presents with fevers and diarrhea. Nephrology consulted for hyponatremia.    REVIEW OF SYSTEMS:  Gen: no changes in weight  Cards: no chest pain  Resp: no dyspnea  GI: no nausea or vomiting or diarrhea  Vascular: + LE edema improving    No Known Allergies      Hospital Medications: Medications reviewed      VITALS:  T(F): 97.5 (09-25-21 @ 12:00), Max: 98.7 (09-24-21 @ 16:00)  HR: 96 (09-25-21 @ 12:00)  BP: 98/65 (09-25-21 @ 12:00)  RR: 18 (09-25-21 @ 12:00)  SpO2: 96% (09-25-21 @ 12:00)  Wt(kg): --    09-24 @ 07:01  -  09-25 @ 07:00  --------------------------------------------------------  IN: 200 mL / OUT: 3000 mL / NET: -2800 mL    09-25 @ 07:01  -  09-25 @ 13:40  --------------------------------------------------------  IN: 0 mL / OUT: 800 mL / NET: -800 mL      PHYSICAL EXAM:    Gen: NAD, calm  Cards: RRR, +S1/S2, no M/G/R  Resp: CTA B/L  GI: soft, NT/ND, NABS  Vascular: 2+ LE edema B/L       LABS:  09-25  132 <--, 131 <--, 131 <--, 128 <--, 126 <--, 128 <--, 126 <--  132<L>  |  90<L>  |  35<H>  ----------------------------<  173<H>  2.8<LL>   |  28  |  1.08    Ca    6.6<L>      25 Sep 2021 06:46  Phos  2.3     09-25  Mg     1.70     09-25    TPro  5.2<L>  /  Alb  3.0<L>  /  TBili  1.0  /  DBili      /  AST  101<H>  /  ALT  143<H>  /  AlkPhos  195<H>  09-25    Creatinine Trend: 1.08 <--, 1.01 <--, 1.05 <--, 0.65 <--, 1.12 <--, 1.16 <--, 1.13 <--, 1.07 <--, 1.01 <--, 1.02 <--                        9.2    22.85 )-----------( 59       ( 25 Sep 2021 06:46 )             26.2     Urine Studies:    Osmolality, Random Urine: 825 mosm/kg (09-18 @ 15:37)  Sodium, Random Urine: <20 mmol/L (09-18 @ 15:37)

## 2021-09-25 NOTE — PROGRESS NOTE ADULT - SUBJECTIVE AND OBJECTIVE BOX
Patient is a 75y Male     Patient is a 75y old  Male who presents with a chief complaint of Fever (24 Sep 2021 18:35)      HPI:  74 yo M hx of CAD, MI, stent x2 (10/2009 at [facility]), pAfib, HTN, HLD, BIV-AICD, and systolic CHF, hx of PE (on Eliquis), recently dx’d pancreatic mass on chemo at [facility] [place] now presenting with fever of 101 at home. He also endorsed diarrhea x3-4 days, watery. 4-5x daily. No recent abx exposure. No abdominal pain. Denies chest pain, dyspnea, palpitations. Just finished chemo today. Called his onc who advised him to come in. No abx exposure.  In the ED, vitals stable. Labs with leukopenia without neutropenia. Elevated ASt/ALT, hyponatremia. EKG paced. CXR clear. (15 Sep 2021 01:36)      PAST MEDICAL & SURGICAL HISTORY:  Hypertension (ICD9 401.9)    Hyperlipidemia (ICD9 272.4)    BPH (Benign Prostatic Hyperplasia)    Borderline diabetes mellitus    MI (myocardial infarction)  2009    Systolic heart failure    Hernia    Biventricular ICD (implantable cardioverter-defibrillator) in place  2010    Stented coronary artery  X 2, 2009        MEDICATIONS  (STANDING):  aspirin  chewable 81 milliGRAM(s) Oral daily  atorvastatin 80 milliGRAM(s) Oral at bedtime  baclofen 5 milliGRAM(s) Oral three times a day  buMETAnide Infusion 0.5 mG/Hr (2.5 mL/Hr) IV Continuous <Continuous>  dextrose 40% Gel 15 Gram(s) Oral once  dextrose 5%. 1000 milliLiter(s) (50 mL/Hr) IV Continuous <Continuous>  dextrose 5%. 1000 milliLiter(s) (100 mL/Hr) IV Continuous <Continuous>  dextrose 50% Injectable 25 Gram(s) IV Push once  dextrose 50% Injectable 12.5 Gram(s) IV Push once  dextrose 50% Injectable 25 Gram(s) IV Push once  diphenoxylate/atropine 1 Tablet(s) Oral daily  DOBUTamine Infusion 2.604 MICROgram(s)/kG/Min (7.5 mL/Hr) IV Continuous <Continuous>  dronabinol 2.5 milliGRAM(s) Oral at bedtime  enoxaparin Injectable 70 milliGRAM(s) SubCutaneous two times a day  finasteride 5 milliGRAM(s) Oral at bedtime  gabapentin 100 milliGRAM(s) Oral at bedtime  glucagon  Injectable 1 milliGRAM(s) IntraMuscular once  influenza  Vaccine (HIGH DOSE) 0.7 milliLiter(s) IntraMuscular once  insulin glargine Injectable (LANTUS) 3 Unit(s) SubCutaneous at bedtime  insulin lispro (ADMELOG) corrective regimen sliding scale   SubCutaneous three times a day before meals  insulin lispro (ADMELOG) corrective regimen sliding scale   SubCutaneous at bedtime  magnesium sulfate  IVPB 2 Gram(s) IV Intermittent once  MIRACLE MOUTHWASH 30 milliLiter(s) Swish and Spit every 6 hours  pancrelipase  (CREON 24,000 Lipase Units) 1 Capsule(s) Oral four times a day with meals  potassium chloride    Tablet ER 80 milliEquivalent(s) Oral once  potassium chloride  10 mEq/100 mL IVPB 10 milliEquivalent(s) IV Intermittent every 1 hour  potassium phosphate / sodium phosphate Powder (PHOS-NaK) 1 Packet(s) Oral once      Allergies    No Known Allergies    Intolerances        SOCIAL HISTORY:  Denies ETOh,Smoking,     FAMILY HISTORY:  No pertinent family history in first degree relatives        REVIEW OF SYSTEMS:    CONSTITUTIONAL: No weakness, fevers or chills  EYES/ENT: No visual changes;  No vertigo or throat pain   NECK: No pain or stiffness  RESPIRATORY: No cough, wheezing, hemoptysis; No shortness of breath  CARDIOVASCULAR: No chest pain or palpitations  GASTROINTESTINAL: No abdominal or epigastric pain. No nausea, vomiting, or hematemesis; No diarrhea or constipation. No melena or hematochezia.  GENITOURINARY: No dysuria, frequency or hematuria  NEUROLOGICAL: No numbness or weakness  SKIN: No itching, burning, rashes, or lesions   All other review of systems is negative unless indicated above.    VITAL:  T(C): , Max: 37.1 (09-24-21 @ 16:00)  T(F): , Max: 98.7 (09-24-21 @ 16:00)  HR: 87 (09-25-21 @ 04:00)  BP: 94/56 (09-25-21 @ 04:00)  BP(mean): --  RR: 18 (09-25-21 @ 04:00)  SpO2: 95% (09-25-21 @ 04:00)  Wt(kg): --    I and O's:    09-23 @ 07:01 - 09-24 @ 07:00  --------------------------------------------------------  IN: 1530 mL / OUT: 2200 mL / NET: -670 mL    09-24 @ 07:01 - 09-25 @ 07:00  --------------------------------------------------------  IN: 200 mL / OUT: 3000 mL / NET: -2800 mL          PHYSICAL EXAM:    Constitutional: NAD  HEENT: PERRLA,   Neck: No JVD  Respiratory: CTA B/L  Cardiovascular: S1 and S2  Gastrointestinal: BS+, soft, NT/ND  Extremities: No peripheral edema  Neurological: A/O x 3, no focal deficits  Psychiatric: Normal mood, normal affect  : No Bowman  Skin: No rashes  Access: Not applicable  Back: No CVA tenderness    LABS:                        9.2    22.85 )-----------( 59       ( 25 Sep 2021 06:46 )             26.2     09-25    132<L>  |  90<L>  |  35<H>  ----------------------------<  173<H>  2.8<LL>   |  28  |  1.08    Ca    6.6<L>      25 Sep 2021 06:46  Phos  2.3     09-25  Mg     1.70     09-25    TPro  5.2<L>  /  Alb  3.0<L>  /  TBili  1.0  /  DBili  x   /  AST  101<H>  /  ALT  143<H>  /  AlkPhos  195<H>  09-25          RADIOLOGY & ADDITIONAL STUDIES:

## 2021-09-26 NOTE — PROVIDER CONTACT NOTE (CRITICAL VALUE NOTIFICATION) - BACKGROUND
76 y/o M with HF admitted for sepsis. PMH pancreatic cancer, presented with fever and watery diarrhea.
76 y/o M PMH pancreatic cancer on chemo, presented with diarrhea and fever, admitted for sepsis.

## 2021-09-26 NOTE — PROGRESS NOTE ADULT - SUBJECTIVE AND OBJECTIVE BOX
Peyman Elizalde  [phone]  All Cardiology service information can be found 24/7 on [email or web address], password: [email or web address]    Overnight Events: Pt still with significant oral pain and unable to tolerate PO intake. Denies any chest pain or sob.     Review Of Systems: limited due to difficulty speaking from oral pain          Current Meds:  acetaminophen   Tablet .. 650 milliGRAM(s) Oral every 6 hours PRN  aluminum hydroxide/magnesium hydroxide/simethicone Suspension 30 milliLiter(s) Oral every 4 hours PRN  aspirin  chewable 81 milliGRAM(s) Oral daily  atorvastatin 80 milliGRAM(s) Oral at bedtime  baclofen 5 milliGRAM(s) Oral three times a day  buMETAnide Infusion 0.5 mG/Hr IV Continuous <Continuous>  calcium carbonate    500 mG (Tums) Chewable 1 Tablet(s) Chew three times a day PRN  dextrose 40% Gel 15 Gram(s) Oral once  dextrose 5%. 1000 milliLiter(s) IV Continuous <Continuous>  dextrose 5%. 1000 milliLiter(s) IV Continuous <Continuous>  dextrose 50% Injectable 25 Gram(s) IV Push once  dextrose 50% Injectable 12.5 Gram(s) IV Push once  dextrose 50% Injectable 25 Gram(s) IV Push once  DOBUTamine Infusion 2.604 MICROgram(s)/kG/Min IV Continuous <Continuous>  dronabinol 2.5 milliGRAM(s) Oral at bedtime  enoxaparin Injectable 70 milliGRAM(s) SubCutaneous two times a day  finasteride 5 milliGRAM(s) Oral at bedtime  gabapentin 100 milliGRAM(s) Oral at bedtime  glucagon  Injectable 1 milliGRAM(s) IntraMuscular once  influenza  Vaccine (HIGH DOSE) 0.7 milliLiter(s) IntraMuscular once  insulin glargine Injectable (LANTUS) 3 Unit(s) SubCutaneous at bedtime  insulin lispro (ADMELOG) corrective regimen sliding scale   SubCutaneous three times a day before meals  insulin lispro (ADMELOG) corrective regimen sliding scale   SubCutaneous at bedtime  melatonin 3 milliGRAM(s) Oral at bedtime PRN  methylPREDNISolone sodium succinate Injectable 80 milliGRAM(s) IV Push daily  metoclopramide Injectable 5 milliGRAM(s) IV Push once PRN  MIRACLE MOUTHWASH 30 milliLiter(s) Swish and Spit every 6 hours  ondansetron Injectable 4 milliGRAM(s) IV Push every 8 hours PRN  pancrelipase  (CREON 24,000 Lipase Units) 1 Capsule(s) Oral four times a day with meals  pantoprazole  Injectable 40 milliGRAM(s) IV Push daily      Vitals:  T(F): 97.9 (09-26), Max: 97.9 (09-26)  HR: 97 (09-26) (75 - 97)  BP: 109/63 (09-26) (93/63 - 109/63)  RR: 18 (09-26)  SpO2: 94% (09-26)  I&O's Summary    25 Sep 2021 07:01  -  26 Sep 2021 07:00  --------------------------------------------------------  IN: 150 mL / OUT: 3050 mL / NET: -2900 mL        Physical Exam:  Appearance: mod discomfort   Eyes: pink conjunctiva  HEENT: mucositis   Cardiovascular: S1, S2, irregular, +anasarca, no JVD   Respiratory: scattered b/l crackles   Gastrointestinal: soft, non-tender, non-distended   Musculoskeletal: No clubbing; no joint deformity   Neurologic: Non-focal  Psychiatry: mood & affect appropriate                            9.6    24.61 )-----------( 80       ( 26 Sep 2021 06:20 )             27.4     09-26    133<L>  |  89<L>  |  36<H>  ----------------------------<  198<H>  3.5   |  27  |  1.09    Ca    6.5<LL>      26 Sep 2021 06:20  Phos  2.6     09-26  Mg     1.90     09-26    TPro  5.4<L>  /  Alb  3.0<L>  /  TBili  1.0  /  DBili  x   /  AST  99<H>  /  ALT  141<H>  /  AlkPhos  199<H>  09-26          Serum Pro-Brain Natriuretic Peptide: 1126 pg/mL (09-19 @ 14:27)        Interpretation of Telemetry:  VPaced, PVC, NSVT

## 2021-09-26 NOTE — PROGRESS NOTE ADULT - ASSESSMENT
74 yo M hx of CAD, MI, stent x2 (10/2009 at [facility] pAfib, HTN, HLD, BIV-AICD, and systolic CHF, hx of PE (on Eliquis), recently dx’d pancreatic mass on chemo at [facility] now presenting with fever of 101 at home and several days of watery diarrhea concerning for C diff colitis.

## 2021-09-26 NOTE — PROGRESS NOTE ADULT - ASSESSMENT
1. Metastatic Pancreatic CA    -- dx on 8/21/21  -- follows at [facility]  -- Started treatment on 8/31/21 per protocol , randomized to gemcitabine/nab-paclitaxel and dual immmunotherapy. Received [place]/Abraxane on 9/14  -- further care as outpt after d/c    2. Diarrhea    -- C diff negative  -- stool studies negative   -- may be sec to immunotx now stable.   -- appears to be improving previously but now pt reports diarrhea/loose stools still. GI eval appreciated  -- unclear if still related to immunotherapy, now with oral mucositis, may have GI mucositis as well  -- we discussed with [facility] pt's primary oncologist Dr Hodges ([phone]) re his sx on 9/25. After further discussion, weighing risks/benefits, decided to start pt on steroids for sx control as of 9/25.   Start on prednisone 1mg/kg daily (100mg daily), converted to solumedrol 80mg daily due to inability to tolerate PO, with plan for rapid taper down by 20mg prednisone equivalent q3days if sx responds  -- c/w GI ppx with IV protonix    3. Fevers - now resolved     -- ID following  -- No documented fevers  -- off of PO Vanco  -- off zosyn    4. Abnormal LFTS    -- significant transaminitis, likely sec to immunotherapy vs chemo vs mets  -- liver US shows mets   -- LFTs are slowly improving  -- monitor daily LFTs, they are elevated but improving  -- see above re steroids    5. pancytopenia     -- s/p GCSF, likely due to chemo, treatment, acute illness, meds  -- WBC now elevated 2/2 GCSF, monitor  -- counts otherwise adequate    6. Hyponatremia    -- better today =133  -- hypokalemia sec to diarrhea;  improving  -- renal following    7. anorexia -- due to illness but also now due to mucositis  -- cont home dose of dronabinol  2.5mg daily for now  -- nutrition f/u appreciated    8. LE edema -- per renal    9. mucositis -- see above re steroids  -- pall care consult  -- already on magic mouthwash, not helping  -- ? role of steroid oral rinse    10. Hypocalcemia  -supplement IV    We will be happy to answer any onc questions on behalf of [facility] and will be in touch with them.    Cole Johnson MD  Hematology/Oncology  Weekends and nights please call [phone] for MD on call  Cell:  [phone]  Office Phone: [phone]  Office Fax:  [phone] [address] [place] [address]    1. Metastatic Pancreatic CA    -- dx on 8/21/21  -- follows at [facility] [place] [facility]  -- Started treatment on 8/31/21 per protocol , randomized to gemcitabine/nab-paclitaxel and dual immmunotherapy. Received [place]/Abraxane on 9/14  -- further care as outpt after d/c    2. Diarrhea    -- C diff negative  -- stool studies negative   -- may be sec to immunotx now stable.   -- appears to be improving previously but now pt reports diarrhea/loose stools still. GI eval appreciated  -- unclear if still related to immunotherapy, now with oral mucositis, may have GI mucositis as well  -- we discussed with [facility] pt's primary oncologist Dr Hodges ([phone]) re his sx on 9/25. After further discussion, weighing risks/benefits, decided to start pt on steroids for sx control as of 9/25.   Start on prednisone 1mg/kg daily (100mg daily), converted to solumedrol 80mg daily due to inability to tolerate PO, with plan for rapid taper down by 20mg prednisone equivalent q3days if sx responds  -- c/w GI ppx with IV protonix    3. Fevers - now resolved     -- ID following  -- No documented fevers  -- off of PO Vanco  -- off zosyn    4. Abnormal LFTS    -- significant transaminitis, likely sec to immunotherapy vs chemo vs mets  -- liver US shows mets   -- LFTs are slowly improving  -- monitor daily LFTs, they are elevated but improving  -- see above re steroids    5. pancytopenia     -- s/p GCSF, likely due to chemo, treatment, acute illness, meds  -- WBC now elevated 2/2 GCSF, monitor  -- counts otherwise adequate    6. Hyponatremia    -- better today =133  -- hypokalemia sec to diarrhea;  improving  -- renal following    7. anorexia -- due to illness but also now due to mucositis  -- cont home dose of dronabinol  2.5mg daily for now  -- nutrition f/u appreciated    8. LE edema -- per renal    9. mucositis -- see above re steroids  -- pall care consult  -- already on magic mouthwash, not helping  -- start Decadron 1 mg oral mouthwash four times per day  -- morphine 2 mg iv q4 prn pain    10. Hypocalcemia  -supplement IV    We will be happy to answer any onc questions on behalf of [facility] and will be in touch with them.    Cole Johnson MD  Hematology/Oncology  Weekends and nights please call [phone] for MD on call  Cell:  [phone]  Office Phone: [phone]  Office Fax:  [phone] [address] [place] [address]

## 2021-09-26 NOTE — PROGRESS NOTE ADULT - SUBJECTIVE AND OBJECTIVE BOX
Patient is a 75y Male     Patient is a 75y old  Male who presents with a chief complaint of Fever (26 Sep 2021 08:44)      HPI:  74 yo M hx of CAD, MI, stent x2 (10/2009 at [facility]), pAfib, HTN, HLD, BIV-AICD, and systolic CHF, hx of PE (on Eliquis), recently dx’d pancreatic mass on chemo at [facility] now presenting with fever of 101 at home. He also endorsed diarrhea x3-4 days, watery. 4-5x daily. No recent abx exposure. No abdominal pain. Denies chest pain, dyspnea, palpitations. Just finished chemo today. Called his onc who advised him to come in. No abx exposure.  In the ED, vitals stable. Labs with leukopenia without neutropenia. Elevated ASt/ALT, hyponatremia. EKG paced. CXR clear. (15 Sep 2021 01:36)      PAST MEDICAL & SURGICAL HISTORY:  Hypertension (ICD9 401.9)    Hyperlipidemia (ICD9 272.4)    BPH (Benign Prostatic Hyperplasia)    Borderline diabetes mellitus    MI (myocardial infarction)  2009    Systolic heart failure    Hernia    Biventricular ICD (implantable cardioverter-defibrillator) in place  2010    Stented coronary artery  X 2, 2009        MEDICATIONS  (STANDING):  aspirin  chewable 81 milliGRAM(s) Oral daily  atorvastatin 80 milliGRAM(s) Oral at bedtime  baclofen 5 milliGRAM(s) Oral three times a day  buMETAnide Infusion 0.5 mG/Hr (2.5 mL/Hr) IV Continuous <Continuous>  dextrose 40% Gel 15 Gram(s) Oral once  dextrose 5%. 1000 milliLiter(s) (50 mL/Hr) IV Continuous <Continuous>  dextrose 5%. 1000 milliLiter(s) (100 mL/Hr) IV Continuous <Continuous>  dextrose 50% Injectable 25 Gram(s) IV Push once  dextrose 50% Injectable 12.5 Gram(s) IV Push once  dextrose 50% Injectable 25 Gram(s) IV Push once  DOBUTamine Infusion 2.604 MICROgram(s)/kG/Min (7.5 mL/Hr) IV Continuous <Continuous>  dronabinol 2.5 milliGRAM(s) Oral at bedtime  enoxaparin Injectable 70 milliGRAM(s) SubCutaneous two times a day  finasteride 5 milliGRAM(s) Oral at bedtime  gabapentin 100 milliGRAM(s) Oral at bedtime  glucagon  Injectable 1 milliGRAM(s) IntraMuscular once  influenza  Vaccine (HIGH DOSE) 0.7 milliLiter(s) IntraMuscular once  insulin glargine Injectable (LANTUS) 3 Unit(s) SubCutaneous at bedtime  insulin lispro (ADMELOG) corrective regimen sliding scale   SubCutaneous three times a day before meals  insulin lispro (ADMELOG) corrective regimen sliding scale   SubCutaneous at bedtime  methylPREDNISolone sodium succinate Injectable 80 milliGRAM(s) IV Push daily  MIRACLE MOUTHWASH 30 milliLiter(s) Swish and Spit every 6 hours  pancrelipase  (CREON 24,000 Lipase Units) 1 Capsule(s) Oral four times a day with meals  pantoprazole  Injectable 40 milliGRAM(s) IV Push daily      Allergies    No Known Allergies    Intolerances        SOCIAL HISTORY:  Denies ETOh,Smoking,     FAMILY HISTORY:  No pertinent family history in first degree relatives        REVIEW OF SYSTEMS:    CONSTITUTIONAL: No weakness, fevers or chills  EYES/ENT: No visual changes;  No vertigo or throat pain   NECK: No pain or stiffness  RESPIRATORY: No cough, wheezing, hemoptysis; No shortness of breath  CARDIOVASCULAR: No chest pain or palpitations  GASTROINTESTINAL: No abdominal or epigastric pain. No nausea, vomiting, or hematemesis; No diarrhea or constipation. No melena or hematochezia.  GENITOURINARY: No dysuria, frequency or hematuria  NEUROLOGICAL: No numbness or weakness  SKIN: No itching, burning, rashes, or lesions   All other review of systems is negative unless indicated above.    VITAL:  T(C): , Max: 36.6 (09-26-21 @ 04:00)  T(F): , Max: 97.9 (09-26-21 @ 04:00)  HR: 95 (09-26-21 @ 08:30)  BP: 104/64 (09-26-21 @ 08:30)  BP(mean): --  RR: 18 (09-26-21 @ 08:30)  SpO2: 96% (09-26-21 @ 08:30)  Wt(kg): --    I and O's:    09-24 @ 07:01  -  09-25 @ 07:00  --------------------------------------------------------  IN: 200 mL / OUT: 3000 mL / NET: -2800 mL    09-25 @ 07:01  -  09-26 @ 07:00  --------------------------------------------------------  IN: 150 mL / OUT: 3050 mL / NET: -2900 mL          PHYSICAL EXAM:    Constitutional: NAD  HEENT: PERRLA,   Neck: No JVD  Respiratory: CTA B/L  Cardiovascular: S1 and S2  Gastrointestinal: BS+, soft, NT/ND  Extremities: No peripheral edema  Neurological: A/O x 3, no focal deficits  Psychiatric: Normal mood, normal affect  : No Bowman  Skin: No rashes  Access: Not applicable  Back: No CVA tenderness    LABS:                        9.6    24.61 )-----------( 80       ( 26 Sep 2021 06:20 )             27.4     09-26    133<L>  |  89<L>  |  36<H>  ----------------------------<  198<H>  3.5   |  27  |  1.09    Ca    6.5<LL>      26 Sep 2021 06:20  Phos  2.6     09-26  Mg     1.90     09-26    TPro  5.4<L>  /  Alb  3.0<L>  /  TBili  1.0  /  DBili  x   /  AST  99<H>  /  ALT  141<H>  /  AlkPhos  199<H>  09-26          RADIOLOGY & ADDITIONAL STUDIES:

## 2021-09-26 NOTE — CHART NOTE - NSCHARTNOTEFT_GEN_A_CORE
Endocrine follow up     Please refer to last progress note for full plan of care     DM  Chart reviewed - patient is now on Solumedrol   Hyperglycemia noted   Glucose above target of 100-180 mg/dL   Will increase Lantus to 5 units at bedtime   Will increase Admelog from low to MODERATE correctional scale before meals and bedtime  Will assess need to pre-meal Admelog pending glucose trend   Please alter endocrine to any changes in steroid regimen     Hypocalcemia   Corrected Ca 7.3, low  Defer management to nephrology, primary team as patient is being diuresed, diarrhea, multiple medical causes for electrolyte imbalance     Endocrine following      CAPILLARY BLOOD GLUCOSE      POCT Blood Glucose.: 241 mg/dL (26 Sep 2021 12:43)  POCT Blood Glucose.: 228 mg/dL (26 Sep 2021 09:05)  POCT Blood Glucose.: 165 mg/dL (25 Sep 2021 21:36)  POCT Blood Glucose.: 256 mg/dL (25 Sep 2021 18:12)      09-26    133<L>  |  89<L>  |  36<H>  ----------------------------<  198<H>  3.5   |  27  |  1.09    Ca    6.5<LL>      26 Sep 2021 06:20  Phos  2.6     09-26  Mg     1.90     09-26    TPro  5.4<L>  /  Alb  3.0<L>  /  TBili  1.0  /  DBili  x   /  AST  99<H>  /  ALT  141<H>  /  AlkPhos  199<H>  09-26        MEDICATIONS  (STANDING):  aspirin  chewable 81 milliGRAM(s) Oral daily  atorvastatin 80 milliGRAM(s) Oral at bedtime  baclofen 5 milliGRAM(s) Oral three times a day  calcium carbonate   1250 mG (OsCal) 1 Tablet(s) Oral two times a day  dexAMETHasone    Solution 1 milliGRAM(s) Oral four times a day  dextrose 40% Gel 15 Gram(s) Oral once  dextrose 5%. 1000 milliLiter(s) (50 mL/Hr) IV Continuous <Continuous>  dextrose 5%. 1000 milliLiter(s) (100 mL/Hr) IV Continuous <Continuous>  dextrose 50% Injectable 25 Gram(s) IV Push once  dextrose 50% Injectable 12.5 Gram(s) IV Push once  dextrose 50% Injectable 25 Gram(s) IV Push once  DOBUTamine Infusion 1.25 MICROgram(s)/kG/Min (3.6 mL/Hr) IV Continuous <Continuous>  dronabinol 2.5 milliGRAM(s) Oral at bedtime  enoxaparin Injectable 70 milliGRAM(s) SubCutaneous two times a day  finasteride 5 milliGRAM(s) Oral at bedtime  gabapentin 100 milliGRAM(s) Oral at bedtime  glucagon  Injectable 1 milliGRAM(s) IntraMuscular once  influenza  Vaccine (HIGH DOSE) 0.7 milliLiter(s) IntraMuscular once  insulin glargine Injectable (LANTUS) 3 Unit(s) SubCutaneous at bedtime  insulin lispro (ADMELOG) corrective regimen sliding scale   SubCutaneous three times a day before meals  insulin lispro (ADMELOG) corrective regimen sliding scale   SubCutaneous at bedtime  methylPREDNISolone sodium succinate Injectable 80 milliGRAM(s) IV Push daily  MIRACLE MOUTHWASH 30 milliLiter(s) Swish and Spit every 6 hours  pancrelipase  (CREON 24,000 Lipase Units) 1 Capsule(s) Oral four times a day with meals  pantoprazole  Injectable 40 milliGRAM(s) IV Push daily  potassium chloride  10 mEq/100 mL IVPB 10 milliEquivalent(s) IV Intermittent every 1 hour

## 2021-09-26 NOTE — PROGRESS NOTE ADULT - ATTENDING COMMENTS
Bumex off.  Wean off dobutamine tomorrow. Bumex off.  Wean off dobutamine tomorrow.    Addendum:  Pt reports hearing audible alarm from ICD. Please ask EP to check the device tomorrow.

## 2021-09-26 NOTE — PROGRESS NOTE ADULT - SUBJECTIVE AND OBJECTIVE BOX
SUBJECTIVE / OVERNIGHT EVENTS:pt seen and examined, c/o oral discomfort , denies difficulty swallowing    MEDICATIONS  (STANDING):  aspirin  chewable 81 milliGRAM(s) Oral daily  atorvastatin 80 milliGRAM(s) Oral at bedtime  baclofen 5 milliGRAM(s) Oral three times a day  calcium carbonate   1250 mG (OsCal) 1 Tablet(s) Oral two times a day  dexAMETHasone    Solution 1 milliGRAM(s) Oral four times a day  dextrose 40% Gel 15 Gram(s) Oral once  dextrose 5%. 1000 milliLiter(s) (50 mL/Hr) IV Continuous <Continuous>  dextrose 5%. 1000 milliLiter(s) (100 mL/Hr) IV Continuous <Continuous>  dextrose 50% Injectable 25 Gram(s) IV Push once  dextrose 50% Injectable 12.5 Gram(s) IV Push once  dextrose 50% Injectable 25 Gram(s) IV Push once  DOBUTamine Infusion 1.25 MICROgram(s)/kG/Min (3.6 mL/Hr) IV Continuous <Continuous>  dronabinol 2.5 milliGRAM(s) Oral at bedtime  enoxaparin Injectable 70 milliGRAM(s) SubCutaneous two times a day  finasteride 5 milliGRAM(s) Oral at bedtime  gabapentin 100 milliGRAM(s) Oral at bedtime  glucagon  Injectable 1 milliGRAM(s) IntraMuscular once  influenza  Vaccine (HIGH DOSE) 0.7 milliLiter(s) IntraMuscular once  insulin glargine Injectable (LANTUS) 5 Unit(s) SubCutaneous at bedtime  insulin lispro (ADMELOG) corrective regimen sliding scale   SubCutaneous at bedtime  insulin lispro (ADMELOG) corrective regimen sliding scale   SubCutaneous three times a day before meals  methylPREDNISolone sodium succinate Injectable 80 milliGRAM(s) IV Push daily  MIRACLE MOUTHWASH 30 milliLiter(s) Swish and Spit every 6 hours  pancrelipase  (CREON 24,000 Lipase Units) 1 Capsule(s) Oral four times a day with meals  pantoprazole  Injectable 40 milliGRAM(s) IV Push daily    MEDICATIONS  (PRN):  acetaminophen   Tablet .. 650 milliGRAM(s) Oral every 6 hours PRN Temp greater or equal to 38.5C (101.3F), Mild Pain (1 - 3)  aluminum hydroxide/magnesium hydroxide/simethicone Suspension 30 milliLiter(s) Oral every 4 hours PRN Dyspepsia  melatonin 3 milliGRAM(s) Oral at bedtime PRN Insomnia  metoclopramide Injectable 5 milliGRAM(s) IV Push once PRN nausea  morphine  - Injectable 2 milliGRAM(s) IV Push every 4 hours PRN Moderate Pain (4 - 6)  ondansetron Injectable 4 milliGRAM(s) IV Push every 8 hours PRN Nausea and/or Vomiting    Vital Signs Last 24 Hrs  T(C): 36.6 (09-26-21 @ 18:08), Max: 36.6 (09-26-21 @ 04:00)  T(F): 97.9 (09-26-21 @ 18:08), Max: 97.9 (09-26-21 @ 04:00)  HR: 96 (09-26-21 @ 18:08) (91 - 97)  BP: 110/72 (09-26-21 @ 18:08) (93/63 - 110/72)  BP(mean): --  RR: 18 (09-26-21 @ 18:08) (18 - 18)  SpO2: 97% (09-26-21 @ 18:08) (94% - 97%)        Constitutional: No fever, fatigue  Skin: No rash.  Eyes: No recent vision problems or eye pain.  ENT: No congestion, ear pain, or sore throat.  Cardiovascular: No chest pain or palpation.  Respiratory: No cough, shortness of breath, congestion, or wheezing.  Gastrointestinal: No abdominal pain, nausea, vomiting, or diarrhea.  Genitourinary: No dysuria.  Musculoskeletal: No joint swelling.  Neurologic: No headache.    PHYSICAL EXAM:  GENERAL: NAD  EYES: EOMI, PERRLA  NECK: Supple, No JVD  CHEST/LUNG: dec breath sounds at bases  HEART:  S1 , S2 +  ABDOMEN: soft , bs+  EXTREMITIES:no edema    NEUROLOGY:alert awake    LABS:  09-26    133<L>  |  89<L>  |  38<H>  ----------------------------<  237<H>  3.5   |  28  |  1.08    Ca    6.6<L>      26 Sep 2021 19:07  Phos  1.8     09-26  Mg     2.00     09-26    TPro  5.4<L>  /  Alb  3.0<L>  /  TBili  1.0  /  DBili      /  AST  99<H>  /  ALT  141<H>  /  AlkPhos  199<H>  09-26    Creatinine Trend: 1.08 <--, 1.09 <--, 1.05 <--, 1.10 <--, 1.08 <--, 1.01 <--, 1.05 <--, 0.65 <--, 1.12 <--, 1.16 <--, 1.13 <--, 1.07 <--                        9.6    24.61 )-----------( 80       ( 26 Sep 2021 06:20 )             27.4     Urine Studies:            LIVER FUNCTIONS - ( 26 Sep 2021 06:20 )  Alb: 3.0 g/dL / Pro: 5.4 g/dL / ALK PHOS: 199 U/L / ALT: 141 U/L / AST: 99 U/L / GGT: x                       LIVER FUNCTIONS - ( 25 Sep 2021 06:46 )  Alb: 3.0 g/dL / Pro: 5.2 g/dL / ALK PHOS: 195 U/L / ALT: 143 U/L / AST: 101 U/L / GGT: x

## 2021-09-26 NOTE — PROGRESS NOTE ADULT - ASSESSMENT
75M with metastatic pancreatic cancer, CAD, ICM/HFrEF, pulmonary emboli, a/w failure to thrive likely in the setting or progression of underlying malignancy. HF consulted given concern for significant volume overload likely in the setting of malignancy rather than decompensated HF.    #HFrEF  -Anasarca on exam likely from underlying malignancy. Stop dobutamine gtt and hold bumex gtt for now, pt net negative 8L already with minimal PO intake.  -Electrolyte repletion per primary team  -Unable to tolerate GDMT at this time due to poor PO intake and soft blood pressure  -Supportive care for oral ulcers    Peyman Elizalde PGY6  All Cardiology service information can be found 24/7 on [email or web address], password: [email or web address] with metastatic pancreatic cancer, CAD, ICM/HFrEF, pulmonary emboli, a/w failure to thrive likely in the setting or progression of underlying malignancy. HF consulted given concern for significant volume overload likely in the setting of malignancy rather than decompensated HF.    #HFrEF  -Anasarca on exam likely from underlying malignancy. Stop hold bumex gtt for now and decrease dobutamine to 1.25mcg/kg/min, pt net negative 8L already with minimal PO intake.  -Electrolyte repletion per primary team  -Unable to tolerate GDMT at this time due to poor PO intake and soft blood pressure  -Supportive care for oral ulcers    Peyman Elizalde PGY6  All Cardiology service information can be found 24/7 on [email or web address], password: [email or web address] with metastatic pancreatic cancer, CAD, ICM/HFrEF, pulmonary emboli, a/w failure to thrive likely in the setting or progression of underlying malignancy. HF consulted given concern for significant volume overload likely in the setting of malignancy rather than decompensated HF.    #HFrEF  -Anasarca on exam likely from underlying malignancy. Hold bumex gtt for now and decrease dobutamine to 1.25mcg/kg/min, pt net negative 8L already with minimal PO intake.  -Electrolyte repletion per primary team  -Unable to tolerate GDMT at this time due to poor PO intake and soft blood pressure  -Supportive care for oral ulcers    Peyman Elizalde PGY6  All Cardiology service information can be found 24/7 on [email or web address], password: [email or web address]

## 2021-09-26 NOTE — PROGRESS NOTE ADULT - SUBJECTIVE AND OBJECTIVE BOX
Patient is a 75y Male     Patient is a 75y old  Male who presents with a chief complaint of Fever (26 Sep 2021 09:20)      HPI:  76 yo M hx of CAD, MI, stent x2 (10/2009 at [facility]), pAfib, HTN, HLD, BIV-AICD, and systolic CHF, hx of PE (on Eliquis), recently dx’d pancreatic mass on chemo at [facility] now presenting with fever of 101 at home. He also endorsed diarrhea x3-4 days, watery. 4-5x daily. No recent abx exposure. No abdominal pain. Denies chest pain, dyspnea, palpitations. Just finished chemo today. Called his onc who advised him to come in. No abx exposure.  In the ED, vitals stable. Labs with leukopenia without neutropenia. Elevated ASt/ALT, hyponatremia. EKG paced. CXR clear. (15 Sep 2021 01:36)      PAST MEDICAL & SURGICAL HISTORY:  Hypertension (ICD9 401.9)    Hyperlipidemia (ICD9 272.4)    BPH (Benign Prostatic Hyperplasia)    Borderline diabetes mellitus    MI (myocardial infarction)  2009    Systolic heart failure    Hernia    Biventricular ICD (implantable cardioverter-defibrillator) in place  2010    Stented coronary artery  X 2, 2009        MEDICATIONS  (STANDING):  aspirin  chewable 81 milliGRAM(s) Oral daily  atorvastatin 80 milliGRAM(s) Oral at bedtime  baclofen 5 milliGRAM(s) Oral three times a day  buMETAnide Infusion 0.5 mG/Hr (2.5 mL/Hr) IV Continuous <Continuous>  dextrose 40% Gel 15 Gram(s) Oral once  dextrose 5%. 1000 milliLiter(s) (50 mL/Hr) IV Continuous <Continuous>  dextrose 5%. 1000 milliLiter(s) (100 mL/Hr) IV Continuous <Continuous>  dextrose 50% Injectable 25 Gram(s) IV Push once  dextrose 50% Injectable 12.5 Gram(s) IV Push once  dextrose 50% Injectable 25 Gram(s) IV Push once  DOBUTamine Infusion 2.604 MICROgram(s)/kG/Min (7.5 mL/Hr) IV Continuous <Continuous>  dronabinol 2.5 milliGRAM(s) Oral at bedtime  enoxaparin Injectable 70 milliGRAM(s) SubCutaneous two times a day  finasteride 5 milliGRAM(s) Oral at bedtime  gabapentin 100 milliGRAM(s) Oral at bedtime  glucagon  Injectable 1 milliGRAM(s) IntraMuscular once  influenza  Vaccine (HIGH DOSE) 0.7 milliLiter(s) IntraMuscular once  insulin glargine Injectable (LANTUS) 3 Unit(s) SubCutaneous at bedtime  insulin lispro (ADMELOG) corrective regimen sliding scale   SubCutaneous three times a day before meals  insulin lispro (ADMELOG) corrective regimen sliding scale   SubCutaneous at bedtime  methylPREDNISolone sodium succinate Injectable 80 milliGRAM(s) IV Push daily  MIRACLE MOUTHWASH 30 milliLiter(s) Swish and Spit every 6 hours  pancrelipase  (CREON 24,000 Lipase Units) 1 Capsule(s) Oral four times a day with meals  pantoprazole  Injectable 40 milliGRAM(s) IV Push daily      Allergies    No Known Allergies    Intolerances        SOCIAL HISTORY:  Denies ETOh,Smoking,     FAMILY HISTORY:  No pertinent family history in first degree relatives        REVIEW OF SYSTEMS:    CONSTITUTIONAL: No weakness, fevers or chills  EYES/ENT: No visual changes;  No vertigo or throat pain   NECK: No pain or stiffness  RESPIRATORY: No cough, wheezing, hemoptysis; No shortness of breath  CARDIOVASCULAR: No chest pain or palpitations  GASTROINTESTINAL: No abdominal or epigastric pain. No nausea, vomiting, or hematemesis; No diarrhea or constipation. No melena or hematochezia.  GENITOURINARY: No dysuria, frequency or hematuria  NEUROLOGICAL: No numbness or weakness  SKIN: No itching, burning, rashes, or lesions   All other review of systems is negative unless indicated above.    VITAL:  T(C): , Max: 36.6 (09-26-21 @ 04:00)  T(F): , Max: 97.9 (09-26-21 @ 04:00)  HR: 95 (09-26-21 @ 08:30)  BP: 104/64 (09-26-21 @ 08:30)  BP(mean): --  RR: 18 (09-26-21 @ 08:30)  SpO2: 96% (09-26-21 @ 08:30)  Wt(kg): --    I and O's:    09-24 @ 07:01  -  09-25 @ 07:00  --------------------------------------------------------  IN: 200 mL / OUT: 3000 mL / NET: -2800 mL    09-25 @ 07:01  -  09-26 @ 07:00  --------------------------------------------------------  IN: 150 mL / OUT: 3050 mL / NET: -2900 mL          PHYSICAL EXAM:    Constitutional: NAD  HEENT: PERRLA,   Neck: No JVD  Respiratory: CTA B/L  Cardiovascular: S1 and S2  Gastrointestinal: BS+, soft, NT/ND  Extremities: No peripheral edema  Neurological: A/O x 3, no focal deficits  Psychiatric: Normal mood, normal affect  : No Bowman  Skin: No rashes  Access: Not applicable  Back: No CVA tenderness    LABS:                        9.6    24.61 )-----------( 80       ( 26 Sep 2021 06:20 )             27.4     09-26    133<L>  |  89<L>  |  36<H>  ----------------------------<  198<H>  3.5   |  27  |  1.09    Ca    6.5<LL>      26 Sep 2021 06:20  Phos  2.6     09-26  Mg     1.90     09-26    TPro  5.4<L>  /  Alb  3.0<L>  /  TBili  1.0  /  DBili  x   /  AST  99<H>  /  ALT  141<H>  /  AlkPhos  199<H>  09-26          RADIOLOGY & ADDITIONAL STUDIES:

## 2021-09-26 NOTE — PROGRESS NOTE ADULT - ASSESSMENT
appreicate all services.  conservative gi magnemtn  no acute gi complaints at this point  poor prognosis  depression is understood

## 2021-09-26 NOTE — PROGRESS NOTE ADULT - SUBJECTIVE AND OBJECTIVE BOX
on dobutamine for HF    acetaminophen   Tablet .. 650 milliGRAM(s) Oral every 6 hours PRN  aluminum hydroxide/magnesium hydroxide/simethicone Suspension 30 milliLiter(s) Oral every 4 hours PRN  aspirin  chewable 81 milliGRAM(s) Oral daily  atorvastatin 80 milliGRAM(s) Oral at bedtime  baclofen 5 milliGRAM(s) Oral three times a day  calcium carbonate   1250 mG (OsCal) 1 Tablet(s) Oral two times a day  DOBUTamine Infusion 1.25 MICROgram(s)/kG/Min IV Continuous <Continuous>  dronabinol 2.5 milliGRAM(s) Oral at bedtime  enoxaparin Injectable 70 milliGRAM(s) SubCutaneous two times a day  finasteride 5 milliGRAM(s) Oral at bedtime  gabapentin 100 milliGRAM(s) Oral at bedtime  glucagon  Injectable 1 milliGRAM(s) IntraMuscular once  influenza  Vaccine (HIGH DOSE) 0.7 milliLiter(s) IntraMuscular once  insulin glargine Injectable (LANTUS) 3 Unit(s) SubCutaneous at bedtime  insulin lispro (ADMELOG) corrective regimen sliding scale   SubCutaneous three times a day before meals  insulin lispro (ADMELOG) corrective regimen sliding scale   SubCutaneous at bedtime  melatonin 3 milliGRAM(s) Oral at bedtime PRN  methylPREDNISolone sodium succinate Injectable 80 milliGRAM(s) IV Push daily  metoclopramide Injectable 5 milliGRAM(s) IV Push once PRN  MIRACLE MOUTHWASH 30 milliLiter(s) Swish and Spit every 6 hours  ondansetron Injectable 4 milliGRAM(s) IV Push every 8 hours PRN  pancrelipase  (CREON 24,000 Lipase Units) 1 Capsule(s) Oral four times a day with meals  pantoprazole  Injectable 40 milliGRAM(s) IV Push daily  potassium chloride  10 mEq/100 mL IVPB 10 milliEquivalent(s) IV Intermittent every 1 hour      No Known Allergies      ROS otherwise negative     T(C): 36.6 (09-26-21 @ 04:00), Max: 36.6 (09-26-21 @ 04:00)  HR: 95 (09-26-21 @ 08:30) (75 - 97)  BP: 104/64 (09-26-21 @ 08:30) (93/63 - 109/63)  RR: 18 (09-26-21 @ 08:30) (18 - 18)  SpO2: 96% (09-26-21 @ 08:30) (94% - 97%)  PHYSICAL EXAM  Gen:  laying in bed, nad  H:  anicteric, eomi  Ext:  no edema                          9.6    24.61 )-----------( 80       ( 26 Sep 2021 06:20 )             27.4                         9.2    22.85 )-----------( 59       ( 25 Sep 2021 06:46 )             26.2                         9.3    25.50 )-----------( 53       ( 24 Sep 2021 07:52 )             26.6   09-26    133<L>  |  89<L>  |  36<H>  ----------------------------<  198<H>  3.5   |  27  |  1.09    Ca    6.5<LL>      26 Sep 2021 06:20  Phos  2.6     09-26  Mg     1.90     09-26    TPro  5.4<L>  /  Alb  3.0<L>  /  TBili  1.0  /  DBili  x   /  AST  99<H>  /  ALT  141<H>  /  AlkPhos  199<H>  09-26   on dobutamine for HF    acetaminophen   Tablet .. 650 milliGRAM(s) Oral every 6 hours PRN  aluminum hydroxide/magnesium hydroxide/simethicone Suspension 30 milliLiter(s) Oral every 4 hours PRN  aspirin  chewable 81 milliGRAM(s) Oral daily  atorvastatin 80 milliGRAM(s) Oral at bedtime  baclofen 5 milliGRAM(s) Oral three times a day  calcium carbonate   1250 mG (OsCal) 1 Tablet(s) Oral two times a day  DOBUTamine Infusion 1.25 MICROgram(s)/kG/Min IV Continuous <Continuous>  dronabinol 2.5 milliGRAM(s) Oral at bedtime  enoxaparin Injectable 70 milliGRAM(s) SubCutaneous two times a day  finasteride 5 milliGRAM(s) Oral at bedtime  gabapentin 100 milliGRAM(s) Oral at bedtime  glucagon  Injectable 1 milliGRAM(s) IntraMuscular once  influenza  Vaccine (HIGH DOSE) 0.7 milliLiter(s) IntraMuscular once  insulin glargine Injectable (LANTUS) 3 Unit(s) SubCutaneous at bedtime  insulin lispro (ADMELOG) corrective regimen sliding scale   SubCutaneous three times a day before meals  insulin lispro (ADMELOG) corrective regimen sliding scale   SubCutaneous at bedtime  melatonin 3 milliGRAM(s) Oral at bedtime PRN  methylPREDNISolone sodium succinate Injectable 80 milliGRAM(s) IV Push daily  metoclopramide Injectable 5 milliGRAM(s) IV Push once PRN  MIRACLE MOUTHWASH 30 milliLiter(s) Swish and Spit every 6 hours  ondansetron Injectable 4 milliGRAM(s) IV Push every 8 hours PRN  pancrelipase  (CREON 24,000 Lipase Units) 1 Capsule(s) Oral four times a day with meals  pantoprazole  Injectable 40 milliGRAM(s) IV Push daily  potassium chloride  10 mEq/100 mL IVPB 10 milliEquivalent(s) IV Intermittent every 1 hour      No Known Allergies      ROS otherwise negative     T(C): 36.6 (09-26-21 @ 04:00), Max: 36.6 (09-26-21 @ 04:00)  HR: 95 (09-26-21 @ 08:30) (75 - 97)  BP: 104/64 (09-26-21 @ 08:30) (93/63 - 109/63)  RR: 18 (09-26-21 @ 08:30) (18 - 18)  SpO2: 96% (09-26-21 @ 08:30) (94% - 97%)  PHYSICAL EXAM  Gen:  laying in bed, nad  Oral:  grade 1 mucositis  H:  anicteric, eomi  Ext:  2+ LE edema                          9.6    24.61 )-----------( 80       ( 26 Sep 2021 06:20 )             27.4                         9.2    22.85 )-----------( 59       ( 25 Sep 2021 06:46 )             26.2                         9.3    25.50 )-----------( 53       ( 24 Sep 2021 07:52 )             26.6   09-26    133<L>  |  89<L>  |  36<H>  ----------------------------<  198<H>  3.5   |  27  |  1.09    Ca    6.5<LL>      26 Sep 2021 06:20  Phos  2.6     09-26  Mg     1.90     09-26    TPro  5.4<L>  /  Alb  3.0<L>  /  TBili  1.0  /  DBili  x   /  AST  99<H>  /  ALT  141<H>  /  AlkPhos  199<H>  09-26

## 2021-09-27 NOTE — CONSULT NOTE ADULT - PROBLEM SELECTOR RECOMMENDATION 6
- follows at [facility] :  Started treatment on 8/31/21 per protocol , randomized to gemcitabine/nab-paclitaxel and dual immmunotherapy. Received Camp/Abraxane on 9/14  - oncology recommendations appreciated

## 2021-09-27 NOTE — PROGRESS NOTE ADULT - ATTENDING COMMENTS
75 year old male with a PMH of HTN, HLD, CAD, MI, PCI with stent x2 2009, chronic HFrEF, s/p BIV ICD ([employer]), BPH, recently diagnosed DVT/PE,  on anticoagulation ( Lovenox then Eliquis) and metastatic pancreatic cancer (diag in August), on chemotherapy at [facility] for past few weeks, who presented to our ED with fever, nausea/vomiting, diarrhea, poor po intake and fluid overload. Course complicated with AF with RVR. Now in SR. Not on any rate control agents at this time. Currently on Lovenox for AC.  No further EP work up needed at this time. Please call back if AF with RVR re-occurs.

## 2021-09-27 NOTE — PROVIDER CONTACT NOTE (OTHER) - RECOMMENDATIONS
provider notified, ordered NS 0.9% @ 100 mL/hr for 10 hrs
Tele monitoring.
contacting provider
provider notified
Provider notified, awaiting further instruction.
provider notified
A stick
provider notified
Will continue to monitor vitals Q4.
education
Attempt made, but patient is a hard stick, provider made aware
none at this time

## 2021-09-27 NOTE — CONSULT NOTE ADULT - SUBJECTIVE AND OBJECTIVE BOX
HPI:  76 yo M hx of CAD, MI, stent x2 (10/2009 at [facility]), pAfib, HTN, HLD, BIV-AICD, and systolic CHF, hx of PE (on Eliquis), recently dx’d pancreatic mass on chemo at [facility] now presenting with fever of 101 at home. He also endorsed diarrhea x3-4 days, watery. 4-5x daily. No recent abx exposure. No abdominal pain. Denies chest pain, dyspnea, palpitations. Just finished chemo today. Called his onc who advised him to come in. No abx exposure.  In the ED, vitals stable. Labs with leukopenia without neutropenia. Elevated ASt/ALT, hyponatremia. EKG paced. CXR clear. (15 Sep 2021 01:36)    PERTINENT PM/SXH:   Hypertension (ICD9 401.9)  Hyperlipidemia (ICD9 272.4)  Bacteremia (ICD9 790.7)  AF (Atrial Fibrillation) (ICD9 427.31)  Pneumonia (ICD9 486)  BPH (Benign Prostatic Hyperplasia)  Dyspepsia  Borderline diabetes mellitus  MI (myocardial infarction)  Systolic heart failure  Hernia  Biventricular ICD (implantable cardioverter-defibrillator) in place  Stented coronary artery    FAMILY HISTORY:  No pertinent family history in first degree relatives    ITEMS NOT CHECKED ARE NOT PRESENT    SOCIAL HISTORY:   Significant other/partner[x ]  Children[x- 3 children ]  Jehovah's witness/Spirituality: Confucianism  Substance hx:  [ ]   Tobacco hx:  [ ]   Alcohol hx: [ ]   Home Opioid hx:  [ ] I-Stop Reference No:  [id number]  Others' Prescriptions  Patient Name: Asim JoyBirth Date: 1945  Address: [address] [place] [id number]: Male  Rx Written	Rx Dispensed	Drug	Quantity	Days Supply	Prescriber Name	Prescriber Shania #	Payment Method	Dispenser  08/31/2021	08/31/2021	oxycodone hcl 5 mg tablet	120	30	Yan Hodges MD	[id number]	[facility]    Patient Name: Asim JoyBirth Date: 1945  Address: [address] [place] [id number]: Male  Rx Written	Rx Dispensed	Drug	Quantity	Days Supply	Prescriber Name	Prescriber Shania #	Payment Method	Dispenser  09/14/2021	09/23/2021	dronabinol 2.5 mg capsule	14	14	Yan Hodges MD	[id number]	Medicare	[id number]  Living Situation: [x ]Home  [ ]Long term care  [ ]Rehab [ ]Other    ADVANCE DIRECTIVES:    MOLST  [ ]  Living Will  [ ]   DECISION MAKER(s):  [ ] Health Care Proxy(s)  [x ] Surrogate(s)  [ ] Guardian           Name(s): Phone Number(s):  Wife Rosa Joy : [phone]  Daughter Hannah Joy: [phone]    BASELINE (I)ADL(s) (prior to admission):  [place]: [ x]Total  [ ] Moderate [ ]Dependent    Allergies    No Known Allergies    Intolerances    MEDICATIONS  (STANDING):  aspirin  chewable 81 milliGRAM(s) Oral daily  atorvastatin 80 milliGRAM(s) Oral at bedtime  baclofen 5 milliGRAM(s) Oral three times a day  calcium carbonate   1250 mG (OsCal) 1 Tablet(s) Oral two times a day  dexAMETHasone    Solution 1 milliGRAM(s) Oral four times a day  dextrose 40% Gel 15 Gram(s) Oral once  dextrose 5%. 1000 milliLiter(s) (50 mL/Hr) IV Continuous <Continuous>  dextrose 5%. 1000 milliLiter(s) (100 mL/Hr) IV Continuous <Continuous>  dextrose 50% Injectable 25 Gram(s) IV Push once  dextrose 50% Injectable 12.5 Gram(s) IV Push once  dextrose 50% Injectable 25 Gram(s) IV Push once  dronabinol 2.5 milliGRAM(s) Oral at bedtime  enoxaparin Injectable 70 milliGRAM(s) SubCutaneous two times a day  finasteride 5 milliGRAM(s) Oral at bedtime  gabapentin 100 milliGRAM(s) Oral at bedtime  glucagon  Injectable 1 milliGRAM(s) IntraMuscular once  influenza  Vaccine (HIGH DOSE) 0.7 milliLiter(s) IntraMuscular once  insulin glargine Injectable (LANTUS) 5 Unit(s) SubCutaneous at bedtime  insulin lispro (ADMELOG) corrective regimen sliding scale   SubCutaneous at bedtime  insulin lispro (ADMELOG) corrective regimen sliding scale   SubCutaneous three times a day before meals  methylPREDNISolone sodium succinate Injectable 80 milliGRAM(s) IV Push daily  metoprolol succinate ER 12.5 milliGRAM(s) Oral daily  pancrelipase  (CREON 24,000 Lipase Units) 1 Capsule(s) Oral four times a day with meals  pantoprazole  Injectable 40 milliGRAM(s) IV Push daily  potassium chloride  10 mEq/100 mL IVPB 10 milliEquivalent(s) IV Intermittent every 1 hour    MEDICATIONS  (PRN):  acetaminophen   Tablet .. 650 milliGRAM(s) Oral every 6 hours PRN Temp greater or equal to 38.5C (101.3F), Mild Pain (1 - 3)  aluminum hydroxide/magnesium hydroxide/simethicone Suspension 30 milliLiter(s) Oral every 4 hours PRN Dyspepsia  lidocaine 2% Viscous 15 milliLiter(s) Swish and Spit every 4 hours PRN Mouth sores  melatonin 3 milliGRAM(s) Oral at bedtime PRN Insomnia  metoclopramide Injectable 5 milliGRAM(s) IV Push once PRN nausea  morphine  - Injectable 2 milliGRAM(s) IV Push every 4 hours PRN Moderate Pain (4 - 6)  ondansetron Injectable 4 milliGRAM(s) IV Push every 8 hours PRN Nausea and/or Vomiting    PRESENT SYMPTOMS: [ ]Unable to obtain due to poor mentation   Source if other than patient:  [ ]Family   [ ]Team     Pain: [x ]yes [ ]no  QOL impact - moderate- severe  Location -   mouth                 Aggravating factors - eating/ drinking  Quality -   Radiation - none  Timing- constant   Severity (0-10 scale):  Minimal acceptable level (0-10 scale):     CPOT:    https://www.scc.org/getattachment/[email or web address]-0373x5104k1s/Critical-Care-Pain-Observation-Tool-(CPOT)      PAIN AD Score:     http://geriatrictoolkit.[place].[facility]/cog/painad.pdf (press ctrl +  left click to view)    Dyspnea:                           [ ]Mild [ ]Moderate [ ]Severe  Anxiety:                             [ ]Mild [ ]Moderate [ ]Severe  Agitation:                          [ ]Mild [ ]Moderate [ ]Severe  Fatigue:                             [ ]Mild [ ]Moderate [ ]Severe  Nausea:                             [ ]Mild [ ]Moderate [ ]Severe  Loss of appetite:              [ ]Mild [ ]Moderate [ ]Severe  Constipation:                   [ ]Mild [ ]Moderate [ ]Severe  Diarrhea:                          [ ]Mild [ ]Moderate [ ]Severe  Pruritus:                            [ ]Mild [ ]Moderate [ ]Severe  Depression:                      [ ]Mild [ ]Moderate [ ]Severe    Other Symptoms:  [ ]All other review of systems negative     Palliative Performance Status Version 2:         %    http://npcrc.org/files/news/palliative_performance_scale_ppsv2.pdf    PHYSICAL EXAM:  Vital Signs Last 24 Hrs  T(C): 36.5 (27 Sep 2021 14:05), Max: 36.6 (26 Sep 2021 18:08)  T(F): 97.7 (27 Sep 2021 14:05), Max: 97.9 (26 Sep 2021 18:08)  HR: 88 (27 Sep 2021 14:05) (87 - 96)  BP: 104/71 (27 Sep 2021 14:05) (93/61 - 112/71)  BP(mean): --  RR: 18 (27 Sep 2021 14:05) (18 - 18)  SpO2: 96% (27 Sep 2021 14:05) (94% - 97%)     I&O's Summary    26 Sep 2021 07:01  -  27 Sep 2021 07:00  --------------------------------------------------------  IN: 700 mL / OUT: 1200 mL / NET: -500 mL        GENERAL:  [ ]Alert  [ ]Oriented x   [ ]Lethargic  [ ]Cachexia  [ ]Unarousable  [ ]Verbal  [ ]Non-Verbal  [ ] No Distress  Behavioral:   [ ] Anxiety  [ ] Delirium [ ] Agitation [ ] Calm  [ ] Other  HEENT:  [ ]Normal  [ ] Temporal Wasting  [ ]Dry mouth   [ ]ET Tube/Trach  [ ]Oral lesions  [ ] Mucositis  PULMONARY:   [ ]Clear [ ]Tachypnea  [ ]Audible excessive secretions   [ ]Rhonchi        [ ]Right [ ]Left [ ]Bilateral  [ ]Crackles        [ ]Right [ ]Left [ ]Bilateral  [ ]Wheezing     [ ]Right [ ]Left [ ]Bilateral  [ ]Diminished breath sounds [ ]right [ ]left [ ]bilateral  CARDIOVASCULAR:    [ ]Regular [ ]Irregular [ ]Tachy  [ ]Cj [ ]Murmur [ ]Other  GASTROINTESTINAL:  [ ]Soft  [ ]Distended   [ ]+BS  [ ]Non tender [ ]Tender  [ ]PEG [ ]OGT/ NGT  Last BM:   GENITOURINARY:  [ ]Normal [ ] Incontinent   [ ]Oliguria/Anuria   [ ][place]  MUSCULOSKELETAL:   [ ]Normal   [ ]Weakness  [ ]Bed/Wheelchair bound [ ]Edema  [  ] amputation  [  ] contraction  NEUROLOGIC:   [ ]No focal deficits  [ ]Cognitive impairment  [ ]Dysphagia [ ]Dysarthria [ ]Paresis [ ]Other   SKIN: See Nursing Skin Assessment for further details  [ ]Normal    [ ]Rash  [ ]Pressure ulcer(s)       Present on admission [ ]y [ ]n   [  ]  Wound    [  ] hyperpigmentation    CRITICAL CARE:  [ ] Shock Present  [ ]Septic [ ]Cardiogenic [ ]Neurologic [ ]Hypovolemic  [ ]  Vasopressors [ ]  Inotropes   [ ]Respiratory failure present [ ]Mechanical ventilation [ ]Non-invasive ventilatory support [ ]High flow    [ ]Acute  [ ]Chronic [ ]Hypoxic  [ ]Hypercarbic [ ]Other  [ ]Other organ failure     LABS:                        9.8    36.79 )-----------( 116      ( 27 Sep 2021 07:51 )             28.8   09-27    132<L>  |  89<L>  |  41<H>  ----------------------------<  180<H>  3.4<L>   |  29  |  1.05    Ca    6.8<L>      27 Sep 2021 07:51  Phos  2.0     09-27  Mg     2.10     09-27    TPro  5.3<L>  /  Alb  3.0<L>  /  TBili  0.9  /  DBili  x   /  AST  83<H>  /  ALT  123<H>  /  AlkPhos  178<H>  09-27      CAPILLARY BLOOD GLUCOSE      POCT Blood Glucose.: 175 mg/dL (27 Sep 2021 13:12)  POCT Blood Glucose.: 179 mg/dL (27 Sep 2021 09:15)  POCT Blood Glucose.: 207 mg/dL (26 Sep 2021 21:25)  POCT Blood Glucose.: 254 mg/dL (26 Sep 2021 18:33)    Hypertension (ICD9 401.9)    Hyperlipidemia (ICD9 272.4)    Bacteremia (ICD9 790.7)    AF (Atrial Fibrillation) (ICD9 427.31)    Pneumonia (ICD9 486)    BPH (Benign Prostatic Hyperplasia)    Dyspepsia    Borderline diabetes mellitus    MI (myocardial infarction)    Systolic heart failure        RADIOLOGY & ADDITIONAL STUDIES:    PROTEIN CALORIE MALNUTRITION PRESENT: [ ]mild [ ]moderate [ ]severe [ ]underweight [ ]morbid obesity  https://www.andeal.org/vault/8030/web/files/ONC/Table_Clinical%20Characteristics%20to%20Document%20Malnutrition-White%20JV%20et%20al%202012.pdf    Height (cm): 177.8 (09-15-21 @ 08:19), 177.8 (08-05-21 @ 16:06), 177.8 (02-16-21 @ 10:16)  Weight (kg): 96 (09-22-21 @ 15:47), 96.2 (08-05-21 @ 19:09), 96.2 (02-16-21 @ 10:16)  BMI (kg/m2): 30.4 (09-22-21 @ 15:47), 30.4 (09-15-21 @ 08:19), 30.4 (08-05-21 @ 19:09)    [ ]PPSV2 < or = to 30% [ ]significant weight loss  [ ]poor nutritional intake  [ ]anasarca      [ ]Artificial Nutrition      REFERRALS:   [ ]Chaplaincy  [ ]Hospice  [ ]Child Life  [ ]Social Work  [ ]Case management [ ]Holistic Therapy     Goals of Care Document:  HPI:  76 yo M hx of CAD, MI, stent x2 (10/2009 at [facility]), pAfib, HTN, HLD, BIV-AICD, and systolic CHF, hx of PE (on Eliquis), recently dx’d pancreatic mass on chemo at [facility] [place] now presenting with fever of 101 at home. He also endorsed diarrhea x3-4 days, watery. 4-5x daily. No recent abx exposure. No abdominal pain. Denies chest pain, dyspnea, palpitations. Just finished chemo today. Called his onc who advised him to come in. No abx exposure.  In the ED, vitals stable. Labs with leukopenia without neutropenia. Elevated ASt/ALT, hyponatremia. EKG paced. CXR clear. (15 Sep 2021 01:36)    PERTINENT PM/SXH:   Hypertension (ICD9 401.9)  Hyperlipidemia (ICD9 272.4)  Bacteremia (ICD9 790.7)  AF (Atrial Fibrillation) (ICD9 427.31)  Pneumonia (ICD9 486)  BPH (Benign Prostatic Hyperplasia)  Dyspepsia  Borderline diabetes mellitus  MI (myocardial infarction)  Systolic heart failure  Hernia  Biventricular ICD (implantable cardioverter-defibrillator) in place  Stented coronary artery    FAMILY HISTORY:  No pertinent family history in first degree relatives    ITEMS NOT CHECKED ARE NOT PRESENT    SOCIAL HISTORY:   Significant other/partner[x ]  Children[x- 3 children ]  Episcopal/Spirituality: Mandaeism  Substance hx:  [ ]   Tobacco hx:  [ ]   Alcohol hx: [ ]   Home Opioid hx:  [ ] I-Stop Reference No:  [id number]  Others' Prescriptions  Patient Name: Asim JoyBirth Date: 1945  Address: [address] [place] [id number]: Male  Rx Written	Rx Dispensed	Drug	Quantity	Days Supply	Prescriber Name	Prescriber Shania #	Payment Method	Dispenser  08/31/2021	08/31/2021	oxycodone hcl 5 mg tablet	120	30	Yan Hodges MD	[id number]	[facility]    Patient Name: Asim JoyBirth Date: 1945  Address: [address] [place] [id number]: Male  Rx Written	Rx Dispensed	Drug	Quantity	Days Supply	Prescriber Name	Prescriber Shania #	Payment Method	Dispenser  09/14/2021	09/23/2021	dronabinol 2.5 mg capsule	14	14	Yan Hodges MD	[id number]	Medicare	[id number]  Living Situation: [x ]Home  [ ]Long term care  [ ]Rehab [ ]Other    ADVANCE DIRECTIVES:    MOLST  [ ]  Living Will  [ ]   DECISION MAKER(s):  [ ] Health Care Proxy(s)  [x ] Surrogate(s)  [ ] Guardian           Name(s): Phone Number(s):  Wife Rosa Joy : [phone]  Daughter Hannah Joy: [phone]    BASELINE (I)ADL(s) (prior to admission):  [place]: [ x]Total  [ ] Moderate [ ]Dependent    Allergies    No Known Allergies    Intolerances    MEDICATIONS  (STANDING):  aspirin  chewable 81 milliGRAM(s) Oral daily  atorvastatin 80 milliGRAM(s) Oral at bedtime  baclofen 5 milliGRAM(s) Oral three times a day  calcium carbonate   1250 mG (OsCal) 1 Tablet(s) Oral two times a day  dexAMETHasone    Solution 1 milliGRAM(s) Oral four times a day  dextrose 40% Gel 15 Gram(s) Oral once  dextrose 5%. 1000 milliLiter(s) (50 mL/Hr) IV Continuous <Continuous>  dextrose 5%. 1000 milliLiter(s) (100 mL/Hr) IV Continuous <Continuous>  dextrose 50% Injectable 25 Gram(s) IV Push once  dextrose 50% Injectable 12.5 Gram(s) IV Push once  dextrose 50% Injectable 25 Gram(s) IV Push once  dronabinol 2.5 milliGRAM(s) Oral at bedtime  enoxaparin Injectable 70 milliGRAM(s) SubCutaneous two times a day  finasteride 5 milliGRAM(s) Oral at bedtime  gabapentin 100 milliGRAM(s) Oral at bedtime  glucagon  Injectable 1 milliGRAM(s) IntraMuscular once  influenza  Vaccine (HIGH DOSE) 0.7 milliLiter(s) IntraMuscular once  insulin glargine Injectable (LANTUS) 5 Unit(s) SubCutaneous at bedtime  insulin lispro (ADMELOG) corrective regimen sliding scale   SubCutaneous at bedtime  insulin lispro (ADMELOG) corrective regimen sliding scale   SubCutaneous three times a day before meals  methylPREDNISolone sodium succinate Injectable 80 milliGRAM(s) IV Push daily  metoprolol succinate ER 12.5 milliGRAM(s) Oral daily  pancrelipase  (CREON 24,000 Lipase Units) 1 Capsule(s) Oral four times a day with meals  pantoprazole  Injectable 40 milliGRAM(s) IV Push daily  potassium chloride  10 mEq/100 mL IVPB 10 milliEquivalent(s) IV Intermittent every 1 hour    MEDICATIONS  (PRN):  acetaminophen   Tablet .. 650 milliGRAM(s) Oral every 6 hours PRN Temp greater or equal to 38.5C (101.3F), Mild Pain (1 - 3)  aluminum hydroxide/magnesium hydroxide/simethicone Suspension 30 milliLiter(s) Oral every 4 hours PRN Dyspepsia  lidocaine 2% Viscous 15 milliLiter(s) Swish and Spit every 4 hours PRN Mouth sores  melatonin 3 milliGRAM(s) Oral at bedtime PRN Insomnia  metoclopramide Injectable 5 milliGRAM(s) IV Push once PRN nausea  morphine  - Injectable 2 milliGRAM(s) IV Push every 4 hours PRN Moderate Pain (4 - 6)  ondansetron Injectable 4 milliGRAM(s) IV Push every 8 hours PRN Nausea and/or Vomiting    PRESENT SYMPTOMS: [ ]Unable to obtain due to poor mentation   Source if other than patient:  [ ]Family   [ ]Team     Pain: [x ]yes [ ]no  QOL impact - moderate- severe  Location -   mouth                 Aggravating factors - eating/ drinking  Quality - unable to describe   Radiation - none  Timing- constant   Severity (0-10 scale): unable to describe   Minimal acceptable level (0-10 scale):     CPOT:    https://www.[facility].org/getattachment/[email or web address]/Critical-Care-Pain-Observation-Tool-(CPOT)      PAIN AD Score:     http://geriatrictoolkit.[facility]/cog/painad.pdf (press ctrl +  left click to view)    Dyspnea:                           [ ]Mild [ ]Moderate [ ]Severe  Anxiety:                             [ ]Mild [ ]Moderate [ ]Severe  Agitation:                          [ ]Mild [ ]Moderate [ ]Severe  Fatigue:                             [ ]Mild [ ]Moderate [ ]Severe  Nausea:                             [ ]Mild [ ]Moderate [ ]Severe  Loss of appetite:              [ ]Mild [ ]Moderate [ ]Severe  Constipation:                   [ ]Mild [ ]Moderate [ ]Severe  Diarrhea:                          [x ]Mild [ ]Moderate [ ]Severe    Other Symptoms:  [ x]All other review of systems negative     Palliative Performance Status Version 2:  70-80       %    http://[facility].org/files/news/palliative_performance_scale_ppsv2.pdf    PHYSICAL EXAM:  Vital Signs Last 24 Hrs  T(C): 36.5 (27 Sep 2021 14:05), Max: 36.6 (26 Sep 2021 18:08)  T(F): 97.7 (27 Sep 2021 14:05), Max: 97.9 (26 Sep 2021 18:08)  HR: 88 (27 Sep 2021 14:05) (87 - 96)  BP: 104/71 (27 Sep 2021 14:05) (93/61 - 112/71)  BP(mean): --  RR: 18 (27 Sep 2021 14:05) (18 - 18)  SpO2: 96% (27 Sep 2021 14:05) (94% - 97%)     I&O's Summary    26 Sep 2021 07:01  -  27 Sep 2021 07:00  --------------------------------------------------------  IN: 700 mL / OUT: 1200 mL / NET: -500 mL    GENERAL:  [x ]Alert  [ x]Oriented x3   [ ]Lethargic  [ ]Cachexia  [ ]Unarousable  [ x]Verbal  [ ]Non-Verbal  [ x] No Distress  Behavioral:   [ ] Anxiety  [ ] Delirium [ ] Agitation [ x] Calm  [ ] Other  HEENT:  [ ]Normal  [ ] Temporal Wasting  [ ]Dry mouth   [ ]ET Tube/Trach  [ ]Oral lesions  [x ] Mucositis  PULMONARY:   [x ]Clear [ ]Tachypnea  [ ]Audible excessive secretions   [ ]Rhonchi        [ ]Right [ ]Left [ ]Bilateral  [ ]Crackles        [ ]Right [ ]Left [ ]Bilateral  [ ]Wheezing     [ ]Right [ ]Left [ ]Bilateral  [ ]Diminished breath sounds [ ]right [ ]left [ ]bilateral  CARDIOVASCULAR:    [ x]Regular [ ]Irregular [ ]Tachy  [ ]Cj [ ]Murmur [ ]Other  GASTROINTESTINAL:  [ x]Soft  [ ]Distended   [x ]+BS  [ x]Non tender [ ]Tender  [ ]PEG [ ]OGT/ NGT  Last BM: 9/27  GENITOURINARY:  [ ]Normal [ ] Incontinent   [ ]Oliguria/Anuria   [ x] condom catheter   MUSCULOSKELETAL:   [ x]Normal   [ ]Weakness  [ ]Bed/Wheelchair bound [ ]Edema  [  ] amputation  [  ] contraction  NEUROLOGIC:   [ x]No focal deficits  [ ]Cognitive impairment  [ ]Dysphagia [ ]Dysarthria [ ]Paresis [ ]Other   SKIN: Moisture Associated and Incontinence Associated Dermatitis. See Nursing Skin Assessment for further details  [ ]Normal    [ ]Rash  [ ]Pressure ulcer(s)       Present on admission [ ]y [ ]n   [  ]  Wound    [  ] hyperpigmentation    CRITICAL CARE:  [ ] Shock Present  [ ]Septic [ ]Cardiogenic [ ]Neurologic [ ]Hypovolemic  [ ]  Vasopressors [ ]  Inotropes   [ ]Respiratory failure present [ ]Mechanical ventilation [ ]Non-invasive ventilatory support [ ]High flow    [ ]Acute  [ ]Chronic [ ]Hypoxic  [ ]Hypercarbic [ ]Other  [ ]Other organ failure     LABS:                        9.8    36.79 )-----------( 116      ( 27 Sep 2021 07:51 )             28.8   09-27    132<L>  |  89<L>  |  41<H>  ----------------------------<  180<H>  3.4<L>   |  29  |  1.05    Ca    6.8<L>      27 Sep 2021 07:51  Phos  2.0     09-27  Mg     2.10     09-27    TPro  5.3<L>  /  Alb  3.0<L>  /  TBili  0.9  /  DBili  x   /  AST  83<H>  /  ALT  123<H>  /  AlkPhos  178<H>  09-27      CAPILLARY BLOOD GLUCOSE      POCT Blood Glucose.: 175 mg/dL (27 Sep 2021 13:12)  POCT Blood Glucose.: 179 mg/dL (27 Sep 2021 09:15)  POCT Blood Glucose.: 207 mg/dL (26 Sep 2021 21:25)  POCT Blood Glucose.: 254 mg/dL (26 Sep 2021 18:33)    RADIOLOGY & ADDITIONAL STUDIES:  CXRAY 9/24/2021  FINDINGS:  Prior study dated 9/14/2021 was available for review.  The lungs are clear.  No pleural abnormality is seen. Demonstrate bibasilar atelectatic changes. No pleural effusion is seen.  The heart and mediastinum appear intact.  Evaluate pacemaker is noted.  IMPRESSION: Bibasilar atelectatic changes are noted.    US Abdomen 9/15/2021  FINDINGS:  Liver: Heterogeneous liver with numerous scattered hyperechoic lesions consistent with hepatic metastases.  Common bile duct: Normal caliber. Common bile duct measures 5 mm.  Gallbladder: Contracted. Cholelithiasis.  Pancreas: Poorly visualized.  Right kidney: 13.1 cm. No hydronephrosis, renal mass, or calculi.  Ascites: Mild perihepatic ascites.  Splenic Vein: Not visualized.  Main Portal Vein: Patent with hepatopetal flow.  Left Portal Vein: Patent with hepatopetal flow.  Right Portal Vein: Hepatic Artery: Patent with normal direction of flow.  Hepatic Veins and Inferior Vena Cava: Patent with normal direction of flow.  IMPRESSION:  No portal vein thrombosis.  Redemonstrated hepatic metastases with mild perihepatic ascites.      PROTEIN CALORIE MALNUTRITION PRESENT: [ ]mild [ ]moderate [ x]severe [ ]underweight [ ]morbid obesity  https://www.andeal.org/vault/2440/web/files/ONC/Table_Clinical%20Characteristics%20to%20Document%20Malnutrition-White%20JV%20et%20al%103926.pdf    Height (cm): 177.8 (09-15-21 @ 08:19), 177.8 (08-05-21 @ 16:06), 177.8 (02-16-21 @ 10:16)  Weight (kg): 96 (09-22-21 @ 15:47), 96.2 (08-05-21 @ 19:09), 96.2 (02-16-21 @ 10:16)  BMI (kg/m2): 30.4 (09-22-21 @ 15:47), 30.4 (09-15-21 @ 08:19), 30.4 (08-05-21 @ 19:09)    [ ]PPSV2 < or = to 30% [ ]significant weight loss  [ ]poor nutritional intake  [ ]anasarca      [ ]Artificial Nutrition      REFERRALS:   [ ]Chaplaincy  [ ]Hospice  [ ]Child Life  [ ]Social Work  [ x]Case management [ ]Holistic Therapy     Goals of Care Document:  HPI:  74 yo M hx of CAD, MI, stent x2 (10/2009 at [facility]), pAfib, HTN, HLD, BIV-AICD, and systolic CHF, hx of PE (on Eliquis), recently dx’d pancreatic mass on chemo at [facility] now presenting with fever of 101 at home. He also endorsed diarrhea x3-4 days, watery. 4-5x daily. No recent abx exposure. No abdominal pain. Denies chest pain, dyspnea, palpitations. Just finished chemo today. Called his onc who advised him to come in. No abx exposure.  In the ED, vitals stable. Labs with leukopenia without neutropenia. Elevated ASt/ALT, hyponatremia. EKG paced. CXR clear. (15 Sep 2021 01:36)    PERTINENT PM/SXH:   Hypertension (ICD9 401.9)  Hyperlipidemia (ICD9 272.4)  Bacteremia (ICD9 790.7)  AF (Atrial Fibrillation) (ICD9 427.31)  Pneumonia (ICD9 486)  BPH (Benign Prostatic Hyperplasia)  Dyspepsia  Borderline diabetes mellitus  MI (myocardial infarction)  Systolic heart failure  Hernia  Biventricular ICD (implantable cardioverter-defibrillator) in place  Stented coronary artery    FAMILY HISTORY:  No pertinent family history in first degree relatives    ITEMS NOT CHECKED ARE NOT PRESENT    SOCIAL HISTORY:   Significant other/partner[x ]  Children[x- 3 children ]  Nondenominational/Spirituality: Voodoo  Substance hx:  [ ]   Tobacco hx:  [ ]   Alcohol hx: [ ]   Home Opioid hx:  [ ] I-Stop Reference No:  [id number]  Others' Prescriptions  Patient Name: Asim JoyBirth Date: 1945  Address: [address] [place] [id number]: Male  Rx Written	Rx Dispensed	Drug	Quantity	Days Supply	Prescriber Name	Prescriber Shania #	Payment Method	Dispenser  08/31/2021	08/31/2021	oxycodone hcl 5 mg tablet	120	30	Yan Hodges MD	[id number]	[facility]    Patient Name: Asim JoyBirth Date: 1945  Address: [address] [place] [id number]: Male  Rx Written	Rx Dispensed	Drug	Quantity	Days Supply	Prescriber Name	Prescriber Shania #	Payment Method	Dispenser  09/14/2021	09/23/2021	dronabinol 2.5 mg capsule	14	14	Yan Hodges MD	[id number]	Medicare	[id number]  Living Situation: [x ]Home  [ ]Long term care  [ ]Rehab [ ]Other    ADVANCE DIRECTIVES:    MOLST  [ ]  Living Will  [ ]   DECISION MAKER(s):  [ ] Health Care Proxy(s)  [x ] Surrogate(s)  [ ] Guardian           Name(s): Phone Number(s):  Wife Rosa Joy : [phone]  Daughter Hannah Joy: [phone]    BASELINE (I)ADL(s) (prior to admission):  [place]: [ x]Total  [ ] Moderate [ ]Dependent    Allergies    No Known Allergies    Intolerances    MEDICATIONS  (STANDING):  aspirin  chewable 81 milliGRAM(s) Oral daily  atorvastatin 80 milliGRAM(s) Oral at bedtime  baclofen 5 milliGRAM(s) Oral three times a day  calcium carbonate   1250 mG (OsCal) 1 Tablet(s) Oral two times a day  dexAMETHasone    Solution 1 milliGRAM(s) Oral four times a day  dextrose 40% Gel 15 Gram(s) Oral once  dextrose 5%. 1000 milliLiter(s) (50 mL/Hr) IV Continuous <Continuous>  dextrose 5%. 1000 milliLiter(s) (100 mL/Hr) IV Continuous <Continuous>  dextrose 50% Injectable 25 Gram(s) IV Push once  dextrose 50% Injectable 12.5 Gram(s) IV Push once  dextrose 50% Injectable 25 Gram(s) IV Push once  dronabinol 2.5 milliGRAM(s) Oral at bedtime  enoxaparin Injectable 70 milliGRAM(s) SubCutaneous two times a day  finasteride 5 milliGRAM(s) Oral at bedtime  gabapentin 100 milliGRAM(s) Oral at bedtime  glucagon  Injectable 1 milliGRAM(s) IntraMuscular once  influenza  Vaccine (HIGH DOSE) 0.7 milliLiter(s) IntraMuscular once  insulin glargine Injectable (LANTUS) 5 Unit(s) SubCutaneous at bedtime  insulin lispro (ADMELOG) corrective regimen sliding scale   SubCutaneous at bedtime  insulin lispro (ADMELOG) corrective regimen sliding scale   SubCutaneous three times a day before meals  methylPREDNISolone sodium succinate Injectable 80 milliGRAM(s) IV Push daily  metoprolol succinate ER 12.5 milliGRAM(s) Oral daily  pancrelipase  (CREON 24,000 Lipase Units) 1 Capsule(s) Oral four times a day with meals  pantoprazole  Injectable 40 milliGRAM(s) IV Push daily  potassium chloride  10 mEq/100 mL IVPB 10 milliEquivalent(s) IV Intermittent every 1 hour    MEDICATIONS  (PRN):  acetaminophen   Tablet .. 650 milliGRAM(s) Oral every 6 hours PRN Temp greater or equal to 38.5C (101.3F), Mild Pain (1 - 3)  aluminum hydroxide/magnesium hydroxide/simethicone Suspension 30 milliLiter(s) Oral every 4 hours PRN Dyspepsia  lidocaine 2% Viscous 15 milliLiter(s) Swish and Spit every 4 hours PRN Mouth sores  melatonin 3 milliGRAM(s) Oral at bedtime PRN Insomnia  metoclopramide Injectable 5 milliGRAM(s) IV Push once PRN nausea  morphine  - Injectable 2 milliGRAM(s) IV Push every 4 hours PRN Moderate Pain (4 - 6)  ondansetron Injectable 4 milliGRAM(s) IV Push every 8 hours PRN Nausea and/or Vomiting    PRESENT SYMPTOMS: [ ]Unable to obtain due to poor mentation   Source if other than patient:  [ ]Family   [ ]Team     Pain: [x ]yes [ ]no  QOL impact - moderate- severe  Location -   mouth                 Aggravating factors - eating/ drinking  Quality - unable to describe   Radiation - none  Timing- constant   Severity (0-10 scale): unable to describe   Minimal acceptable level (0-10 scale):     CPOT:    https://[email or web address]/getattachment/[email or web address]/Critical-Care-Pain-Observation-Tool-(CPOT)      PAIN AD Score:     [email or web address] (press ctrl +  left click to view)    Dyspnea:                           [ ]Mild [ ]Moderate [ ]Severe  Anxiety:                             [ ]Mild [ ]Moderate [ ]Severe  Agitation:                          [ ]Mild [ ]Moderate [ ]Severe  Fatigue:                             [ ]Mild [ ]Moderate [ ]Severe  Nausea:                             [ ]Mild [ ]Moderate [ ]Severe  Loss of appetite:              [ ]Mild [ ]Moderate [ ]Severe  Constipation:                   [ ]Mild [ ]Moderate [ ]Severe  Diarrhea:                          [x ]Mild [ ]Moderate [ ]Severe    Other Symptoms:  [ x]All other review of systems negative     Palliative Performance Status Version 2:  70-80       %    [email or web address]/[email or web address]_performance_scale_[email or web address].pdf    PHYSICAL EXAM:  Vital Signs Last 24 Hrs  T(C): 36.5 (27 Sep 2021 14:05), Max: 36.6 (26 Sep 2021 18:08)  T(F): 97.7 (27 Sep 2021 14:05), Max: 97.9 (26 Sep 2021 18:08)  HR: 88 (27 Sep 2021 14:05) (87 - 96)  BP: 104/71 (27 Sep 2021 14:05) (93/61 - 112/71)  BP(mean): --  RR: 18 (27 Sep 2021 14:05) (18 - 18)  SpO2: 96% (27 Sep 2021 14:05) (94% - 97%)     I&O's Summary    26 Sep 2021 07:01  -  27 Sep 2021 07:00  --------------------------------------------------------  IN: 700 mL / OUT: 1200 mL / NET: -500 mL    GENERAL:  [x ]Alert  [ x]Oriented x3   [ ]Lethargic  [ ]Cachexia  [ ]Unarousable  [ x]Verbal  [ ]Non-Verbal  [ x] No Distress  Behavioral:   [ ] Anxiety  [ ] Delirium [ ] Agitation [ x] Calm  [ ] Other  HEENT:  [ ]Normal  [ ] Temporal Wasting  [ ]Dry mouth   [ ]ET Tube/Trach  [ ]Oral lesions  [x ] Mucositis  PULMONARY:   [x ]Clear [ ]Tachypnea  [ ]Audible excessive secretions   [ ]Rhonchi        [ ]Right [ ]Left [ ]Bilateral  [ ]Crackles        [ ]Right [ ]Left [ ]Bilateral  [ ]Wheezing     [ ]Right [ ]Left [ ]Bilateral  [ ]Diminished breath sounds [ ]right [ ]left [ ]bilateral  CARDIOVASCULAR:    [ x]Regular [ ]Irregular [ ]Tachy  [ ]Cj [ ]Murmur [ ]Other  GASTROINTESTINAL:  [ x]Soft  [ ]Distended   [x ]+BS  [ x]Non tender [ ]Tender  [ ]PEG [ ]OGT/ NGT  Last BM: 9/27  GENITOURINARY:  [ ]Normal [ ] Incontinent   [ ]Oliguria/Anuria   [ x] condom catheter   MUSCULOSKELETAL:   [ x]Normal   [ ]Weakness  [ ]Bed/Wheelchair bound [ ]Edema  [  ] amputation  [  ] contraction  NEUROLOGIC:   [ x]No focal deficits, except slight facial drooping of mouth on right side upon smiling  [ ]Cognitive impairment  [ ]Dysphagia [ ]Dysarthria [ ]Paresis [ ]Other   SKIN: Moisture Associated and Incontinence Associated Dermatitis. See Nursing Skin Assessment for further details  [ ]Normal    [ ]Rash  [ ]Pressure ulcer(s)       Present on admission [ ]y [ ]n   [  ]  Wound    [  ] hyperpigmentation    CRITICAL CARE:  [ ] Shock Present  [ ]Septic [ ]Cardiogenic [ ]Neurologic [ ]Hypovolemic  [ ]  Vasopressors [ ]  Inotropes   [ ]Respiratory failure present [ ]Mechanical ventilation [ ]Non-invasive ventilatory support [ ]High flow    [ ]Acute  [ ]Chronic [ ]Hypoxic  [ ]Hypercarbic [ ]Other  [ ]Other organ failure     LABS:                        9.8    36.79 )-----------( 116      ( 27 Sep 2021 07:51 )             28.8   09-27    132<L>  |  89<L>  |  41<H>  ----------------------------<  180<H>  3.4<L>   |  29  |  1.05    Ca    6.8<L>      27 Sep 2021 07:51  Phos  2.0     09-27  Mg     2.10     09-27    TPro  5.3<L>  /  Alb  3.0<L>  /  TBili  0.9  /  DBili  x   /  AST  83<H>  /  ALT  123<H>  /  AlkPhos  178<H>  09-27      CAPILLARY BLOOD GLUCOSE      POCT Blood Glucose.: 175 mg/dL (27 Sep 2021 13:12)  POCT Blood Glucose.: 179 mg/dL (27 Sep 2021 09:15)  POCT Blood Glucose.: 207 mg/dL (26 Sep 2021 21:25)  POCT Blood Glucose.: 254 mg/dL (26 Sep 2021 18:33)    RADIOLOGY & ADDITIONAL STUDIES:  CXRAY 9/24/2021  FINDINGS:  Prior study dated 9/14/2021 was available for review.  The lungs are clear.  No pleural abnormality is seen. Demonstrate bibasilar atelectatic changes. No pleural effusion is seen.  The heart and mediastinum appear intact.  Evaluate pacemaker is noted.  IMPRESSION: Bibasilar atelectatic changes are noted.    US Abdomen 9/15/2021  FINDINGS:  Liver: Heterogeneous liver with numerous scattered hyperechoic lesions consistent with hepatic metastases.  Common bile duct: Normal caliber. Common bile duct measures 5 mm.  Gallbladder: Contracted. Cholelithiasis.  Pancreas: Poorly visualized.  Right kidney: 13.1 cm. No hydronephrosis, renal mass, or calculi.  Ascites: Mild perihepatic ascites.  Splenic Vein: Not visualized.  Main Portal Vein: Patent with hepatopetal flow.  Left Portal Vein: Patent with hepatopetal flow.  Right Portal Vein: Hepatic Artery: Patent with normal direction of flow.  Hepatic Veins and Inferior Vena Cava: Patent with normal direction of flow.  IMPRESSION:  No portal vein thrombosis.  Redemonstrated hepatic metastases with mild perihepatic ascites.      PROTEIN CALORIE MALNUTRITION PRESENT: [ ]mild [ ]moderate [ x]severe [ ]underweight [ ]morbid obesity  https://www.[employer].org/vault/2440/web/files/ONC/Table_Clinical%20Characteristics%20to%20Document%20Malnutrition-White%20JV%20et%20al%749415.pdf    Height (cm): 177.8 (09-15-21 @ 08:19), 177.8 (08-05-21 @ 16:06), 177.8 (02-16-21 @ 10:16)  Weight (kg): 96 (09-22-21 @ 15:47), 96.2 (08-05-21 @ 19:09), 96.2 (02-16-21 @ 10:16)  BMI (kg/m2): 30.4 (09-22-21 @ 15:47), 30.4 (09-15-21 @ 08:19), 30.4 (08-05-21 @ 19:09)    [ ]PPSV2 < or = to 30% [ ]significant weight loss  [ ]poor nutritional intake  [ ]anasarca      [ ]Artificial Nutrition      REFERRALS:   [ ]Chaplaincy  [ ]Hospice  [ ]Child Life  [ ]Social Work  [ x]Case management [ ]Holistic Therapy     Goals of Care Document:

## 2021-09-27 NOTE — CONSULT NOTE ADULT - PROBLEM SELECTOR RECOMMENDATION 4
Now Resolving  possibly related to immunotherapy treatment   on IV Solumedrol 80mg QD  management as per primary and oncology teams

## 2021-09-27 NOTE — PROVIDER CONTACT NOTE (OTHER) - DATE AND TIME:
18-Sep-2021 02:15
18-Sep-2021
25-Sep-2021 23:15
18-Sep-2021 02:15
19-Sep-2021 12:00
26-Sep-2021 08:49
27-Sep-2021 17:45
17-Sep-2021 06:15
22-Sep-2021 20:27
24-Sep-2021 23:00
18-Sep-2021 00:44
18-Sep-2021 05:35

## 2021-09-27 NOTE — PROGRESS NOTE ADULT - ATTENDING COMMENTS
Dobutamine stopped.  Start Toprol 12.5 mg po bid.  If BP can tolerate, start losartan 12.5 mg po qd.  Hold diuretics. Dobutamine stopped.  Start Toprol 12.5 mg po qd (hold SBP < 95 or for orthostatic hypotension).  Hold diuretics.

## 2021-09-27 NOTE — CHART NOTE - NSCHARTNOTEFT_GEN_A_CORE
Spoke with EP NP Diamond this morning who notified provider of new slight right facial droop and right hand numbness. Patient seen and examined at bedside. Patient notes right hand tingling. Denies confusion, weakness, headache, blurry vision, dysarthria, dizziness, shortness of breath. Patient A&Ox3. He appears to have slight right sided droop of his mouth with mildly slurred speech. Patient does not notice changes to his face and reports that the sores in his mouth are affecting his speech. Neuro exam otherwise nonfocal. PERRLA, 5/5 strength and +sensation b/l UE and LE. Discussed with daughter Hannah over the phone who agrees that his speech has been more slurred over the last few days. Urgent CT Head ordered and Dr. Dowd notified. Dr. Dowd recommended MRI Brain. Wife and Daughter then came to visit patient at bedside and per wife, patient has always had slight right sided droop of his mouth, confirmed with pictures. His speech does not sound remarkably changed per wife and she believes his mouth sores are contributing to his speech difficulty. Patient's wife also believes that his hand tingling is due to IV placement and lack of ROM exercises. Pt has ICD and leads not compatible with MRI per wife. They report that patient looks better than he has during this whole admission. Patient, wife and daughter no longer want to proceed with brain imaging at this time. Dr. Dowd notified. Spoke with EP NP Diamond this morning who notified provider of new slight right facial droop and right hand numbness. Patient seen and examined at bedside. Patient notes right hand tingling. Denies confusion, weakness, headache, blurry vision, dysarthria, dizziness, shortness of breath. Patient A&Ox3. He appears to have slight right sided droop of his mouth with mildly slurred speech. Patient does not notice changes to his face and reports that the sores in his mouth are affecting his speech. Neuro exam otherwise nonfocal. PERRLA, 5/5 strength and +sensation b/l UE and LE. Discussed with daughter Hannah over the phone who agrees that his speech has been more slurred over the last few days. Urgent CT Head ordered and Dr. Dowd notified. Dr. Dowd recommended MRI Brain. Wife and Daughter then came to visit patient at bedside and per wife, patient has always had slight right sided droop of his mouth, confirmed with pictures. His speech does not sound remarkably changed per wife and she believes his mouth sores are contributing to his speech difficulty. Patient's wife also believes that his hand tingling is due to IV placement and lack of ROM exercises. Pt has ICD and leads not compatible with MRI per wife. They report that patient looks better than he has during this whole admission. Patient, wife and daughter no longer want to proceed with brain imaging at this time. Dr. Dowd notified. Neuro checks q4h.

## 2021-09-27 NOTE — CONSULT NOTE ADULT - PROBLEM SELECTOR RECOMMENDATION 3
Nutrition evaluation appreciated  Encourage PO intake as tolerated  On PO Dronabinol 2.5mg QHS as per outpatient regimen

## 2021-09-27 NOTE — PROVIDER CONTACT NOTE (OTHER) - REASON
Pt had 3 beats of V-tac
low DBP, no parameters
patient daughter refusing metoprolol
BP & requesting nausea meds
Unable to get labs
BP
BP elevated from baseline
patient does not have working IV at this time, complains of mouth sores, unable to get am labs
Pt had 3 beats of NSVT.
pt lab status update
BP 90/56
Patient refusing PO night medications

## 2021-09-27 NOTE — PROGRESS NOTE ADULT - SUBJECTIVE AND OBJECTIVE BOX
Pt seen, feeling "good" this am but states that his mucositis sx were unchanged, though able to drink some liquids and coffee this am. Tells me that he did not have loose BM this am but RN received overnight report that he had a large BM.     MEDICATIONS  (STANDING):  aspirin  chewable 81 milliGRAM(s) Oral daily  atorvastatin 80 milliGRAM(s) Oral at bedtime  baclofen 5 milliGRAM(s) Oral three times a day  calcium carbonate   1250 mG (OsCal) 1 Tablet(s) Oral two times a day  dexAMETHasone    Solution 1 milliGRAM(s) Oral four times a day  dextrose 40% Gel 15 Gram(s) Oral once  dextrose 5%. 1000 milliLiter(s) (50 mL/Hr) IV Continuous <Continuous>  dextrose 5%. 1000 milliLiter(s) (100 mL/Hr) IV Continuous <Continuous>  dextrose 50% Injectable 25 Gram(s) IV Push once  dextrose 50% Injectable 12.5 Gram(s) IV Push once  dextrose 50% Injectable 25 Gram(s) IV Push once  DOBUTamine Infusion 1.25 MICROgram(s)/kG/Min (3.6 mL/Hr) IV Continuous <Continuous>  dronabinol 2.5 milliGRAM(s) Oral at bedtime  enoxaparin Injectable 70 milliGRAM(s) SubCutaneous two times a day  finasteride 5 milliGRAM(s) Oral at bedtime  gabapentin 100 milliGRAM(s) Oral at bedtime  glucagon  Injectable 1 milliGRAM(s) IntraMuscular once  influenza  Vaccine (HIGH DOSE) 0.7 milliLiter(s) IntraMuscular once  insulin glargine Injectable (LANTUS) 5 Unit(s) SubCutaneous at bedtime  insulin lispro (ADMELOG) corrective regimen sliding scale   SubCutaneous at bedtime  insulin lispro (ADMELOG) corrective regimen sliding scale   SubCutaneous three times a day before meals  methylPREDNISolone sodium succinate Injectable 80 milliGRAM(s) IV Push daily  pancrelipase  (CREON 24,000 Lipase Units) 1 Capsule(s) Oral four times a day with meals  pantoprazole  Injectable 40 milliGRAM(s) IV Push daily  potassium chloride  10 mEq/100 mL IVPB 10 milliEquivalent(s) IV Intermittent every 1 hour    MEDICATIONS  (PRN):  acetaminophen   Tablet .. 650 milliGRAM(s) Oral every 6 hours PRN Temp greater or equal to 38.5C (101.3F), Mild Pain (1 - 3)  aluminum hydroxide/magnesium hydroxide/simethicone Suspension 30 milliLiter(s) Oral every 4 hours PRN Dyspepsia  lidocaine 2% Viscous 15 milliLiter(s) Swish and Spit every 4 hours PRN Mouth sores  melatonin 3 milliGRAM(s) Oral at bedtime PRN Insomnia  metoclopramide Injectable 5 milliGRAM(s) IV Push once PRN nausea  morphine  - Injectable 2 milliGRAM(s) IV Push every 4 hours PRN Moderate Pain (4 - 6)  ondansetron Injectable 4 milliGRAM(s) IV Push every 8 hours PRN Nausea and/or Vomiting      ROS  No fever, sweats, chills  No epistaxis, HA, + sore throat  No CP, SOB, cough, sputum  No n/v/d, abd pain, melena, hematochezia  + edema  No rash  No anxiety  No back pain, joint pain  No bleeding, bruising  No dysuria, hematuria    Vital Signs Last 24 Hrs  T(C): 36.3 (27 Sep 2021 10:05), Max: 36.6 (26 Sep 2021 18:08)  T(F): 97.3 (27 Sep 2021 10:05), Max: 97.9 (26 Sep 2021 18:08)  HR: 93 (27 Sep 2021 10:05) (87 - 96)  BP: 112/71 (27 Sep 2021 10:05) (93/61 - 112/71)  BP(mean): --  RR: 18 (27 Sep 2021 10:05) (18 - 18)  SpO2: 95% (27 Sep 2021 10:05) (94% - 97%)    PE  NAD  Awake, alert  Anicteric  limited 2/2 covid pandemic                          9.8    36.79 )-----------( 116      ( 27 Sep 2021 07:51 )             28.8       09-27    132<L>  |  89<L>  |  41<H>  ----------------------------<  180<H>  3.4<L>   |  29  |  1.05    Ca    6.8<L>      27 Sep 2021 07:51  Phos  2.0     09-27  Mg     2.10     09-27    TPro  5.3<L>  /  Alb  3.0<L>  /  TBili  0.9  /  DBili  x   /  AST  83<H>  /  ALT  123<H>  /  AlkPhos  178<H>  09-27

## 2021-09-27 NOTE — PROVIDER CONTACT NOTE (OTHER) - BACKGROUND
can not draw 10am labs
74 yo M pmhx of CAD, MI, stent x2; admitted for sepsis r/o c.diff
76 y/o M  admitted for diarrhea likely related to chemo tx - now on steroids. hyponatremic on IV lasix. systolic CHF
Pt admitted for sepsis
Pt admitted for sepsis. Pt has +HF & pancreatic CA.
74 yo M admitted for sepsis
74 yo M admitted for sepsis
76 yo M admitted for sepsis
74 yo M admitted for sepsis
76 y/o M admitted for sepsis, PMH pancreatic cancer, presented with fever and diarrhea. Patient has painful bloody sores inside mouth due to chemotherapy treatment.
74 yo M hx of CAD, MI, stent x2 (10/2009 at [facility]), pAfib, HTN, HLD, BIV-AICD, and systolic CHF. Pt admitted for sepsis.
76 yo M admitted for sepsis

## 2021-09-27 NOTE — PROGRESS NOTE ADULT - SUBJECTIVE AND OBJECTIVE BOX
SUBJECTIVE / OVERNIGHT EVENTS:pt seen and examined, c/o oral discomfort , denies difficulty swallowing    MEDICATIONS  (STANDING):  aspirin  chewable 81 milliGRAM(s) Oral daily  atorvastatin 80 milliGRAM(s) Oral at bedtime  baclofen 5 milliGRAM(s) Oral three times a day  calcium carbonate   1250 mG (OsCal) 1 Tablet(s) Oral two times a day  dexAMETHasone    Solution 1 milliGRAM(s) Oral four times a day  dextrose 40% Gel 15 Gram(s) Oral once  dextrose 5%. 1000 milliLiter(s) (50 mL/Hr) IV Continuous <Continuous>  dextrose 5%. 1000 milliLiter(s) (100 mL/Hr) IV Continuous <Continuous>  dextrose 50% Injectable 25 Gram(s) IV Push once  dextrose 50% Injectable 12.5 Gram(s) IV Push once  dextrose 50% Injectable 25 Gram(s) IV Push once  dronabinol 2.5 milliGRAM(s) Oral at bedtime  enoxaparin Injectable 70 milliGRAM(s) SubCutaneous two times a day  finasteride 5 milliGRAM(s) Oral at bedtime  gabapentin 100 milliGRAM(s) Oral at bedtime  glucagon  Injectable 1 milliGRAM(s) IntraMuscular once  influenza  Vaccine (HIGH DOSE) 0.7 milliLiter(s) IntraMuscular once  insulin glargine Injectable (LANTUS) 5 Unit(s) SubCutaneous at bedtime  insulin lispro (ADMELOG) corrective regimen sliding scale   SubCutaneous at bedtime  insulin lispro (ADMELOG) corrective regimen sliding scale   SubCutaneous three times a day before meals  methylPREDNISolone sodium succinate Injectable 80 milliGRAM(s) IV Push daily  metoprolol succinate ER 12.5 milliGRAM(s) Oral daily  pancrelipase  (CREON 24,000 Lipase Units) 1 Capsule(s) Oral four times a day with meals  pantoprazole  Injectable 40 milliGRAM(s) IV Push daily    MEDICATIONS  (PRN):  acetaminophen   Tablet .. 650 milliGRAM(s) Oral every 6 hours PRN Temp greater or equal to 38.5C (101.3F), Mild Pain (1 - 3)  aluminum hydroxide/magnesium hydroxide/simethicone Suspension 30 milliLiter(s) Oral every 4 hours PRN Dyspepsia  lidocaine 2% Viscous 15 milliLiter(s) Swish and Spit every 4 hours PRN Mouth sores  melatonin 3 milliGRAM(s) Oral at bedtime PRN Insomnia  metoclopramide Injectable 5 milliGRAM(s) IV Push once PRN nausea  morphine  - Injectable 2 milliGRAM(s) IV Push every 4 hours PRN Moderate Pain (4 - 6)  ondansetron Injectable 4 milliGRAM(s) IV Push every 8 hours PRN Nausea and/or Vomiting    Vital Signs Last 24 Hrs  T(C): 36.4 (09-27-21 @ 20:51), Max: 36.6 (09-27-21 @ 18:05)  T(F): 97.5 (09-27-21 @ 20:51), Max: 97.8 (09-27-21 @ 18:05)  HR: 91 (09-27-21 @ 20:51) (87 - 95)  BP: 110/70 (09-27-21 @ 20:51) (93/61 - 112/71)  BP(mean): --  RR: 18 (09-27-21 @ 20:51) (18 - 18)  SpO2: 98% (09-27-21 @ 20:51) (94% - 98%)    Orthostatic VS  09-27-21 @ 17:31  Lying BP: 93/68 HR: 85  Sitting BP: 104/70 HR: 96  Standing BP: --/-- HR: --  Site: upper left arm  Mode: electronic        Constitutional: No fever, fatigue  Skin: No rash.  Eyes: No recent vision problems or eye pain.  ENT: No congestion, ear pain, or sore throat.  Cardiovascular: No chest pain or palpation.  Respiratory: No cough, shortness of breath, congestion, or wheezing.  Gastrointestinal: No abdominal pain, nausea, vomiting, or diarrhea.  Genitourinary: No dysuria.  Musculoskeletal: No joint swelling.  Neurologic: No headache.    PHYSICAL EXAM:  GENERAL: NAD  EYES: EOMI, PERRLA  NECK: Supple, No JVD  CHEST/LUNG: dec breath sounds at bases  HEART:  S1 , S2 +  ABDOMEN: soft , bs+  EXTREMITIES:no edema    NEUROLOGY:alert awake    LABS:  09-27    132<L>  |  89<L>  |  41<H>  ----------------------------<  180<H>  3.4<L>   |  29  |  1.05    Ca    6.8<L>      27 Sep 2021 07:51  Phos  2.0     09-27  Mg     2.10     09-27    TPro  5.3<L>  /  Alb  3.0<L>  /  TBili  0.9  /  DBili      /  AST  83<H>  /  ALT  123<H>  /  AlkPhos  178<H>  09-27    Creatinine Trend: 1.05 <--, 1.08 <--, 1.09 <--, 1.05 <--, 1.10 <--, 1.08 <--, 1.01 <--, 1.05 <--, 0.65 <--, 1.12 <--, 1.16 <--, 1.13 <--                        9.8    36.79 )-----------( 116      ( 27 Sep 2021 07:51 )             28.8     Urine Studies:            LIVER FUNCTIONS - ( 27 Sep 2021 07:51 )  Alb: 3.0 g/dL / Pro: 5.3 g/dL / ALK PHOS: 178 U/L / ALT: 123 U/L / AST: 83 U/L / GGT: x             -----------( 80       ( 26 Sep 2021 06:20 )             27.4     Urine Studies:            LIVER FUNCTIONS - ( 26 Sep 2021 06:20 )  Alb: 3.0 g/dL / Pro: 5.4 g/dL / ALK PHOS: 199 U/L / ALT: 141 U/L / AST: 99 U/L / GGT: x                       LIVER FUNCTIONS - ( 25 Sep 2021 06:46 )  Alb: 3.0 g/dL / Pro: 5.2 g/dL / ALK PHOS: 195 U/L / ALT: 143 U/L / AST: 101 U/L / GGT: x

## 2021-09-27 NOTE — PROGRESS NOTE ADULT - ASSESSMENT
continue current regimen  pt with pancreatic cancer  depresison is udnerstandable.  recent lft elevation, need to be careful with new meds

## 2021-09-27 NOTE — CONSULT NOTE ADULT - ASSESSMENT
74 yo M hx of CAD, MI, stent x2 (10/2009 at [facility]), pAfib, HTN, HLD, BIV-AICD, and systolic CHF, hx of PE (on Eliquis), recently dx’d pancreatic mass on chemo at [facility] [place] now presenting with fever of 101 at home and several days of watery diarrhea concerning for C diff colitis.  Palliative Care consulted for symptom management of mucositis.

## 2021-09-27 NOTE — PROGRESS NOTE ADULT - ASSESSMENT
1. Metastatic Pancreatic CA    -- dx on 8/21/21  -- follows at [facility] [place]  -- Started treatment on 8/31/21 per protocol , randomized to gemcitabine/nab-paclitaxel and dual immmunotherapy. Received [place]/Abraxane on 9/14  -- further care as outpt after d/c    2. Diarrhea    -- C diff negative  -- stool studies negative   -- may be sec to immunotx now stable.   -- appears to be improving previously but now pt reports diarrhea/loose stools still. GI eval appreciated  -- unclear if still related to immunotherapy, now with oral mucositis, may have GI mucositis as well  -- we discussed with [facility] pt's primary oncologist Dr Hodges ([phone]) re his sx on 9/25. After further discussion, weighing risks/benefits, decided to start pt on steroids for sx control as of 9/25.   Start on prednisone 1mg/kg daily (100mg daily), converted to solumedrol 80mg daily due to inability to tolerate PO, with plan for rapid taper down by 20mg prednisone equivalent q3days if sx responds. My plan is to cont current dose today. If pt truly does not have loose BM tomorrow, would decrease to solumedrol 60mg tomorrow   -- c/w GI ppx with IV protonix    3. Fevers - now resolved     -- ID following  -- No documented fevers  -- off of PO Vanco  -- off zosyn    4. Abnormal LFTS    -- significant transaminitis, likely sec to immunotherapy vs chemo vs mets  -- liver US shows mets   -- LFTs are slowly improving  -- monitor daily LFTs, they are elevated but stable  -- see above re steroids    5. pancytopenia     -- s/p GCSF, likely due to chemo, treatment, acute illness, meds  -- WBC now elevated 2/2 GCSF, monitor  -- counts otherwise adequate    6. Hyponatremia    -- stable  -- hypokalemia sec to diarrhea;  improving  -- renal following    7. anorexia -- due to illness but also now due to mucositis  -- cont home dose of dronabinol  2.5mg daily for now  -- nutrition f/u appreciated    8. LE edema -- per renal    9. mucositis -- see above re steroids  -- pall care consult  -- already on magic mouthwash, not helping  -- start Decadron 1 mg oral mouthwash four times per day  -- morphine 2 mg iv q4 prn pain    10. Hypocalcemia  -supplement IV    11. reported facial droop -- d/w primary team later this morning, planned for neuro imaging    12. audible AICD alarm -- EP fiona appreciated, no event, alarm 2/2 not able to communicate remotely    D/w pt, will follow, total time spent 35min< >50% spent in discussion and coordination of care.

## 2021-09-27 NOTE — PROGRESS NOTE ADULT - SUBJECTIVE AND OBJECTIVE BOX
Patient is a 75y Male     Patient is a 75y old  Male who presents with a chief complaint of Fever (26 Sep 2021 11:28)      HPI:  74 yo M hx of CAD, MI, stent x2 (10/2009 at [facility]), pAfib, HTN, HLD, BIV-AICD, and systolic CHF, hx of PE (on Eliquis), recently dx’d pancreatic mass on chemo at [facility] [place] now presenting with fever of 101 at home. He also endorsed diarrhea x3-4 days, watery. 4-5x daily. No recent abx exposure. No abdominal pain. Denies chest pain, dyspnea, palpitations. Just finished chemo today. Called his onc who advised him to come in. No abx exposure.  In the ED, vitals stable. Labs with leukopenia without neutropenia. Elevated ASt/ALT, hyponatremia. EKG paced. CXR clear. (15 Sep 2021 01:36)      PAST MEDICAL & SURGICAL HISTORY:  Hypertension (ICD9 401.9)    Hyperlipidemia (ICD9 272.4)    BPH (Benign Prostatic Hyperplasia)    Borderline diabetes mellitus    MI (myocardial infarction)  2009    Systolic heart failure    Hernia    Biventricular ICD (implantable cardioverter-defibrillator) in place  2010    Stented coronary artery  X 2, 2009        MEDICATIONS  (STANDING):  aspirin  chewable 81 milliGRAM(s) Oral daily  atorvastatin 80 milliGRAM(s) Oral at bedtime  baclofen 5 milliGRAM(s) Oral three times a day  calcium carbonate   1250 mG (OsCal) 1 Tablet(s) Oral two times a day  dexAMETHasone    Solution 1 milliGRAM(s) Oral four times a day  dextrose 40% Gel 15 Gram(s) Oral once  dextrose 5%. 1000 milliLiter(s) (50 mL/Hr) IV Continuous <Continuous>  dextrose 5%. 1000 milliLiter(s) (100 mL/Hr) IV Continuous <Continuous>  dextrose 50% Injectable 25 Gram(s) IV Push once  dextrose 50% Injectable 12.5 Gram(s) IV Push once  dextrose 50% Injectable 25 Gram(s) IV Push once  DOBUTamine Infusion 1.25 MICROgram(s)/kG/Min (3.6 mL/Hr) IV Continuous <Continuous>  dronabinol 2.5 milliGRAM(s) Oral at bedtime  enoxaparin Injectable 70 milliGRAM(s) SubCutaneous two times a day  finasteride 5 milliGRAM(s) Oral at bedtime  gabapentin 100 milliGRAM(s) Oral at bedtime  glucagon  Injectable 1 milliGRAM(s) IntraMuscular once  influenza  Vaccine (HIGH DOSE) 0.7 milliLiter(s) IntraMuscular once  insulin glargine Injectable (LANTUS) 5 Unit(s) SubCutaneous at bedtime  insulin lispro (ADMELOG) corrective regimen sliding scale   SubCutaneous at bedtime  insulin lispro (ADMELOG) corrective regimen sliding scale   SubCutaneous three times a day before meals  methylPREDNISolone sodium succinate Injectable 80 milliGRAM(s) IV Push daily  MIRACLE MOUTHWASH 30 milliLiter(s) Swish and Spit every 6 hours  pancrelipase  (CREON 24,000 Lipase Units) 1 Capsule(s) Oral four times a day with meals  pantoprazole  Injectable 40 milliGRAM(s) IV Push daily      Allergies    No Known Allergies    Intolerances        SOCIAL HISTORY:  Denies ETOh,Smoking,     FAMILY HISTORY:  No pertinent family history in first degree relatives        REVIEW OF SYSTEMS:    CONSTITUTIONAL: No weakness, fevers or chills  EYES/ENT: No visual changes;  No vertigo or throat pain   NECK: No pain or stiffness  RESPIRATORY: No cough, wheezing, hemoptysis; No shortness of breath  CARDIOVASCULAR: No chest pain or palpitations  GASTROINTESTINAL: No abdominal or epigastric pain. No nausea, vomiting, or hematemesis; No diarrhea or constipation. No melena or hematochezia.  GENITOURINARY: No dysuria, frequency or hematuria  NEUROLOGICAL: No numbness or weakness  SKIN: No itching, burning, rashes, or lesions   All other review of systems is negative unless indicated above.    VITAL:  T(C): , Max: 36.6 (09-26-21 @ 12:24)  T(F): , Max: 97.9 (09-26-21 @ 12:24)  HR: 88 (09-27-21 @ 06:05)  BP: 93/61 (09-27-21 @ 06:05)  BP(mean): --  RR: 18 (09-27-21 @ 06:05)  SpO2: 94% (09-27-21 @ 06:05)  Wt(kg): --    I and O's:    09-25 @ 07:01  -  09-26 @ 07:00  --------------------------------------------------------  IN: 150 mL / OUT: 3050 mL / NET: -2900 mL    09-26 @ 07:01  -  09-27 @ 07:00  --------------------------------------------------------  IN: 700 mL / OUT: 1200 mL / NET: -500 mL          PHYSICAL EXAM:    Constitutional: NAD  HEENT: PERRLA,   Neck: No JVD  Respiratory: CTA B/L  Cardiovascular: S1 and S2  Gastrointestinal: BS+, soft, NT/ND  Extremities: No peripheral edema  Neurological: A/O x 3, no focal deficits  Psychiatric: Normal mood, normal affect  : No Bowman  Skin: No rashes  Access: Not applicable  Back: No CVA tenderness    LABS:                        9.8    36.79 )-----------( 116      ( 27 Sep 2021 07:51 )             28.8     09-27    132<L>  |  89<L>  |  41<H>  ----------------------------<  180<H>  3.4<L>   |  29  |  1.05    Ca    6.8<L>      27 Sep 2021 07:51  Phos  2.0     09-27  Mg     2.10     09-27    TPro  5.3<L>  /  Alb  3.0<L>  /  TBili  0.9  /  DBili  x   /  AST  83<H>  /  ALT  123<H>  /  AlkPhos  178<H>  09-27          RADIOLOGY & ADDITIONAL STUDIES:

## 2021-09-27 NOTE — CONSULT NOTE ADULT - PROBLEM SELECTOR RECOMMENDATION 8
Emotional support provided, questions answered.    Thank you for allowing us to participate in your patient's care. We will continue to follow with you. Please page [phone] for any questions/concerns.

## 2021-09-27 NOTE — PROGRESS NOTE ADULT - ASSESSMENT
75 year old male with a PMH of HTN, HLD, CAD, MI, PCI with stent x2 2009, chronic HFrEF, s/p BIV ICD (Medtronic), BPH, recently diagnosed DVT/PE,  on anticoagulation ( Lovenox then Eliquis) and metastatic pancreatic cancer (diag in August), [place] [facility] [place], who presented to our ED with fever, nausea/vomiting, diarrhea, poor po intake and fluid overload. Noted to have severe hyponatremia, leukopenia and persistent high transaminitis.  BP meds BB/ARB were held due to hypotension and severe nausea.  He was started on Albumin and IV Lasix for fluid overload. Now on dobutamine assisted diuresis.  Improving leukopenia and down trending transaminitis noted.  Hospital course complicated by AFib w/RVR. (See interrogation notes below).   Repeat interrogation today 9/27/2021 for device beeping over the weekend: Device tone sounded die to unsuccessful Carelink transmission. Found to be BiV pacing only 86% likely secondary to AFib w/RVR and frequent PVC's. LV capture thresholds slightly increased. Outputs adjusted.                                                                    BiV ICD interrogation revealed a recent onset of PAF episodes with RVR since Sep 5, 2021. The longest episode occurred on 9/20 lasting 4 hours with ventricular rates to 194 bpm.  A device alert  showed average ventricular rates >100 during AT/AF (>6 hours) for 5 days. Of note, the onset of AFib episodes correlates to an increased OptiVol index  (fluid accumulation) recorded by his ICD suggesting the AFib  likely contributed to his worsening heart failure.  In light of his comorbidities and transaminitis, rate control strategy  was preferred to rhythm control.   PLAN:    -Continuous telemetry monitoring for PAF with RVR.    -Continue diuresis (currently receiving Dobutamine, albumin and IV Lasix 80mg bid).   -May use IV Lopressor for rate control of AFib with RVR as needed.   -Discontinue  Coreg 6.25mg bid. Hesitant to start digoxin for rate control given hypokalemia ( K+ 2.8 today) despite repletion. Can start dig 0.125mg daily when K > 4.0   -Continue anticoagulation therapy with Lovenox. To transition to Eliquis when H/H stable.   -Will follow.        75 year old male with a PMH of HTN, HLD, CAD, MI, PCI with stent x2 2009, chronic HFrEF, s/p BIV ICD (Medtronic), BPH, recently diagnosed DVT/PE,  on anticoagulation ( Lovenox then Eliquis) and metastatic pancreatic cancer (diag in August), on chemotherapy at [facility] [place] for past few weeks, who presented to our ED with fever, nausea/vomiting, diarrhea, poor po intake and fluid overload. Noted to have severe hyponatremia, leukopenia and persistent high transaminitis.  BP meds BB/ARB were held due to hypotension and severe nausea. Initially diuresed with Lasix, then switched to Bumex , then dobutamine with improved urine output.  Improving leukopenia and down trending transaminitis noted.  Hospital course complicated by AFib w/RVR. (See interrogation notes below). Had been on low dose beta blocker for rate control but it was discontinued when started on  dobutamine. Still being held for decreased systolic BP's and limited oral intake Over the weekend patient reported hearing beeping from his device.      Initial  Medtronic BiV ICD interrogation revealed a recent onset of PAF episodes with RVR since Sep 5, 2021. The longest episode occurred on 9/20 lasting 4 hours with ventricular rates to 194 bpm.  A device alert  showed average ventricular rates >100 during AT/AF (>6 hours) for 5 days. Of note, the onset of AFib episodes correlates to an increased OptiVol index  (fluid accumulation) recorded by his ICD suggesting the AFib  likely contributed to his worsening heart failure.       Repeat interrogation today 9/27/2021 for device beeping over the weekend: Device tone sounded due to unsuccessful Carelink transmission. Found to be BiV pacing only 86% likely secondary to AFib w/RVR and frequent PVC's. LV capture thresholds slightly increased. Outputs adjusted.     Plan:   -Continuous telemetry monitoring for PAF with RVR.    -Start beta blocker for rate control when BP permits and oral intake improves.  May use IV Lopressor prn for rate control of AFib with RVR.   -Obtain head CT to rule out new stroke.

## 2021-09-27 NOTE — PROCEDURE NOTE - NSPROCNAME_GEN_A_CORE
ICD Interrogation Note
CRT-D (Cardiac Resynchronization Therapy with Defibrillation Capabilities) Interrogation Note

## 2021-09-27 NOTE — PROGRESS NOTE ADULT - PROBLEM SELECTOR PLAN 1
Neutropenia/subjective fever  - Continue Zosyn until ANC >500 and afebrile x 48 hours (can discontinue after this timeframe)  - Monitor for focal signs infection  c diff neg    lower ext weakness - neuro f/u   OOB in chair

## 2021-09-27 NOTE — PROVIDER CONTACT NOTE (OTHER) - NAME OF MD/NP/PA/DO NOTIFIED:
RADHA DAMON
Vicente Beatty
Matthias Keenan
Sarina Estrada
EITAN Martinez [id number]
Michelle Castle,
ACP
Estella Raywick PA
Lucrecia Diamond
Nadia Sawant ACP

## 2021-09-27 NOTE — CONSULT NOTE ADULT - CONSULT REQUESTED DATE/TIME
17-Sep-2021 18:18
23-Sep-2021
27-Sep-2021 17:17
20-Sep-2021 14:13
14-Sep-2021 21:02
24-Sep-2021 18:36
15-Sep-2021 08:19
15-Sep-2021 12:01

## 2021-09-27 NOTE — PROGRESS NOTE ADULT - SUBJECTIVE AND OBJECTIVE BOX
Interval History:  Patient resting comfortably in bed   Denies CP/SOB/palpitations/dizziness.  No acute events overnight.      Medications:  acetaminophen   Tablet .. 650 milliGRAM(s) Oral every 6 hours PRN  aluminum hydroxide/magnesium hydroxide/simethicone Suspension 30 milliLiter(s) Oral every 4 hours PRN  aspirin  chewable 81 milliGRAM(s) Oral daily  atorvastatin 80 milliGRAM(s) Oral at bedtime  baclofen 5 milliGRAM(s) Oral three times a day  calcium carbonate   1250 mG (OsCal) 1 Tablet(s) Oral two times a day  dexAMETHasone    Solution 1 milliGRAM(s) Oral four times a day  dextrose 40% Gel 15 Gram(s) Oral once  dextrose 5%. 1000 milliLiter(s) IV Continuous <Continuous>  dextrose 5%. 1000 milliLiter(s) IV Continuous <Continuous>  dextrose 50% Injectable 25 Gram(s) IV Push once  dextrose 50% Injectable 12.5 Gram(s) IV Push once  dextrose 50% Injectable 25 Gram(s) IV Push once  DOBUTamine Infusion 1.25 MICROgram(s)/kG/Min IV Continuous <Continuous>  dronabinol 2.5 milliGRAM(s) Oral at bedtime  enoxaparin Injectable 70 milliGRAM(s) SubCutaneous two times a day  finasteride 5 milliGRAM(s) Oral at bedtime  gabapentin 100 milliGRAM(s) Oral at bedtime  glucagon  Injectable 1 milliGRAM(s) IntraMuscular once  influenza  Vaccine (HIGH DOSE) 0.7 milliLiter(s) IntraMuscular once  insulin glargine Injectable (LANTUS) 5 Unit(s) SubCutaneous at bedtime  insulin lispro (ADMELOG) corrective regimen sliding scale   SubCutaneous at bedtime  insulin lispro (ADMELOG) corrective regimen sliding scale   SubCutaneous three times a day before meals  melatonin 3 milliGRAM(s) Oral at bedtime PRN  methylPREDNISolone sodium succinate Injectable 80 milliGRAM(s) IV Push daily  metoclopramide Injectable 5 milliGRAM(s) IV Push once PRN  MIRACLE MOUTHWASH 30 milliLiter(s) Swish and Spit every 6 hours  morphine  - Injectable 2 milliGRAM(s) IV Push every 4 hours PRN  ondansetron Injectable 4 milliGRAM(s) IV Push every 8 hours PRN  pancrelipase  (CREON 24,000 Lipase Units) 1 Capsule(s) Oral four times a day with meals  pantoprazole  Injectable 40 milliGRAM(s) IV Push daily  potassium chloride   Powder 40 milliEquivalent(s) Oral once      Vitals:  T(C): 36.3 (09-27-21 @ 10:31), Max: 36.6 (09-26-21 @ 12:24)  HR: 93 (09-27-21 @ 10:31) (87 - 97)  BP: 112/71 (09-27-21 @ 10:31) (93/61 - 112/71)  BP(mean): --  RR: 18 (09-27-21 @ 10:31) (18 - 18)  SpO2: 95% (09-27-21 @ 10:31) (94% - 97%)      I&O's Summary    26 Sep 2021 07:01  -  27 Sep 2021 07:00  --------------------------------------------------------  IN: 700 mL / OUT: 1200 mL / NET: -500 mL        Physical Exam:  Appearance: No Acute Distress  HEENT: PERRL  Neck: No JVD  Cardiovascular: Normal S1 S2  Respiratory: Clear to auscultation fine bibasilar rales  Gastrointestinal: Soft, Non-tender	  Skin: No cyanosis	  Neurologic: Non-focal  Extremities: 3+ pitting/weeping BLE edema  Psychiatry: A & O x 3    Labs:                        9.8    36.79 )-----------( 116      ( 27 Sep 2021 07:51 )             28.8     09-27    132<L>  |  89<L>  |  41<H>  ----------------------------<  180<H>  3.4<L>   |  29  |  1.05    Ca    6.8<L>      27 Sep 2021 07:51  Phos  2.0     09-27  Mg     2.10     09-27    TPro  5.3<L>  /  Alb  3.0<L>  /  TBili  0.9  /  DBili  x   /  AST  83<H>  /  ALT  123<H>  /  AlkPhos  178<H>  09-27      TELEMETRY: Vpaced@90bpm with brief intermittent episodes NSVT     Echocardiogram:  < from: TTE with Doppler (w/Cont) (09.21.21 @ 13:59) >  ------------------------------------------------------------------------  DIMENSIONS:  Dimensions:     Normal Values:  LA:     3.3 cm    2.0 - 4.0 cm  Ao:     3.7 cm    2.0 - 3.8 cm  SEPTUM: 0.7 cm    0.6 - 1.2 cm  PWT:    0.7 cm    0.6 - 1.1 cm  LVIDd:  5.3 cm    3.0 - 5.6 cm  LVIDs:  4.0 cm    1.8 - 4.0 cm  Derived Variables:  LVMI: 60 g/m2  RWT: 0.26  Ejection Fraction (Visual Estimate): 30 %  ------------------------------------------------------------------------  OBSERVATIONS:  Mitral Valve: Mitral annular calcification, otherwise  normal mitral valve. Minimal mitral regurgitation.  Aortic Root: Normal aortic root.  Aortic Valve: Calcified trileaflet aortic valve with normal  opening.  Left Atrium: Normal left atrium.  Left Ventricle: Endocardium not well visualized; grossly  severe global left ventricular systolic dysfunction.  Endocardial visualization enhanced with intravenous  injection of echo contrast (Definity). Normal left  ventricular internal dimensions and wall thicknesses. Mild  diastolic dysfunction (Stage I).  Right Heart: Normal right atrium. Unable to accurately  evaluate right ventricular size or systolic function.  Tricuspid valve not well visualized. Pulmonic valve not  well visualized.  Pericardium/PleuraNormal pericardium with no pericardial  effusion.  ------------------------------------------------------------------------  CONCLUSIONS:  Technically difficult study.  1. Mitral annular calcification, otherwise normal mitral  valve. Minimal mitralregurgitation.  2. Normal left ventricular internal dimensions and wall  thicknesses.  3. Endocardium not well visualized; grossly severe global  left ventricular systolic dysfunction.  Endocardial  visualization enhanced with intravenous injection of echo  contrast (Definity).  4. Mild diastolic dysfunction (Stage I).  5. Unable to accurately evaluate right ventricular size or  systolic function.  ------------------------------------       Interval History:  Patient resting comfortably in bed   Denies CP/SOB/palpitations/dizziness.  No acute events overnight.      Medications:  acetaminophen   Tablet .. 650 milliGRAM(s) Oral every 6 hours PRN  aluminum hydroxide/magnesium hydroxide/simethicone Suspension 30 milliLiter(s) Oral every 4 hours PRN  aspirin  chewable 81 milliGRAM(s) Oral daily  atorvastatin 80 milliGRAM(s) Oral at bedtime  baclofen 5 milliGRAM(s) Oral three times a day  calcium carbonate   1250 mG (OsCal) 1 Tablet(s) Oral two times a day  dexAMETHasone    Solution 1 milliGRAM(s) Oral four times a day  dextrose 40% Gel 15 Gram(s) Oral once  dextrose 5%. 1000 milliLiter(s) IV Continuous <Continuous>  dextrose 5%. 1000 milliLiter(s) IV Continuous <Continuous>  dextrose 50% Injectable 25 Gram(s) IV Push once  dextrose 50% Injectable 12.5 Gram(s) IV Push once  dextrose 50% Injectable 25 Gram(s) IV Push once  DOBUTamine Infusion 1.25 MICROgram(s)/kG/Min IV Continuous <Continuous>  dronabinol 2.5 milliGRAM(s) Oral at bedtime  enoxaparin Injectable 70 milliGRAM(s) SubCutaneous two times a day  finasteride 5 milliGRAM(s) Oral at bedtime  gabapentin 100 milliGRAM(s) Oral at bedtime  glucagon  Injectable 1 milliGRAM(s) IntraMuscular once  influenza  Vaccine (HIGH DOSE) 0.7 milliLiter(s) IntraMuscular once  insulin glargine Injectable (LANTUS) 5 Unit(s) SubCutaneous at bedtime  insulin lispro (ADMELOG) corrective regimen sliding scale   SubCutaneous at bedtime  insulin lispro (ADMELOG) corrective regimen sliding scale   SubCutaneous three times a day before meals  melatonin 3 milliGRAM(s) Oral at bedtime PRN  methylPREDNISolone sodium succinate Injectable 80 milliGRAM(s) IV Push daily  metoclopramide Injectable 5 milliGRAM(s) IV Push once PRN  MIRACLE MOUTHWASH 30 milliLiter(s) Swish and Spit every 6 hours  morphine  - Injectable 2 milliGRAM(s) IV Push every 4 hours PRN  ondansetron Injectable 4 milliGRAM(s) IV Push every 8 hours PRN  pancrelipase  (CREON 24,000 Lipase Units) 1 Capsule(s) Oral four times a day with meals  pantoprazole  Injectable 40 milliGRAM(s) IV Push daily  potassium chloride   Powder 40 milliEquivalent(s) Oral once      Vitals:  T(C): 36.3 (09-27-21 @ 10:31), Max: 36.6 (09-26-21 @ 12:24)  HR: 93 (09-27-21 @ 10:31) (87 - 97)  BP: 112/71 (09-27-21 @ 10:31) (93/61 - 112/71)  BP(mean): --  RR: 18 (09-27-21 @ 10:31) (18 - 18)  SpO2: 95% (09-27-21 @ 10:31) (94% - 97%)      I&O's Summary    26 Sep 2021 07:01  -  27 Sep 2021 07:00  --------------------------------------------------------  IN: 700 mL / OUT: 1200 mL / NET: -500 mL        Physical Exam:  Appearance: No Acute Distress  HEENT: PERRL  Neck: No JVD  Cardiovascular: Normal S1 S2  Respiratory: Clear to auscultation fine bibasilar rales  Gastrointestinal: Soft, Non-tender	  Skin: No cyanosis	  Neurologic: Non-focal  Extremities: 2+ pitting/weeping BLE edema  Psychiatry: A & O x 3    Labs:                        9.8    36.79 )-----------( 116      ( 27 Sep 2021 07:51 )             28.8     09-27    132<L>  |  89<L>  |  41<H>  ----------------------------<  180<H>  3.4<L>   |  29  |  1.05    Ca    6.8<L>      27 Sep 2021 07:51  Phos  2.0     09-27  Mg     2.10     09-27    TPro  5.3<L>  /  Alb  3.0<L>  /  TBili  0.9  /  DBili  x   /  AST  83<H>  /  ALT  123<H>  /  AlkPhos  178<H>  09-27      TELEMETRY: Vpaced@90bpm with brief intermittent episodes NSVT     Echocardiogram:    TTE with Doppler (w/Cont) (09.21.21 @ 13:59)   ------------------------------------------------------------------------  DIMENSIONS:  Dimensions:     Normal Values:  LA:     3.3 cm    2.0 - 4.0 cm  Ao:     3.7 cm    2.0 - 3.8 cm  SEPTUM: 0.7 cm    0.6 - 1.2 cm  PWT:    0.7 cm    0.6 - 1.1 cm  LVIDd:  5.3 cm    3.0 - 5.6 cm  LVIDs:  4.0 cm    1.8 - 4.0 cm  Derived Variables:  LVMI: 60 g/m2  RWT: 0.26  Ejection Fraction (Visual Estimate): 30 %  ------------------------------------------------------------------------  OBSERVATIONS:  Mitral Valve: Mitral annular calcification, otherwise  normal mitral valve. Minimal mitral regurgitation.  Aortic Root: Normal aortic root.  Aortic Valve: Calcified trileaflet aortic valve with normal  opening.  Left Atrium: Normal left atrium.  Left Ventricle: Endocardium not well visualized; grossly  severe global left ventricular systolic dysfunction.  Endocardial visualization enhanced with intravenous  injection of echo contrast (Definity). Normal left  ventricular internal dimensions and wall thicknesses. Mild  diastolic dysfunction (Stage I).  Right Heart: Normal right atrium. Unable to accurately  evaluate right ventricular size or systolic function.  Tricuspid valve not well visualized. Pulmonic valve not  well visualized.  Pericardium/PleuraNormal pericardium with no pericardial  effusion.  ------------------------------------------------------------------------  CONCLUSIONS:  Technically difficult study.  1. Mitral annular calcification, otherwise normal mitral  valve. Minimal mitralregurgitation.  2. Normal left ventricular internal dimensions and wall  thicknesses.  3. Endocardium not well visualized; grossly severe global  left ventricular systolic dysfunction.  Endocardial  visualization enhanced with intravenous injection of echo  contrast (Definity).  4. Mild diastolic dysfunction (Stage I).  5. Unable to accurately evaluate right ventricular size or  systolic function.  ------------------------------------

## 2021-09-27 NOTE — CONSULT NOTE ADULT - PROVIDER SPECIALTY LIST ADULT
Infectious Disease
Heme/Onc
Heart Failure
Electrophysiology
Palliative Care
Nephrology
Heme/Onc
Endocrinology

## 2021-09-27 NOTE — PROVIDER CONTACT NOTE (OTHER) - SITUATION
Pt had 3 beats of V-tach
BP 90/56
BP slightly out of parameters and pt requesting meds for heartburn
watery dirrhea, dm, anemia, CAD
sodium trending down
BP slightly out of parameters
DBP low upon q4 assessments, no parameters for BP
Patient refusing all PO night medications, patient states he can't swallow them.
patient does not have working IV at this time, complains of mouth sores, unable to get am labs
bp elevated from baseline upon assessment
Pt had 3 beats of NSVT.
patient daughter refusing metoprolol at this time

## 2021-09-27 NOTE — PROCEDURE NOTE - ADDITIONAL PROCEDURE DETAILS
Device sensing and pacing well.  LV capture threshold slightly increased to 4.0/1.0.  Outputs adjusted accordingly.  Device tone sounded due to unsuccessful Care Link transmission.
Noted multiple PAF with RVR >6 hours for 5 days since Sep 5, 2021  Possible OptiVol  fluid accumulation  from Sep 9-ongoing  p/w fever, N/V

## 2021-09-27 NOTE — PROGRESS NOTE ADULT - ASSESSMENT
75 year old Male with PMH of HTN, HLD, CAD/MI, stent x2 (2009), HFrEF (EF 30%) s/p BiV ICD (2010). He was recently diagnosed with pAFib, PE/DVT (treated with Lovenox and Eliquis) on 8/20 at [facility] with incidental finding of pancreatic mass on Chest CT. He sought follow up care for metastatic pancreatic CA at [facility] [place], participating in clinical research trial (immunotherapy and chemotherapy) which began on 9/1 and last dose 9/14. Patient presented to ED with c/o lethargy, poor appetite, fever and diarrhea X 3 days. Labs revealed leukopenia without neutropenia, elevated AST/ALT and hyponatremia. Initially treated with fluid and albumin for dehydration. Now with volume overload treated with IV Lasix, switched to Bumex drip on 9/22 and started on low dose Dobutamine on 9/23  with improved urine output. However, patient remains with diarrhea and limited po intake; Bumex drip discontinued on 9/23 and weaned off Dobutamine today.       75 year old Male with PMH of HTN, HLD, CAD/MI, stent x2 (2009), HFrEF (EF 30%) s/p BiV ICD (2010). He was recently diagnosed with pAFib, PE/DVT (treated with Lovenox and Eliquis) on 8/20 at [facility] with incidental finding of pancreatic mass on Chest CT. He sought follow up care for metastatic pancreatic CA at [facility] [place], [place], participating in clinical research trial (immunotherapy and chemotherapy) which began on 9/1 and last dose 9/14. Patient presented to ED with c/o lethargy, poor appetite, fever and diarrhea X 3 days. Labs revealed leukopenia without neutropenia, elevated AST/ALT and hyponatremia. Initially treated with fluid and albumin for dehydration. Now with volume overload treated with IV Lasix, switched to Bumex drip on 9/22 and started on low dose Dobutamine on 9/23  with improved urine output. However, patient remains with diarrhea and limited po intake; Bumex drip discontinued on 9/23 and weaned off Dobutamine today.     Discontinue Dobutamine today  Continue to encourage po intake and analgesia  for mouth sores.  Avoid albumin (may not be beneficial)  f/u CXR       75 year old Male with PMH of HTN, HLD, CAD/MI, stent x2 (2009), HFrEF (EF 30%) s/p BiV ICD (2010). He was recently diagnosed with pAFib, PE/DVT (treated with Lovenox and Eliquis) on 8/20 at [facility] with incidental finding of pancreatic mass on Chest CT. He [place] [facility] [place] clinical [place] trial (immunotherapy and chemotherapy) which began on 9/1 and last dose 9/14. Patient presented to ED with c/o lethargy, poor appetite, fever and diarrhea X 3 days. Labs revealed leukopenia without neutropenia, elevated AST/ALT and hyponatremia. Initially treated with fluid and albumin for dehydration. Now with volume overload treated with IV Lasix, switched to Bumex drip on 9/22 and started on low dose Dobutamine on 9/23  with improved urine output. However, patient remains with diarrhea and limited po intake; Bumex drip discontinued on 9/23 and weaned off Dobutamine today.     Discontinue Dobutamine today  Continue to encourage po intake and analgesia  for mouth sores.  Avoid albumin (may not be beneficial)  GDMT remains on hold- continue to monitor SBP and po tolerance   ICD re-interrogation    f/u CXR   Management  per primary team  75 year old Male with PMH of HTN, HLD, CAD/MI, stent x2 (2009), HFrEF (EF 30%) s/p BiV ICD (2010). He was recently diagnosed with pAFib, PE/DVT (treated with Lovenox and Eliquis) on 8/20 at [facility] with incidental finding of pancreatic mass on Chest CT. He sought follow up care for metastatic pancreatic CA at [facility] [place], participating in clinical research trial (immunotherapy and chemotherapy) which began on 9/1 and last dose 9/14. Patient presented to ED with c/o lethargy, poor appetite, fever and diarrhea X 3 days. Labs revealed leukopenia without neutropenia, elevated AST/ALT and hyponatremia. Initially treated with fluid and albumin for dehydration. Now with volume overload treated with IV Lasix, switched to Bumex drip on 9/22 and started on low dose Dobutamine on 9/23  with improved urine output. However, patient remains with diarrhea and limited po intake; Bumex drip discontinued on 9/23 and weaned off Dobutamine today.     Discontinue Dobutamine today  Continue to encourage po intake and analgesia  for mouth sores.  Avoid albumin (may not be beneficial)  GDMT remains on hold- continue to monitor SBP and po tolerance   ICD re-interrogation today   Management  per primary team  75 year old Male with PMH of HTN, HLD, CAD/MI, stent x2 (2009), HFrEF (EF 30%) s/p BiV ICD (2010). He was recently diagnosed with pAFib, PE/DVT (treated with Lovenox and Eliquis) on 8/20 at [facility] with incidental finding of pancreatic mass on Chest CT. He sought follow up care for metastatic pancreatic CA at [facility] [place] in clinical research trial (immunotherapy and chemotherapy) which began on 9/1 and last dose 9/14. Patient presented to ED with c/o lethargy, poor appetite, fever and diarrhea X 3 days. Labs revealed leukopenia without neutropenia, elevated AST/ALT and hyponatremia. Initially treated with fluid and albumin for dehydration. Now with volume overload treated with IV Lasix, switched to Bumex drip on 9/22 and started on low dose Dobutamine on 9/23  with improved urine output. However, patient remains with diarrhea and limited po intake; Bumex drip discontinued on 9/23 and weaned off Dobutamine today.     Discontinue Dobutamine today  Continue to encourage po intake and analgesia  for mouth sores.  Avoid albumin (may not be beneficial)  GDMT remains on hold- continue to monitor SBP and po tolerance   Patient c/o dizziness when sitting up in bed today; Please perform orthostatic BP check Q shift  X2   ICD re-interrogation today   Management  per primary team  75 year old Male with PMH of HTN, HLD, CAD/MI, stent x2 (2009), HFrEF (EF 30%) s/p BiV ICD (2010). He was recently diagnosed with pAFib, PE/DVT (treated with Lovenox and Eliquis) on 8/20 at [facility] with incidental finding of pancreatic mass on Chest CT. He sought follow up care for metastatic pancreatic CA at [facility] [place], participating in clinical research trial (immunotherapy and chemotherapy) which began on 9/1 and last dose 9/14. Patient presented to ED with c/o lethargy, poor appetite, fever and diarrhea X 3 days. Labs revealed leukopenia without neutropenia, elevated AST/ALT and hyponatremia. Initially treated with fluid and albumin for dehydration. Now with volume overload treated with IV Lasix, switched to Bumex drip on 9/22 and started on low dose Dobutamine on 9/23  with improved urine output. However, patient remains with diarrhea and limited po intake; Bumex drip discontinued on 9/23 and weaned off Dobutamine today.     Discontinue Dobutamine today  Continue to encourage po intake and analgesia  for mouth sores.  Avoid albumin (may not be beneficial)  GDMT remains on hold- continue to monitor SBP and po tolerance   Toprol 12.5mg (hold SBP <95)  Patient c/o dizziness when sitting up in bed today; Please perform orthostatic BP check Q shift  X2   ICD re-interrogation today   Management  per primary team

## 2021-09-27 NOTE — PROVIDER CONTACT NOTE (OTHER) - ACTION/TREATMENT ORDERED:
provider notified
Provider notified, awaiting further instruction.
will come and assess patient mouth, and A stick patient
Continue to monitor vitals Q4.
provider notified
will continue to educate
Provider aware, pt being transferred to tele unit.
PA made aware, will continue to monitor.
provider notified  orders for NS 0.9% @ 100 mL/hr for 10 hrs

## 2021-09-27 NOTE — CONSULT NOTE ADULT - CONVERSATION DETAILS
Patient lives at home with wife Taiwo. They have 3 children; daughter Hannah Joy is a Pediatric Cardiologist in [facility]. He was working as an optometrist until 6 weeks ago.   Patient states he believes he has completed HCP paperwork with the  appointed wife Rosa Joy as his primary HCP and alternate HCP as daughter Hannah Joy. Wife said would look for them at home.     Spoke to daughter Hannah Joy separately at bedside outside room. Daughter verbalizes understanding of his current diagnosis of pancreatic cancer and states that initially was told by doctors that he had less than a year to live. Patient has been receiving treatment at [facility] [place] on a clinical trial. Daughter states that they want to remain "hopeful" that with this treatment is curative in nature based on what she has read about the clinical study thus far. Daughter asking about palliative care speciality and hospice and further details about it. Extensively explained role of palliative care in oncology patients and how it can be applied simultaneously during treatment. Also explained about hospice and their philosophy of symptom focused care and focus on quality of life. Daughter appreciative of information as she acknowledges that while they want to try the clinical trial currently she also is aware that if treatment is not effective and impacting quality of life and/or patient does not want to pursue further DMT in the future that hospice is an option. Daughter appreciative of information.     Emotional support provided.

## 2021-09-27 NOTE — CONSULT NOTE ADULT - PROBLEM SELECTOR RECOMMENDATION 9
- on Miracle Mouth Wash q6 ATC switched to Lidocaine 2% Viscous 15mL swish and spit q4 PRN  - on Decadron Solution 1mg swish and spit four times a day per primary and onc teams   - can consider trial of mixing together 5mL of PO Morphine Solution with 15ML of Magic MouthWash for Swish and Spit q6 PRN   - Spoke with primary team Dr. Dowd who would like trial with Lidocaine swish and spit first prior to considering trial of Morphine swish combination with Magic Mouthwash.   - Will reassess patient tomorrow

## 2021-09-27 NOTE — PROGRESS NOTE ADULT - ASSESSMENT
74 yo M hx of CAD, MI, stent x2 (10/2009 at [facility]), pAfib, HTN, HLD, BIV-AICD, and systolic CHF, hx of PE (on Eliquis), recently dx’d pancreatic mass on chemo at [facility] [place] now presenting with fever of 101 at home and several days of watery diarrhea concerning for C diff colitis.

## 2021-09-27 NOTE — PROGRESS NOTE ADULT - SUBJECTIVE AND OBJECTIVE BOX
Patient states he feels much better. Mouth sores less painful. Able to eating soft food without difficulty.   Complains of right hand 4th and 5th digit numbness extending to the elbow x 2 days.  No weakness  Denies chest pain, shortness of breath, palpitations, lightheadedness or headache. No change in visual acuity.   Patient complained of a beeping noise from his ICD over the weekend.  No events overnight.     Vital Signs Last 24 Hrs  T(C): 36.3 (27 Sep 2021 10:31), Max: 36.6 (26 Sep 2021 12:24)  T(F): 97.3 (27 Sep 2021 10:31), Max: 97.9 (26 Sep 2021 12:24)  HR: 93 (27 Sep 2021 10:31) (87 - 97)  BP: 112/71 (27 Sep 2021 10:31) (93/61 - 112/71)  BP(mean): --  RR: 18 (27 Sep 2021 10:31) (18 - 18)  SpO2: 95% (27 Sep 2021 10:31) (94% - 97%)        Telemetry:  Normal sinus rhythm with biventricular pacing.   MEDICATIONS  (STANDING):  aspirin  chewable 81 milliGRAM(s) Oral daily  atorvastatin 80 milliGRAM(s) Oral at bedtime  baclofen 5 milliGRAM(s) Oral three times a day  calcium carbonate   1250 mG (OsCal) 1 Tablet(s) Oral two times a day  dexAMETHasone    Solution 1 milliGRAM(s) Oral four times a day  dextrose 40% Gel 15 Gram(s) Oral once  dextrose 5%. 1000 milliLiter(s) (50 mL/Hr) IV Continuous <Continuous>  dextrose 5%. 1000 milliLiter(s) (100 mL/Hr) IV Continuous <Continuous>  dextrose 50% Injectable 25 Gram(s) IV Push once  dextrose 50% Injectable 12.5 Gram(s) IV Push once  dextrose 50% Injectable 25 Gram(s) IV Push once  DOBUTamine Infusion 1.25 MICROgram(s)/kG/Min (3.6 mL/Hr) IV Continuous <Continuous>  dronabinol 2.5 milliGRAM(s) Oral at bedtime  enoxaparin Injectable 70 milliGRAM(s) SubCutaneous two times a day  finasteride 5 milliGRAM(s) Oral at bedtime  gabapentin 100 milliGRAM(s) Oral at bedtime  glucagon  Injectable 1 milliGRAM(s) IntraMuscular once  influenza  Vaccine (HIGH DOSE) 0.7 milliLiter(s) IntraMuscular once  insulin glargine Injectable (LANTUS) 5 Unit(s) SubCutaneous at bedtime  insulin lispro (ADMELOG) corrective regimen sliding scale   SubCutaneous at bedtime  insulin lispro (ADMELOG) corrective regimen sliding scale   SubCutaneous three times a day before meals  methylPREDNISolone sodium succinate Injectable 80 milliGRAM(s) IV Push daily  MIRACLE MOUTHWASH 30 milliLiter(s) Swish and Spit every 6 hours  pancrelipase  (CREON 24,000 Lipase Units) 1 Capsule(s) Oral four times a day with meals  pantoprazole  Injectable 40 milliGRAM(s) IV Push daily  potassium chloride  10 mEq/100 mL IVPB 10 milliEquivalent(s) IV Intermittent every 1 hour    MEDICATIONS  (PRN):  acetaminophen   Tablet .. 650 milliGRAM(s) Oral every 6 hours PRN Temp greater or equal to 38.5C (101.3F), Mild Pain (1 - 3)  aluminum hydroxide/magnesium hydroxide/simethicone Suspension 30 milliLiter(s) Oral every 4 hours PRN Dyspepsia  melatonin 3 milliGRAM(s) Oral at bedtime PRN Insomnia  metoclopramide Injectable 5 milliGRAM(s) IV Push once PRN nausea  morphine  - Injectable 2 milliGRAM(s) IV Push every 4 hours PRN Moderate Pain (4 - 6)  ondansetron Injectable 4 milliGRAM(s) IV Push every 8 hours PRN Nausea and/or Vomiting          Physical exam:   Gen- awake, alert more conversant but speech slightly garbled.   Resp- right basilar crackles. No wheezing  CV-  S1 and S2 RRR. No murmurs, gallops or rubs  ABD- soft nontender + distended + bowel sounds  EXT- grossly edematous lower legs bilaterally (slightly improved)  Neuro- mild right sided facial droop with slight tongue deviation. Speech slightly garbled.                             9.8    36.79 )-----------( 116      ( 27 Sep 2021 07:51 )             28.8       09-27    132<L>  |  89<L>  |  41<H>  ----------------------------<  180<H>  3.4<L>   |  29  |  1.05    Ca    6.8<L>      27 Sep 2021 07:51  Phos  2.0     09-27  Mg     2.10     09-27    TPro  5.3<L>  /  Alb  3.0<L>  /  TBili  0.9  /  DBili  x   /  AST  83<H>  /  ALT  123<H>  /  AlkPhos  178<H>  09-27                         Patient states he feels much better. Mouth sores less painful. Able to eating soft food and liquids this morning.   Complains of right hand 4th and 5th digit numbness extending to the elbow x 2 days.  No weakness  Denies chest pain, shortness of breath, palpitations, lightheadedness or headache. No change in visual acuity.   Patient complained of a beeping noise from his ICD over the weekend.  No events overnight on telemetry.     Vital Signs Last 24 Hrs  T(C): 36.3 (27 Sep 2021 10:31), Max: 36.6 (26 Sep 2021 12:24)  T(F): 97.3 (27 Sep 2021 10:31), Max: 97.9 (26 Sep 2021 12:24)  HR: 93 (27 Sep 2021 10:31) (87 - 97)  BP: 112/71 (27 Sep 2021 10:31) (93/61 - 112/71)  BP(mean): --  RR: 18 (27 Sep 2021 10:31) (18 - 18)  SpO2: 95% (27 Sep 2021 10:31) (94% - 97%)        Telemetry:  Normal sinus rhythm with biventricular pacing.   MEDICATIONS  (STANDING):  aspirin  chewable 81 milliGRAM(s) Oral daily  atorvastatin 80 milliGRAM(s) Oral at bedtime  baclofen 5 milliGRAM(s) Oral three times a day  calcium carbonate   1250 mG (OsCal) 1 Tablet(s) Oral two times a day  dexAMETHasone    Solution 1 milliGRAM(s) Oral four times a day  dextrose 40% Gel 15 Gram(s) Oral once  dextrose 5%. 1000 milliLiter(s) (50 mL/Hr) IV Continuous <Continuous>  dextrose 5%. 1000 milliLiter(s) (100 mL/Hr) IV Continuous <Continuous>  dextrose 50% Injectable 25 Gram(s) IV Push once  dextrose 50% Injectable 12.5 Gram(s) IV Push once  dextrose 50% Injectable 25 Gram(s) IV Push once  DOBUTamine Infusion 1.25 MICROgram(s)/kG/Min (3.6 mL/Hr) IV Continuous <Continuous>  dronabinol 2.5 milliGRAM(s) Oral at bedtime  enoxaparin Injectable 70 milliGRAM(s) SubCutaneous two times a day  finasteride 5 milliGRAM(s) Oral at bedtime  gabapentin 100 milliGRAM(s) Oral at bedtime  glucagon  Injectable 1 milliGRAM(s) IntraMuscular once  influenza  Vaccine (HIGH DOSE) 0.7 milliLiter(s) IntraMuscular once  insulin glargine Injectable (LANTUS) 5 Unit(s) SubCutaneous at bedtime  insulin lispro (ADMELOG) corrective regimen sliding scale   SubCutaneous at bedtime  insulin lispro (ADMELOG) corrective regimen sliding scale   SubCutaneous three times a day before meals  methylPREDNISolone sodium succinate Injectable 80 milliGRAM(s) IV Push daily  MIRACLE MOUTHWASH 30 milliLiter(s) Swish and Spit every 6 hours  pancrelipase  (CREON 24,000 Lipase Units) 1 Capsule(s) Oral four times a day with meals  pantoprazole  Injectable 40 milliGRAM(s) IV Push daily  potassium chloride  10 mEq/100 mL IVPB 10 milliEquivalent(s) IV Intermittent every 1 hour    MEDICATIONS  (PRN):  acetaminophen   Tablet .. 650 milliGRAM(s) Oral every 6 hours PRN Temp greater or equal to 38.5C (101.3F), Mild Pain (1 - 3)  aluminum hydroxide/magnesium hydroxide/simethicone Suspension 30 milliLiter(s) Oral every 4 hours PRN Dyspepsia  melatonin 3 milliGRAM(s) Oral at bedtime PRN Insomnia  metoclopramide Injectable 5 milliGRAM(s) IV Push once PRN nausea  morphine  - Injectable 2 milliGRAM(s) IV Push every 4 hours PRN Moderate Pain (4 - 6)  ondansetron Injectable 4 milliGRAM(s) IV Push every 8 hours PRN Nausea and/or Vomiting          Physical exam:   Gen- awake, alert more conversant but speech slightly garbled.   Resp- right basilar crackles. No wheezing  CV-  S1 and S2 RRR. No murmurs, gallops or rubs  ABD- soft nontender + distended + bowel sounds  EXT- grossly edematous lower legs bilaterally (slightly improved)  Neuro- mild right sided facial droop with slight tongue deviation. Speech slightly garbled.                             9.8    36.79 )-----------( 116      ( 27 Sep 2021 07:51 )             28.8       09-27    132<L>  |  89<L>  |  41<H>  ----------------------------<  180<H>  3.4<L>   |  29  |  1.05    Ca    6.8<L>      27 Sep 2021 07:51  Phos  2.0     09-27  Mg     2.10     09-27    TPro  5.3<L>  /  Alb  3.0<L>  /  TBili  0.9  /  DBili  x   /  AST  83<H>  /  ALT  123<H>  /  AlkPhos  178<H>  09-27

## 2021-09-27 NOTE — PROVIDER CONTACT NOTE (OTHER) - ASSESSMENT
BP 90/56 HR 92, still in parameters  asymptomatic
Patient states he is unable to swallow PO night medications. Patient has painful bloody sores inside mouth due to chemotherapy treatment. Multiple attempts made to give PO medications, patient spitting out medications each time.
Pt A&O X4, complaining of worsening mouth sores, edema to B/L lower and upper extremities. Denies pain, sob, or diarrhea at this time
Pt alert & oriented x4. No distress noted.
VS in chart  sodium trending down, hyponatremic - latest BMP shows 122  episodes of tachycardia 140s-150s; self-resolving
/97 HR 77 up from baseline, still in parameters  asymptomatic
A&Ox4 - daughter at bedside. no acute distress noted. orthos attempted unable to obtain standing BP r/t weakness. patient states due to current HR & BP she would like to hold off on administering metoprolol at this time.
Pt A&Ox4, remained hemodynamically stable and vital signs within normal limits, expects for 3 beats of v-tach
pt DBP slightly low 98/57 HR 96  asymptomatic, pt resting
pt SBP 98/68 HR 84; slightly out of parameters
patient swollen in arms bilaterally, vitals stable, no s/s of discomfort.
pt BP slightly low  BP 95/67 HR 75  asymptomatic, no s/s distress

## 2021-09-28 NOTE — PROGRESS NOTE ADULT - ATTENDING COMMENTS
Pt seen this am with PA, with nose bleed, rec nasal saline spray which he had at bedside. No change in mucositis sx, with no diarrhea, which was confirmed with staff as well. Doing well, will plan on tapering steroids, already received dose this am, decrease starting tomorrow to solumedrol 60mg daily, would plan on rapid tapering q3d at this time unless sx recur. Pall care eval appreciated, sx mgmt for mucositis.    Total time spent 35min, >50% spent in discussion and coordination of care.

## 2021-09-28 NOTE — CHART NOTE - NSCHARTNOTEFT_GEN_A_CORE
ACP MEDICINE COVERAGE - Medicine Subsequent Hospital Care Note    CC:  HPI/Subjective:    ROS:  Denies fever, chills, diaphoresis, malaise, night sweats, generalized weakness, headache, changes in vision, dizziness, cough, sputum production, wheezing, hemoptysis, chest pain, palpitations, shortness of breath, dyspnea on exertion, PND, dysphagia, new abdominal pain, nausea, vomiting, diarrhea, constipation, melena, hematochezia, dysuria, increased urinary frequency/urgency, hematuria, nocturia, numbness/weakness/tingling, any other complaints.    [  ] I spoke with the attending, nurse, and family to obtain the history.  [  ] Unable to obtain history due to ___________.    Vital Signs Last 24 Hrs  T(C): 36.8 (21 @ 18:04), Max: 36.9 (21 @ 14:35)  T(F): 98.2 (21 @ 18:04), Max: 98.4 (21 @ 14:35)  HR: 96 (21 @ 18:04) (89 - 100)  BP: 103/77 (21 @ 18:04) (95/67 - 110/70)  RR: 18 (21 @ 18:04) (18 - 18)  SpO2: 98% (21 @ 18:04) (96% - 99%) on (O2)    PHYSICAL EXAM:  Constitutional: NAD, well-developed, well-nourished  Ears, nose, Mouth, and Throat: normal external ears and nose, normal hearing, moist oral mucosa  Eyes: normal conjunctiva, EOMI, PEERL  Neck: supple, no JVD  Respiratory: Clear to auscultation bilaterally. No wheezes, rales or rhonchi. Normal respiratory effort  Cardiovascular: regular rate and rhythm, S1 and S2, no murmurs, rubs or gallops, no edema, 2+ peripheral pulses  Gastrointestinal: soft, nontender, nondistended, +bowel sounds, no hernia  Skin: warm, dry, no rash  Neurologic: sensation grossly intact, CN grossly intact, non-focal exam  Musculoskeletal: no clubbing, no cyanosis, no joint swelling  Psychiatric: A&Ox3, appropriate mood, affect    LABS:                        10.2   36.93 )-----------( 149      ( 28 Sep 2021 07:43 )             29.3         139  |  88<L>  |  42<H>  ----------------------------<  160<H>  3.6   |  31  |  0.91    Ca    7.3<L>      28 Sep 2021 07:43  Phos  2.5       Mg     2.30         TPro  5.2<L>  /  Alb  2.9<L>  /  TBili  0.9  /  DBili  x   /  AST  90<H>  /  ALT  120<H>  /  AlkPhos  168<H>      PT/INR: PT: 27.9 sec | INR: 2.54 ratio (09-15-21 @ 07:11)  PTT: 31.0 sec (09-15-21 @ 07:11)    CARDIAC ENZYMES            Serum Pro-Brain Natriuretic Peptide: 1126 pg/mL (21 @ 14:27)  1084 pg/mL (09-15-21 @ 15:18)  733 pg/mL (21 @ 23:10)    D-Dimer Assay:     Urinanalysis Basic (21 @ 19:32):  Color: Jennifer / Appearance: Slightly Turbid / S.028 / pH: --  Gluc: -- / Ketone: Negative / Bili: Negative / Urobili: 3 mg/dL  Blood: -- / Protein: 30 mg/dL / Nitrite: Negative  Leuk Esterase: Negative / RBC: <5 / WBC: <5  Sq Epi: -- / Non Sq Epi: -- / Bacteria: Few      Blood Gas Venous (21 @ 19:32):  pH: -- | HCO3: -- | pCO2: -- | pO2: -- | Lactate: 1.5  pH: 7.38 | HCO3: 21 | pCO2: 36 | pO2: 36 | Lactate: 2.5      Blood Gas Arterial (21 @ 19:32):      CAPILLARY BLOOD GLUCOSE:  POCT Blood Glucose: 209 mg/dL (21 @ 17:40)  POCT Blood Glucose: 277 mg/dL (21 @ 12:37)  POCT Blood Glucose: 166 mg/dL (21 @ 09:13)      COVID PCR:  NotDetec (21 @ 14:20)  NotDetec (09-15-21 @ 01:30)  NotDetec (21 @ 17:38)      RADIOLOGY:    MEDICATIONS  (STANDING):  aspirin  chewable 81 milliGRAM(s) Oral daily  atorvastatin 80 milliGRAM(s) Oral at bedtime  baclofen 5 milliGRAM(s) Oral three times a day  calcium carbonate   1250 mG (OsCal) 1 Tablet(s) Oral two times a day  dexAMETHasone    Solution 1 milliGRAM(s) Oral four times a day  dextrose 40% Gel 15 Gram(s) Oral once  dextrose 5%. 1000 milliLiter(s) (50 mL/Hr) IV Continuous <Continuous>  dextrose 5%. 1000 milliLiter(s) (100 mL/Hr) IV Continuous <Continuous>  dextrose 50% Injectable 25 Gram(s) IV Push once  dextrose 50% Injectable 12.5 Gram(s) IV Push once  dextrose 50% Injectable 25 Gram(s) IV Push once  dronabinol 2.5 milliGRAM(s) Oral at bedtime  enoxaparin Injectable 70 milliGRAM(s) SubCutaneous two times a day  finasteride 5 milliGRAM(s) Oral at bedtime  gabapentin 100 milliGRAM(s) Oral at bedtime  glucagon  Injectable 1 milliGRAM(s) IntraMuscular once  influenza  Vaccine (HIGH DOSE) 0.7 milliLiter(s) IntraMuscular once  insulin glargine Injectable (LANTUS) 5 Unit(s) SubCutaneous at bedtime  insulin lispro (ADMELOG) corrective regimen sliding scale   SubCutaneous three times a day before meals  insulin lispro (ADMELOG) corrective regimen sliding scale   SubCutaneous at bedtime  insulin lispro Injectable (ADMELOG) 3 Unit(s) SubCutaneous three times a day before meals  metoprolol succinate ER 12.5 milliGRAM(s) Oral daily  oxymetazoline 0.05% Nasal Spray 1 Spray(s) Both Nostrils every 12 hours  pancrelipase  (CREON 24,000 Lipase Units) 1 Capsule(s) Oral four times a day with meals  pantoprazole  Injectable 40 milliGRAM(s) IV Push daily    MEDICATIONS  (PRN):  acetaminophen   Tablet .. 650 milliGRAM(s) Oral every 6 hours PRN Temp greater or equal to 38.5C (101.3F), Mild Pain (1 - 3)  aluminum hydroxide/magnesium hydroxide/simethicone Suspension 30 milliLiter(s) Oral every 4 hours PRN Dyspepsia  lidocaine 2% Viscous 15 milliLiter(s) Swish and Spit every 4 hours PRN Mouth sores  melatonin 3 milliGRAM(s) Oral at bedtime PRN Insomnia  metoclopramide Injectable 5 milliGRAM(s) IV Push once PRN nausea  morphine  - Injectable 2 milliGRAM(s) IV Push every 4 hours PRN Moderate Pain (4 - 6)  ondansetron Injectable 4 milliGRAM(s) IV Push every 8 hours PRN Nausea and/or Vomiting  sodium chloride 0.65% Nasal 1 Spray(s) Both Nostrils four times a day PRN Nasal Congestion    I&O's Summary    27 Sep 2021 07:  -  28 Sep 2021 07:00  --------------------------------------------------------  IN: 50 mL / OUT: 300 mL / NET: -250 mL    28 Sep 2021 07:  -  28 Sep 2021 19:32  --------------------------------------------------------  IN: 600 mL / OUT: 650 mL / NET: -50 mL      I reviewed the above labs, radiology, medications, tests, telemetry, and EKG interpretation.    ASSESSMENT/PLAN:    - Clinical findings, labs, tests, telemetry, and ekg reviewed with attending. Will monitor patient closely.       Low complexity/risk (30min)    nose bleed minimal : :afrin ordered: monitor : resolving on lovenox BID   d/w medical attending Dr. Dowd in agreement with plan

## 2021-09-28 NOTE — PROGRESS NOTE ADULT - PROBLEM SELECTOR PLAN 6
-Need outpatient follow up vs GOC discussion
- follows at [facility] :  Started treatment on 8/31/21 per protocol , randomized to gemcitabine/nab-paclitaxel and dual immmunotherapy. Received Isabella/Abraxane on 9/14  - oncology recommendations appreciated.
-Need outpatient follow up vs GOC discussion

## 2021-09-28 NOTE — PROGRESS NOTE ADULT - SUBJECTIVE AND OBJECTIVE BOX
Patient is a 75y old  Male who presents with a chief complaint of Fever (28 Sep 2021 13:33)    Patient seen this morning. Had a bout of epistaxis this morning - RN aware; was given saline nasal spray. Mucositis is still present, not better yet but diarrhea has improved.    MEDICATIONS  (STANDING):  aspirin  chewable 81 milliGRAM(s) Oral daily  atorvastatin 80 milliGRAM(s) Oral at bedtime  baclofen 5 milliGRAM(s) Oral three times a day  calcium carbonate   1250 mG (OsCal) 1 Tablet(s) Oral two times a day  dexAMETHasone    Solution 1 milliGRAM(s) Oral four times a day  dextrose 40% Gel 15 Gram(s) Oral once  dextrose 5%. 1000 milliLiter(s) (50 mL/Hr) IV Continuous <Continuous>  dextrose 5%. 1000 milliLiter(s) (100 mL/Hr) IV Continuous <Continuous>  dextrose 50% Injectable 25 Gram(s) IV Push once  dextrose 50% Injectable 12.5 Gram(s) IV Push once  dextrose 50% Injectable 25 Gram(s) IV Push once  dronabinol 2.5 milliGRAM(s) Oral at bedtime  enoxaparin Injectable 70 milliGRAM(s) SubCutaneous two times a day  finasteride 5 milliGRAM(s) Oral at bedtime  gabapentin 100 milliGRAM(s) Oral at bedtime  glucagon  Injectable 1 milliGRAM(s) IntraMuscular once  influenza  Vaccine (HIGH DOSE) 0.7 milliLiter(s) IntraMuscular once  insulin glargine Injectable (LANTUS) 5 Unit(s) SubCutaneous at bedtime  insulin lispro (ADMELOG) corrective regimen sliding scale   SubCutaneous at bedtime  insulin lispro (ADMELOG) corrective regimen sliding scale   SubCutaneous three times a day before meals  insulin lispro Injectable (ADMELOG) 3 Unit(s) SubCutaneous three times a day before meals  methylPREDNISolone sodium succinate Injectable 60 milliGRAM(s) IV Push daily  metoprolol succinate ER 12.5 milliGRAM(s) Oral daily  oxymetazoline 0.05% Nasal Spray 1 Spray(s) Both Nostrils every 12 hours  pancrelipase  (CREON 24,000 Lipase Units) 1 Capsule(s) Oral four times a day with meals  pantoprazole  Injectable 40 milliGRAM(s) IV Push daily    MEDICATIONS  (PRN):  acetaminophen   Tablet .. 650 milliGRAM(s) Oral every 6 hours PRN Temp greater or equal to 38.5C (101.3F), Mild Pain (1 - 3)  aluminum hydroxide/magnesium hydroxide/simethicone Suspension 30 milliLiter(s) Oral every 4 hours PRN Dyspepsia  lidocaine 2% Viscous 15 milliLiter(s) Swish and Spit every 4 hours PRN Mouth sores  melatonin 3 milliGRAM(s) Oral at bedtime PRN Insomnia  metoclopramide Injectable 5 milliGRAM(s) IV Push once PRN nausea  morphine  - Injectable 2 milliGRAM(s) IV Push every 4 hours PRN Moderate Pain (4 - 6)  ondansetron Injectable 4 milliGRAM(s) IV Push every 8 hours PRN Nausea and/or Vomiting  sodium chloride 0.65% Nasal 1 Spray(s) Both Nostrils four times a day PRN Nasal Congestion      ROS  No fever, sweats, chills  Epistaxis per HPI  Mouth sores - painful  No CP, SOB, cough, sputum  Diarrhea has improved  No edema  No rash  No anxiety  No back pain, joint pain  No bleeding, bruising  No dysuria, hematuria    Vital Signs Last 24 Hrs  T(C): 36.7 (28 Sep 2021 10:42), Max: 36.7 (28 Sep 2021 07:03)  T(F): 98.1 (28 Sep 2021 10:42), Max: 98.1 (28 Sep 2021 10:42)  HR: 100 (28 Sep 2021 10:42) (89 - 100)  BP: 109/73 (28 Sep 2021 10:42) (94/71 - 110/70)  BP(mean): --  RR: 18 (28 Sep 2021 10:42) (18 - 18)  SpO2: 96% (28 Sep 2021 10:42) (95% - 99%)    PE  NAD  Awake, alert  No current nosebleed but bloody tissue was present  Anicteric, MMM  No c/c/e  No rash grossly  FROM                          10.2   36.93 )-----------( 149      ( 28 Sep 2021 07:43 )             29.3       09-28    139  |  88<L>  |  42<H>  ----------------------------<  160<H>  3.6   |  31  |  0.91    Ca    7.3<L>      28 Sep 2021 07:43  Phos  2.5     09-28  Mg     2.30     09-28    TPro  5.2<L>  /  Alb  2.9<L>  /  TBili  0.9  /  DBili  x   /  AST  90<H>  /  ALT  120<H>  /  AlkPhos  168<H>  09-28

## 2021-09-28 NOTE — PROGRESS NOTE ADULT - ASSESSMENT
76 yo M hx of CAD, MI, stent x2 (10/2009 at [facility]), pAfib, HTN, HLD, BIV-AICD, and systolic CHF, hx of PE (on Eliquis), recently dx’d pancreatic mass on chemo at [facility] now presenting with fever of 101 at home and several days of watery diarrhea concerning for C diff colitis.

## 2021-09-28 NOTE — PROGRESS NOTE ADULT - ASSESSMENT
74 yo M hx of CAD, MI, stent x2 (10/2009 at [facility]), pAfib, HTN, HLD, BIV-AICD, and systolic CHF, hx of PE (on Eliquis), recently dx’d pancreatic mass on chemo at [facility] now presenting with fever of 101 at home and several days of watery diarrhea concerning for C diff colitis.  Palliative Care consulted for symptom management of mucositis.

## 2021-09-28 NOTE — PROGRESS NOTE ADULT - SUBJECTIVE AND OBJECTIVE BOX
Chief Complaint: f/u DM2    History:  Patient seen at bedside this morning. Eating more now, states that he feels more hungry. No abdominal pain, nausea, vomiting. States that he still feels weak and can't walk.    He called his wife while I was at the bedside - wife states that his appetite was low before. Has been on steroids since 9/25. They are asking when he will be discharged.    MEDICATIONS  (STANDING):  aspirin  chewable 81 milliGRAM(s) Oral daily  atorvastatin 80 milliGRAM(s) Oral at bedtime  baclofen 5 milliGRAM(s) Oral three times a day  calcium carbonate   1250 mG (OsCal) 1 Tablet(s) Oral two times a day  dexAMETHasone    Solution 1 milliGRAM(s) Oral four times a day  dextrose 40% Gel 15 Gram(s) Oral once  dextrose 5%. 1000 milliLiter(s) (50 mL/Hr) IV Continuous <Continuous>  dextrose 5%. 1000 milliLiter(s) (100 mL/Hr) IV Continuous <Continuous>  dextrose 50% Injectable 25 Gram(s) IV Push once  dextrose 50% Injectable 12.5 Gram(s) IV Push once  dextrose 50% Injectable 25 Gram(s) IV Push once  dronabinol 2.5 milliGRAM(s) Oral at bedtime  enoxaparin Injectable 70 milliGRAM(s) SubCutaneous two times a day  finasteride 5 milliGRAM(s) Oral at bedtime  gabapentin 100 milliGRAM(s) Oral at bedtime  glucagon  Injectable 1 milliGRAM(s) IntraMuscular once  influenza  Vaccine (HIGH DOSE) 0.7 milliLiter(s) IntraMuscular once  insulin glargine Injectable (LANTUS) 5 Unit(s) SubCutaneous at bedtime  insulin lispro (ADMELOG) corrective regimen sliding scale   SubCutaneous at bedtime  insulin lispro (ADMELOG) corrective regimen sliding scale   SubCutaneous three times a day before meals  insulin lispro Injectable (ADMELOG) 3 Unit(s) SubCutaneous three times a day before meals  methylPREDNISolone sodium succinate Injectable 80 milliGRAM(s) IV Push daily  metoprolol succinate ER 12.5 milliGRAM(s) Oral daily  pancrelipase  (CREON 24,000 Lipase Units) 1 Capsule(s) Oral four times a day with meals  pantoprazole  Injectable 40 milliGRAM(s) IV Push daily    MEDICATIONS  (PRN):  acetaminophen   Tablet .. 650 milliGRAM(s) Oral every 6 hours PRN Temp greater or equal to 38.5C (101.3F), Mild Pain (1 - 3)  aluminum hydroxide/magnesium hydroxide/simethicone Suspension 30 milliLiter(s) Oral every 4 hours PRN Dyspepsia  lidocaine 2% Viscous 15 milliLiter(s) Swish and Spit every 4 hours PRN Mouth sores  melatonin 3 milliGRAM(s) Oral at bedtime PRN Insomnia  metoclopramide Injectable 5 milliGRAM(s) IV Push once PRN nausea  morphine  - Injectable 2 milliGRAM(s) IV Push every 4 hours PRN Moderate Pain (4 - 6)  ondansetron Injectable 4 milliGRAM(s) IV Push every 8 hours PRN Nausea and/or Vomiting  sodium chloride 0.65% Nasal 1 Spray(s) Both Nostrils four times a day PRN Nasal Congestion      Allergies  No Known Allergies    Review of Systems:  ALL OTHER SYSTEMS REVIEWED AND NEGATIVE    PHYSICAL EXAM:  VITALS: T(C): 36.7 (09-28-21 @ 10:42)  T(F): 98.1 (09-28-21 @ 10:42), Max: 98.1 (09-28-21 @ 10:42)  HR: 100 (09-28-21 @ 10:42) (88 - 100)  BP: 109/73 (09-28-21 @ 10:42) (94/71 - 110/70)  RR:  (18 - 18)  SpO2:  (95% - 99%)  Wt(kg): --  GENERAL: NAD, well-developed  EYES: No proptosis, anicteric  HEENT:  Atraumatic, Normocephalic  RESPIRATORY: + air movement bilaterally, no respiratory distress   PSYCH: reactive affect, appropriate mood     POCT Blood Glucose.: 277 mg/dL (09-28-21 @ 12:37)  POCT Blood Glucose.: 166 mg/dL (09-28-21 @ 09:13)  POCT Blood Glucose.: 161 mg/dL (09-27-21 @ 22:34)  POCT Blood Glucose.: 233 mg/dL (09-27-21 @ 18:05)  POCT Blood Glucose.: 175 mg/dL (09-27-21 @ 13:12)  POCT Blood Glucose.: 179 mg/dL (09-27-21 @ 09:15)  POCT Blood Glucose.: 207 mg/dL (09-26-21 @ 21:25)  POCT Blood Glucose.: 254 mg/dL (09-26-21 @ 18:33)  POCT Blood Glucose.: 241 mg/dL (09-26-21 @ 12:43)  POCT Blood Glucose.: 228 mg/dL (09-26-21 @ 09:05)  POCT Blood Glucose.: 165 mg/dL (09-25-21 @ 21:36)  POCT Blood Glucose.: 256 mg/dL (09-25-21 @ 18:12)      09-28    139  |  88<L>  |  42<H>  ----------------------------<  160<H>  3.6   |  31  |  0.91    EGFR if : 95  EGFR if non : 82    Ca    7.3<L>      09-28  Mg     2.30     09-28  Phos  2.5     09-28    TPro  5.2<L>  /  Alb  2.9<L>  /  TBili  0.9  /  DBili  x   /  AST  90<H>  /  ALT  120<H>  /  AlkPhos  168<H>  09-28

## 2021-09-28 NOTE — PROGRESS NOTE ADULT - PROBLEM SELECTOR PLAN 1
Neutropenia/subjective fever  - Continue Zosyn until ANC >500 and afebrile x 48 hours (can discontinue after this timeframe)  - Monitor for focal signs infection  c diff neg    lower ext weakness - neuro f/u - no further intervention- NO mri sec to aicd   OOB in chair  ORTHOSTATICS- POSITIVE - ? discuss with cards about ? iv fluids

## 2021-09-28 NOTE — PROGRESS NOTE ADULT - ASSESSMENT
75 year old Male with PMH of HTN, HLD, CAD/MI, stent x2 (2009), HFrEF (EF 30%) s/p BiV ICD (2010). He was recently diagnosed with pAFib, PE/DVT (treated with Lovenox and Eliquis) on 8/20 at [facility] with incidental finding of pancreatic mass on Chest CT. He sought follow up care for metastatic pancreatic CA at [facility], participating in clinical research trial (immunotherapy and chemotherapy) which began on 9/1 and last dose 9/14. Patient presented to ED with c/o lethargy, poor appetite, fever and diarrhea X 3 days. Labs revealed leukopenia without neutropenia, elevated AST/ALT and hyponatremia. Initially treated with fluid and albumin for dehydration. Now with volume overload treated with IV Lasix, switched to Bumex drip on 9/22 and started on low dose Dobutamine on 9/23  with improved urine output. However, patient remains with diarrhea and limited po intake; Bumex drip discontinued on 9/23 and weaned off Dobutamine today.     Acute on chronic systolic heart failure   ICM, TTE: Severe global left ventricular systolic dysfunction.  No JVD on exam, but with LLE edema   Discontinued Dobutamine 9/27   ICD interrogation: 86% pacing, NSR    Continue Toprol 12.5mg (hold SBP <95), continue to hold diuretic   I&Os 640/150-->+490 75 year old Male with PMH of HTN, HLD, CAD/MI, stent x2 (2009), HFrEF (EF 30%) s/p BiV ICD (2010). He was recently diagnosed with pAFib, PE/DVT (treated with Lovenox and Eliquis) on 8/20 at [facility] with incidental finding of pancreatic mass on Chest CT. He sought follow up care for metastatic pancreatic CA at [facility] [place] [place], participating in clinical research trial (immunotherapy and chemotherapy) which began on 9/1 and last dose 9/14. Patient presented to ED with c/o lethargy, poor appetite, fever and diarrhea X 3 days. Labs revealed leukopenia without neutropenia, elevated AST/ALT and hyponatremia. Initially treated with fluid and albumin for dehydration. Now with volume overload treated with IV Lasix, switched to Bumex drip on 9/22 and started on low dose Dobutamine on 9/23  with improved urine output. However, patient remains with diarrhea and limited po intake; Bumex drip discontinued on 9/23 and weaned off Dobutamine today.     Acute on chronic systolic heart failure   ICM, TTE: Severe global left ventricular systolic dysfunction.  No JVD on exam, but with LLE edema   Discontinued Dobutamine 9/27   Hold IVF   ICD interrogation: 86% pacing, NSR    Continue Toprol 12.5mg (hold SBP <95), continue to hold diuretic   I&Os 640/150-->+490

## 2021-09-28 NOTE — PROGRESS NOTE ADULT - SUBJECTIVE AND OBJECTIVE BOX
SUBJECTIVE / OVERNIGHT EVENTS:pt seen and examined, c/o oral discomfort , denies difficulty swallowing    MEDICATIONS  (STANDING):  aspirin  chewable 81 milliGRAM(s) Oral daily  atorvastatin 80 milliGRAM(s) Oral at bedtime  baclofen 5 milliGRAM(s) Oral three times a day  calcium carbonate   1250 mG (OsCal) 1 Tablet(s) Oral two times a day  dexAMETHasone    Solution 1 milliGRAM(s) Oral four times a day  dextrose 40% Gel 15 Gram(s) Oral once  dextrose 5%. 1000 milliLiter(s) (50 mL/Hr) IV Continuous <Continuous>  dextrose 5%. 1000 milliLiter(s) (100 mL/Hr) IV Continuous <Continuous>  dextrose 50% Injectable 25 Gram(s) IV Push once  dextrose 50% Injectable 12.5 Gram(s) IV Push once  dextrose 50% Injectable 25 Gram(s) IV Push once  dronabinol 2.5 milliGRAM(s) Oral at bedtime  enoxaparin Injectable 70 milliGRAM(s) SubCutaneous two times a day  finasteride 5 milliGRAM(s) Oral at bedtime  gabapentin 100 milliGRAM(s) Oral at bedtime  glucagon  Injectable 1 milliGRAM(s) IntraMuscular once  influenza  Vaccine (HIGH DOSE) 0.7 milliLiter(s) IntraMuscular once  insulin glargine Injectable (LANTUS) 5 Unit(s) SubCutaneous at bedtime  insulin lispro (ADMELOG) corrective regimen sliding scale   SubCutaneous at bedtime  insulin lispro (ADMELOG) corrective regimen sliding scale   SubCutaneous three times a day before meals  insulin lispro Injectable (ADMELOG) 3 Unit(s) SubCutaneous three times a day before meals  methylPREDNISolone sodium succinate Injectable 80 milliGRAM(s) IV Push daily  metoprolol succinate ER 12.5 milliGRAM(s) Oral daily  oxymetazoline 0.05% Nasal Spray 1 Spray(s) Both Nostrils every 12 hours  pancrelipase  (CREON 24,000 Lipase Units) 1 Capsule(s) Oral four times a day with meals  pantoprazole  Injectable 40 milliGRAM(s) IV Push daily    MEDICATIONS  (PRN):  acetaminophen   Tablet .. 650 milliGRAM(s) Oral every 6 hours PRN Temp greater or equal to 38.5C (101.3F), Mild Pain (1 - 3)  aluminum hydroxide/magnesium hydroxide/simethicone Suspension 30 milliLiter(s) Oral every 4 hours PRN Dyspepsia  lidocaine 2% Viscous 15 milliLiter(s) Swish and Spit every 4 hours PRN Mouth sores  melatonin 3 milliGRAM(s) Oral at bedtime PRN Insomnia  metoclopramide Injectable 5 milliGRAM(s) IV Push once PRN nausea  morphine  - Injectable 2 milliGRAM(s) IV Push every 4 hours PRN Moderate Pain (4 - 6)  ondansetron Injectable 4 milliGRAM(s) IV Push every 8 hours PRN Nausea and/or Vomiting  sodium chloride 0.65% Nasal 1 Spray(s) Both Nostrils four times a day PRN Nasal Congestion    Vital Signs Last 24 Hrs  T(C): 36.7 (09-28-21 @ 10:42), Max: 36.7 (09-28-21 @ 07:03)  T(F): 98.1 (09-28-21 @ 10:42), Max: 98.1 (09-28-21 @ 10:42)  HR: 100 (09-28-21 @ 10:42) (89 - 100)  BP: 109/73 (09-28-21 @ 10:42) (94/71 - 110/70)  BP(mean): --  RR: 18 (09-28-21 @ 10:42) (18 - 18)  SpO2: 96% (09-28-21 @ 10:42) (95% - 99%)    Orthostatic VS  09-28-21 @ 10:42  Lying BP: 109/73 HR: 100  Sitting BP: 99/71 HR: 107  Standing BP: --/-- HR: --  Site: upper left arm  Mode: electronic  Orthostatic VS  09-27-21 @ 17:31  Lying BP: 93/68 HR: 85  Sitting BP: 104/70 HR: 96  Standing BP: --/-- HR: --  Site: upper left arm  Mode: electronic      Constitutional: No fever, fatigue  Skin: No rash.  Eyes: No recent vision problems or eye pain.  ENT: No congestion, ear pain, or sore throat.  Cardiovascular: No chest pain or palpation.  Respiratory: No cough, shortness of breath, congestion, or wheezing.  Gastrointestinal: No abdominal pain, nausea, vomiting, or diarrhea.  Genitourinary: No dysuria.  Musculoskeletal: No joint swelling.  Neurologic: No headache.    PHYSICAL EXAM:  GENERAL: NAD  EYES: EOMI, PERRLA  NECK: Supple, No JVD  CHEST/LUNG: dec breath sounds at bases  HEART:  S1 , S2 +  ABDOMEN: soft , bs+  EXTREMITIES:no edema    NEUROLOGY:alert awake    LABS:  09-28    139  |  88<L>  |  42<H>  ----------------------------<  160<H>  3.6   |  31  |  0.91    Ca    7.3<L>      28 Sep 2021 07:43  Phos  2.5     09-28  Mg     2.30     09-28    TPro  5.2<L>  /  Alb  2.9<L>  /  TBili  0.9  /  DBili      /  AST  90<H>  /  ALT  120<H>  /  AlkPhos  168<H>  09-28    Creatinine Trend: 0.91 <--, 1.05 <--, 1.08 <--, 1.09 <--, 1.05 <--, 1.10 <--, 1.08 <--, 1.01 <--, 1.05 <--, 0.65 <--, 1.12 <--, 1.16 <--                        10.2   36.93 )-----------( 149      ( 28 Sep 2021 07:43 )             29.3     Urine Studies:            LIVER FUNCTIONS - ( 28 Sep 2021 07:43 )  Alb: 2.9 g/dL / Pro: 5.2 g/dL / ALK PHOS: 168 U/L / ALT: 120 U/L / AST: 90 U/L / GGT: x                       LIVER FUNCTIONS - ( 25 Sep 2021 06:46 )  Alb: 3.0 g/dL / Pro: 5.2 g/dL / ALK PHOS: 195 U/L / ALT: 143 U/L / AST: 101 U/L / GGT: x

## 2021-09-28 NOTE — CHART NOTE - NSCHARTNOTEFT_GEN_A_CORE
75M PMHx CAD, MI, stent x 2 (10/2009 at [facility]), pAfib (on Eliquis), HTN, HLD, BIV-AICD, systolic CHF, PE (on Eliquis) recently dx’d pancreatic CA on chemo at [facility] p/w fever and watery diarrhea. Course c/b positive FOBT, hyponatremia, and transaminitis   -- Metastatic pancreatic cancer dx on 8/21/21  -- [facility]  -- Started treatment on 8/31/21 per protocol , randomized to gemcitabine/nab-paclitaxel and dual immmunotherapy. Received [place]/Abraxane on 9/14  -- As per Dr. Moscoso hematologist/oncologist no chemotherapy to be given during rehab   pt to follow up at [facility] Statement Selected

## 2021-09-28 NOTE — PROGRESS NOTE ADULT - SUBJECTIVE AND OBJECTIVE BOX
SUBJECTIVE AND OBJECTIVE:  Patient seen with wife and daughter Shameka at bedside. Patient reports mucositis is similar to yesterday with no change in symptom.     INTERVAL HPI/OVERNIGHT EVENTS:  Patient with epistaxis this AM.     DNR on chart:   Allergies    No Known Allergies    Intolerances    MEDICATIONS  (STANDING):  aspirin  chewable 81 milliGRAM(s) Oral daily  baclofen 5 milliGRAM(s) Oral three times a day  calcium carbonate   1250 mG (OsCal) 1 Tablet(s) Oral two times a day  dexAMETHasone    Solution 1 milliGRAM(s) Oral four times a day  dextrose 40% Gel 15 Gram(s) Oral once  dextrose 5%. 1000 milliLiter(s) (50 mL/Hr) IV Continuous <Continuous>  dextrose 5%. 1000 milliLiter(s) (100 mL/Hr) IV Continuous <Continuous>  dextrose 50% Injectable 25 Gram(s) IV Push once  dextrose 50% Injectable 12.5 Gram(s) IV Push once  dextrose 50% Injectable 25 Gram(s) IV Push once  dronabinol 2.5 milliGRAM(s) Oral at bedtime  enoxaparin Injectable 70 milliGRAM(s) SubCutaneous two times a day  finasteride 5 milliGRAM(s) Oral at bedtime  gabapentin 100 milliGRAM(s) Oral at bedtime  glucagon  Injectable 1 milliGRAM(s) IntraMuscular once  influenza  Vaccine (HIGH DOSE) 0.7 milliLiter(s) IntraMuscular once  insulin glargine Injectable (LANTUS) 5 Unit(s) SubCutaneous at bedtime  insulin lispro (ADMELOG) corrective regimen sliding scale   SubCutaneous three times a day before meals  insulin lispro (ADMELOG) corrective regimen sliding scale   SubCutaneous at bedtime  insulin lispro Injectable (ADMELOG) 3 Unit(s) SubCutaneous three times a day before meals  metoprolol succinate ER 12.5 milliGRAM(s) Oral daily  oxymetazoline 0.05% Nasal Spray 1 Spray(s) Both Nostrils every 12 hours  pancrelipase  (CREON 24,000 Lipase Units) 1 Capsule(s) Oral four times a day with meals  pantoprazole  Injectable 40 milliGRAM(s) IV Push daily    MEDICATIONS  (PRN):  acetaminophen   Tablet .. 650 milliGRAM(s) Oral every 6 hours PRN Temp greater or equal to 38.5C (101.3F), Mild Pain (1 - 3)  aluminum hydroxide/magnesium hydroxide/simethicone Suspension 30 milliLiter(s) Oral every 4 hours PRN Dyspepsia  lidocaine 2% Viscous 15 milliLiter(s) Swish and Spit every 4 hours PRN Mouth sores  melatonin 3 milliGRAM(s) Oral at bedtime PRN Insomnia  metoclopramide Injectable 5 milliGRAM(s) IV Push once PRN nausea  morphine  - Injectable 2 milliGRAM(s) IV Push every 4 hours PRN Moderate Pain (4 - 6)  ondansetron Injectable 4 milliGRAM(s) IV Push every 8 hours PRN Nausea and/or Vomiting  sodium chloride 0.65% Nasal 1 Spray(s) Both Nostrils four times a day PRN Nasal Congestion      ITEMS UNCHECKED ARE NOT PRESENT    PRESENT SYMPTOMS: [ ]Unable to obtain due to poor mentation   Source if other than patient:  [ ]Family   [ ]Team     Pain: [x ]yes [ ]no  QOL impact - moderate- severe  Location -   mouth                 Aggravating factors - eating/ drinking  Quality - unable to describe   Radiation - none  Timing- constant   Severity (0-10 scale): unable to describe   Minimal acceptable level (0-10 scale):     Dyspnea:                           [ ]Mild [ ]Moderate [ ]Severe  Anxiety:                             [ ]Mild [ ]Moderate [ ]Severe  Agitation:                          [ ]Mild [ ]Moderate [ ]Severe  Fatigue:                             [ ]Mild [ ]Moderate [ ]Severe  Nausea:                             [ ]Mild [ ]Moderate [ ]Severe  Loss of appetite:              [ ]Mild [ ]Moderate [ ]Severe  Constipation:                   [ ]Mild [ ]Moderate [ ]Severe  Diarrhea:                          [x ]Mild [ ]Moderate [ ]Severe    CPOT:    https://www.[email or web address]/getattachment/[email or web address]/Critical-Care-Pain-Observation-Tool-(CPOT)    PAIN AD Score:	  http://geriatrictoolkit.[place].[facility]/cog/painad.pdf (Ctrl + left click to view)    Other Symptoms:  [ ]All other review of systems negative     Palliative Performance Status Version 2:   70-80      %      http://[facility].org/files/news/palliative_performance_scale_ppsv2.pdf    PHYSICAL EXAM:  Vital Signs Last 24 Hrs  T(C): 36.8 (28 Sep 2021 18:04), Max: 36.9 (28 Sep 2021 14:35)  T(F): 98.2 (28 Sep 2021 18:04), Max: 98.4 (28 Sep 2021 14:35)  HR: 96 (28 Sep 2021 18:04) (89 - 100)  BP: 103/77 (28 Sep 2021 18:04) (95/67 - 110/64)  BP(mean): 85 (28 Sep 2021 18:04) (79 - 85)  RR: 18 (28 Sep 2021 18:04) (18 - 18)  SpO2: 98% (28 Sep 2021 18:04) (96% - 99%)     I&O's Summary    27 Sep 2021 07:01  -  28 Sep 2021 07:00  --------------------------------------------------------  IN: 50 mL / OUT: 300 mL / NET: -250 mL    28 Sep 2021 07:01  -  28 Sep 2021 21:05  --------------------------------------------------------  IN: 600 mL / OUT: 650 mL / NET: -50 mL       GENERAL:  [x ]Alert  [ x]Oriented x3   [ ]Lethargic  [ ]Cachexia  [ ]Unarousable  [ x]Verbal  [ ]Non-Verbal  [ x] No Distress  Behavioral:   [ ] Anxiety  [ ] Delirium [ ] Agitation [ x] Calm  [ ] Other  HEENT:  [ ]Normal  [ ] Temporal Wasting  [ ]Dry mouth   [ ]ET Tube/Trach  [ ]Oral lesions  [x ] Mucositis  PULMONARY:   [x ]Clear [ ]Tachypnea  [ ]Audible excessive secretions   [ ]Rhonchi        [ ]Right [ ]Left [ ]Bilateral  [ ]Crackles        [ ]Right [ ]Left [ ]Bilateral  [ ]Wheezing     [ ]Right [ ]Left [ ]Bilateral  [ ]Diminished breath sounds [ ]right [ ]left [ ]bilateral  CARDIOVASCULAR:    [ x]Regular [ ]Irregular [ ]Tachy  [ ]Cj [ ]Murmur [ ]Other  GASTROINTESTINAL:  [ x]Soft  [ ]Distended   [x ]+BS  [ x]Non tender [ ]Tender  [ ]PEG [ ]OGT/ NGT  Last BM: 9/27  GENITOURINARY:  [ ]Normal [ ] Incontinent   [ ]Oliguria/Anuria   [ x] condom catheter   MUSCULOSKELETAL:   [ x]Normal   [ ]Weakness  [ ]Bed/Wheelchair bound [ ]Edema  [  ] amputation  [  ] contraction  NEUROLOGIC:   [ x]No focal deficits, except slight facial drooping of mouth on right side upon smiling  [ ]Cognitive impairment  [ ]Dysphagia [ ]Dysarthria [ ]Paresis [ ]Other   SKIN: Moisture Associated and Incontinence Associated Dermatitis. See Nursing Skin Assessment for further details  [ ]Normal    [ ]Rash  [ ]Pressure ulcer(s)       Present on admission [ ]y [ ]n  [  ]  Wound    [  ] hyperpigmentation    CRITICAL CARE:  [ ]Shock Present  [ ]Septic [ ]Cardiogenic [ ]Neurologic [ ]Hypovolemic  [ ]Vasopressors [ ]Inotropes  [ ]Respiratory failure present [ ]Mechanical Ventilation [ ]Non-invasive ventilatory support [ ]High-Flow   [ ]Acute  [ ]Chronic [ ]Hypoxic  [ ]Hypercarbic [ ]Other  [ ]Other organ failure     LABS:                        10.2   36.93 )-----------( 149      ( 28 Sep 2021 07:43 )             29.3   09-28    139  |  88<L>  |  42<H>  ----------------------------<  160<H>  3.6   |  31  |  0.91    Ca    7.3<L>      28 Sep 2021 07:43  Phos  2.5     09-28  Mg     2.30     09-28    TPro  5.2<L>  /  Alb  2.9<L>  /  TBili  0.9  /  DBili  x   /  AST  90<H>  /  ALT  120<H>  /  AlkPhos  168<H>  09-28      CAPILLARY BLOOD GLUCOSE      POCT Blood Glucose.: 209 mg/dL (28 Sep 2021 17:40)  POCT Blood Glucose.: 277 mg/dL (28 Sep 2021 12:37)  POCT Blood Glucose.: 166 mg/dL (28 Sep 2021 09:13)  POCT Blood Glucose.: 161 mg/dL (27 Sep 2021 22:34)      RADIOLOGY & ADDITIONAL STUDIES: No new imaging today    Protein Calorie Malnutrition Present: [ ]mild [ ]moderate [ ]severe [ ]underweight [ ]morbid obesity  https://www.andeal.org/vault/2440/web/files/ONC/Table_Clinical%20Characteristics%20to%20Document%20Malnutrition-White%20JV%20et%20al%202012.pdf    Height (cm): 177.8 (09-15-21 @ 08:19), 177.8 (08-05-21 @ 16:06), 177.8 (02-16-21 @ 10:16)  Weight (kg): 96 (09-22-21 @ 15:47), 96.2 (08-05-21 @ 19:09), 96.2 (02-16-21 @ 10:16)  BMI (kg/m2): 30.4 (09-22-21 @ 15:47), 30.4 (09-15-21 @ 08:19), 30.4 (08-05-21 @ 19:09)    [ ]PPSV2 < or = 30%  [ ]significant weight loss [ ]poor nutritional intake [ ]anasarca    [ ]Artificial Nutrition    REFERRALS:   [ ]Chaplaincy  [ ]Hospice  [ ]Child Life  [ ]Social Work  [ x]Case management [ ]Holistic Therapy     Goals of Care Document:

## 2021-09-28 NOTE — PROGRESS NOTE ADULT - SUBJECTIVE AND OBJECTIVE BOX
Patient is a 75y Male     Patient is a 75y old  Male who presents with a chief complaint of Fever (27 Sep 2021 17:16)      HPI:  74 yo M hx of CAD, MI, stent x2 (10/2009 at [facility]), pAfib, HTN, HLD, BIV-AICD, and systolic CHF, hx of PE (on Eliquis), recently dx’d pancreatic mass on chemo at [facility] now presenting with fever of 101 at home. He also endorsed diarrhea x3-4 days, watery. 4-5x daily. No recent abx exposure. No abdominal pain. Denies chest pain, dyspnea, palpitations. Just finished chemo today. Called his onc who advised him to come in. No abx exposure.  In the ED, vitals stable. Labs with leukopenia without neutropenia. Elevated ASt/ALT, hyponatremia. EKG paced. CXR clear. (15 Sep 2021 01:36)      PAST MEDICAL & SURGICAL HISTORY:  Hypertension (ICD9 401.9)    Hyperlipidemia (ICD9 272.4)    BPH (Benign Prostatic Hyperplasia)    Borderline diabetes mellitus    MI (myocardial infarction)  2009    Systolic heart failure    Hernia    Biventricular ICD (implantable cardioverter-defibrillator) in place  2010    Stented coronary artery  X 2, 2009        MEDICATIONS  (STANDING):  aspirin  chewable 81 milliGRAM(s) Oral daily  atorvastatin 80 milliGRAM(s) Oral at bedtime  baclofen 5 milliGRAM(s) Oral three times a day  calcium carbonate   1250 mG (OsCal) 1 Tablet(s) Oral two times a day  dexAMETHasone    Solution 1 milliGRAM(s) Oral four times a day  dextrose 40% Gel 15 Gram(s) Oral once  dextrose 5%. 1000 milliLiter(s) (50 mL/Hr) IV Continuous <Continuous>  dextrose 5%. 1000 milliLiter(s) (100 mL/Hr) IV Continuous <Continuous>  dextrose 50% Injectable 25 Gram(s) IV Push once  dextrose 50% Injectable 12.5 Gram(s) IV Push once  dextrose 50% Injectable 25 Gram(s) IV Push once  dronabinol 2.5 milliGRAM(s) Oral at bedtime  enoxaparin Injectable 70 milliGRAM(s) SubCutaneous two times a day  finasteride 5 milliGRAM(s) Oral at bedtime  gabapentin 100 milliGRAM(s) Oral at bedtime  glucagon  Injectable 1 milliGRAM(s) IntraMuscular once  influenza  Vaccine (HIGH DOSE) 0.7 milliLiter(s) IntraMuscular once  insulin glargine Injectable (LANTUS) 5 Unit(s) SubCutaneous at bedtime  insulin lispro (ADMELOG) corrective regimen sliding scale   SubCutaneous at bedtime  insulin lispro (ADMELOG) corrective regimen sliding scale   SubCutaneous three times a day before meals  methylPREDNISolone sodium succinate Injectable 80 milliGRAM(s) IV Push daily  metoprolol succinate ER 12.5 milliGRAM(s) Oral daily  pancrelipase  (CREON 24,000 Lipase Units) 1 Capsule(s) Oral four times a day with meals  pantoprazole  Injectable 40 milliGRAM(s) IV Push daily      Allergies    No Known Allergies    Intolerances        SOCIAL HISTORY:  Denies ETOh,Smoking,     FAMILY HISTORY:  No pertinent family history in first degree relatives        REVIEW OF SYSTEMS:    CONSTITUTIONAL: No weakness, fevers or chills  EYES/ENT: No visual changes;  No vertigo or throat pain   NECK: No pain or stiffness  RESPIRATORY: No cough, wheezing, hemoptysis; No shortness of breath  CARDIOVASCULAR: No chest pain or palpitations  GASTROINTESTINAL: No abdominal or epigastric pain. No nausea, vomiting, or hematemesis; No diarrhea or constipation. No melena or hematochezia.  GENITOURINARY: No dysuria, frequency or hematuria  NEUROLOGICAL: No numbness or weakness  SKIN: No itching, burning, rashes, or lesions   All other review of systems is negative unless indicated above.    VITAL:  T(C): , Max: 36.7 (09-28-21 @ 07:03)  T(F): , Max: 98 (09-28-21 @ 07:03)  HR: 89 (09-28-21 @ 07:03)  BP: 97/64 (09-28-21 @ 07:03)  BP(mean): --  RR: 18 (09-28-21 @ 07:03)  SpO2: 99% (09-28-21 @ 07:03)  Wt(kg): --    I and O's:    09-26 @ 07:01  -  09-27 @ 07:00  --------------------------------------------------------  IN: 700 mL / OUT: 1200 mL / NET: -500 mL    09-27 @ 07:01  -  09-28 @ 07:00  --------------------------------------------------------  IN: 50 mL / OUT: 300 mL / NET: -250 mL          PHYSICAL EXAM:    Constitutional: NAD  HEENT: PERRLA,   Neck: No JVD  Respiratory: CTA B/L  Cardiovascular: S1 and S2  Gastrointestinal: BS+, soft, NT/ND  Extremities: No peripheral edema  Neurological: A/O x 3, no focal deficits  Psychiatric: Normal mood, normal affect  : No Bowman  Skin: No rashes  Access: Not applicable  Back: No CVA tenderness    LABS:                        9.8    36.79 )-----------( 116      ( 27 Sep 2021 07:51 )             28.8     09-27    132<L>  |  89<L>  |  41<H>  ----------------------------<  180<H>  3.4<L>   |  29  |  1.05    Ca    6.8<L>      27 Sep 2021 07:51  Phos  2.0     09-27  Mg     2.10     09-27    TPro  5.3<L>  /  Alb  3.0<L>  /  TBili  0.9  /  DBili  x   /  AST  83<H>  /  ALT  123<H>  /  AlkPhos  178<H>  09-27          RADIOLOGY & ADDITIONAL STUDIES:

## 2021-09-28 NOTE — PROGRESS NOTE ADULT - ASSESSMENT
1. Metastatic Pancreatic CA    -- dx on 8/21/21  -- follows at [facility]  -- Started treatment on 8/31/21 per protocol , randomized to gemcitabine/nab-paclitaxel and dual immmunotherapy. Received [place]/Abraxane on 9/14  -- further care as outpt after d/c    2. Diarrhea    -- C diff negative  -- stool studies negative   -- may be sec to immunotx now stable.   -- appears to be improving previously but now pt reports diarrhea/loose stools still. GI eval appreciated  -- unclear if still related to immunotherapy, now with oral mucositis, may have GI mucositis as well  -- we discussed with [facility] pt's primary oncologist Dr Hodges ([phone]) re his sx on 9/25. After further discussion, weighing risks/benefits, decided to start pt on steroids for sx control as of 9/25.   Start on prednisone 1mg/kg daily (100mg daily), converted to solumedrol 80mg daily due to inability to tolerate PO, with plan for rapid taper down by 20mg prednisone equivalent q3days if sx responds. My plan is to cont current dose today. Have decreased to solumedrol 60mg today (9/28) when patient reported no diarrhea.  -- c/w GI ppx with IV protonix    3. Fevers - now resolved     -- ID following  -- No documented fevers  -- off of PO Vanco  -- off zosyn    4. Abnormal LFTS    -- significant transaminitis, likely sec to immunotherapy vs chemo vs mets  -- liver US shows mets   -- LFTs are slowly improving  -- monitor daily LFTs, they are elevated but stable  -- see above re steroids    5. pancytopenia     -- s/p GCSF, likely due to chemo, treatment, acute illness, meds  -- WBC now elevated 2/2 GCSF, monitor  -- counts otherwise adequate    6. Hyponatremia    -- stable  -- hypokalemia sec to diarrhea;  improving  -- renal following    7. anorexia -- due to illness but also now due to mucositis  -- cont home dose of dronabinol  2.5mg daily for now  -- nutrition f/u appreciated    8. LE edema -- per renal    9. mucositis -- see above re steroids  -- pall care consult  -- already on magic mouthwash, not helping  -- started Decadron 1 mg oral mouthwash four times per day  -- morphine 2 mg iv q4 prn pain    10. Hypocalcemia  -supplement IV    11. reported facial droop -- d/w primary team later this morning, planned for neuro imaging    12. audible AICD alarm -- EP eval appreciated, no event, alarm 2/2 not able to communicate remotely    13. Epistaxis -- patient given nasal saline spray - if continues would get ENT consult    We will continue to follow patient and coordinate with [facility]    Washington Townsend PA-C  Hematology/Oncology  [facility]   [phone] (cell)  [phone] (office)  [phone] (alt office)  Evenings and weekends please call MD on call or office

## 2021-09-28 NOTE — PROGRESS NOTE ADULT - PROBLEM SELECTOR PLAN 7
Emotional support provided, questions answered.    Thank you for allowing us to participate in your patient's care. Please page [phone] for any questions/concerns. Emotional support provided, questions answered.    Spoke with primary team ACP Ana about above recommendations; per her discussions with Dr. Dowd would like to hold off on trial of Morphine Magic Mouthwash combination    Thank you for allowing us to participate in your patient's care.  Patient to be discharged from [facility] team consult service today.   Please re-consult if we can be of further assistance, ext [phone] or page [phone].

## 2021-09-28 NOTE — PROGRESS NOTE ADULT - ASSESSMENT
75M newly diagnosed DM2 HbA1c 6.8%, prior history of prediabetes and recent diagnosis of pancreatic cancer.    1) DM2  HbA1c 6.8% - given age this A1c is controlled and at goal and may not require therapy at discharge if he does not require steroids on discharge  Checked c-peptide to ensure pancreatic mass is not inhibiting insulin secretion - c-peptide 3.9 (glu 161) is normal range.  This is reassuring that he likely would not require insulin for discharge.    Inpatient -  - Now on solumedrol 80 mg IV daily which is causing post-prandial hyperglycemia  - c/w Lantus 5 units qhs  - add Admelog 3 units before meals  - change correction scale to low correction scale qac and qhs  - consistent carb diet  - check FS qac and qhs   - will follow     Discharge plan at this point likely on no DM meds with monitoring of glucose levels at home using glucometer and following up with enodcrinologist Dr. Sam Fields to trend and carb consistent diet (as long as he does not require steroids on dc). If requires steroids on dc, can likely dc on oral agents only (would normally dc on Metformin but given elevated LFTs, defer Metformin and instead could use DPP24 inhibitor or Prandin to help with post-prandial hyperglycemia).    Explained to patient and family that glucose levels and A1c would need to be followed as outpatient in case pancreatic mass enlarges and further inhibits insulin secretion possibly requiring insulin therapy in the future.    2) HTN  BP goal < 130/80    3) Hyperlipidemia   on atorvastatin 80mg - consider dc given elevated LFTs    Fabby Fowler MD   Pager # [phone]  On evenings and weekends, please call the office at [phone] or page endocrine fellow on call. Please note that this patient may be followed by different provider tomorrow. If no answer, contact the office.

## 2021-09-28 NOTE — PROGRESS NOTE ADULT - ATTENDING COMMENTS
Feels a bit better today. Getting dizzy when standing up. No significant orthostatic hypotension.  Start losartan 12.5 mg po qd (hold for SBP<90).  Encourage PO intake.  PT every day. Will likely need short stay at rehab facility.

## 2021-09-28 NOTE — PROGRESS NOTE ADULT - SUBJECTIVE AND OBJECTIVE BOX
Deep Lagunas NP  CCU Service  Cardiology   Spect: [id number] ([facility])     PATIENT: REGGIE PANTOJA, MRN: [id number]    CHIEF COMPLAINT: Patient is a 75y old  Male who presents with a chief complaint of Fever (28 Sep 2021 07:55)      INTERVAL HISTORY/OVERNIGHT EVENTS: No overnight events. Denies abdominal pain, chest pain or SOB, cough. Oriented to person, place, and time. Breathing comfortably on room air.    REVIEW OF SYSTEMS:    Constitutional:     [ ] negative [ ] fevers [ ] chills [ ] weight loss [ ] weight gain  HEENT:                  [ ] negative [ ] dry eyes [ ] eye irritation [ ] postnasal drip [ ] nasal congestion  CV:                         [ ] negative  [ ] chest pain [ ] orthopnea [ ] palpitations [ ] murmur  Resp:                     [ ] negative [ ] cough [ ] shortness of breath [ ] dyspnea [ ] wheezing [ ] sputum [ ] hemoptysis  GI:                          [ ] negative [ ] nausea [ ] vomiting [ ] diarrhea [ ] constipation [ ] abd pain [ ] dysphagia   :                        [ ] negative [ ] dysuria [ ] nocturia [ ] hematuria [ ] increased urinary frequency  Musculoskeletal: [ ] negative [ ] back pain [ ] myalgias [ ] arthralgias [ ] fracture  Skin:                       [ ] negative [ ] rash [ ] itch  Neurological:        [ ] negative [ ] headache [ ] dizziness [ ] syncope [ ] weakness [ ] numbness  Psychiatric:           [ ] negative [ ] anxiety [ ] depression  Endocrine:            [ ] negative [ ] diabetes [ ] thyroid problem  Heme/Lymph:      [ ] negative [ ] anemia [ ] bleeding problem  Allergic/Immune: [ ] negative [ ] itchy eyes [ ] nasal discharge [ ] hives [ ] angioedema    [x] All other systems negative  [ ] Unable to assess ROS because ________.    MEDICATIONS:  MEDICATIONS  (STANDING):  aspirin  chewable 81 milliGRAM(s) Oral daily  atorvastatin 80 milliGRAM(s) Oral at bedtime  baclofen 5 milliGRAM(s) Oral three times a day  calcium carbonate   1250 mG (OsCal) 1 Tablet(s) Oral two times a day  dexAMETHasone    Solution 1 milliGRAM(s) Oral four times a day  dronabinol 2.5 milliGRAM(s) Oral at bedtime  enoxaparin Injectable 70 milliGRAM(s) SubCutaneous two times a day  finasteride 5 milliGRAM(s) Oral at bedtime  gabapentin 100 milliGRAM(s) Oral at bedtime  influenza  Vaccine (HIGH DOSE) 0.7 milliLiter(s) IntraMuscular once  insulin glargine Injectable (LANTUS) 5 Unit(s) SubCutaneous at bedtime  insulin lispro (ADMELOG) corrective regimen sliding scale   SubCutaneous at bedtime  insulin lispro (ADMELOG) corrective regimen sliding scale   SubCutaneous three times a day before meals  methylPREDNISolone sodium succinate Injectable 80 milliGRAM(s) IV Push daily  metoprolol succinate ER 12.5 milliGRAM(s) Oral daily  pancrelipase  (CREON 24,000 Lipase Units) 1 Capsule(s) Oral four times a day with meals  pantoprazole  Injectable 40 milliGRAM(s) IV Push daily    MEDICATIONS  (PRN):  acetaminophen   Tablet .. 650 milliGRAM(s) Oral every 6 hours PRN Temp greater or equal to 38.5C (101.3F), Mild Pain (1 - 3)  aluminum hydroxide/magnesium hydroxide/simethicone Suspension 30 milliLiter(s) Oral every 4 hours PRN Dyspepsia  lidocaine 2% Viscous 15 milliLiter(s) Swish and Spit every 4 hours PRN Mouth sores  melatonin 3 milliGRAM(s) Oral at bedtime PRN Insomnia  metoclopramide Injectable 5 milliGRAM(s) IV Push once PRN nausea  morphine  - Injectable 2 milliGRAM(s) IV Push every 4 hours PRN Moderate Pain (4 - 6)  ondansetron Injectable 4 milliGRAM(s) IV Push every 8 hours PRN Nausea and/or Vomiting  sodium chloride 0.65% Nasal 1 Spray(s) Both Nostrils four times a day PRN Nasal Congestion      ALLERGIES: Allergies    No Known Allergies    Intolerances        OBJECTIVE:  ICU Vital Signs Last 24 Hrs  T(C): 36.7 (28 Sep 2021 07:03), Max: 36.7 (28 Sep 2021 07:03)  T(F): 98 (28 Sep 2021 07:03), Max: 98 (28 Sep 2021 07:03)  HR: 89 (28 Sep 2021 07:03) (88 - 94)  BP: 97/64 (28 Sep 2021 07:03) (94/71 - 112/71)  RR: 18 (28 Sep 2021 07:03) (18 - 18)  SpO2: 99% (28 Sep 2021 07:03) (95% - 99%)    POCT Blood Glucose.: 161 mg/dL (27 Sep 2021 22:34)  POCT Blood Glucose.: 233 mg/dL (27 Sep 2021 18:05)  POCT Blood Glucose.: 175 mg/dL (27 Sep 2021 13:12)  POCT Blood Glucose.: 179 mg/dL (27 Sep 2021 09:15)    CAPILLARY BLOOD GLUCOSE      POCT Blood Glucose.: 161 mg/dL (27 Sep 2021 22:34)    I&O's Summary    27 Sep 2021 07:01  -  28 Sep 2021 07:00  --------------------------------------------------------  IN: 50 mL / OUT: 300 mL / NET: -250 mL      Daily     Daily Weight in k.6 (27 Sep 2021 14:05)    PHYSICAL EXAMINATION:  General: Comfortable, no acute distress, cooperative with exam.  HEENT: Moist mucous membranes.  Respiratory: L clear, R dec BS  CV: RRR, S1S2, no murmurs, rubs or gallops. No JVD. Distal pulses intact.  Abdominal: Soft, nontender, nondistended, no rebound or guarding, normal bowel sounds.  Neurology: AOx3, no focal neuro defects, MARQUES x 4.  Extremities: No pitting edema, + Peripheral pulses.  Incisions:   Tubes:    LABS:                          10.2   36.93 )-----------( 149      ( 28 Sep 2021 07:43 )             29.3         139  |  88<L>  |  42<H>  ----------------------------<  160<H>  3.6   |  31  |  0.91    Ca    7.3<L>      28 Sep 2021 07:43  Phos  2.5       Mg     2.30         TPro  5.2<L>  /  Alb  2.9<L>  /  TBili  0.9  /  DBili  x   /  AST  90<H>  /  ALT  120<H>  /  AlkPhos  168<H>      LIVER FUNCTIONS - ( 28 Sep 2021 07:43 )  Alb: 2.9 g/dL / Pro: 5.2 g/dL / ALK PHOS: 168 U/L / ALT: 120 U/L / AST: 90 U/L / GGT: x               TELEMETRY: NSR w pacing     IMAGING:   TTE with Doppler (w/Cont) (21 @ 13:59)  CONCLUSIONS:  Technically difficult study.  1. Mitral annular calcification, otherwise normal mitral  valve. Minimal mitralregurgitation.  2. Normal left ventricular internal dimensions and wall  thicknesses.  3. Endocardium not well visualized; grossly severe global  left ventricular systolic dysfunction.  Endocardial  visualization enhanced with intravenous injection of echo  contrast (Definity).  4. Mild diastolic dysfunction (Stage I).  5. Unable to accurately evaluate right ventricular size or  systolic function.  ------------------------

## 2021-09-28 NOTE — PROGRESS NOTE ADULT - PROBLEM SELECTOR PLAN 1
- on Lidocaine 2% Viscous 15mL swish and spit q4 PRN  - on Decadron Solution 1mg swish and spit four times a day per primary and onc teams   - can consider trial of mixing together 5mL of PO Morphine Solution with 15ML of Magic MouthWash for Swish and Spit q6 PRN   - Spoke with primary team BI Perez about above recommendations; per her discussions with Dr. Dowd would like to hold off on trial of Morphine Magic Mouthwash combination for now - on Lidocaine 2% Viscous 15mL swish and spit q4 PRN  - on Decadron Solution 1mg swish and spit four times a day per primary and onc teams   - can consider trial of mixing together 5mL of PO Morphine Solution with 15ML of Magic MouthWash for Swish and Spit q6 PRN

## 2021-09-29 NOTE — PROGRESS NOTE ADULT - PROBLEM SELECTOR PLAN 3
Observe. Be cautious with fluid status. Currently euvolemic.
Observe. Be cautious with fluid status. Currently euvolemic.  echo with dec lvfx  chf team to eval pt
Observe. Be cautious with fluid status. Currently euvolemic.
Observe. Be cautious with fluid status. Currently euvolemic.
Observe. Be cautious with fluid status. Currently euvolemic.  echo with dec lvfx  chf team f/u
Observe. Be cautious with fluid status. Currently euvolemic.  echo with dec lvfx  chf team f/u
Observe. Be cautious with fluid status. Currently euvolemic.
Observe. Be cautious with fluid status. Currently euvolemic.
Nutrition evaluation appreciated  Encourage PO intake as tolerated  On PO Dronabinol 2.5mg QHS as per outpatient regimen.
Observe. Be cautious with fluid status. Currently euvolemic.  echo with dec lvfx  chf team f/u
Observe. Be cautious with fluid status. Currently euvolemic.  echo with dec lvfx
Observe. Be cautious with fluid status. Currently euvolemic.  echo with dec lvfx  chf team to eval pt
Observe. Be cautious with fluid status. Currently euvolemic.

## 2021-09-29 NOTE — PROGRESS NOTE ADULT - SUBJECTIVE AND OBJECTIVE BOX
Chief Complaint: f/u DM2    History:  Patient seen at bedside this morning. Tolerating po, but does report oral discomfort. He had his breakfast this morning, states that he didn't have much of his dinner last night. No abdominal pain, nausea, vomiting.    Steroid decreased to Solumedrol 60 mg IV daily today.     MEDICATIONS  (STANDING):  aspirin  chewable 81 milliGRAM(s) Oral daily  baclofen 5 milliGRAM(s) Oral three times a day  calcium carbonate   1250 mG (OsCal) 1 Tablet(s) Oral two times a day  dexAMETHasone    Solution 1 milliGRAM(s) Oral four times a day  dextrose 40% Gel 15 Gram(s) Oral once  dextrose 5%. 1000 milliLiter(s) (50 mL/Hr) IV Continuous <Continuous>  dextrose 5%. 1000 milliLiter(s) (100 mL/Hr) IV Continuous <Continuous>  dextrose 50% Injectable 25 Gram(s) IV Push once  dextrose 50% Injectable 12.5 Gram(s) IV Push once  dextrose 50% Injectable 25 Gram(s) IV Push once  dronabinol 2.5 milliGRAM(s) Oral at bedtime  enoxaparin Injectable 70 milliGRAM(s) SubCutaneous two times a day  finasteride 5 milliGRAM(s) Oral at bedtime  gabapentin 100 milliGRAM(s) Oral at bedtime  glucagon  Injectable 1 milliGRAM(s) IntraMuscular once  influenza  Vaccine (HIGH DOSE) 0.7 milliLiter(s) IntraMuscular once  insulin glargine Injectable (LANTUS) 5 Unit(s) SubCutaneous at bedtime  insulin lispro (ADMELOG) corrective regimen sliding scale   SubCutaneous three times a day before meals  insulin lispro (ADMELOG) corrective regimen sliding scale   SubCutaneous at bedtime  insulin lispro Injectable (ADMELOG) 3 Unit(s) SubCutaneous three times a day before meals  methylPREDNISolone sodium succinate Injectable 60 milliGRAM(s) IV Push daily  metoprolol succinate ER 12.5 milliGRAM(s) Oral daily  nystatin    Suspension [id number] Unit(s) Oral two times a day  oxymetazoline 0.05% Nasal Spray 1 Spray(s) Both Nostrils every 12 hours  pancrelipase  (CREON 24,000 Lipase Units) 1 Capsule(s) Oral four times a day with meals  pantoprazole  Injectable 40 milliGRAM(s) IV Push daily    MEDICATIONS  (PRN):  acetaminophen   Tablet .. 650 milliGRAM(s) Oral every 6 hours PRN Temp greater or equal to 38.5C (101.3F), Mild Pain (1 - 3)  aluminum hydroxide/magnesium hydroxide/simethicone Suspension 30 milliLiter(s) Oral every 4 hours PRN Dyspepsia  lidocaine 2% Viscous 15 milliLiter(s) Swish and Spit every 4 hours PRN Mouth sores  melatonin 3 milliGRAM(s) Oral at bedtime PRN Insomnia  metoclopramide Injectable 5 milliGRAM(s) IV Push once PRN nausea  morphine  - Injectable 2 milliGRAM(s) IV Push every 4 hours PRN Moderate Pain (4 - 6)  ondansetron Injectable 4 milliGRAM(s) IV Push every 8 hours PRN Nausea and/or Vomiting  sodium chloride 0.65% Nasal 1 Spray(s) Both Nostrils four times a day PRN Nasal Congestion      Allergies  No Known Allergies    Review of Systems:  ALL OTHER SYSTEMS REVIEWED AND NEGATIVE    PHYSICAL EXAM:  VITALS: T(C): 36.3 (09-29-21 @ 08:00)  T(F): 97.4 (09-29-21 @ 08:00), Max: 98.4 (09-28-21 @ 14:35)  HR: 98 (09-29-21 @ 08:00) (94 - 100)  BP: 109/71 (09-29-21 @ 08:00) (95/67 - 111/78)  RR:  (18 - 18)  SpO2:  (97% - 100%)  Wt(kg): --  GENERAL: NAD, well-developed  EYES: No proptosis, anicteric  HEENT:  Atraumatic, Normocephalic  RESPIRATORY: + air movement bilaterally, no respiratory distress   PSYCH: reactive affect, appropriate mood     POCT Blood Glucose.: 172 mg/dL (09-29-21 @ 09:24)  POCT Blood Glucose.: 173 mg/dL (09-28-21 @ 22:51)  POCT Blood Glucose.: 192 mg/dL (09-28-21 @ 21:33)  POCT Blood Glucose.: 209 mg/dL (09-28-21 @ 17:40)  POCT Blood Glucose.: 277 mg/dL (09-28-21 @ 12:37)  POCT Blood Glucose.: 166 mg/dL (09-28-21 @ 09:13)  POCT Blood Glucose.: 161 mg/dL (09-27-21 @ 22:34)  POCT Blood Glucose.: 233 mg/dL (09-27-21 @ 18:05)  POCT Blood Glucose.: 175 mg/dL (09-27-21 @ 13:12)  POCT Blood Glucose.: 179 mg/dL (09-27-21 @ 09:15)  POCT Blood Glucose.: 207 mg/dL (09-26-21 @ 21:25)  POCT Blood Glucose.: 254 mg/dL (09-26-21 @ 18:33)  POCT Blood Glucose.: 241 mg/dL (09-26-21 @ 12:43)      09-28    139  |  88<L>  |  42<H>  ----------------------------<  160<H>  3.6   |  31  |  0.91    EGFR if : 95  EGFR if non : 82    Ca    7.3<L>      09-28  Mg     2.30     09-28  Phos  2.5     09-28    TPro  5.2<L>  /  Alb  2.9<L>  /  TBili  0.9  /  DBili  x   /  AST  90<H>  /  ALT  120<H>  /  AlkPhos  168<H>  09-28

## 2021-09-29 NOTE — PROGRESS NOTE ADULT - SUBJECTIVE AND OBJECTIVE BOX
SUBJECTIVE / OVERNIGHT EVENTS:pt seen and examined, c/o oral discomfort , denies difficulty swallowing    MEDICATIONS  (STANDING):  aspirin  chewable 81 milliGRAM(s) Oral daily  baclofen 5 milliGRAM(s) Oral three times a day  calcium carbonate   1250 mG (OsCal) 1 Tablet(s) Oral two times a day  dexAMETHasone    Solution 1 milliGRAM(s) Oral four times a day  dextrose 40% Gel 15 Gram(s) Oral once  dextrose 5%. 1000 milliLiter(s) (50 mL/Hr) IV Continuous <Continuous>  dextrose 5%. 1000 milliLiter(s) (100 mL/Hr) IV Continuous <Continuous>  dextrose 50% Injectable 25 Gram(s) IV Push once  dextrose 50% Injectable 12.5 Gram(s) IV Push once  dextrose 50% Injectable 25 Gram(s) IV Push once  dronabinol 2.5 milliGRAM(s) Oral at bedtime  enoxaparin Injectable 70 milliGRAM(s) SubCutaneous two times a day  finasteride 5 milliGRAM(s) Oral at bedtime  gabapentin 100 milliGRAM(s) Oral at bedtime  glucagon  Injectable 1 milliGRAM(s) IntraMuscular once  influenza  Vaccine (HIGH DOSE) 0.7 milliLiter(s) IntraMuscular once  insulin glargine Injectable (LANTUS) 5 Unit(s) SubCutaneous at bedtime  insulin lispro (ADMELOG) corrective regimen sliding scale   SubCutaneous three times a day before meals  insulin lispro (ADMELOG) corrective regimen sliding scale   SubCutaneous at bedtime  insulin lispro Injectable (ADMELOG) 3 Unit(s) SubCutaneous three times a day before meals  losartan 12.5 milliGRAM(s) Oral daily  methylPREDNISolone sodium succinate Injectable 60 milliGRAM(s) IV Push daily  metoprolol succinate ER 12.5 milliGRAM(s) Oral daily  nystatin    Suspension [id number] Unit(s) Oral two times a day  oxymetazoline 0.05% Nasal Spray 1 Spray(s) Both Nostrils every 12 hours  pancrelipase  (CREON 24,000 Lipase Units) 1 Capsule(s) Oral four times a day with meals  pantoprazole  Injectable 40 milliGRAM(s) IV Push daily    MEDICATIONS  (PRN):  acetaminophen   Tablet .. 650 milliGRAM(s) Oral every 6 hours PRN Temp greater or equal to 38.5C (101.3F), Mild Pain (1 - 3)  aluminum hydroxide/magnesium hydroxide/simethicone Suspension 30 milliLiter(s) Oral every 4 hours PRN Dyspepsia  lidocaine 2% Viscous 15 milliLiter(s) Swish and Spit every 4 hours PRN Mouth sores  melatonin 3 milliGRAM(s) Oral at bedtime PRN Insomnia  metoclopramide Injectable 5 milliGRAM(s) IV Push once PRN nausea  morphine  - Injectable 2 milliGRAM(s) IV Push every 4 hours PRN Moderate Pain (4 - 6)  ondansetron Injectable 4 milliGRAM(s) IV Push every 8 hours PRN Nausea and/or Vomiting  sodium chloride 0.65% Nasal 1 Spray(s) Both Nostrils four times a day PRN Nasal Congestion    Orthostatic VS  09-28-21 @ 10:42  Lying BP: 109/73 HR: 100  Sitting BP: 99/71 HR: 107  Standing BP: --/-- HR: --  Site: upper left arm  Mode: electronic  Orthostatic VS  09-27-21 @ 17:31  Vital Signs Last 24 Hrs  T(C): 36.3 (09-29-21 @ 16:00), Max: 36.7 (09-29-21 @ 06:30)  T(F): 97.4 (09-29-21 @ 16:00), Max: 98 (09-29-21 @ 06:30)  HR: 97 (09-29-21 @ 16:00) (94 - 100)  BP: 100/69 (09-29-21 @ 16:00) (100/69 - 111/78)  BP(mean): --  RR: 18 (09-29-21 @ 16:00) (18 - 18)  SpO2: 97% (09-29-21 @ 16:00) (97% - 100%)    Orthostatic VS  09-28-21 @ 22:00  Lying BP: 104/74 HR: 94  Sitting BP: 95/70 HR: 100  Standing BP: --/-- HR: --  Site: --  Mode: --  Orthostatic VS  09-28-21 @ 10:42  Lying BP: 109/73 HR: 100  Sitting BP: 99/71 HR: 107  Standing BP: --/-- HR: --  Site: upper left arm  Mode: electronic  Lying BP: 93/68 HR: 85  Sitting BP: 104/70 HR: 96  Standing BP: --/-- HR: --  Site: upper left arm  Mode: electronic      Constitutional: No fever, fatigue  Skin: No rash.  Eyes: No recent vision problems or eye pain.  ENT: No congestion, ear pain, or sore throat.  Cardiovascular: No chest pain or palpation.  Respiratory: No cough, shortness of breath, congestion, or wheezing.  Gastrointestinal: No abdominal pain, nausea, vomiting, or diarrhea.  Genitourinary: No dysuria.  Musculoskeletal: No joint swelling.  Neurologic: No headache.    PHYSICAL EXAM:  GENERAL: NAD  EYES: EOMI, PERRLA  shallow ulcers in oral cavity , no bleeding   NECK: Supple, No JVD  CHEST/LUNG: dec breath sounds at bases  HEART:  S1 , S2 +  ABDOMEN: soft , bs+  EXTREMITIES:no edema    NEUROLOGY:alert awake    LABS:  09-28    139  |  88<L>  |  42<H>  ----------------------------<  160<H>  3.6   |  31  |  0.91    Ca    7.3<L>      28 Sep 2021 07:43  Phos  2.5     09-28  Mg     2.30     09-28    TPro  5.2<L>  /  Alb  2.9<L>  /  TBili  0.9  /  DBili      /  AST  90<H>  /  ALT  120<H>  /  AlkPhos  168<H>  09-28    Creatinine Trend: 0.91 <--, 1.05 <--, 1.08 <--, 1.09 <--, 1.05 <--, 1.10 <--, 1.08 <--, 1.01 <--, 1.05 <--, 0.65 <--, 1.12 <--, 1.16 <--                        10.2   36.93 )-----------( 149      ( 28 Sep 2021 07:43 )             29.3     Urine Studies:            LIVER FUNCTIONS - ( 28 Sep 2021 07:43 )  Alb: 2.9 g/dL / Pro: 5.2 g/dL / ALK PHOS: 168 U/L / ALT: 120 U/L / AST: 90 U/L / GGT: x                       LIVER FUNCTIONS - ( 25 Sep 2021 06:46 )  Alb: 3.0 g/dL / Pro: 5.2 g/dL / ALK PHOS: 195 U/L / ALT: 143 U/L / AST: 101 U/L / GGT: x

## 2021-09-29 NOTE — PROGRESS NOTE ADULT - PROBLEM SELECTOR PROBLEM 5
Acute on chronic systolic heart failure
Acute anemia

## 2021-09-29 NOTE — PROGRESS NOTE ADULT - ASSESSMENT
1. Metastatic Pancreatic CA    -- dx on 8/21/21  -- follows at [facility]  -- Started treatment on 8/31/21 per protocol , randomized to gemcitabine/nab-paclitaxel and dual immmunotherapy. Received [place]/A[facility]xane on 9/14  -- further care as outpt after d/c    2. Diarrhea    -- C diff negative  -- stool studies negative   -- may be sec to immunotx now stable.   -- appears to be improving previously but now pt reports diarrhea/loose stools still. GI eval appreciated  -- unclear if still related to immunotherapy, now with oral mucositis, may have GI mucositis as well  -- we discussed with [facility] pt's primary oncologist Dr Hodges ([phone]) re his sx on 9/25. After further discussion, weighing risks/benefits, decided to start pt on steroids for sx control as of 9/25.   Start on prednisone 1mg/kg daily (100mg daily), converted to solumedrol 80mg daily due to inability to tolerate PO, with plan for rapid taper down by 20mg prednisone equivalent q3days if sx responds. My plan is to cont current dose today. Have decreased to solumedrol 60mg 9/28 when patient reported no diarrhea. Plan to decrease dose every 3 days if diarrhea remains better.   -- c/w GI ppx with IV protonix    3. Fevers - now resolved     -- ID following  -- No documented fevers  -- off of PO Vanco and IV zosyn    4. Abnormal LFTS    -- significant transaminitis, likely sec to immunotherapy vs chemo vs mets  -- liver US shows mets   -- LFTs are slowly improving  -- monitor daily LFTs, they are elevated but stable  -- see above re steroids    5. pancytopenia     -- s/p GCSF, likely due to chemo, treatment, acute illness, meds  -- WBC had increased 2/2 GCSF  -- counts otherwise adequate    6. Hyponatremia    -- stable  -- hypokalemia sec to diarrhea;  improving  -- renal following    7. anorexia -- due to illness but also now due to mucositis  -- cont home dose of dronabinol  2.5mg daily for now  -- nutrition f/u appreciated    8. LE edema -- per renal    9. mucositis -- see above re steroids  -- pall care consult appreciated  -- already on magic mouthwash, not helping  -- started Decadron 1 mg oral mouthwash four times per day  -- morphine 2 mg iv q4 prn pain  --Recommend good oral hygeine to be encouraged despite pain    10. Hypocalcemia  -supplement IV    11. reported facial droop -- resolved - imaging cancelled    12. audible AICD alarm -- EP eval appreciated, no event, alarm 2/2 not able to communicate remotely    13. Epistaxis -- patient given nasal saline spray and afrin - has resolved    We will continue to follow patient and coordinate with [facility]    Washington Townsend PA-C  Hematology/Oncology  [facility]   [phone] (cell)  [phone] (office)  [phone] (alt office)  Evenings and weekends please call MD on call or office

## 2021-09-29 NOTE — PROGRESS NOTE ADULT - PROBLEM SELECTOR PLAN 1
Neutropenia/subjective fever  - Continue Zosyn until ANC >500 and afebrile x 48 hours (can discontinue after this timeframe)  - Monitor for focal signs infection  c diff neg    lower ext weakness - neuro f/u - no further intervention- NO mri sec to aicd   oob in chair   PT   poss dc to Rehab

## 2021-09-29 NOTE — PROGRESS NOTE ADULT - SUBJECTIVE AND OBJECTIVE BOX
Patient is a 75y Male     Patient is a 75y old  Male who presents with a chief complaint of Fever (28 Sep 2021 21:04)      HPI:  76 yo M hx of CAD, MI, stent x2 (10/2009 at [facility]), pAfib, HTN, HLD, BIV-AICD, and systolic CHF, hx of PE (on Eliquis), [place] [facility] [place] with fever of 101 at home. He also endorsed diarrhea x3-4 days, watery. 4-5x daily. No recent abx exposure. No abdominal pain. Denies chest pain, dyspnea, palpitations. Just finished chemo today. Called his onc who advised him to come in. No abx exposure.  In the ED, vitals stable. Labs with leukopenia without neutropenia. Elevated ASt/ALT, hyponatremia. EKG paced. CXR clear. (15 Sep 2021 01:36)      PAST MEDICAL & SURGICAL HISTORY:  Hypertension (ICD9 401.9)    Hyperlipidemia (ICD9 272.4)    BPH (Benign Prostatic Hyperplasia)    Borderline diabetes mellitus    MI (myocardial infarction)  2009    Systolic heart failure    Hernia    Biventricular ICD (implantable cardioverter-defibrillator) in place  2010    Stented coronary artery  X 2, 2009        MEDICATIONS  (STANDING):  aspirin  chewable 81 milliGRAM(s) Oral daily  baclofen 5 milliGRAM(s) Oral three times a day  calcium carbonate   1250 mG (OsCal) 1 Tablet(s) Oral two times a day  dexAMETHasone    Solution 1 milliGRAM(s) Oral four times a day  dextrose 40% Gel 15 Gram(s) Oral once  dextrose 5%. 1000 milliLiter(s) (50 mL/Hr) IV Continuous <Continuous>  dextrose 5%. 1000 milliLiter(s) (100 mL/Hr) IV Continuous <Continuous>  dextrose 50% Injectable 25 Gram(s) IV Push once  dextrose 50% Injectable 12.5 Gram(s) IV Push once  dextrose 50% Injectable 25 Gram(s) IV Push once  dronabinol 2.5 milliGRAM(s) Oral at bedtime  enoxaparin Injectable 70 milliGRAM(s) SubCutaneous two times a day  finasteride 5 milliGRAM(s) Oral at bedtime  gabapentin 100 milliGRAM(s) Oral at bedtime  glucagon  Injectable 1 milliGRAM(s) IntraMuscular once  influenza  Vaccine (HIGH DOSE) 0.7 milliLiter(s) IntraMuscular once  insulin glargine Injectable (LANTUS) 5 Unit(s) SubCutaneous at bedtime  insulin lispro (ADMELOG) corrective regimen sliding scale   SubCutaneous three times a day before meals  insulin lispro (ADMELOG) corrective regimen sliding scale   SubCutaneous at bedtime  insulin lispro Injectable (ADMELOG) 3 Unit(s) SubCutaneous three times a day before meals  methylPREDNISolone sodium succinate Injectable 60 milliGRAM(s) IV Push daily  metoprolol succinate ER 12.5 milliGRAM(s) Oral daily  oxymetazoline 0.05% Nasal Spray 1 Spray(s) Both Nostrils every 12 hours  pancrelipase  (CREON 24,000 Lipase Units) 1 Capsule(s) Oral four times a day with meals  pantoprazole  Injectable 40 milliGRAM(s) IV Push daily      Allergies    No Known Allergies    Intolerances        SOCIAL HISTORY:  Denies ETOh,Smoking,     FAMILY HISTORY:  No pertinent family history in first degree relatives        REVIEW OF SYSTEMS:    CONSTITUTIONAL: No weakness, fevers or chills  EYES/ENT: No visual changes;  No vertigo or throat pain   NECK: No pain or stiffness  RESPIRATORY: No cough, wheezing, hemoptysis; No shortness of breath  CARDIOVASCULAR: No chest pain or palpitations  GASTROINTESTINAL: No abdominal or epigastric pain. No nausea, vomiting, or hematemesis; No diarrhea or constipation. No melena or hematochezia.  GENITOURINARY: No dysuria, frequency or hematuria  NEUROLOGICAL: No numbness or weakness  SKIN: No itching, burning, rashes, or lesions   All other review of systems is negative unless indicated above.    VITAL:  T(C): , Max: 36.9 (09-28-21 @ 14:35)  T(F): , Max: 98.4 (09-28-21 @ 14:35)  HR: 100 (09-29-21 @ 06:30)  BP: 111/78 (09-29-21 @ 06:30)  BP(mean): 85 (09-28-21 @ 18:04)  RR: 18 (09-29-21 @ 06:30)  SpO2: 100% (09-29-21 @ 06:30)  Wt(kg): --    I and O's:    09-27 @ 07:01  -  09-28 @ 07:00  --------------------------------------------------------  IN: 50 mL / OUT: 300 mL / NET: -250 mL    09-28 @ 07:01  -  09-29 @ 07:00  --------------------------------------------------------  IN: 718 mL / OUT: 950 mL / NET: -232 mL          PHYSICAL EXAM:    Constitutional: NAD  HEENT: PERRLA,   Neck: No JVD  Respiratory: CTA B/L  Cardiovascular: S1 and S2  Gastrointestinal: BS+, soft, NT/ND  Extremities: No peripheral edema  Neurological: A/O x 3, no focal deficits  Psychiatric: Normal mood, normal affect  : No Bowman  Skin: No rashes  Access: Not applicable  Back: No CVA tenderness    LABS:                        10.2   36.93 )-----------( 149      ( 28 Sep 2021 07:43 )             29.3     09-28    139  |  88<L>  |  42<H>  ----------------------------<  160<H>  3.6   |  31  |  0.91    Ca    7.3<L>      28 Sep 2021 07:43  Phos  2.5     09-28  Mg     2.30     09-28    TPro  5.2<L>  /  Alb  2.9<L>  /  TBili  0.9  /  DBili  x   /  AST  90<H>  /  ALT  120<H>  /  AlkPhos  168<H>  09-28          RADIOLOGY & ADDITIONAL STUDIES:

## 2021-09-29 NOTE — PROGRESS NOTE ADULT - SUBJECTIVE AND OBJECTIVE BOX
Interval History:  Patient resting comfortably in bed   Denies CP/SOB/palpitations  No acute events overnight.      Medications:  acetaminophen   Tablet .. 650 milliGRAM(s) Oral every 6 hours PRN  aluminum hydroxide/magnesium hydroxide/simethicone Suspension 30 milliLiter(s) Oral every 4 hours PRN  aspirin  chewable 81 milliGRAM(s) Oral daily  baclofen 5 milliGRAM(s) Oral three times a day  calcium carbonate   1250 mG (OsCal) 1 Tablet(s) Oral two times a day  dexAMETHasone    Solution 1 milliGRAM(s) Oral four times a day  dextrose 40% Gel 15 Gram(s) Oral once  dextrose 5%. 1000 milliLiter(s) IV Continuous <Continuous>  dextrose 5%. 1000 milliLiter(s) IV Continuous <Continuous>  dextrose 50% Injectable 25 Gram(s) IV Push once  dextrose 50% Injectable 12.5 Gram(s) IV Push once  dextrose 50% Injectable 25 Gram(s) IV Push once  dronabinol 2.5 milliGRAM(s) Oral at bedtime  enoxaparin Injectable 70 milliGRAM(s) SubCutaneous two times a day  finasteride 5 milliGRAM(s) Oral at bedtime  gabapentin 100 milliGRAM(s) Oral at bedtime  glucagon  Injectable 1 milliGRAM(s) IntraMuscular once  influenza  Vaccine (HIGH DOSE) 0.7 milliLiter(s) IntraMuscular once  insulin glargine Injectable (LANTUS) 5 Unit(s) SubCutaneous at bedtime  insulin lispro (ADMELOG) corrective regimen sliding scale   SubCutaneous three times a day before meals  insulin lispro (ADMELOG) corrective regimen sliding scale   SubCutaneous at bedtime  insulin lispro Injectable (ADMELOG) 3 Unit(s) SubCutaneous three times a day before meals  lidocaine 2% Viscous 15 milliLiter(s) Swish and Spit every 4 hours PRN  melatonin 3 milliGRAM(s) Oral at bedtime PRN  methylPREDNISolone sodium succinate Injectable 60 milliGRAM(s) IV Push daily  metoclopramide Injectable 5 milliGRAM(s) IV Push once PRN  metoprolol succinate ER 12.5 milliGRAM(s) Oral daily  morphine  - Injectable 2 milliGRAM(s) IV Push every 4 hours PRN  nystatin    Suspension [id number] Unit(s) Oral two times a day  ondansetron Injectable 4 milliGRAM(s) IV Push every 8 hours PRN  oxymetazoline 0.05% Nasal Spray 1 Spray(s) Both Nostrils every 12 hours  pancrelipase  (CREON 24,000 Lipase Units) 1 Capsule(s) Oral four times a day with meals  pantoprazole  Injectable 40 milliGRAM(s) IV Push daily  sodium chloride 0.65% Nasal 1 Spray(s) Both Nostrils four times a day PRN      Vitals:  T(C): 36.3 (09-29-21 @ 08:00), Max: 36.9 (09-28-21 @ 14:35)  HR: 98 (09-29-21 @ 08:00) (94 - 100)  BP: 109/71 (09-29-21 @ 08:00) (95/67 - 111/78)  BP(mean): 85 (09-28-21 @ 18:04) (79 - 85)  RR: 18 (09-29-21 @ 08:00) (18 - 18)  SpO2: 97% (09-29-21 @ 08:00) (97% - 100%)      I&O's Summary    28 Sep 2021 07:01  -  29 Sep 2021 07:00  --------------------------------------------------------  IN: 718 mL / OUT: 950 mL / NET: -232 mL        Physical Exam:  Appearance: No Acute Distress  Neck: No JVD  Cardiovascular: Normal S1 S2  Respiratory: Clear to auscultation bilaterally  Gastrointestinal: Soft, Non-tender	  Skin: No cyanosis	  Neurologic: Non-focal  Extremities: No LE edema  Psychiatry: A & O x 3, Mood & affect appropriate    Labs:                        10.2   36.93 )-----------( 149      ( 28 Sep 2021 07:43 )             29.3     09-28    139  |  88<L>  |  42<H>  ----------------------------<  160<H>  3.6   |  31  |  0.91    Ca    7.3<L>      28 Sep 2021 07:43  Phos  2.5     09-28  Mg     2.30     09-28    TPro  5.2<L>  /  Alb  2.9<L>  /  TBili  0.9  /  DBili  x   /  AST  90<H>  /  ALT  120<H>  /  AlkPhos  168<H>  09-28            TELEMETRY:    Echocardiogram:   Interval History:  Patient resting comfortably in bed   Denies CP/SOB/palpitations  No acute events overnight.      Medications:  acetaminophen   Tablet .. 650 milliGRAM(s) Oral every 6 hours PRN  aluminum hydroxide/magnesium hydroxide/simethicone Suspension 30 milliLiter(s) Oral every 4 hours PRN  aspirin  chewable 81 milliGRAM(s) Oral daily  baclofen 5 milliGRAM(s) Oral three times a day  calcium carbonate   1250 mG (OsCal) 1 Tablet(s) Oral two times a day  dexAMETHasone    Solution 1 milliGRAM(s) Oral four times a day  dextrose 40% Gel 15 Gram(s) Oral once  dextrose 5%. 1000 milliLiter(s) IV Continuous <Continuous>  dextrose 5%. 1000 milliLiter(s) IV Continuous <Continuous>  dextrose 50% Injectable 25 Gram(s) IV Push once  dextrose 50% Injectable 12.5 Gram(s) IV Push once  dextrose 50% Injectable 25 Gram(s) IV Push once  dronabinol 2.5 milliGRAM(s) Oral at bedtime  enoxaparin Injectable 70 milliGRAM(s) SubCutaneous two times a day  finasteride 5 milliGRAM(s) Oral at bedtime  gabapentin 100 milliGRAM(s) Oral at bedtime  glucagon  Injectable 1 milliGRAM(s) IntraMuscular once  influenza  Vaccine (HIGH DOSE) 0.7 milliLiter(s) IntraMuscular once  insulin glargine Injectable (LANTUS) 5 Unit(s) SubCutaneous at bedtime  insulin lispro (ADMELOG) corrective regimen sliding scale   SubCutaneous three times a day before meals  insulin lispro (ADMELOG) corrective regimen sliding scale   SubCutaneous at bedtime  insulin lispro Injectable (ADMELOG) 3 Unit(s) SubCutaneous three times a day before meals  lidocaine 2% Viscous 15 milliLiter(s) Swish and Spit every 4 hours PRN  melatonin 3 milliGRAM(s) Oral at bedtime PRN  methylPREDNISolone sodium succinate Injectable 60 milliGRAM(s) IV Push daily  metoclopramide Injectable 5 milliGRAM(s) IV Push once PRN  metoprolol succinate ER 12.5 milliGRAM(s) Oral daily  morphine  - Injectable 2 milliGRAM(s) IV Push every 4 hours PRN  nystatin    Suspension 448348 Unit(s) Oral two times a day  ondansetron Injectable 4 milliGRAM(s) IV Push every 8 hours PRN  oxymetazoline 0.05% Nasal Spray 1 Spray(s) Both Nostrils every 12 hours  pancrelipase  (CREON 24,000 Lipase Units) 1 Capsule(s) Oral four times a day with meals  pantoprazole  Injectable 40 milliGRAM(s) IV Push daily  sodium chloride 0.65% Nasal 1 Spray(s) Both Nostrils four times a day PRN      Vitals:  T(C): 36.3 (09-29-21 @ 08:00), Max: 36.9 (09-28-21 @ 14:35)  HR: 98 (09-29-21 @ 08:00) (94 - 100)  BP: 109/71 (09-29-21 @ 08:00) (95/67 - 111/78)  BP(mean): 85 (09-28-21 @ 18:04) (79 - 85)  RR: 18 (09-29-21 @ 08:00) (18 - 18)  SpO2: 97% (09-29-21 @ 08:00) (97% - 100%)      I&O's Summary    28 Sep 2021 07:01  -  29 Sep 2021 07:00  --------------------------------------------------------  IN: 718 mL / OUT: 950 mL / NET: -232 mL        Physical Exam:  Appearance: No Acute Distress  Neck: No JVD  Cardiovascular: Normal S1 S2  Respiratory: Clear to auscultation bilaterally  Gastrointestinal: Soft, Non-tender	  Skin: No cyanosis	  Neurologic: Non-focal  Extremities: 2+ weeping BLE edema  Psychiatry: A & O x 3      Labs:                        10.2   36.93 )-----------( 149      ( 28 Sep 2021 07:43 )             29.3     09-28    139  |  88<L>  |  42<H>  ----------------------------<  160<H>  3.6   |  31  |  0.91    Ca    7.3<L>      28 Sep 2021 07:43  Phos  2.5     09-28  Mg     2.30     09-28    TPro  5.2<L>  /  Alb  2.9<L>  /  TBili  0.9  /  DBili  x   /  AST  90<H>  /  ALT  120<H>  /  AlkPhos  168<H>  09-28      TELEMETRY: Sinus Rhythm ()    Echocardiogram:  TTE with Doppler (w/Cont) (09.21.21 @ 13:59)    DIMENSIONS:  Dimensions:     Normal Values:  LA:     3.3 cm    2.0 - 4.0 cm  Ao:     3.7 cm    2.0 - 3.8 cm  SEPTUM: 0.7 cm    0.6 - 1.2 cm  PWT:    0.7 cm    0.6 - 1.1 cm  LVIDd:  5.3 cm    3.0 - 5.6 cm  LVIDs:  4.0 cm    1.8 - 4.0 cm  Derived Variables:  LVMI: 60 g/m2  RWT: 0.26  Ejection Fraction (Visual Estimate): 30 %  ------------------------------------------------------------------------  OBSERVATIONS:  Mitral Valve: Mitral annular calcification, otherwise  normal mitral valve. Minimal mitral regurgitation.  Aortic Root: Normal aortic root.  Aortic Valve: Calcified trileaflet aortic valve with normal  opening.  Left Atrium: Normal left atrium.  Left Ventricle: Endocardium not well visualized; grossly  severe global left ventricular systolic dysfunction.  Endocardial visualization enhanced with intravenous  injection of echo contrast (Definity). Normal left  ventricular internal dimensions and wall thicknesses. Mild  diastolic dysfunction (Stage I).  Right Heart: Normal right atrium. Unable to accurately  evaluate right ventricular size or systolic function.  Tricuspid valve not well visualized. Pulmonic valve not  well visualized.  Pericardium/PleuraNormal pericardium with no pericardial  effusion.  ------------------------------------------------------------------------  CONCLUSIONS:  Technically difficult study.  1. Mitral annular calcification, otherwise normal mitral  valve. Minimal mitralregurgitation.  2. Normal left ventricular internal dimensions and wall  thicknesses.  3. Endocardium not well visualized; grossly severe global  left ventricular systolic dysfunction.  Endocardial  visualization enhanced with intravenous injection of echo  contrast (Definity).  4. Mild diastolic dysfunction (Stage I).  5. Unable to accurately evaluate right ventricular size or  systolic function.

## 2021-09-29 NOTE — PROGRESS NOTE ADULT - PROBLEM SELECTOR PLAN 5
3 pt drop from Aug. Will need to check outpatient record. No signs of GI bleed  -cont ac
3 pt drop from Aug. Will need to check outpatient record. No signs of GI bleed  -Get Occult fecal blood  -Transition to lovenox BID for now and if HGB stabilizes can restart eliquis  -Iron panels sent
3 pt drop from Aug. Will need to check outpatient record. No signs of GI bleed  -Get Occult fecal blood  -Transition to lovenox BID for now and if HGB stabilizes can restart eliquis  \
3 pt drop from Aug. Will need to check outpatient record. No signs of GI bleed  -Get Occult fecal blood  -Transition to lovenox BID for now and if HGB stabilizes can restart eliquis  \
3 pt drop from Aug. Will need to check outpatient record. No signs of GI bleed  -Get Occult fecal blood  -Transition to lovenox BID for now and if HGB stabilizes can restart eliquis  -Iron panels sent
3 pt drop from Aug. Will need to check outpatient record. No signs of GI bleed  -Get Occult fecal blood  -Transition to lovenox BID for now and if HGB stabilizes can restart eliquis  \
3 pt drop from Aug. Will need to check outpatient record. No signs of GI bleed  -cont ac
3 pt drop from Aug. Will need to check outpatient record. No signs of GI bleed  -Get Occult fecal blood  -Transition to lovenox BID for now and if HGB stabilizes can restart eliquis  -Iron panels sent
- management as per cardiology Heart Failure team.
3 pt drop from Aug. Will need to check outpatient record. No signs of GI bleed  -Get Occult fecal blood  -Transition to lovenox BID for now and if HGB stabilizes can restart eliquis  \
3 pt drop from Aug. Will need to check outpatient record. No signs of GI bleed  -cont ac

## 2021-09-29 NOTE — PROGRESS NOTE ADULT - PROBLEM SELECTOR PLAN 4
improving
improving
Could be leading to hyponatremia. C diff treatment for now until C diff comes back
improving
Could be leading to hyponatremia. C diff treatment for now until C diff comes back
improving
improving
Could be leading to hyponatremia. C diff treatment for now until C diff comes back
Now Resolving  possibly related to immunotherapy treatment   on IV Solumedrol 80mg QD  management as per primary and oncology teams.
improving
Could be leading to hyponatremia. C diff treatment for now until C diff comes back

## 2021-09-29 NOTE — PROGRESS NOTE ADULT - PROBLEM SELECTOR PLAN 2
Likely related to sepsis vs underlying pancreatic cancer  -monitor closely  likely sec to chemo
Likely related to sepsis vs underlying pancreatic cancer  -monitor closely
Likely related to sepsis vs underlying pancreatic cancer  -monitor closely  likely sec to chemo
Likely related to sepsis vs underlying pancreatic cancer  -Acute viral panels sent  -RUQ for portal vein thrombosis and hepatitis
Likely related to sepsis vs underlying pancreatic cancer  -Acute viral panels sent  -RUQ for portal vein thrombosis and hepatitis
Likely related to sepsis vs underlying pancreatic cancer  -monitor closely  likely sec to chemo
Likely related to sepsis vs underlying pancreatic cancer  -monitor closely  likely sec to chemo
Likely related to sepsis vs underlying pancreatic cancer  -monitor closely
Likely related to sepsis vs underlying pancreatic cancer  -monitor closely  likely sec to chemo
Likely related to sepsis vs underlying pancreatic cancer  -Acute viral panels sent  -RUQ for portal vein thrombosis and hepatitis
Swallow evaluation appreciated  On Dysphagia 1 Pureed Thin Liquid Diet.
Likely related to sepsis vs underlying pancreatic cancer  -monitor closely  likely sec to chemo
Likely related to sepsis vs underlying pancreatic cancer  -Acute viral panels sent  -RUQ for portal vein thrombosis and hepatitis

## 2021-09-29 NOTE — PROGRESS NOTE ADULT - PROBLEM SELECTOR PROBLEM 6
Metastasis from pancreatic cancer

## 2021-09-29 NOTE — PROGRESS NOTE ADULT - ASSESSMENT
76 yo M hx of CAD, MI, stent x2 (10/2009 at [facility]), pAfib, HTN, HLD, BIV-AICD, and systolic CHF, hx of PE (on Eliquis), recently dx’d pancreatic mass on chemo at [facility] [place] now presenting with fever of 101 at home and several days of watery diarrhea concerning for C diff colitis.

## 2021-09-29 NOTE — PROGRESS NOTE ADULT - ASSESSMENT
75 year old Male with PMH of HTN, HLD, CAD/MI, stent x2 (2009), HFrEF (EF 30%) s/p BiV ICD (2010). He was recently diagnosed with pAFib, PE/DVT (treated with Lovenox and Eliquis) on 8/20 at [facility] with incidental finding of pancreatic mass on Chest CT. He sought follow up care for metastatic pancreatic CA at [facility] [place], participating in clinical research trial (immunotherapy and chemotherapy) which began on 9/1 and last dose 9/14. Patient presented to ED with c/o lethargy, poor appetite, fever and diarrhea X 3 days. Labs revealed leukopenia without neutropenia, elevated AST/ALT and hyponatremia. Initially treated with fluid and albumin for dehydration. Now with volume overload treated with IV Lasix, switched to Bumex drip on 9/22 and started on low dose Dobutamine on 9/23  with improved urine output. However, patient remains with diarrhea and limited po intake; Bumex drip discontinued on 9/23 and weaned off Dobutamine today.     Toprol 12.5mg (hold SBP <95)  Please start Losartan 12.5mg (hold SBP <95)  Continue to encourage po intake and analgesia  for mouth sores.  Strict I/O  Management  per primary team

## 2021-09-29 NOTE — PROGRESS NOTE ADULT - PROBLEM SELECTOR PLAN 9
FENP: No IVF, Lytes PRN, Dysphagia diet, Lovenox ttx
FENP: No IVF, Lytes PRN, Dysphagia diet, Lovenox ttx
pts daughter was well forme in detail in length about pts current clinical status , management plan ,  all questions / concerns addressed   pts daughter requesting DULCE
pts daughter was well forme in detail in length about pts current clinical status , management plan ,  all questions / concerns addressed   pts daughter requesting DULCE

## 2021-09-29 NOTE — PROGRESS NOTE ADULT - ASSESSMENT
75M newly diagnosed DM2 HbA1c 6.8%, prior history of prediabetes and recent diagnosis of pancreatic cancer.    1) DM2  HbA1c 6.8% - given age this A1c is controlled and at goal and may not require therapy at discharge if he does not require steroids on discharge  Checked c-peptide to ensure pancreatic mass is not inhibiting insulin secretion - c-peptide 3.9 (glu 161) is normal range.  This is reassuring that he likely would not require insulin for discharge.    Inpatient -  - Now on solumedrol 60 mg IV daily which is causing post-prandial hyperglycemia  - c/w Lantus 5 units qhs  - monitor on Admelog 3 units before meals  - low correction scale qac and qhs  - consistent carb diet  - check FS qac and qhs   - will follow     Discharge plan at this point likely on no DM meds with monitoring of glucose levels at home using glucometer and following up with enodcrinologist Dr. Sam Fields and carb consistent diet (as long as he does not require steroids on dc). If requires steroids on dc, can likely dc on oral agents only (would normally dc on Metformin but given elevated LFTs, defer Metformin and instead could use DPP24 inhibitor or Prandin to help with post-prandial hyperglycemia).    Explained to patient and family that glucose levels and A1c would need to be followed as outpatient in case pancreatic mass enlarges and further inhibits insulin secretion possibly requiring insulin therapy in the future.    2) HTN  BP goal < 130/80, at goal  On Metoprolol    3) Hyperlipidemia   statin discontinued due to elevated LFTs    Fabby Fowler MD   Pager # [phone]  On evenings and weekends, please call the office at [phone] or page endocrine fellow on call. Please note that this patient may be followed by different provider tomorrow. If no answer, contact the office.

## 2021-09-29 NOTE — PROGRESS NOTE ADULT - SUBJECTIVE AND OBJECTIVE BOX
Patient is a 75y old  Male who presents with a chief complaint of Fever (29 Sep 2021 08:33)    Patient seen this morning. Diarrhea is improved but his mouth sores are still painful. Continuing solumedrop (currently on 60 mg PO daily) and dexamethasone oral rinse. Spoke with wife, she states that he has not been brushing his teeth secondary to pain. Epistaxis has resolved.    MEDICATIONS  (STANDING):  aspirin  chewable 81 milliGRAM(s) Oral daily  baclofen 5 milliGRAM(s) Oral three times a day  calcium carbonate   1250 mG (OsCal) 1 Tablet(s) Oral two times a day  dexAMETHasone    Solution 1 milliGRAM(s) Oral four times a day  dextrose 40% Gel 15 Gram(s) Oral once  dextrose 5%. 1000 milliLiter(s) (50 mL/Hr) IV Continuous <Continuous>  dextrose 5%. 1000 milliLiter(s) (100 mL/Hr) IV Continuous <Continuous>  dextrose 50% Injectable 25 Gram(s) IV Push once  dextrose 50% Injectable 12.5 Gram(s) IV Push once  dextrose 50% Injectable 25 Gram(s) IV Push once  dronabinol 2.5 milliGRAM(s) Oral at bedtime  enoxaparin Injectable 70 milliGRAM(s) SubCutaneous two times a day  finasteride 5 milliGRAM(s) Oral at bedtime  gabapentin 100 milliGRAM(s) Oral at bedtime  glucagon  Injectable 1 milliGRAM(s) IntraMuscular once  influenza  Vaccine (HIGH DOSE) 0.7 milliLiter(s) IntraMuscular once  insulin glargine Injectable (LANTUS) 5 Unit(s) SubCutaneous at bedtime  insulin lispro (ADMELOG) corrective regimen sliding scale   SubCutaneous three times a day before meals  insulin lispro (ADMELOG) corrective regimen sliding scale   SubCutaneous at bedtime  insulin lispro Injectable (ADMELOG) 3 Unit(s) SubCutaneous three times a day before meals  methylPREDNISolone sodium succinate Injectable 60 milliGRAM(s) IV Push daily  metoprolol succinate ER 12.5 milliGRAM(s) Oral daily  nystatin    Suspension 868999 Unit(s) Oral two times a day  oxymetazoline 0.05% Nasal Spray 1 Spray(s) Both Nostrils every 12 hours  pancrelipase  (CREON 24,000 Lipase Units) 1 Capsule(s) Oral four times a day with meals  pantoprazole  Injectable 40 milliGRAM(s) IV Push daily    MEDICATIONS  (PRN):  acetaminophen   Tablet .. 650 milliGRAM(s) Oral every 6 hours PRN Temp greater or equal to 38.5C (101.3F), Mild Pain (1 - 3)  aluminum hydroxide/magnesium hydroxide/simethicone Suspension 30 milliLiter(s) Oral every 4 hours PRN Dyspepsia  lidocaine 2% Viscous 15 milliLiter(s) Swish and Spit every 4 hours PRN Mouth sores  melatonin 3 milliGRAM(s) Oral at bedtime PRN Insomnia  metoclopramide Injectable 5 milliGRAM(s) IV Push once PRN nausea  morphine  - Injectable 2 milliGRAM(s) IV Push every 4 hours PRN Moderate Pain (4 - 6)  ondansetron Injectable 4 milliGRAM(s) IV Push every 8 hours PRN Nausea and/or Vomiting  sodium chloride 0.65% Nasal 1 Spray(s) Both Nostrils four times a day PRN Nasal Congestion      ROS  No fever, sweats, chills  Ongoing mouth pain  No CP, SOB, cough, sputum  No n/v/d, abd pain, melena, hematochezia  No edema  No rash  No anxiety  No back pain, joint pain  No bleeding, bruising  No dysuria, hematuria    Vital Signs Last 24 Hrs  T(C): 36.3 (29 Sep 2021 08:00), Max: 36.9 (28 Sep 2021 14:35)  T(F): 97.4 (29 Sep 2021 08:00), Max: 98.4 (28 Sep 2021 14:35)  HR: 98 (29 Sep 2021 08:00) (94 - 100)  BP: 109/71 (29 Sep 2021 08:00) (95/67 - 111/78)  BP(mean): 85 (28 Sep 2021 18:04) (79 - 85)  RR: 18 (29 Sep 2021 08:00) (18 - 18)  SpO2: 97% (29 Sep 2021 08:00) (96% - 100%)    PE  NAD  Awake, alert  Oral ulcerations lower lip - clean without discharge  Anicteric, MMM  No c/c/e  No rash grossly                            10.2   36.93 )-----------( 149      ( 28 Sep 2021 07:43 )             29.3       09-28    139  |  88<L>  |  42<H>  ----------------------------<  160<H>  3.6   |  31  |  0.91    Ca    7.3<L>      28 Sep 2021 07:43  Phos  2.5     09-28  Mg     2.30     09-28    TPro  5.2<L>  /  Alb  2.9<L>  /  TBili  0.9  /  DBili  x   /  AST  90<H>  /  ALT  120<H>  /  AlkPhos  168<H>  09-28

## 2021-09-29 NOTE — PROGRESS NOTE ADULT - PROBLEM SELECTOR PLAN 8
cont ac
-Transition to lovenox BID for now and if HGB stabilizes can restart eliquis
cont ac
-Transition to lovenox BID for now and if HGB stabilizes can restart eliquis
cont ac
-Transition to lovenox BID for now and if HGB stabilizes can restart eliquis

## 2021-09-29 NOTE — PROGRESS NOTE ADULT - ATTENDING COMMENTS
Start losartan 12.5 mg po qd.  D/c planning. Start losartan 12.5 mg po qd.  D/c planning.    Will sign off. He will f/u with his cardiologist, Stevenson Matthew.

## 2021-09-29 NOTE — PROGRESS NOTE ADULT - PROBLEM SELECTOR PROBLEM 8
H/O pulmonary thrombosis
Palliative care encounter
H/O pulmonary thrombosis

## 2021-09-30 NOTE — PROGRESS NOTE ADULT - NUTRITIONAL ASSESSMENT
This patient has been assessed with a concern for Malnutrition and has been determined to have a diagnosis/diagnoses of Severe protein-calorie malnutrition.    This patient is being managed with:   Diet Regular-  1000mL Fluid Restriction ([id number])  Supplement Feeding Modality:  Oral  Ensure Clear Cans or Servings Per Day:  1       Frequency:  Three Times a day  Entered: Sep 21 2021  1:58PM    
This patient has been assessed with a concern for Malnutrition and has been determined to have a diagnosis/diagnoses of Severe protein-calorie malnutrition.    This patient is being managed with:   Diet Regular-  1000mL Fluid Restriction ([id number])  Supplement Feeding Modality:  Oral  Ensure Clear Cans or Servings Per Day:  1       Frequency:  Three Times a day  Entered: Sep 21 2021  1:58PM    
This patient has been assessed with a concern for Malnutrition and has been determined to have a diagnosis/diagnoses of Severe protein-calorie malnutrition.    This patient is being managed with:   Diet Dysphagia 2 Mechanical Soft-Thin Liquids-  DASH/TLC {Sodium & Cholesterol Restricted} (DASH)  1000mL Fluid Restriction ([id number])  Supplement Feeding Modality:  Oral  Ensure Enlive Servings Per Day:  1       Frequency:  Three Times a day  Entered: Sep 24 2021  2:07PM    
This patient has been assessed with a concern for Malnutrition and has been determined to have a diagnosis/diagnoses of Severe protein-calorie malnutrition.    This patient is being managed with:   Diet Regular-  1000mL Fluid Restriction ([id number])  Supplement Feeding Modality:  Oral  Ensure Clear Cans or Servings Per Day:  1       Frequency:  Three Times a day  Entered: Sep 21 2021  1:58PM    
This patient has been assessed with a concern for Malnutrition and has been determined to have a diagnosis/diagnoses of Severe protein-calorie malnutrition.    This patient is being managed with:   Diet Regular-  1000mL Fluid Restriction ([id number])  Supplement Feeding Modality:  Oral  Ensure Enlive Cans or Servings Per Day:  3       Frequency:  Daily  Entered: Sep 20 2021  1:41PM    
This patient has been assessed with a concern for Malnutrition and has been determined to have a diagnosis/diagnoses of Severe protein-calorie malnutrition.    This patient is being managed with:   Diet Dysphagia 1 Pureed-Thin Liquids-  DASH/TLC {Sodium & Cholesterol Restricted} (DASH)  1000mL Fluid Restriction ([id number])  Supplement Feeding Modality:  Oral  Ensure Enlive Servings Per Day:  1       Frequency:  Three Times a day  Entered: Sep 27 2021  2:24PM    
This patient has been assessed with a concern for Malnutrition and has been determined to have a diagnosis/diagnoses of Severe protein-calorie malnutrition.    This patient is being managed with:   Diet Dysphagia 2 Mechanical Soft-Thin Liquids-  DASH/TLC {Sodium & Cholesterol Restricted} (DASH)  1000mL Fluid Restriction ([id number])  Supplement Feeding Modality:  Oral  Ensure Enlive Servings Per Day:  1       Frequency:  Three Times a day  Entered: Sep 24 2021  2:07PM    
This patient has been assessed with a concern for Malnutrition and has been determined to have a diagnosis/diagnoses of Severe protein-calorie malnutrition.    This patient is being managed with:   Diet Regular-  1000mL Fluid Restriction ([id number])  Supplement Feeding Modality:  Oral  Ensure Clear Cans or Servings Per Day:  1       Frequency:  Three Times a day  Entered: Sep 21 2021  1:58PM    
This patient has been assessed with a concern for Malnutrition and has been determined to have a diagnosis/diagnoses of Severe protein-calorie malnutrition.    This patient is being managed with:   Diet Regular-  1000mL Fluid Restriction ([id number])  Supplement Feeding Modality:  Oral  Ensure Enlive Cans or Servings Per Day:  3       Frequency:  Daily  Entered: Sep 20 2021  1:41PM    
This patient has been assessed with a concern for Malnutrition and has been determined to have a diagnosis/diagnoses of Severe protein-calorie malnutrition.    This patient is being managed with:   Diet Dysphagia 1 Pureed-Thin Liquids-  DASH/TLC {Sodium & Cholesterol Restricted} (DASH)  1000mL Fluid Restriction ([id number])  Supplement Feeding Modality:  Oral  Ensure Enlive Servings Per Day:  1       Frequency:  Three Times a day  Entered: Sep 27 2021  2:24PM    
This patient has been assessed with a concern for Malnutrition and has been determined to have a diagnosis/diagnoses of Severe protein-calorie malnutrition.    This patient is being managed with:   Diet Dysphagia 2 Mechanical Soft-Thin Liquids-  DASH/TLC {Sodium & Cholesterol Restricted} (DASH)  1000mL Fluid Restriction ([id number])  Supplement Feeding Modality:  Oral  Ensure Enlive Servings Per Day:  1       Frequency:  Three Times a day  Entered: Sep 24 2021  2:07PM    
This patient has been assessed with a concern for Malnutrition and has been determined to have a diagnosis/diagnoses of Severe protein-calorie malnutrition.    This patient is being managed with:   Diet Dysphagia 2 Mechanical Soft-Thin Liquids-  DASH/TLC {Sodium & Cholesterol Restricted} (DASH)  1000mL Fluid Restriction ([id number])  Supplement Feeding Modality:  Oral  Ensure Enlive Servings Per Day:  1       Frequency:  Three Times a day  Entered: Sep 24 2021  2:07PM    
This patient has been assessed with a concern for Malnutrition and has been determined to have a diagnosis/diagnoses of Severe protein-calorie malnutrition.    This patient is being managed with:   Diet Regular-  1000mL Fluid Restriction ([id number])  Supplement Feeding Modality:  Oral  Ensure Clear Cans or Servings Per Day:  1       Frequency:  Three Times a day  Entered: Sep 21 2021  1:58PM    
This patient has been assessed with a concern for Malnutrition and has been determined to have a diagnosis/diagnoses of Severe protein-calorie malnutrition.    This patient is being managed with:   Diet Dysphagia 1 Pureed-Thin Liquids-  DASH/TLC {Sodium & Cholesterol Restricted} (DASH)  1000mL Fluid Restriction ([id number])  Supplement Feeding Modality:  Oral  Ensure Enlive Servings Per Day:  1       Frequency:  Three Times a day  Entered: Sep 27 2021  2:24PM    
This patient has been assessed with a concern for Malnutrition and has been determined to have a diagnosis/diagnoses of Severe protein-calorie malnutrition.    This patient is being managed with:   Diet Dysphagia 2 Mechanical Soft-Thin Liquids-  DASH/TLC {Sodium & Cholesterol Restricted} (DASH)  1000mL Fluid Restriction ([id number])  Supplement Feeding Modality:  Oral  Ensure Enlive Servings Per Day:  1       Frequency:  Three Times a day  Entered: Sep 24 2021  2:07PM    
This patient has been assessed with a concern for Malnutrition and has been determined to have a diagnosis/diagnoses of Severe protein-calorie malnutrition.    This patient is being managed with:   Diet Dysphagia 2 Mechanical Soft-Thin Liquids-  DASH/TLC {Sodium & Cholesterol Restricted} (DASH)  1000mL Fluid Restriction ([id number])  Supplement Feeding Modality:  Oral  Ensure Enlive Servings Per Day:  1       Frequency:  Three Times a day  Entered: Sep 24 2021  2:07PM    
This patient has been assessed with a concern for Malnutrition and has been determined to have a diagnosis/diagnoses of Severe protein-calorie malnutrition.    This patient is being managed with:   Diet Dysphagia 2 Mechanical Soft-Thin Liquids-  DASH/TLC {Sodium & Cholesterol Restricted} (DASH)  1000mL Fluid Restriction ([id number])  Supplement Feeding Modality:  Oral  Ensure Enlive Servings Per Day:  1       Frequency:  Three Times a day  Entered: Sep 24 2021  2:07PM    
This patient has been assessed with a concern for Malnutrition and has been determined to have a diagnosis/diagnoses of Severe protein-calorie malnutrition.    This patient is being managed with:   Diet Regular-  1000mL Fluid Restriction ([id number])  Supplement Feeding Modality:  Oral  Ensure Enlive Cans or Servings Per Day:  3       Frequency:  Daily  Entered: Sep 20 2021  1:41PM    
This patient has been assessed with a concern for Malnutrition and has been determined to have a diagnosis/diagnoses of Severe protein-calorie malnutrition.    This patient is being managed with:   Diet Regular-  1000mL Fluid Restriction ([id number])  Supplement Feeding Modality:  Oral  Ensure Enlive Cans or Servings Per Day:  3       Frequency:  Daily  Entered: Sep 20 2021  1:41PM    
This patient has been assessed with a concern for Malnutrition and has been determined to have a diagnosis/diagnoses of Severe protein-calorie malnutrition.    This patient is being managed with:   Diet Regular-  1000mL Fluid Restriction ([id number])  Supplement Feeding Modality:  Oral  Ensure Enlive Cans or Servings Per Day:  3       Frequency:  Daily  Entered: Sep 20 2021  1:41PM    
This patient has been assessed with a concern for Malnutrition and has been determined to have a diagnosis/diagnoses of Severe protein-calorie malnutrition.    This patient is being managed with:   Diet Dysphagia 2 Mechanical Soft-Thin Liquids-  DASH/TLC {Sodium & Cholesterol Restricted} (DASH)  1000mL Fluid Restriction ([id number])  Supplement Feeding Modality:  Oral  Ensure Enlive Servings Per Day:  1       Frequency:  Three Times a day  Entered: Sep 24 2021  2:07PM    
This patient has been assessed with a concern for Malnutrition and has been determined to have a diagnosis/diagnoses of Severe protein-calorie malnutrition.    This patient is being managed with:   Diet Regular-  1000mL Fluid Restriction ([id number])  Supplement Feeding Modality:  Oral  Ensure Clear Cans or Servings Per Day:  1       Frequency:  Three Times a day  Entered: Sep 21 2021  1:58PM    
This patient has been assessed with a concern for Malnutrition and has been determined to have a diagnosis/diagnoses of Severe protein-calorie malnutrition.    This patient is being managed with:   Diet Regular-  1000mL Fluid Restriction ([id number])  Supplement Feeding Modality:  Oral  Ensure Clear Cans or Servings Per Day:  1       Frequency:  Three Times a day  Entered: Sep 21 2021  1:58PM      This patient has been assessed with a concern for Malnutrition and has been determined to have a diagnosis/diagnoses of Severe protein-calorie malnutrition.    This patient is being managed with:   Diet Regular-  1000mL Fluid Restriction ([id number])  Supplement Feeding Modality:  Oral  Ensure Clear Cans or Servings Per Day:  1       Frequency:  Three Times a day  Entered: Sep 21 2021  1:58PM    
This patient has been assessed with a concern for Malnutrition and has been determined to have a diagnosis/diagnoses of Severe protein-calorie malnutrition.    This patient is being managed with:   Diet Dysphagia 1 Pureed-Thin Liquids-  DASH/TLC {Sodium & Cholesterol Restricted} (DASH)  1000mL Fluid Restriction ([id number])  Supplement Feeding Modality:  Oral  Ensure Enlive Servings Per Day:  1       Frequency:  Three Times a day  Entered: Sep 27 2021  2:24PM    
This patient has been assessed with a concern for Malnutrition and has been determined to have a diagnosis/diagnoses of Severe protein-calorie malnutrition.    This patient is being managed with:   Diet Dysphagia 2 Mechanical Soft-Thin Liquids-  DASH/TLC {Sodium & Cholesterol Restricted} (DASH)  1000mL Fluid Restriction ([id number])  Supplement Feeding Modality:  Oral  Ensure Enlive Servings Per Day:  1       Frequency:  Three Times a day  Entered: Sep 24 2021  2:07PM    
This patient has been assessed with a concern for Malnutrition and has been determined to have a diagnosis/diagnoses of Severe protein-calorie malnutrition.    This patient is being managed with:   Diet Dysphagia 2 Mechanical Soft-Thin Liquids-  DASH/TLC {Sodium & Cholesterol Restricted} (DASH)  1000mL Fluid Restriction ([id number])  Supplement Feeding Modality:  Oral  Ensure Enlive Servings Per Day:  1       Frequency:  Three Times a day  Entered: Sep 24 2021  2:07PM    
This patient has been assessed with a concern for Malnutrition and has been determined to have a diagnosis/diagnoses of Severe protein-calorie malnutrition.    This patient is being managed with:   Diet Regular-  1000mL Fluid Restriction ([id number])  Supplement Feeding Modality:  Oral  Ensure Clear Cans or Servings Per Day:  1       Frequency:  Three Times a day  Entered: Sep 21 2021  1:58PM    
This patient has been assessed with a concern for Malnutrition and has been determined to have a diagnosis/diagnoses of Severe protein-calorie malnutrition.    This patient is being managed with:   Diet Dysphagia 1 Pureed-Thin Liquids-  DASH/TLC {Sodium & Cholesterol Restricted} (DASH)  1000mL Fluid Restriction ([id number])  Supplement Feeding Modality:  Oral  Ensure Enlive Servings Per Day:  1       Frequency:  Three Times a day  Entered: Sep 27 2021  2:24PM    
This patient has been assessed with a concern for Malnutrition and has been determined to have a diagnosis/diagnoses of Severe protein-calorie malnutrition.    This patient is being managed with:   Diet Dysphagia 2 Mechanical Soft-Thin Liquids-  DASH/TLC {Sodium & Cholesterol Restricted} (DASH)  1000mL Fluid Restriction ([id number])  Supplement Feeding Modality:  Oral  Ensure Enlive Servings Per Day:  1       Frequency:  Three Times a day  Entered: Sep 24 2021  2:07PM    
This patient has been assessed with a concern for Malnutrition and has been determined to have a diagnosis/diagnoses of Severe protein-calorie malnutrition.    This patient is being managed with:   Diet Dysphagia 1 Pureed-Thin Liquids-  DASH/TLC {Sodium & Cholesterol Restricted} (DASH)  1000mL Fluid Restriction ([id number])  Supplement Feeding Modality:  Oral  Ensure Enlive Servings Per Day:  1       Frequency:  Three Times a day  Entered: Sep 27 2021  2:24PM

## 2021-09-30 NOTE — PROGRESS NOTE ADULT - SUBJECTIVE AND OBJECTIVE BOX
Patient is a 75y Male     Patient is a 75y old  Male who presents with a chief complaint of Fever (29 Sep 2021 11:25)      HPI:  76 yo M hx of CAD, MI, stent x2 (10/2009 at [facility]), pAfib, HTN, HLD, BIV-AICD, and systolic CHF, hx of PE (on Eliquis), recently dx’d pancreatic mass on chemo at [facility] [place] now presenting with fever of 101 at home. He also endorsed diarrhea x3-4 days, watery. 4-5x daily. No recent abx exposure. No abdominal pain. Denies chest pain, dyspnea, palpitations. Just finished chemo today. Called his onc who advised him to come in. No abx exposure.  In the ED, vitals stable. Labs with leukopenia without neutropenia. Elevated ASt/ALT, hyponatremia. EKG paced. CXR clear. (15 Sep 2021 01:36)      PAST MEDICAL & SURGICAL HISTORY:  Hypertension (ICD9 401.9)    Hyperlipidemia (ICD9 272.4)    BPH (Benign Prostatic Hyperplasia)    Borderline diabetes mellitus    MI (myocardial infarction)  2009    Systolic heart failure    Hernia    Biventricular ICD (implantable cardioverter-defibrillator) in place  2010    Stented coronary artery  X 2, 2009        MEDICATIONS  (STANDING):  aspirin  chewable 81 milliGRAM(s) Oral daily  baclofen 5 milliGRAM(s) Oral three times a day  calcium carbonate   1250 mG (OsCal) 1 Tablet(s) Oral two times a day  dexAMETHasone    Solution 1 milliGRAM(s) Oral four times a day  dextrose 40% Gel 15 Gram(s) Oral once  dextrose 5%. 1000 milliLiter(s) (50 mL/Hr) IV Continuous <Continuous>  dextrose 5%. 1000 milliLiter(s) (100 mL/Hr) IV Continuous <Continuous>  dextrose 50% Injectable 25 Gram(s) IV Push once  dextrose 50% Injectable 12.5 Gram(s) IV Push once  dextrose 50% Injectable 25 Gram(s) IV Push once  dronabinol 2.5 milliGRAM(s) Oral at bedtime  enoxaparin Injectable 70 milliGRAM(s) SubCutaneous two times a day  finasteride 5 milliGRAM(s) Oral at bedtime  gabapentin 100 milliGRAM(s) Oral at bedtime  glucagon  Injectable 1 milliGRAM(s) IntraMuscular once  influenza  Vaccine (HIGH DOSE) 0.7 milliLiter(s) IntraMuscular once  insulin glargine Injectable (LANTUS) 5 Unit(s) SubCutaneous at bedtime  insulin lispro (ADMELOG) corrective regimen sliding scale   SubCutaneous three times a day before meals  insulin lispro (ADMELOG) corrective regimen sliding scale   SubCutaneous at bedtime  insulin lispro Injectable (ADMELOG) 3 Unit(s) SubCutaneous three times a day before meals  losartan 12.5 milliGRAM(s) Oral daily  methylPREDNISolone sodium succinate Injectable 60 milliGRAM(s) IV Push daily  metoprolol succinate ER 12.5 milliGRAM(s) Oral daily  nystatin    Suspension 650572 Unit(s) Oral two times a day  oxymetazoline 0.05% Nasal Spray 1 Spray(s) Both Nostrils every 12 hours  pancrelipase  (CREON 24,000 Lipase Units) 1 Capsule(s) Oral four times a day with meals  pantoprazole  Injectable 40 milliGRAM(s) IV Push daily      Allergies    No Known Allergies    Intolerances        SOCIAL HISTORY:  Denies ETOh,Smoking,     FAMILY HISTORY:  No pertinent family history in first degree relatives        REVIEW OF SYSTEMS:    CONSTITUTIONAL: No weakness, fevers or chills  EYES/ENT: No visual changes;  No vertigo or throat pain   NECK: No pain or stiffness  RESPIRATORY: No cough, wheezing, hemoptysis; No shortness of breath  CARDIOVASCULAR: No chest pain or palpitations  GASTROINTESTINAL: No abdominal or epigastric pain. No nausea, vomiting, or hematemesis; No diarrhea or constipation. No melena or hematochezia.  GENITOURINARY: No dysuria, frequency or hematuria  NEUROLOGICAL: No numbness or weakness  SKIN: No itching, burning, rashes, or lesions   All other review of systems is negative unless indicated above.    VITAL:  T(C): , Max: 36.7 (09-29-21 @ 22:00)  T(F): , Max: 98 (09-29-21 @ 22:00)  HR: 89 (09-30-21 @ 05:00)  BP: 101/70 (09-30-21 @ 05:00)  BP(mean): --  RR: 18 (09-30-21 @ 05:00)  SpO2: 99% (09-30-21 @ 05:00)  Wt(kg): --    I and O's:    09-28 @ 07:01  -  09-29 @ 07:00  --------------------------------------------------------  IN: 718 mL / OUT: 950 mL / NET: -232 mL    09-29 @ 07:01  -  09-30 @ 07:00  --------------------------------------------------------  IN: 150 mL / OUT: 300 mL / NET: -150 mL          PHYSICAL EXAM:    Constitutional: NAD  HEENT: PERRLA,   Neck: No JVD  Respiratory: CTA B/L  Cardiovascular: S1 and S2  Gastrointestinal: BS+, soft, NT/ND  Extremities: No peripheral edema  Neurological: A/O x 3, no focal deficits  Psychiatric: Normal mood, normal affect  : No Bowman  Skin: No rashes  Access: Not applicable  Back: No CVA tenderness    LABS:                        10.0   35.24 )-----------( 163      ( 29 Sep 2021 18:52 )             29.8     09-29    132<L>  |  90<L>  |  34<H>  ----------------------------<  155<H>  3.6   |  29  |  0.82    Ca    8.0<L>      29 Sep 2021 18:52  Phos  2.6     09-29  Mg     2.30     09-29    TPro  5.1<L>  /  Alb  2.9<L>  /  TBili  0.7  /  DBili  x   /  AST  82<H>  /  ALT  125<H>  /  AlkPhos  161<H>  09-29          RADIOLOGY & ADDITIONAL STUDIES:

## 2021-09-30 NOTE — PROGRESS NOTE ADULT - PROBLEM SELECTOR PROBLEM 3
Chronic systolic congestive heart failure
Hypertension
Chronic systolic congestive heart failure
Hypertension
Chronic systolic congestive heart failure
Severe protein-calorie malnutrition
Chronic systolic congestive heart failure
Hypertension
Chronic systolic congestive heart failure

## 2021-09-30 NOTE — DISCHARGE NOTE PROVIDER - NSDCCPCAREPLAN_GEN_ALL_CORE_FT
PRINCIPAL DISCHARGE DIAGNOSIS  Diagnosis: Diarrhea  Assessment and Plan of Treatment: Please follow up with your pcp , oncologist, and gastroenterologist within 1 week of dsicharge.  Please call to make an appointment within 1 week of dsicharge.  Your diarrhena is most likley to chemotherapy.  Infectious work up is negative.  You were seen by the oncologist while you were admitted to the hospital.  You were started on steroids.  Your diarrhea has now resolved.  COtninue steroid taper as prescribed.      SECONDARY DISCHARGE DIAGNOSES  Diagnosis: Metastasis from pancreatic cancer  Assessment and Plan of Treatment: Please follow up wiht your oncogologist Dr. Hodges within 1 week of dsicharge.  Please call to make an appointment wihtin 1 week of dishcarge... Continue treatment as per your oncologist recommendations.    Diagnosis: Diabetes type 2, controlled  Assessment and Plan of Treatment: Continue your medication regimen and a consistent carbohydrate diet (Meaning eating the same amount of carbohydrates at the same time each day). Monitor blood glucose levels throughout the day before meals and at bedtime. Record blood sugars and bring to outpatient providers appointment in order to be reviewed by your doctor for management modifications. If your sugars are more than 400 or less than 70 you should contact your PCP immediately. Monitor for signs/symptoms of low blood glucose, such as, dizziness, altered mental status, or cool/clammy skin. In addition, monitor for signs/symptoms of high blood glucose, such as, feeling hot, dry, fatigued, or with increased thirst/urination. Make regular podiatry appointments in order to have feet checked for wounds and uncontrolled toe nail growth to prevent infections, as well as, appointments with an ophthalmologist to monitor your vision. please adjust insulin with steroid taper    Diagnosis: Chronic systolic congestive heart failure  Assessment and Plan of Treatment: Please follow up with your cardiolgosit within 1 week of discharge.  you were evaluated by the heart failure team while you were amditted to the hospital.. Continue regimen from hospital. Follow heart healthy diet. Monitor weight. If you develop severe lower extremity swelling and shortness of breath please seek medial attention. Follow up with your PCP and cardiologist for further evaluation and managment. Please call to make an appointment. Your defibrillator was inkterrogated while you were admitted to the hospital.  No events noted    Diagnosis: Hypertension  Assessment and Plan of Treatment: Continue blood pressure medication regimen as directed. Monitor for any visual changes, headaches or dizziness.  Monitor blood pressure regularly.  Follow up with your PCP for further management for high blood pressure.    Diagnosis: Mucositis oral  Assessment and Plan of Treatment: Please continue lidocaine swish and spit as prescribed.  ORal care as per protocol.  Please follow up with the medical doctrors upon arrival to rehab.  Please follow up with your oncologist Dr. Hodges within 1 week of dsicharge.    Diagnosis: Other hyperlipidemia  Assessment and Plan of Treatment: Continue cholesterol control medications. Continue DASH diet. Follow up with your PCP within 1 week of discharge for further management and monitoring of lipid and cholesterol panels.    Diagnosis: Dysphagia  Assessment and Plan of Treatment: You were evaluated by speech and swallow and the recommendations were Dysphagia 1 Pureed Thin Liquid Diet.    Diagnosis: Acute anemia  Assessment and Plan of Treatment: Follow-up with your outpatient provider for further care/recommendations. Monitor for signs/symptoms indicating worsening of disease, such as, easy bleeding/bruising, pale skin, fatigue, dizziness, increased heart rate, or chest pain. You were seen by the gastroenterolgist while you were admitted to the hospital.  As per the gastroenterologist recomeedations are conservative managment.    Diagnosis: Severe protein-calorie malnutrition  Assessment and Plan of Treatment: [facility]    -You were seen by nutrition   - encourage oral intake  -continue oral Dronabinol 2.5mg daily as per outpatient regimen.    Diagnosis: Elevated transaminase level  Assessment and Plan of Treatment: Please follow up with the medical doctors upon arrival to rehab.  Please contineu to monitor your liver function test.  your liver function test being elevated may be due to chemotherapy.   you had an ultrasound of the liver which shower metastasis.   Your cholesterol medication was held while you were admitted to the hospital. please follow up with your pcp for fuurther mangement    Diagnosis: Fever  Assessment and Plan of Treatment: Your infectious work up was negative.  It was decided to moniotr you off antibiotics.        Diagnosis: Pancytopenia  Assessment and Plan of Treatment: Please follow up wi=th your oncologist upon discharge.  pancytopenia   --  likely due to chemotherapy.  please ocntinue to monitor you blood counts as an otpatient    Diagnosis: Lower extremity edema  Assessment and Plan of Treatment: Please follow up with the medical doctor upon arrival to rehab  - Likely is due to decrease oncotic pressure contributing to fluid migration from the intravascular space to the extravascular space.  This is from hypoalbuminemia and not likely due to Continue recommended medication regimen. Monitor for signs/symptoms of fluid overload and electrolyte abnormalities, such as, shortness of breath, cough, swelling, chest discomfort, changes in heart rate, dizziness, fainting, or changes in mental status. Follow-up with your PCP/cardiologist outpatient after you've been discharged from the hospital       PRINCIPAL DISCHARGE DIAGNOSIS  Diagnosis: Diarrhea  Assessment and Plan of Treatment: Please follow up with your pcp , oncologist, and gastroenterologist within 1 week of dsicharge.  Please call to make an appointment within 1 week of dsicharge.  Your diarrhea is most likley to chemotherapy.  Infectious work up is negative.  You were seen by the oncologist while you were admitted to the hospital.  You were started on steroids.  Your diarrhea has now resolved.  COtninue steroid taper as prescribed.      SECONDARY DISCHARGE DIAGNOSES  Diagnosis: Elevated transaminase level  Assessment and Plan of Treatment: Please follow up with the medical doctors upon arrival to rehab.  Please contineu to monitor your liver function test.  your liver function test being elevated may be due to chemotherapy.   you had an ultrasound of the liver which shower metastasis.   Your cholesterol medication was held while you were admitted to the hospital. please follow up with your pcp for fuaislinn manghermann    Diagnosis: Metastasis from pancreatic cancer  Assessment and Plan of Treatment: Please follow up wiht your oncogologist Dr. Hodges within 1 week of dsicharge.  Please call to make an appointment wihtin 1 week of dishcarge... Continue treatment as per your oncologist recommendations.    Diagnosis: Diabetes type 2, controlled  Assessment and Plan of Treatment: Continue your medication regimen and a consistent carbohydrate diet (Meaning eating the same amount of carbohydrates at the same time each day). Monitor blood glucose levels throughout the day before meals and at bedtime. Record blood sugars and bring to outpatient providers appointment in order to be reviewed by your doctor for management modifications. If your sugars are more than 400 or less than 70 you should contact your PCP immediately. Monitor for signs/symptoms of low blood glucose, such as, dizziness, altered mental status, or cool/clammy skin. In addition, monitor for signs/symptoms of high blood glucose, such as, feeling hot, dry, fatigued, or with increased thirst/urination. Make regular podiatry appointments in order to have feet checked for wounds and uncontrolled toe nail growth to prevent infections, as well as, appointments with an ophthalmologist to monitor your vision. please adjust insulin with steroid taper    Diagnosis: Chronic systolic congestive heart failure  Assessment and Plan of Treatment: Please follow up with your cardiolgosit within 1 week of discharge.  you were evaluated by the heart failure team while you were amditted to the hospital.. Continue regimen from hospital. Follow heart healthy diet. Monitor weight. If you develop severe lower extremity swelling and shortness of breath please seek medial attention. Follow up with your PCP and cardiologist for further evaluation and managment. Please call to make an appointment. Your defibrillator was inkterrogated while you were admitted to the hospital.  No events noted    Diagnosis: Mucositis oral  Assessment and Plan of Treatment: Please continue lidocaine swish and spit as prescribed.  ORal care as per protocol.  Please follow up with the medical doctrors upon arrival to rehab.  Please follow up with your oncologist Dr. Hodges within 1 week of dsicharge.    Diagnosis: Hypertension  Assessment and Plan of Treatment: Continue blood pressure medication regimen as directed. Monitor for any visual changes, headaches or dizziness.  Monitor blood pressure regularly.  Follow up with your PCP for further management for high blood pressure.    Diagnosis: Acute anemia  Assessment and Plan of Treatment: Follow-up with your outpatient provider for further care/recommendations. Monitor for signs/symptoms indicating worsening of disease, such as, easy bleeding/bruising, pale skin, fatigue, dizziness, increased heart rate, or chest pain. You were seen by the gastroenterolgist while you were admitted to the hospital.  As per the gastroenterologist recomeedations are conservative managment.    Diagnosis: Other hyperlipidemia  Assessment and Plan of Treatment: Continue cholesterol control medications. Continue DASH diet. Follow up with your PCP within 1 week of discharge for further management and monitoring of lipid and cholesterol panels.    Diagnosis: Fever  Assessment and Plan of Treatment: Your infectious work up was negative.  It was decided to moniotr you off antibiotics.        Diagnosis: Dysphagia  Assessment and Plan of Treatment: You were evaluated by speech and swallow and the recommendations were Dysphagia 1 Pureed Thin Liquid Diet.    Diagnosis: Severe protein-calorie malnutrition  Assessment and Plan of Treatment: Please follow up with the [facility] doctors upon arrival to rehab.    -You were seen by nutrition   - encourage oral intake  -continue oral Dronabinol 2.5mg daily as per outpatient regimen.    Diagnosis: Pancytopenia  Assessment and Plan of Treatment: Please follow up wi=th your oncologist upon discharge.  pancytopenia   --  likely due to chemotherapy.  please ocntinue to monitor you blood counts as an otpatient    Diagnosis: Lower extremity edema  Assessment and Plan of Treatment: Please follow up with the medical doctor upon arrival to rehab  - Likely is due to decrease oncotic pressure contributing to fluid migration from the intravascular space to the extravascular space.  This is from hypoalbuminemia and not likely due to Continue recommended medication regimen. Monitor for signs/symptoms of fluid overload and electrolyte abnormalities, such as, shortness of breath, cough, swelling, chest discomfort, changes in heart rate, dizziness, fainting, or changes in mental status. Follow-up with your PCP/cardiologist outpatient after you've been discharged from the hospital       PRINCIPAL DISCHARGE DIAGNOSIS  Diagnosis: Diarrhea  Assessment and Plan of Treatment: Please follow up with your pcp , oncologist, and gastroenterologist within 1 week of dsicharge.  Please call to make an appointment within 1 week of dsicharge.  Your diarrhea is most likley to chemotherapy.  Infectious work up is negative.  You were seen by the oncologist while you were admitted to the hospital.  You were started on steroids.  Your diarrhea has now resolved.  COtninue steroid taper as prescribed.      SECONDARY DISCHARGE DIAGNOSES  Diagnosis: Elevated transaminase level  Assessment and Plan of Treatment: Please follow up with the medical doctors upon arrival to rehab.  Please contineu to monitor your liver function test.  your liver function test being elevated may be due to chemotherapy.   you had an ultrasound of the liver which shower metastasis.   Your cholesterol medication was held while you were admitted to the hospital. please follow up with your pcp for fuurther mangement    Diagnosis: Metastasis from pancreatic cancer  Assessment and Plan of Treatment: Please follow up wiht your oncogologist Dr. Hodges within 1 week of dsicharge.  Please call to make an appointment wihtin 1 week of dishcarge... Continue treatment as per your oncologist recommendations.    Diagnosis: Diabetes type 2, controlled  Assessment and Plan of Treatment: Continue your medication regimen and a consistent carbohydrate diet (Meaning eating the same amount of carbohydrates at the same time each day). Monitor blood glucose levels throughout the day before meals and at bedtime.   You will no  Longer Need Insulin or any other Diabetic Meds Once you Complete the Steroid Course.  Record blood sugars and bring to outpatient providers appointment in order to be reviewed by your doctor for management modifications. If your sugars are more than 400 or less than 70 you should contact your PCP immediately. Monitor for signs/symptoms of low blood glucose, such as, dizziness, altered mental status, or cool/clammy skin. In addition, monitor for signs/symptoms of high blood glucose, such as, feeling hot, dry, fatigued, or with increased thirst/urination. Make regular podiatry appointments in order to have feet checked for wounds and uncontrolled toe nail growth to prevent infections, as well as, appointments with an ophthalmologist to monitor your vision. please adjust insulin with steroid taper    Diagnosis: Chronic systolic congestive heart failure  Assessment and Plan of Treatment: Please follow up with your cardiolgosit within 1 week of discharge.  you were evaluated by the heart failure team while you were amditted to the hospital.. Continue regimen from hospital. Follow heart healthy diet. Monitor weight. If you develop severe lower extremity swelling and shortness of breath please seek medial attention. Follow up with your PCP and cardiologist for further evaluation and managment. Please call to make an appointment. Your defibrillator was inkterrogated while you were admitted to the hospital.  No events noted    Diagnosis: Mucositis oral  Assessment and Plan of Treatment: Please continue lidocaine swish and spit as prescribed.  ORal care as per protocol.  Please follow up with the medical doctrors upon arrival to rehab.  Please follow up with your oncologist Dr. Hodges within 1 week of dsicharge.    Diagnosis: Hypertension  Assessment and Plan of Treatment: Continue blood pressure medication regimen as directed. Monitor for any visual changes, headaches or dizziness.  Monitor blood pressure regularly.  Follow up with your PCP for further management for high blood pressure.    Diagnosis: Acute anemia  Assessment and Plan of Treatment: Follow-up with your outpatient provider for further care/recommendations. Monitor for signs/symptoms indicating worsening of disease, such as, easy bleeding/bruising, pale skin, fatigue, dizziness, increased heart rate, or chest pain. You were seen by the gastroenterolgist while you were admitted to the hospital.  As per the gastroenterologist recomeedations are conservative managment.    Diagnosis: Other hyperlipidemia  Assessment and Plan of Treatment: Continue cholesterol control medications. Continue DASH diet. Follow up with your PCP within 1 week of discharge for further management and monitoring of lipid and cholesterol panels.    Diagnosis: Fever  Assessment and Plan of Treatment: Your infectious work up was negative.  It was decided to moniotr you off antibiotics.        Diagnosis: Dysphagia  Assessment and Plan of Treatment: You were evaluated by speech and swallow and the recommendations were Dysphagia 1 Pureed Thin Liquid Diet.    Diagnosis: Severe protein-calorie malnutrition  Assessment and Plan of Treatment: Please follow up with the [facility] doctors upon arrival to rehab.    -You were seen by nutrition   - encourage oral intake  -continue oral Dronabinol 2.5mg daily as per outpatient regimen.    Diagnosis: Pancytopenia  Assessment and Plan of Treatment: Please follow up wi=th your oncologist upon discharge.  pancytopenia   --  likely due to chemotherapy.  please ocntinue to monitor you blood counts as an otpatient    Diagnosis: Lower extremity edema  Assessment and Plan of Treatment: Please follow up with the medical doctor upon arrival to rehab  - Likely is due to decrease oncotic pressure contributing to fluid migration from the intravascular space to the extravascular space.  This is from hypoalbuminemia and not likely due to Continue recommended medication regimen. Monitor for signs/symptoms of fluid overload and electrolyte abnormalities, such as, shortness of breath, cough, swelling, chest discomfort, changes in heart rate, dizziness, fainting, or changes in mental status. Follow-up with your PCP/cardiologist outpatient after you've been discharged from the hospital

## 2021-09-30 NOTE — PROGRESS NOTE ADULT - SUBJECTIVE AND OBJECTIVE BOX
Pt seen, with mild diarrhea last night but improved overall      MEDICATIONS  (STANDING):  aspirin  chewable 81 milliGRAM(s) Oral daily  baclofen 5 milliGRAM(s) Oral three times a day  calcium carbonate   1250 mG (OsCal) 1 Tablet(s) Oral two times a day  dexAMETHasone    Solution 1 milliGRAM(s) Oral four times a day  dextrose 40% Gel 15 Gram(s) Oral once  dextrose 5%. 1000 milliLiter(s) (50 mL/Hr) IV Continuous <Continuous>  dextrose 5%. 1000 milliLiter(s) (100 mL/Hr) IV Continuous <Continuous>  dextrose 50% Injectable 25 Gram(s) IV Push once  dextrose 50% Injectable 12.5 Gram(s) IV Push once  dextrose 50% Injectable 25 Gram(s) IV Push once  dronabinol 2.5 milliGRAM(s) Oral at bedtime  enoxaparin Injectable 70 milliGRAM(s) SubCutaneous two times a day  finasteride 5 milliGRAM(s) Oral at bedtime  gabapentin 100 milliGRAM(s) Oral at bedtime  glucagon  Injectable 1 milliGRAM(s) IntraMuscular once  influenza  Vaccine (HIGH DOSE) 0.7 milliLiter(s) IntraMuscular once  insulin glargine Injectable (LANTUS) 5 Unit(s) SubCutaneous at bedtime  insulin lispro (ADMELOG) corrective regimen sliding scale   SubCutaneous three times a day before meals  insulin lispro (ADMELOG) corrective regimen sliding scale   SubCutaneous at bedtime  insulin lispro Injectable (ADMELOG) 3 Unit(s) SubCutaneous three times a day before meals  losartan 12.5 milliGRAM(s) Oral daily  methylPREDNISolone sodium succinate Injectable 60 milliGRAM(s) IV Push daily  metoprolol succinate ER 12.5 milliGRAM(s) Oral daily  nystatin    Suspension 320944 Unit(s) Oral two times a day  oxymetazoline 0.05% Nasal Spray 1 Spray(s) Both Nostrils every 12 hours  pancrelipase  (CREON 24,000 Lipase Units) 1 Capsule(s) Oral four times a day with meals  pantoprazole  Injectable 40 milliGRAM(s) IV Push daily    MEDICATIONS  (PRN):  acetaminophen   Tablet .. 650 milliGRAM(s) Oral every 6 hours PRN Temp greater or equal to 38.5C (101.3F), Mild Pain (1 - 3)  aluminum hydroxide/magnesium hydroxide/simethicone Suspension 30 milliLiter(s) Oral every 4 hours PRN Dyspepsia  lidocaine 2% Viscous 15 milliLiter(s) Swish and Spit every 4 hours PRN Mouth sores  melatonin 3 milliGRAM(s) Oral at bedtime PRN Insomnia  metoclopramide Injectable 5 milliGRAM(s) IV Push once PRN nausea  morphine  - Injectable 2 milliGRAM(s) IV Push every 4 hours PRN Moderate Pain (4 - 6)  ondansetron Injectable 4 milliGRAM(s) IV Push every 8 hours PRN Nausea and/or Vomiting  sodium chloride 0.65% Nasal 1 Spray(s) Both Nostrils four times a day PRN Nasal Congestion      ROS  No fever, sweats, chills    No CP, SOB, cough, sputum  No abd pain, melena, hematochezia    No dysuria, hematuria    Vital Signs Last 24 Hrs  T(C): 36.3 (30 Sep 2021 09:00), Max: 36.7 (29 Sep 2021 22:00)  T(F): 97.4 (30 Sep 2021 09:00), Max: 98 (29 Sep 2021 22:00)  HR: 107 (30 Sep 2021 09:00) (89 - 107)  BP: 90/65 (30 Sep 2021 09:00) (90/65 - 101/74)  BP(mean): --  RR: 18 (30 Sep 2021 09:00) (17 - 18)  SpO2: 97% (30 Sep 2021 09:00) (97% - 99%)    PE  NAD  Awake, alert  Anicteric, MMM  RRR  CTAB  Abd soft, NT, ND  No c/c/e  No rash grossly  FROM                          10.8   35.13 )-----------( 166      ( 30 Sep 2021 08:01 )             32.7       09-30    131<L>  |  89<L>  |  31<H>  ----------------------------<  140<H>  3.8   |  28  |  0.77    Ca    8.4      30 Sep 2021 08:01  Phos  3.2     09-30  Mg     2.30     09-30    TPro  5.5<L>  /  Alb  2.9<L>  /  TBili  0.8  /  DBili  x   /  AST  80<H>  /  ALT  117<H>  /  AlkPhos  159<H>  09-30

## 2021-09-30 NOTE — CHART NOTE - NSCHARTNOTESELECT_GEN_ALL_CORE
Event Note
Endocrine
Endocrine/Event Note
Event Note
Follow-Up/Nutrition Services
Nutrition Services
Sodium Level/Event Note

## 2021-09-30 NOTE — PROGRESS NOTE ADULT - REASON FOR ADMISSION
Fever

## 2021-09-30 NOTE — DISCHARGE NOTE PROVIDER - NSDCFUADDAPPT_GEN_ALL_CORE_FT
Please follow up with your oncologist Dr. Hodges within 1 week of discharge.  Please call to make an appointment within 1 week of discharge.

## 2021-09-30 NOTE — DISCHARGE NOTE PROVIDER - DETAILS OF MALNUTRITION DIAGNOSIS/DIAGNOSES
This patient has been assessed with a concern for Malnutrition and was treated during this hospitalization for the following Nutrition diagnosis/diagnoses:     -  09/21/2021: Severe protein-calorie malnutrition

## 2021-09-30 NOTE — PROGRESS NOTE ADULT - SUBJECTIVE AND OBJECTIVE BOX
Chief Complaint: DM2    History: tolerating po despite oral discomfort  planned for discharge to [email or web address] rehab today.  No hypoglycemia events.    MEDICATIONS  (STANDING):  aspirin  chewable 81 milliGRAM(s) Oral daily  baclofen 5 milliGRAM(s) Oral three times a day  calcium carbonate   1250 mG (OsCal) 1 Tablet(s) Oral two times a day  dexAMETHasone    Solution 1 milliGRAM(s) Oral four times a day  dextrose 40% Gel 15 Gram(s) Oral once  dextrose 5%. 1000 milliLiter(s) (50 mL/Hr) IV Continuous <Continuous>  dextrose 5%. 1000 milliLiter(s) (100 mL/Hr) IV Continuous <Continuous>  dextrose 50% Injectable 25 Gram(s) IV Push once  dextrose 50% Injectable 12.5 Gram(s) IV Push once  dextrose 50% Injectable 25 Gram(s) IV Push once  dronabinol 2.5 milliGRAM(s) Oral at bedtime  enoxaparin Injectable 70 milliGRAM(s) SubCutaneous two times a day  finasteride 5 milliGRAM(s) Oral at bedtime  gabapentin 100 milliGRAM(s) Oral at bedtime  glucagon  Injectable 1 milliGRAM(s) IntraMuscular once  influenza  Vaccine (HIGH DOSE) 0.7 milliLiter(s) IntraMuscular once  insulin glargine Injectable (LANTUS) 5 Unit(s) SubCutaneous at bedtime  insulin lispro (ADMELOG) corrective regimen sliding scale   SubCutaneous three times a day before meals  insulin lispro (ADMELOG) corrective regimen sliding scale   SubCutaneous at bedtime  insulin lispro Injectable (ADMELOG) 3 Unit(s) SubCutaneous three times a day before meals  losartan 12.5 milliGRAM(s) Oral daily  methylPREDNISolone sodium succinate Injectable 60 milliGRAM(s) IV Push daily  metoprolol succinate ER 12.5 milliGRAM(s) Oral daily  nystatin    Suspension 760068 Unit(s) Oral two times a day  oxymetazoline 0.05% Nasal Spray 1 Spray(s) Both Nostrils every 12 hours  pancrelipase  (CREON 24,000 Lipase Units) 1 Capsule(s) Oral four times a day with meals  pantoprazole  Injectable 40 milliGRAM(s) IV Push daily    MEDICATIONS  (PRN):  acetaminophen   Tablet .. 650 milliGRAM(s) Oral every 6 hours PRN Temp greater or equal to 38.5C (101.3F), Mild Pain (1 - 3)  aluminum hydroxide/magnesium hydroxide/simethicone Suspension 30 milliLiter(s) Oral every 4 hours PRN Dyspepsia  lidocaine 2% Viscous 15 milliLiter(s) Swish and Spit every 4 hours PRN Mouth sores  melatonin 3 milliGRAM(s) Oral at bedtime PRN Insomnia  metoclopramide Injectable 5 milliGRAM(s) IV Push once PRN nausea  morphine  - Injectable 2 milliGRAM(s) IV Push every 4 hours PRN Moderate Pain (4 - 6)  ondansetron Injectable 4 milliGRAM(s) IV Push every 8 hours PRN Nausea and/or Vomiting  sodium chloride 0.65% Nasal 1 Spray(s) Both Nostrils four times a day PRN Nasal Congestion      Allergies    No Known Allergies    Intolerances      Review of Systems:  ALL OTHER SYSTEMS REVIEWED AND NEGATIVE      PHYSICAL EXAM:  VITALS: T(C): 36.3 (09-30-21 @ 09:00)  T(F): 97.4 (09-30-21 @ 09:00), Max: 98 (09-29-21 @ 22:00)  HR: 107 (09-30-21 @ 09:00) (89 - 107)  BP: 90/65 (09-30-21 @ 09:00) (90/65 - 101/70)  RR:  (17 - 18)  SpO2:  (97% - 99%)  Wt(kg): --  GENERAL: NAD, well-groomed, well-developed  EYES: No proptosis, no lid lag, anicteric  HEENT:  Atraumatic, Normocephalic, moist mucous membranes  RESPIRATORY: nonlabored respirations, no wheezing  PSYCH: Alert and oriented x 3, normal affect, normal mood    CAPILLARY BLOOD GLUCOSE      POCT Blood Glucose.: 268 mg/dL (30 Sep 2021 12:47)  POCT Blood Glucose.: 146 mg/dL (30 Sep 2021 08:49)  POCT Blood Glucose.: 182 mg/dL (29 Sep 2021 21:13)  POCT Blood Glucose.: 206 mg/dL (29 Sep 2021 19:06)  POCT Blood Glucose.: 156 mg/dL (29 Sep 2021 18:02)  POCT Blood Glucose.: 218 mg/dL (29 Sep 2021 13:27)      09-30    131<L>  |  89<L>  |  31<H>  ----------------------------<  140<H>  3.8   |  28  |  0.77    EGFR if : 102  EGFR if non : 88    Ca    8.4      09-30  Mg     2.30     09-30  Phos  3.2     09-30    TPro  5.5<L>  /  Alb  2.9<L>  /  TBili  0.8  /  DBili  x   /  AST  80<H>  /  ALT  117<H>  /  AlkPhos  159<H>  09-30      A1C with Estimated Average Glucose Result: 6.8 % (09-21-21 @ 07:22)      Thyroid Function Tests:

## 2021-09-30 NOTE — PROGRESS NOTE ADULT - PROVIDER SPECIALTY LIST ADULT
Gastroenterology
Gastroenterology
Heme/Onc
Infectious Disease
Nephrology
Cardiology
Cardiology
Gastroenterology
Heart Failure
Heme/Onc
Heme/Onc
Infectious Disease
Internal Medicine
Internal Medicine
Nephrology
Electrophysiology
Endocrinology
Gastroenterology
Heart Failure
Heme/Onc
Infectious Disease
Nephrology
Electrophysiology
Gastroenterology
Heme/Onc
Infectious Disease
Internal Medicine
Nephrology
Electrophysiology
Gastroenterology
Gastroenterology
Heme/Onc
Internal Medicine
Internal Medicine
Nephrology
Palliative Care
Endocrinology
Endocrinology
Internal Medicine

## 2021-09-30 NOTE — DISCHARGE NOTE PROVIDER - NSDCMRMEDTOKEN_GEN_ALL_CORE_FT
aspirin 81 mg oral tablet: 1 tab(s) orally once a day  carvedilol 6.25 mg oral tablet: 1 tab(s) orally 2 times a day  Centrum Silver oral tablet: 1 tab(s) orally once a day  Eliquis 5 mg oral tablet: 1 tab(s) orally 2 times a day  finasteride 5 mg oral tablet: 1 tab(s) orally once a day  losartan 50 mg oral tablet: 1 tab(s) orally once a day  rosuvastatin 20 mg oral tablet: 1 tab(s) orally once a day (at bedtime)   aluminum hydroxide-magnesium hydroxide 200 mg-200 mg/5 mL oral suspension: 30 milliliter(s) orally every 4 hours, As needed, Dyspepsia  aspirin 81 mg oral tablet: 1 tab(s) orally once a day  baclofen 5 mg oral tablet: 1 tab(s) orally 3 times a day  calcium carbonate 1250 mg (500 mg elemental calcium) oral tablet: 1 tab(s) orally 2 times a day  carvedilol 6.25 mg oral tablet: 1 tab(s) orally 2 times a day  Centrum Silver oral tablet: 1 tab(s) orally once a day  dexamethasone 0.5 mg/5 mL oral liquid: 1 milligram(s) orally 4 times a day  dronabinol 2.5 mg oral capsule: 1 cap(s) orally once a day (at bedtime)  Eliquis 5 mg oral tablet: 1 tab(s) orally 2 times a day  finasteride 5 mg oral tablet: 1 tab(s) orally once a day  gabapentin 100 mg oral capsule: 1 cap(s) orally once a day (at bedtime)  lidocaine 2% mucous membrane solution: 15 milliliter(s) mucous membrane swish and spit every 4 hours, As Needed for mouth sores  losartan 25 mg oral tablet: 0.5 tab(s) orally once a day  losartan 50 mg oral tablet: 1 tab(s) orally once a day  melatonin 3 mg oral tablet: 1 tab(s) orally once a day (at bedtime), As needed, Insomnia  nystatin 100,000 units/mL oral suspension: 5 milliliter(s) orally 2 times a day  pancrelipase 24,000 units-76,000 units-120,000 units oral delayed release capsule: 1 cap(s) orally 4 times a day (with meals and at bedtime)  Protonix 40 mg oral delayed release tablet: 1 tab(s) orally once a day  rosuvastatin 20 mg oral tablet: 1 tab(s) orally once a day (at bedtime)  sodium chloride 0.65% nasal spray: 1 spray(s) nasal 4 times a day, As Needed  for nasal Congestion   Toprol-XL 25 mg oral tablet, extended release: 0.5 tab(s) orally once a day   acetaminophen 325 mg oral tablet: 2 tab(s) orally every 6 hours, As needed, Temp greater or equal to 38.5C (101.3F), Mild Pain (1 - 3)  aluminum hydroxide-magnesium hydroxide 200 mg-200 mg/5 mL oral suspension: 30 milliliter(s) orally every 4 hours, As needed, Dyspepsia  aspirin 81 mg oral tablet: 1 tab(s) orally once a day  baclofen 5 mg oral tablet: 1 tab(s) orally 3 times a day  calcium carbonate 1250 mg (500 mg elemental calcium) oral tablet: 1 tab(s) orally 2 times a day  Centrum Silver oral tablet: 1 tab(s) orally once a day  dexamethasone 0.5 mg/5 mL oral liquid: 1 milligram(s) orally 4 times a day  dronabinol 2.5 mg oral capsule: 1 cap(s) orally once a day (at bedtime)  Eliquis 5 mg oral tablet: 1 tab(s) orally 2 times a day  finasteride 5 mg oral tablet: 1 tab(s) orally once a day  gabapentin 100 mg oral capsule: 1 cap(s) orally once a day (at bedtime)  Lantus: 5 unit(s) subcutaneous once a day (at bedtime)  May need to adjust as steroid is being tapered  PLEASE STOP ONCE STEROID IS COMPLETED   lidocaine 2% mucous membrane solution: 15 milliliter(s) mucous membrane swish and spit every 4 hours, As Needed for mouth sores  losartan 25 mg oral tablet: 0.5 tab(s) orally once a day  melatonin 3 mg oral tablet: 1 tab(s) orally once a day (at bedtime), As needed, Insomnia  methylPREDNISolone acetate 40 mg/mL injectable suspension: 60 milligram(s) injectable once a day on 10/1  then 40 mg IV daily x 3 days  then 20 mg Iv daily x3 days, Then Stop   nystatin 100,000 units/mL oral suspension: 5 milliliter(s) orally 2 times a day  pancrelipase 24,000 units-76,000 units-120,000 units oral delayed release capsule: 1 cap(s) orally 4 times a day (with meals and at bedtime)  Protonix 40 mg oral delayed release tablet: 1 tab(s) orally once a day  sodium chloride 0.65% nasal spray: 1 spray(s) nasal 4 times a day, As Needed  for nasal Congestion   Toprol-XL 25 mg oral tablet, extended release: 0.5 tab(s) orally once a day  Zofran ODT 4 mg oral tablet, disintegratin tab(s) orally every 4 hours, As Needed  For nausea   acetaminophen 325 mg oral tablet: 2 tab(s) orally every 6 hours, As needed, Temp greater or equal to 38.5C (101.3F), Mild Pain (1 - 3)  aluminum hydroxide-magnesium hydroxide 200 mg-200 mg/5 mL oral suspension: 30 milliliter(s) orally every 4 hours, As needed, Dyspepsia  aspirin 81 mg oral tablet: 1 tab(s) orally once a day  baclofen 5 mg oral tablet: 1 tab(s) orally 3 times a day  calcium carbonate 1250 mg (500 mg elemental calcium) oral tablet: 1 tab(s) orally 2 times a day  Centrum Silver oral tablet: 1 tab(s) orally once a day  dexamethasone 0.5 mg/5 mL oral liquid: 1 milligram(s) orally 4 times a day  dronabinol 2.5 mg oral capsule: 1 cap(s) orally once a day (at bedtime)  Eliquis 5 mg oral tablet: 1 tab(s) orally 2 times a day  finasteride 5 mg oral tablet: 1 tab(s) orally once a day  gabapentin 100 mg oral capsule: 1 cap(s) orally once a day (at bedtime)  Lantus: 5 unit(s) subcutaneous once a day (at bedtime)  May need to adjust as steroid is being tapered  PLEASE STOP ONCE STEROID IS COMPLETED   lidocaine 2% mucous membrane solution: 15 milliliter(s) mucous membrane swish and spit every 4 hours, As Needed for mouth sores  losartan 25 mg oral tablet: 0.5 tab(s) orally once a day  melatonin 3 mg oral tablet: 1 tab(s) orally once a day (at bedtime), As needed, Insomnia  nystatin 100,000 units/mL oral suspension: 5 milliliter(s) orally 2 times a day  pancrelipase 24,000 units-76,000 units-120,000 units oral delayed release capsule: 1 cap(s) orally 4 times a day (with meals and at bedtime)  Protonix 40 mg oral delayed release tablet: 1 tab(s) orally once a day  sodium chloride 0.65% nasal spray: 1 spray(s) nasal 4 times a day, As Needed  for nasal Congestion   Toprol-XL 25 mg oral tablet, extended release: 0.5 tab(s) orally once a day  Zofran ODT 4 mg oral tablet, disintegratin tab(s) orally every 4 hours, As Needed  For nausea   acetaminophen 325 mg oral tablet: 2 tab(s) orally every 6 hours, As needed, Temp greater or equal to 38.5C (101.3F), Mild Pain (1 - 3)  Admelog 100 units/mL injectable solution: 3 unit(s) injectable 3 times a day with meals  Hold if NPO   STOP ONCE Steroid COURSE is completed  aluminum hydroxide-magnesium hydroxide 200 mg-200 mg/5 mL oral suspension: 30 milliliter(s) orally every 4 hours, As needed, Dyspepsia  aspirin 81 mg oral tablet: 1 tab(s) orally once a day  baclofen 5 mg oral tablet: 1 tab(s) orally 3 times a day  calcium carbonate 1250 mg (500 mg elemental calcium) oral tablet: 1 tab(s) orally 2 times a day  Centrum Silver oral tablet: 1 tab(s) orally once a day  dexamethasone 0.5 mg/5 mL oral liquid: 1 milligram(s) orally 4 times a day  dronabinol 2.5 mg oral capsule: 1 cap(s) orally once a day (at bedtime)  Eliquis 5 mg oral tablet: 1 tab(s) orally 2 times a day  finasteride 5 mg oral tablet: 1 tab(s) orally once a day  gabapentin 100 mg oral capsule: 1 cap(s) orally once a day (at bedtime)  glucometer (per patient&#x27;s insurance): Test blood sugars four times a day. Dispense #1 glucometer.  Insulin Pen Needles, 4mm: 1 application subcutaneously 4 times a day. ** Use with insulin pen **   lancets: 1 application subcutaneously 4 times a day   Lantus: 5 unit(s) subcutaneous once a day (at bedtime)  May need to adjust as steroid is being tapered  PLEASE STOP ONCE STEROID IS COMPLETED   lidocaine 2% mucous membrane solution: 15 milliliter(s) mucous membrane swish and spit every 4 hours, As Needed for mouth sores  losartan 25 mg oral tablet: 0.5 tab(s) orally once a day  melatonin 3 mg oral tablet: 1 tab(s) orally once a day (at bedtime), As needed, Insomnia  nystatin 100,000 units/mL oral suspension: 5 milliliter(s) orally 2 times a day  pancrelipase 24,000 units-76,000 units-120,000 units oral delayed release capsule: 1 cap(s) orally 4 times a day (with meals and at bedtime)  predniSONE: 75 milligram(s) orally once a day on 10/1  then 60 mg oral Daily starting 10/2 x 3 days  Then 40 mg oral Daily x 3 days  Then 20mgoral Daily x 3 days  Then STOP   Protonix 40 mg oral delayed release tablet: 1 tab(s) orally once a day  sodium chloride 0.65% nasal spray: 1 spray(s) nasal 4 times a day, As Needed  for nasal Congestion   Toprol-XL 25 mg oral tablet, extended release: 0.5 tab(s) orally once a day  Zofran ODT 4 mg oral tablet, disintegratin tab(s) orally every 4 hours, As Needed  For nausea   acetaminophen 325 mg oral tablet: 2 tab(s) orally every 6 hours, As needed, Temp greater or equal to 38.5C (101.3F), Mild Pain (1 - 3)  Admelog 100 units/mL injectable solution: 3 unit(s) injectable 3 times a day with meals  Hold if NPO   STOP ONCE Steroid COURSE is completed  aluminum hydroxide-magnesium hydroxide 200 mg-200 mg/5 mL oral suspension: 30 milliliter(s) orally every 4 hours, As needed, Dyspepsia  aspirin 81 mg oral tablet: 1 tab(s) orally once a day  calcium carbonate 1250 mg (500 mg elemental calcium) oral tablet: 1 tab(s) orally 2 times a day  Centrum Silver oral tablet: 1 tab(s) orally once a day  dexamethasone 0.5 mg/5 mL oral liquid: 1 milligram(s) orally 4 times a day  dronabinol 2.5 mg oral capsule: 1 cap(s) orally once a day (at bedtime)  Eliquis 5 mg oral tablet: 1 tab(s) orally 2 times a day  finasteride 5 mg oral tablet: 1 tab(s) orally once a day  gabapentin 100 mg oral capsule: 1 cap(s) orally once a day (at bedtime)  glucometer (per patient&#x27;s insurance): Test blood sugars four times a day. Dispense #1 glucometer.  Insulin Pen Needles, 4mm: 1 application subcutaneously 4 times a day. ** Use with insulin pen **   lancets: 1 application subcutaneously 4 times a day   Lantus: 5 unit(s) subcutaneous once a day (at bedtime)  May need to adjust as steroid is being tapered  PLEASE STOP ONCE STEROID IS COMPLETED   lidocaine 2% mucous membrane solution: 15 milliliter(s) mucous membrane swish and spit every 4 hours, As Needed for mouth sores  losartan 25 mg oral tablet: 0.5 tab(s) orally once a day  melatonin 3 mg oral tablet: 1 tab(s) orally once a day (at bedtime), As needed, Insomnia  nystatin 100,000 units/mL oral suspension: 5 milliliter(s) orally 2 times a day  pancrelipase 24,000 units-76,000 units-120,000 units oral delayed release capsule: 1 cap(s) orally 4 times a day (with meals and at bedtime)  predniSONE: 75 milligram(s) orally once a day on 10/1  then 60 mg oral Daily starting 10/2 x 3 days  Then 40 mg oral Daily x 3 days  Then 20mgoral Daily x 3 days  Then STOP   Protonix 40 mg oral delayed release tablet: 1 tab(s) orally once a day  sodium chloride 0.65% nasal spray: 1 spray(s) nasal 4 times a day, As Needed  for nasal Congestion   Toprol-XL 25 mg oral tablet, extended release: 0.5 tab(s) orally once a day  Zofran ODT 4 mg oral tablet, disintegratin tab(s) orally every 4 hours, As Needed  For nausea

## 2021-09-30 NOTE — PROGRESS NOTE ADULT - PROBLEM SELECTOR PROBLEM 1
Acute sepsis
Steroid-induced hyperglycemia
Acute sepsis
Steroid-induced hyperglycemia
Acute sepsis
Steroid-induced hyperglycemia
Mucositis oral
Acute sepsis

## 2021-09-30 NOTE — PROGRESS NOTE ADULT - ASSESSMENT
75M newly diagnosed DM2 HbA1c 6.8%, prior history of prediabetes and recent diagnosis of pancreatic cancer.    1) DM2  HbA1c 6.8% - given age this A1c is controlled and at goal and may not require therapy at discharge if he does not require steroids on discharge  Checked c-peptide to ensure pancreatic mass is not inhibiting insulin secretion - c-peptide 3.9 (glu 161) is normal range.  This is reassuring that he likely would not require insulin for discharge.    Inpatient -  - Now on solumedrol 60 mg IV daily which led to post-prandial hyperglycemia. Also on dexamethasone swish and spit.  Glucose elevated but likely to be starting steroid taper soon per primary team so will hold off on increasing insulin plan.  - continue Lantus 5 units qhs  - continue Admelog 3 units before meals TID, hold for NPO  - continue low correction scale qac and low qhs scale  - consistent carb diet  - check FS qac and qhs   - will follow     Discharge plan:  Planned for discharge to [facility] rehab today.  Asked primary team to clarify plan for tapering of IV solumedrol at [facility].  Would continue with above insulin regimen while requiring IV solumedrol at [facility].  Team at [facility] should monitor glucose trend to assess if lowering of insulin dose is appropriate during steroid taper.  Once discharged home if off steroid can likely be dc home without DM medication.  Recommend to prescribe glucometer, test strips and lancets to patient's outpatient pharmacy.  Follow up with outside endocrinologist Dr. Sam Fields after discharge.    Explained to patient and family that glucose levels and A1c would need to be followed as outpatient in case pancreatic mass enlarges and further inhibits insulin secretion possibly requiring insulin therapy in the future.    2) HTN  BP goal < 130/80, at goal  On Metoprolol    3) Hyperlipidemia   statin discontinued due to elevated LFTs    NE plan reviewed with BI Branch.    Rafael Flores MD  Division of Endocrinology  Pager: [phone]    If after 6PM or before 9AM, or on weekends/holidays, please call endocrine answering service for assistance ([phone]).  For nonurgent matters email [email or web address] for assistance.

## 2021-09-30 NOTE — PROGRESS NOTE ADULT - PROBLEM SELECTOR PROBLEM 4
Other hyperlipidemia
Other hyperlipidemia
Acute diarrhea
Diarrhea
Other hyperlipidemia
Acute diarrhea

## 2021-09-30 NOTE — DISCHARGE NOTE PROVIDER - HOSPITAL COURSE
75M PMHx CAD, MI, stent x 2 (10/2009 at [facility]), pAfib (on Eliquis), HTN, HLD, BIV-AICD, systolic CHF, PE (on Eliquis) recently [place] [facility] [place] and watery diarrhea. Course c/b positive FOBT, hyponatremia, and transaminitis     Diarrhea  - No recent antibiotic exposure likely chemo related as no infectious etiology identified  infectious w/u need   -GI consulted:conservative management   -- may be sec to immunotx now stable.   -- discussed with [facility] [place] oncologist Dr Hodges ([phone]) re his sx on 9/25. After further discussion, weighing risks/benefits, decided to start pt on steroids for sx control as of 9/25.     -Have decreased to solumedrol 60mg 9/28 when patient reported no diarrhea. Plan to decrease dose every 3 days if diarrhea remains better.   -- c/w GI ppx with IV protonix    Mucositis oral.   ·  Recommendation: - on Miracle Mouth Wash q6 ATC switched to Lidocaine 2% Viscous 15mL swish and spit q4 PRN    Transaminitis   - likely sec to immunotherapy vs chemo vs mets  - RUQ US no portal vein thrombosis. + Hepatic metastases with mild perihepatic ascites.  -dc statin as per endo     DM2  -- Now on solumedrol 60 mg IV daily which is causing post-prandial hyperglycemia  - c/w Lantus 5 units qhs: add Admelog 3 units before meals: change correction scale to low correction scale qac and qhs  - consistent carb diet:check FS qac and qhs     HTN  BP goal < 130/80    Hyperlipidemia   on atorvastatin 80mg - consider dc given elevated LFTs: dc'd    Chronic systolic congestive heart failure  -Heart failure following   -Acute on chronic systolic heart failure   ICM, TTE: Severe global left ventricular systolic dysfunction.  No JVD on exam, but with LLE edema   Discontinued Dobutamine 9/27   ICD interrogation: 86% pacing, NSR    Continue Toprol 12.5mg (hold SBP <95), continue to hold diuretic   I&Os 640/150-->+490  - BiV ICD interrogation revealed a recent onset of PAF episodes with RVR since Sep 5, 2021.  -EP following   -Repeat interrogation today 9/27/2021 for device beeping over the weekend: Device tone sounded due to unsuccessful Carelink transmission. Found to be BiV pacing only 86% likely secondary to AFib w/RVR and frequent PVC's. LV capture thresholds slightly increased. Outputs adjusted.   -Continuous telemetry monitoring for PAF with RVR.    -Start beta blocker for rate control when BP permits and oral intake improves.  May use IV Lopressor prn for rate control of AFib with RVR.   -Obtain head CT to rule out new stroke.      Acute anemia  - FOBT positive; Hgb 10.4 to 9.0  - GI consulted - conservative managment  - Transition to Lovenox BID for now and if HGB stabilizes can restart Eliquis     Metastasis from pancreatic cancer  -Heme/Onc following   --- dx on 8/21/21  -- follows at [facility] [place]  -- Started treatment on 8/31/21 per protocol , randomized to gemcitabine/nab-paclitaxel and dual immmunotherapy. Received [place]/Abraxane on 9/14  -- further care as outpt after d/c      Fevers - now resolved   -- ID following  -- No documented fevers  -- off of PO Vanco  -- off zosyn    pancytopenia   -- s/p GCSF, likely due to chemo, treatment, acute illness, meds  -- WBC now elevated 2/2 GCSF, monitor  -s/p zarxio (neupogen x3d)  -- counts otherwise adequate    Hyponatremia  -- stable  -- hypokalemia sec to diarrhea;  improving  -- renal following  -resolved    LE Edema  - Likely is due to decrease oncotic pressure contributing to fluid migration from the intravascular space to the extravascular space.  This is from hypoalbuminemia and not likely due to HF.  Trial of IV albumin+ furosemide as above may help, though often is not    Dysphagia.   -Swallow evaluation appreciated  On Dysphagia 1 Pureed Thin Liquid Diet.    Severe protein-calorie malnutrition.   -Nutrition evaluation appreciated  Encourage PO intake as tolerated  On PO Dronabinol 2.5mg QHS as per outpatient regimen.   75M PMHx CAD, MI, stent x 2 (10/2009 at [facility]), pAfib (on Eliquis), HTN, HLD, BIV-AICD, systolic CHF, PE (on Eliquis) [place] [facility] [place] and watery diarrhea. Course c/b positive FOBT, hyponatremia, and transaminitis     Diarrhea  - No recent antibiotic exposure likely chemo related as no infectious etiology identified  infectious w/u need   -GI consulted:conservative management   -- may be sec to immunotx now stable.   -- [place] [facility] [place] Dr Hodges ([phone]) re his sx on 9/25. After further discussion, weighing risks/benefits, decided to start pt on steroids for sx control as of 9/25.     -Have decreased to solumedrol 60mg 9/28 when patient reported no diarrhea.Regimen changed to PO Plan to decrease dose every 3 days if diarrhea remains better.   -- c/w GI ppx with protonix    Mucositis oral.   ·  Recommendation: - on Miracle Mouth Wash q6 ATC switched to Lidocaine 2% Viscous 15mL swish and spit q4 PRN    Transaminitis   - likely sec to immunotherapy vs chemo vs mets  - RUQ US no portal vein thrombosis. + Hepatic metastases with mild perihepatic ascites.  -dc statin as per endo     DM2  -- Now on solumedrol 60 mg IV daily which is causing post-prandial hyperglycemia  - c/w Lantus 5 units qhs: add Admelog 3 units before meals: change correction scale to low correction scale qac and qhs  - consistent carb diet:check FS qac and qhs     HTN  BP goal < 130/80    Hyperlipidemia   on atorvastatin 80mg - consider dc given elevated LFTs: dc'd    Chronic systolic congestive heart failure  -Heart failure following   -Acute on chronic systolic heart failure   ICM, TTE: Severe global left ventricular systolic dysfunction.  No JVD on exam, but with LLE edema   Discontinued Dobutamine 9/27   ICD interrogation: 86% pacing, NSR    Continue Toprol 12.5mg (hold SBP <95), continue to hold diuretic   I&Os 640/150-->+490  - BiV ICD interrogation revealed a recent onset of PAF episodes with RVR since Sep 5, 2021.  -EP following   -Repeat interrogation today 9/27/2021 for device beeping over the weekend: Device tone sounded due to unsuccessful Carelink transmission. Found to be BiV pacing only 86% likely secondary to AFib w/RVR and frequent PVC's. LV capture thresholds slightly increased. Outputs adjusted.   -Continuous telemetry monitoring for PAF with RVR.    -Start beta blocker for rate control when BP permits and oral intake improves.  May use IV Lopressor prn for rate control of AFib with RVR.   -Obtain head CT to rule out new stroke.      Acute anemia  - FOBT positive; Hgb 10.4 to 9.0  - GI consulted - conservative managment  - Transition to Lovenox BID for now and if HGB stabilizes can restart Eliquis     Metastasis from pancreatic cancer  -Heme/Onc following   --- dx on 8/21/21  [place] [facility] [place]ed treatment on 8/31/21 per protocol , randomized to gemcitabine/nab-paclitaxel and dual immmunotherapy. Received [place]/Abraxane on 9/14  -- further care as outpt after d/c      Fevers - now resolved   -- ID following  -- No documented fevers  -- off of PO Vanco  -- off zosyn    pancytopenia   -- s/p GCSF, likely due to chemo, treatment, acute illness, meds  -- WBC now elevated 2/2 GCSF, monitor  -s/p zarxio (neupogen x3d)  -- counts otherwise adequate    Hyponatremia  -- stable  -- hypokalemia sec to diarrhea;  improving  -- renal following  -resolved    LE Edema  - Likely is due to decrease oncotic pressure contributing to fluid migration from the intravascular space to the extravascular space.  This is from hypoalbuminemia and not likely due to HF.  Trial of IV albumin+ furosemide as above may help, though often is not    Dysphagia.   -Swallow evaluation appreciated  On Dysphagia 1 Pureed Thin Liquid Diet.    Severe protein-calorie malnutrition.   -Nutrition evaluation appreciated  Encourage PO intake as tolerated  On PO Dronabinol 2.5mg QHS as per outpatient regimen.   75M PMHx CAD, MI, stent x 2 (10/2009 at [facility]), pAfib (on Eliquis), HTN, HLD, BIV-AICD, systolic CHF, PE (on Eliquis) recently dx’d pancreatic CA on chemo at [facility] [place] p/w fever and watery diarrhea. Course c/b positive FOBT, hyponatremia, and transaminitis     Diarrhea  - No recent antibiotic exposure likely chemo related as no infectious etiology identified  infectious w/u need   -GI consulted:conservative management   -- may be sec to immunotx now stable.   -- discussed with [facility] pt's primary oncologist Dr Hodges ([phone]) re his sx on 9/25. After further discussion, weighing risks/benefits, decided to start pt on steroids for sx control as of 9/25.     -Have decreased to solumedrol 60mg 9/28 when patient reported no diarrhea.Regimen changed to PO Plan to decrease dose every 3 days if diarrhea remains better.   -- c/w GI ppx with protonix    Mucositis oral.   ·  Recommendation: - on Miracle Mouth Wash q6 ATC switched to Lidocaine 2% Viscous 15mL swish and spit q4 PRN    Transaminitis   - likely sec to immunotherapy vs chemo vs mets  - RUQ US no portal vein thrombosis. + Hepatic metastases with mild perihepatic ascites.  -dc statin as per endo     DM2  -- Now on solumedrol 60 mg IV daily which is causing post-prandial hyperglycemia  - c/w Lantus 5 units qhs: add Admelog 3 units before meals: change correction scale to low correction scale qac and qhs  - consistent carb diet:check FS qac and qhs     HTN  BP goal < 130/80    Hyperlipidemia   on atorvastatin 80mg - consider dc given elevated LFTs: dc'd    Transient slight R facial Droop Noted   As per wife, patient has always had slight right sided droop of his mouth, confirmed with pictures. pt and family declined imaging at this time       Chronic systolic congestive heart failure  -Heart failure following   -Acute on chronic systolic heart failure   ICM, TTE: Severe global left ventricular systolic dysfunction.  No JVD on exam, but with LLE edema   Discontinued Dobutamine 9/27   ICD interrogation: 86% pacing, NSR    Continue Toprol 12.5mg (hold SBP <95), continue to hold diuretic   I&Os 640/150-->+490  - BiV ICD interrogation revealed a recent onset of PAF episodes with RVR since Sep 5, 2021.  -EP following   -Repeat interrogation today 9/27/2021 for device beeping over the weekend: Device tone sounded due to unsuccessful Carelink transmission. Found to be BiV pacing only 86% likely secondary to AFib w/RVR and frequent PVC's. LV capture thresholds slightly increased. Outputs adjusted.   -Continuous telemetry monitoring for PAF with RVR.    -Start beta blocker for rate control when BP permits and oral intake improves.  May use IV Lopressor prn for rate control of AFib with RVR.   -Obtain head CT to rule out new stroke.      Acute anemia  - FOBT positive; Hgb 10.4 to 9.0  - GI consulted - conservative managment  - Transition to Lovenox BID for now and if HGB stabilizes can restart Eliquis     Metastasis from pancreatic cancer  -Heme/Onc following   --- dx on 8/21/21  -- [place] at [facility] [place]  -- Started treatment on 8/31/21 per protocol , randomized to gemcitabine/nab-paclitaxel and dual immmunotherapy. Received [place]/Abraxane on 9/14  -- further care as outpt after d/c      Fevers - now resolved   -- ID following  -- No documented fevers  -- off of PO Vanco  -- off zosyn    pancytopenia   -- s/p GCSF, likely due to chemo, treatment, acute illness, meds  -- WBC now elevated 2/2 GCSF, monitor  -s/p zarxio (neupogen x3d)  -- counts otherwise adequate    Hyponatremia  -- stable  -- hypokalemia sec to diarrhea;  improving  -- renal following  -resolved    LE Edema  - Likely is due to decrease oncotic pressure contributing to fluid migration from the intravascular space to the extravascular space.  This is from hypoalbuminemia and not likely due to HF.  Trial of IV albumin+ furosemide as above may help, though often is not    Dysphagia.   -Swallow evaluation appreciated  On Dysphagia 1 Pureed Thin Liquid Diet.    Severe protein-calorie malnutrition.   -Nutrition evaluation appreciated  Encourage PO intake as tolerated  On PO Dronabinol 2.5mg QHS as per outpatient regimen.

## 2021-09-30 NOTE — PROGRESS NOTE ADULT - ASSESSMENT
1. Metastatic Pancreatic CA    -- dx on 8/21/21  -- [place] [facility] [place]  -- Started treatment on 8/31/21 per protocol , randomized to gemcitabine/nab-paclitaxel and dual immmunotherapy. Received [place]/Abraxane on 9/14  -- further care as outpt after d/c    2. Diarrhea    -- C diff negative  -- stool studies negative   -- may be sec to immunotx now stable.   -- appears to be improving previously but now pt reports diarrhea/loose stools still. GI eval appreciated  -- unclear if still related to immunotherapy, now with oral mucositis, may have GI mucositis as well  -- we discussed with [facility] [place] primary oncologist Dr Hodges ([phone]) re his sx on 9/25. After further discussion, weighing risks/benefits, decided to start pt on steroids for sx control as of 9/25.   Start on prednisone 1mg/kg daily (100mg daily), converted to solumedrol 80mg daily due to inability to tolerate PO, with plan for rapid taper down by 20mg prednisone equivalent q3days if sx responds.Have decreased to solumedrol 60 mg 9/28 when patient reported no diarrhea. Plan to decrease dose every 3 days if diarrhea remains better. Would make sure taper continues while pt is in rehab and would decrease to 40 mg tomorrow  -- c/w GI ppx with IV protonix    3. Fevers - now resolved     -- ID following  -- No documented fevers  -- off of PO Vanco and IV zosyn    4. Abnormal LFTS    -- significant transaminitis, likely sec to immunotherapy vs chemo vs mets  -- liver US shows mets   -- LFTs are slowly improving  -- monitor daily LFTs, they are elevated but stable and slightly improved  -- see above re steroids    5. pancytopenia     -- s/p GCSF, likely due to chemo, treatment, acute illness, meds  -- WBC had increased 2/2 GCSF  -- counts otherwise adequate    6. Hyponatremia    -- stable  -- hypokalemia sec to diarrhea;  improving  -- renal following    7. anorexia -- due to illness but also now due to mucositis  -- cont home dose of dronabinol  2.5mg daily for now  -- nutrition f/u appreciated    8. LE edema -- per renal    9. mucositis -- see above re steroids  -- pall care consult appreciated  -- already on magic mouthwash, not helping  -- started Decadron 1 mg oral mouthwash four times per day  -- morphine 2 mg iv q4 prn pain  --Recommend good oral hygeine to be encouraged despite pain    10. Hypocalcemia  -supplement IV    11. reported facial droop -- resolved - imaging cancelled    12. audible AICD alarm -- EP eval appreciated, no event, alarm 2/2 not able to communicate remotely    13. Epistaxis -- patient given nasal saline spray and afrin - has resolved    Likely dc to [facility] today     We are in touch with [facility] and they have asked us to follow along as consultants. Please call with any questions.    Vel Geiger MD  [facility]  Cell: [phone]

## 2021-09-30 NOTE — CHART NOTE - NSCHARTNOTEFT_GEN_A_CORE
RD follow-up for severe malnutrition. 74 yo M hx of CAD, MI, stent x2 (10/2009 at [facility]), pAfib, HTN, HLD, BIV-AICD, and systolic CHF, hx of PE (on Eliquis), recently dx’d pancreatic [place] on [place] [facility] [place] now presenting with fever  and several days of watery diarrhea --- Cdiff negative.     Spoke to RN, patient tolerating diet despite mucositis, no chewing or swallowing issues.  Palatable, cold foods being provided. Good oral hygiene continues to be recommended. Patient s/p Bumex, Dobutamine for fluid overload/acute CHF.  Continues with 2-4+ edema (2+ edema to arms, 3+ to legs, 4+ to ankles and feet).  Endocrinology following patient as on IV solumedrol causing post-prandial hyperglycemia. Patient reported by team to be likely discharging to [facility] today. Hyponatremia noted to be improving. Receiving dronabinol.    Diet, Dysphagia 1 Pureed-Thin Liquids:   DASH/TLC {Sodium & Cholesterol Restricted} (DASH)  1000mL Fluid Restriction ([id number])  Supplement Feeding Modality:  Oral  Ensure Enlive Servings Per Day:  1       Frequency:  Three Times a day (09-27-21 @ 14:24)    Current Weight: 9/30 - 88.4kg      9/27 - 89.6kg      9/24 - 95.6kg      9/22 - 96kg      Pertinent Medications:   baclofen  calcium carbonate   1250 mG (OsCal)  dexAMETHasone    Solution  dextrose 40% Gel  dextrose 5%.  dextrose 5%.  dextrose 50% Injectable  dextrose 50% Injectable  dextrose 50% Injectable  dronabinol  finasteride  gabapentin  glucagon  Injectable  insulin glargine Injectable (LANTUS)  insulin lispro (ADMELOG) corrective regimen sliding scale  insulin lispro (ADMELOG) corrective regimen sliding scale  insulin lispro Injectable (ADMELOG)  losartan  methylPREDNISolone sodium succinate Injectable  metoprolol succinate ER  pancrelipase  (CREON 24,000 Lipase Units)  pantoprazole  Injectable    Pertinent Labs:  09-30 Na131 mmol/L<L> Glu 140 mg/dL<H> K+ 3.8 mmol/L Cr  0.77 mg/dL BUN 31 mg/dL<H> 09-30 Phos 3.2 mg/dL 09-30 Alb 2.9 g/dL<L>    CAPILLARY BLOOD GLUCOSE    POCT Blood Glucose.: 268 mg/dL (30 Sep 2021 12:47)  POCT Blood Glucose.: 146 mg/dL (30 Sep 2021 08:49)  POCT Blood Glucose.: 182 mg/dL (29 Sep 2021 21:13)  POCT Blood Glucose.: 206 mg/dL (29 Sep 2021 19:06)  POCT Blood Glucose.: 156 mg/dL (29 Sep 2021 18:02)    Skin:  No pressure injuries noted, edema as noted above    Estimated Needs:   [X] no change since previous assessment  [ ] recalculated:     Previous Nutrition Diagnosis: Severe Malnutrition   Nutrition Diagnosis is [ X ] ongoing  [ ] resolved [ ] not applicable     Additional Recommendations:  1) Continue oral supplement - Ensure Enlive 240mls 3x daily (1050kcal, 60g protein); suggest providing least restrictive therapeutic diet if medically appropriate given dx/catabolic illness;   2) Continue to provide cold / palatable foods as requested;     Ritu Langston, MS, RDN, CDN  Pager [phone]

## 2021-09-30 NOTE — DISCHARGE NOTE NURSING/CASE MANAGEMENT/SOCIAL WORK - PATIENT PORTAL LINK FT
You can access the FollowMyHealth Patient Portal offered by [facility] by registering at the following website: [email or web address]. By joining [facility]’s FollowMyHealth portal, you will also be able to view your health information using other applications (apps) compatible with our system.

## 2021-09-30 NOTE — PROGRESS NOTE ADULT - PROBLEM SELECTOR PROBLEM 2
Diabetes type 2, controlled
Diabetes type 2, controlled
Elevated transaminase level
Elevated transaminase level
Dysphagia
Elevated transaminase level
Diabetes type 2, controlled
Elevated transaminase level

## 2021-09-30 NOTE — DISCHARGE NOTE PROVIDER - CARE PROVIDER_API CALL
Dima Dong  INTERNAL MEDICINE  [address] [place] [address], [address]  [place] [address]  Phone: [phone]  Fax: [phone]  Follow Up Time:

## 2021-10-06 NOTE — ED PROVIDER NOTE - ATTENDING CONTRIBUTION TO CARE
74 yo M hx of CAD, MI, stent x2 (10/2009 at [facility]), pAfib, HTN, HLD, BIV-AICD, and systolic CHF, hx of PE (on Eliquis), recently dxd pancreatic [place] [facility] [place] to the ER w/ dark stool. Pt denies any nausea no vomiting. Pt states that he has lightheadedness. w/ bruising in the L flank denies any falls. On exam, pt is awake and alert x3, he has clear lungs, soft abdomen w/ ecchymosis in the L flank. Pt w/ hemoccult positive stool. Was sent to ER for further management of symptoms. concern for likely GI bleed. 76 yo M hx of CAD, MI, stent x2 (10/2009 at [facility]), pAfib, HTN, HLD, BIV-AICD, and systolic CHF, hx of PE (on Eliquis), recently dxd pancreatic mass on [place] [facility] [place] presents to the ER w/ dark stool. Pt was at [facility] and sent to the ER for further management of his anemia and concern for GI bleed. Pt states that he has no cp, no palpitations, no sob. +chills. No nausea no vomiting. Denies falling. +lightheadedness w/ movement in the bed. Reports he is at rehab due to weakness.     On exam, pt is awake and alert x3, he has clear lungs, ecchymosis in the L flank. approximately 8cm by 4 cm w/ mild abdominal tenderness. he is moving bilateral arms and legs, is deconditioned w/ inability to lift himself to sitting in the bed. head is normocephalic and atraumatic.     Pt w/ hemoccult positive stool. Was sent to ER for further management of symptoms. concern for likely GI bleed. Will have labs, imaging of the abdomen due to the bruising on the flank to eval for intraabdominal eitiology of anemia.

## 2021-10-06 NOTE — H&P ADULT - HISTORY OF PRESENT ILLNESS
NIGHT HOSPITALIST:   Patient UNKNOWN to me previously, assigned to me at this point via the ER and by Dr. Dowd to admit this 76 y/o M--patient seen with spouse in attendance and reviewed with patient's daughter by phone with patient's permission (daughter is a paediatric cardiologist--patient with a discharge from [facility] on Sept 30 2021 and referred to [facility] in the setting of this patient with a history of CAD with past MI and PCI/stent in 2009, PAF maintained on Eliquis, essential HTN on a beta blocker and ARB, BIV-AICD with HF with reduced EF%. with patient on additional indication for Eliquis for a PE with apparently recent diagnosis at [facility] for pancreatic CA on chemotherapy, with the admission at [facility] for oral mucositis with patient on a Prednisone taper and Decadron swish and spit therapy, with apparent steroid induced type 2 DM, patient maintained on a dysphagia diet from [facility], with the patient at [facility] with slow improvement in functional ability but noted with LEFT flank haematoma today with a low H/H and sent to the [place] [facility].   Patient offers no complaint, although slightly reliant upon spouse in attendance for interview.   Mild general weakness and fatigue.  No back pain, no tearing back pain.  NO fever, no chills, no rigors.  NO abdominal pain, no red blood per rectum or melena.  NO diarrhoea.   NO cough, no dyspnoea.  NO chest pain/pressure.  NO N/V.   NO diaphoresis.  NO palliative or exacerbating factors. NIGHT HOSPITALIST:   Patient UNKNOWN to me previously, assigned to me at this point via the ER and by Dr. Dowd to admit this 74 y/o M--patient seen with spouse in attendance and reviewed with patient's daughter by phone with patient's permission (daughter is a paediatric cardiologist--patient with a discharge from [facility] on Sept 30 2021 and referred to [email or web address] in the setting of this patient with a history of CAD with past MI and PCI/stent in 2009, PAF maintained on Eliquis, essential HTN on a beta blocker and ARB, BIV-AICD with HF with reduced EF%. with patient on additional indication for Eliquis for a PE with apparently recent diagnosis at [facility] for pancreatic CA on chemotherapy, with the admission at [facility] for oral mucositis with patient on a Prednisone taper and Decadron swish and spit therapy, with apparent steroid induced type 2 DM, patient maintained on a dysphagia diet from [facility], with the patient at [email or web address]hab with slow improvement in functional ability but noted with LEFT flank haematoma today with a low H/H and sent to the [place] ER.   Patient offers no complaint, although slightly reliant upon spouse in attendance for interview.   Mild general weakness and fatigue.  No back pain, no tearing back pain.  NO fever, no chills, no rigors.  NO abdominal pain, no red blood per rectum or melena.  NO diarrhoea.   NO cough, no dyspnoea.  NO chest pain/pressure.  NO N/V.   NO diaphoresis.  NO palliative or exacerbating factors.    Patient reports receiving Moderna COVID-19 vaccine x 2.

## 2021-10-06 NOTE — H&P ADULT - PROBLEM SELECTOR PLAN 1
Seen by surgery above.   Would consider contacting IR in the AM for assessment.  HOLDING full AC at present.   Follow H/H past second unit and consider low dose Lasix past transfusion if pulmonary fluid overload.

## 2021-10-06 NOTE — CONSULT NOTE ADULT - SUBJECTIVE AND OBJECTIVE BOX
Reason for consult: Metastatic Pancreatic Cancer, Anemia    HPI: 74 yo male with pmhx pancreatic cancer (on chemo/immunotherapy, last was 2 weeks ago), CAD, CHF, DVT/PE on eliquis, presenting with anemia. Recently admitted to [facility] for fever/diarrhea, sent to rehab last week, In [facility] rehab, was noted to have Hb 6.4, was sent to ED.    Hematology/Oncology called to see patient who has newly dx pancreatic cancer with metastatic disease to the liver.He had CT scan at [facility] Dr. Yan Hodges in Aug 2021 with PE, pancreatic, and liver mets.Biopsy from Aug 21 confirmed pancreatic adenocarcinoma. Started treatment on 8/31/21 per protocol , randomized to gemcitabine/nab-paclitaxel and dual immunotherapy. 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 He received steroids and was discharged to [facility] on 9/30/2021 on tapering doses of steroids. H/H on discharge was 10.8/32.7. He also has a history of DVT/PE and has been on Eliquis. FOBT is currently positive.    PAST MEDICAL & SURGICAL HISTORY:  Hypertension (ICD9 401.9)    Hyperlipidemia (ICD9 272.4)    BPH (Benign Prostatic Hyperplasia)    Borderline diabetes mellitus    MI (myocardial infarction)  2009    Systolic heart failure    Hernia    Biventricular ICD (implantable cardioverter-defibrillator) in place  2010    Stented coronary artery  X 2, 2009        FAMILY HISTORY:  No pertinent family history in first degree relatives        Alochol: Denied  Smoking: Nonsmoker  Drug Use: Denied  Marital Status:         Allergies    No Known Allergies    Intolerances        MEDICATIONS  (STANDING):    MEDICATIONS  (PRN):      ROS  No fever, sweats, chills  No epistaxis, HA, sore throat  No CP, SOB, cough, sputum  No n/v/d, abd pain, melena, hematochezia  No edema  No rash  No anxiety  No back pain, joint pain  No bleeding, bruising  No dysuria, hematuria    T(C): 36.6 (10-06-21 @ 11:51), Max: 36.6 (10-06-21 @ 11:51)  HR: 80 (10-06-21 @ 11:51) (80 - 80)  BP: 102/66 (10-06-21 @ 11:51) (102/66 - 102/66)  RR: 20 (10-06-21 @ 11:51) (20 - 20)  SpO2: 98% (10-06-21 @ 11:51) (98% - 98%)  Wt(kg): --    PE  NAD  Awake, alert  Anicteric, MMM  RRR  CTAB  Abd soft, NT, ND  No c/c/e  No rash grossly  FROM                          7.0    16.11 )-----------( 233      ( 06 Oct 2021 13:50 )             22.8       10-06    125<L>  |  86<L>  |  31<H>  ----------------------------<  193<H>  3.5   |  26  |  0.70    Ca    8.2<L>      06 Oct 2021 13:50    TPro  5.1<L>  /  Alb  2.8<L>  /  TBili  0.8  /  DBili  x   /  AST  50<H>  /  ALT  80<H>  /  AlkPhos  111  10-06   Reason for consult: Metastatic Pancreatic Cancer, Anemia    HPI: 74 yo male with pmhx pancreatic cancer (on chemo/immunotherapy, last was 2 weeks ago), CAD, CHF, DVT/PE on eliquis, presenting with anemia. Recently admitted to [facility] for fever/diarrhea, sent to rehab last week, In [facility] rehab, was noted to have Hb 6.4, was sent to ED.    Hematology/Oncology called to see patient who has newly dx pancreatic cancer with metastatic disease to the liver.He [place] at [facility] [place] Yan Hodges in Aug 2021 with PE, pancreatic, and liver mets.Biopsy from Aug 21 confirmed pancreatic adenocarcinoma. Started treatment on 8/31/21 per protocol , randomized to gemcitabine/nab-paclitaxel and dual immunotherapy. 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 He received steroids and was discharged to [facility] on 9/30/2021 on tapering doses of steroids. H/H on discharge was 10.8/32.7. He also has a history of DVT/PE and has been on Eliquis. FOBT is currently positive.    PAST MEDICAL & SURGICAL HISTORY:  Hypertension (ICD9 401.9)    Hyperlipidemia (ICD9 272.4)    BPH (Benign Prostatic Hyperplasia)    Borderline diabetes mellitus    MI (myocardial infarction)  2009    Systolic heart failure    Hernia    Biventricular ICD (implantable cardioverter-defibrillator) in place  2010    Stented coronary artery  X 2, 2009        FAMILY HISTORY:  No pertinent family history in first degree relatives        Alcohol Denied  Smoking: Nonsmoker  Drug Use: Denied  Marital Status:         Allergies    No Known Allergies    Intolerances        MEDICATIONS  (STANDING):    MEDICATIONS  (PRN):      ROS  Feels cold  No fevers  No epistaxis, HA, sore throat  No CP, SOB, cough, sputum  No n/v/d, abd pain, melena, hematochezia  No edema  No rash  No anxiety  No back pain, joint pain  No bleeding, bruising  No dysuria, hematuria    T(C): 36.6 (10-06-21 @ 11:51), Max: 36.6 (10-06-21 @ 11:51)  HR: 80 (10-06-21 @ 11:51) (80 - 80)  BP: 102/66 (10-06-21 @ 11:51) (102/66 - 102/66)  RR: 20 (10-06-21 @ 11:51) (20 - 20)  SpO2: 98% (10-06-21 @ 11:51) (98% - 98%)  Wt(kg): --    PE  NAD  Awake, alert  Anicteric, MMM  RRR  CTAB  Abd soft, NT, ND  Bilateral leg edema  No rash grossly                            7.0    16.11 )-----------( 233      ( 06 Oct 2021 13:50 )             22.8       10-06    125<L>  |  86<L>  |  31<H>  ----------------------------<  193<H>  3.5   |  26  |  0.70    Ca    8.2<L>      06 Oct 2021 13:50    TPro  5.1<L>  /  Alb  2.8<L>  /  TBili  0.8  /  DBili  x   /  AST  50<H>  /  ALT  80<H>  /  AlkPhos  111  10-06      EXAM:  CT ABDOMEN AND PELVIS                            PROCEDURE DATE:  10/06/2021            INTERPRETATION:  CLINICAL INFORMATION: Left flank bruising.    COMPARISON: CT abdomen and pelvis 8/7/2021.    CONTRAST/COMPLICATIONS:  IV Contrast: None  Oral Contrast: None  Complications: None    PROCEDURE:  CT of the Abdomen and Pelvis was performed.  Sagittal and coronal reformats were performed.    FINDINGS:  Evaluation is partially limited secondary to lack of intravenous contrast.    LOWER CHEST: Partially visualized biventricular AICD leads. Small left and trace right pleural effusions. Symmetric bilateral gynecomastia.    LIVER: Similar-appearing multiple bilobar hepatic metastases, better evaluated on prior contrast-enhanced imaging.  BILE DUCTS: Normal caliber.  GALLBLADDER: Cholelithiasis.  SPLEEN: Within normal limits.  PANCREAS: Redemonstrated ill-defined pancreatic mass arising from the body and tail with extension into the stomach and retroperitoneum. Marked surrounding inflammation. See prior contrast enhanced study for further evaluation.  ADRENALS: Within normal limits.  KIDNEYS/URETERS: Within normal limits.    BLADDER: Within normal limits.  REPRODUCTIVE ORGANS: Prostate within normal limits.    BOWEL: Pancreatic mass extends to the stomach. No bowel obstruction. Appendix is normal. Diverticulosis. Large volume of stool in rectum with associated rectal pneumatosis suspicious for stercoral colitis.  PERITONEUM: Small volume ascites.  VESSELS: Atherosclerotic changes. Limited evaluation due to lack of IV contrast. Likely continued encasement of multiple vessels secondary to pancreatic disease.  RETROPERITONEUM/LYMPH NODES: A large left retroperitoneal hematoma measuring 7.2 x 5.4 x 10.8 cm, likely associated with the left psoas muscle. Similar periaortic lymphadenopathy.  ABDOMINAL WALL: Diffuse subcutaneous infiltration.  BONES: Degenerative changes.    IMPRESSION:  Large left retroperitoneal hematoma measuring 7.2 x 5.4 x 10.8 cm, likely associated with the left psoas muscle.  Similar-appearing metastatic pancreatic disease, better evaluated on prior contrast-enhanced imaging.    Stercoral colitis with rectal pneumatosis.    These findings were discussed with Dr. Gardner on 10/6/2021 at 3:03 PM by Dr. Alexander with read back confirmation.    --- End of Report ---

## 2021-10-06 NOTE — H&P ADULT - ASSESSMENT
NIGHT HOSPITALIST:   NIGHT HOSPITALIST:  Referral of patient with large LEFT retroperitoneal hematoma with no trauma in the setting of patient on Eliquis for PAF and a history of PE--seen by general surgery with no acute surgical intervention recommended with IR referral>>patient with stable hemodynamics but with prolonged QTc may be from patient's K+>>will supplement to 4.0 and check Mg++  will HOLD Zofran and upgraded to telemetry. NIGHT HOSPITALIST:  Referral of patient with large LEFT retroperitoneal hematoma with no trauma in the setting of patient on Eliquis for PAF and a history of PE--seen by general surgery with no acute surgical intervention recommended with IR referral>>patient with stable hemodynamics but with prolonged QTc may be from patient's K+>>will supplement to 4.0 and check Mg++  will HOLD Zofran and upgraded to telemetry.  Reviewed with patient/ spouse and adult daughter>>will have to temporarily HOLD the Eliquis given retroperitoneal hematoma>>will transfuse one additional unit red blood cells with Hgb at 8.1 given extent of hematoma and CAD.  Patient/ spouse agree.    Unclear if the COVID-19 PCR is a false positive.   Will repeat and will obtain inflammatory indices.   Presently no indication for remdesivir or titration of patient's steroid taper.   This was discussed with patient and spouse.    Will continue with FS S/S but may consider review of bedtime Lantus in the AM for improved glycemic control.    Would consider EPS check of AICD if not done recently. NIGHT HOSPITALIST:  Referral of patient a history of pancreatic CA with large LEFT retroperitoneal hematoma with no trauma in the setting of patient on Eliquis for PAF and a history of PE--seen by general surgery with no acute surgical intervention recommended with IR referral>>patient with stable hemodynamics but with prolonged QTc may be from patient's K+>>will supplement to 4.0 and check Mg++  will HOLD Zofran and upgraded to telemetry.  Reviewed with patient/ spouse and adult daughter>>will have to temporarily HOLD the Eliquis given retroperitoneal hematoma>>will transfuse one additional unit red blood cells with Hgb at 8.1 given extent of hematoma and CAD.  Patient/ spouse agree.    Unclear if the COVID-19 PCR is a false positive.   Will repeat and will obtain inflammatory indices.   Presently no indication for remdesivir or titration of patient's steroid taper.   This was discussed with patient and spouse.    Will continue with FS S/S but may consider review of bedtime Lantus in the AM for improved glycemic control.    Would consider EPS check of AICD if not done recently.

## 2021-10-06 NOTE — ED PROVIDER NOTE - OBJECTIVE STATEMENT
74 yo male with pmhx pancreatic cancer (on chemo/immunotherapy, last was 2 weeks ago), CAD, CHF, DVT/PE on eliquis, presenting with anemia. Recently admitted to [facility] for fever/diarrhea, sent to rehab last week, In [place] rehab, was noted to have Hb 6.4, was sent to ED. Denies any complaints.    Denies CP, SOB, LOC, N/V/D, blood in stool.  [facility] Dr. Yan Hodges [phone]

## 2021-10-06 NOTE — ED ADULT NURSE REASSESSMENT NOTE - NS ED NURSE REASSESS COMMENT FT1
As per admission team, patient is able to eat. Patient provided a turkey sandwich and ginger ale.
Patient endorses having a stage one pressure ulcer over the saccum, dressing applied by day shift RN.
Received report from night SHAYAN Manning. Patient resting in the stretcher with wife at the bedside, VSS. Patient finished one unit of PRBC's. Admitting doctor at the bedside assessing patient. Safety measures maintained. Bed in the lowest position. Call bell within reach.  MD at the bedside. No acute distress noted or further complaints at this time.
Pt resting comfortably in bed, denies any adverse transfusion reactions at this time. No signs of transfusion reaction noted. Call bell within reach.
Blood transfusion of 1 unit PRBCs started w/ 2 RNs at bedside. Consent in chart. Pt educated on indication for transfusion and adverse transfusion reactions. Pt verbalizes understanding of indication and possible adverse reactions. Call bell in reach and pt understands to alert staff if symptoms arise. Pt currently resting comfortably in bed.

## 2021-10-06 NOTE — ED ADULT NURSE NOTE - OBJECTIVE STATEMENT
75M to ED sent from [facility] for decreased H&H. As per EMS, patient had Hgb of 6.4 and was sent to the ED to r/out GI bleeding and also for possible placement of IVC filter. Patient has hx of Pancreatic CA, DM, HTN, PE and DVT, PM/Defibrillator. Patient denies rectal bleeding, or any other kind of bleeding at this time. Patient c/o feeling cold at this time.  Patient is alert and oriented x4, calm/cooperative, NAD, VSS. Patient is aler 75M to ED sent from [facility] for decreased H&H. As per EMS, patient had Hgb of 6.4 and was sent to the ED to r/out GI bleeding and also for possible placement of IVC filter. Patient has hx of Pancreatic CA, DM, HTN, PE and DVT, PM/Defibrillator. Patient denies rectal bleeding, or any other kind of bleeding at this time. Patient c/o feeling cold at this time.  Patient is alert and oriented x4, calm/cooperative, NAD, VSS. Patient has 20 gauge IV placed to RAC in the ED, patient also arrives with a 20 gauge iv to his LAC from PTA from [facility]. Break cover SHAYAN Burch3, report given to SHAYAN Manning.

## 2021-10-06 NOTE — H&P ADULT - NSHPREVIEWOFSYSTEMS_GEN_ALL_CORE
NO chest pain/pressure.  NO palpitations.  NO cough, no wheezing, no dyspnoea.  No HA, no focal weakness.  NO fever, no chills, no rigors.  NO rash.    No joint pain.  NO dysuria, no hemturia.  +anorexia.  NO SI/HI.  NO thyroid symptoms.

## 2021-10-06 NOTE — H&P ADULT - NSHPLABSRESULTS_GEN_ALL_CORE
WBC 16.1   88N    Hgb 7.0>> 8.1 past one unit.    Platelets of 233K.    Hgb of 10.8 upon last [facility] discharge.    Na+ 125.  Random glucose of 193    Cr 0.7    K+ 3.5    Alb 2.8    COVID-19 PCR>>POSITIVE>>repeated to exclude false positive (reviewed with spouse)    Echo 9/21 with grossly severe global LV systolic dysfunction.    Chest radiograph with no infiltrate or effusion.  + AICD    EKG tracing reviewed with atrial paced at 97 with PAC, QTc 525 ms WBC 16.1   88N    Hgb 7.0>> 8.1 past one unit.    Platelets of 233K.    Hgb of 10.8 upon last [facility] discharge.    Na+ 125.  Random glucose of 193    Cr 0.7    K+ 3.5    Alb 2.8    COVID-19 PCR>>POSITIVE>>repeated to exclude false positive (reviewed with spouse)    Echo 9/21 with grossly severe global LV systolic dysfunction.    Chest radiograph with no infiltrate or effusion.  + AICD    EKG tracing reviewed with atrial paced at 97 with PAC, QTc 525 ms    CTT abdomen with large LEFT retroperitoneal hematoma 7.2x5.4x10.8 cm with LEFT psoas muscle, similar appearing met pancreatic disease.  Stercoral colitis with rectal pneumatosis>>general surgery called by ER

## 2021-10-06 NOTE — INPATIENT CERTIFICATION FOR MEDICARE PATIENTS - PHYSICIAN CONCUR
I concur with the Admission Order and I certify that services are provided in accordance with Section 42 CFR § 412.3
normal...

## 2021-10-06 NOTE — H&P ADULT - NSICDXPASTMEDICALHX_GEN_ALL_CORE_FT
PAST MEDICAL HISTORY:  Borderline diabetes mellitus     BPH (Benign Prostatic Hyperplasia)     Hyperlipidemia (ICD9 272.4)     Hypertension (ICD9 401.9)     MI (myocardial infarction) 2009    Pancreatic cancer     Systolic heart failure

## 2021-10-06 NOTE — CONSULT NOTE ADULT - ATTENDING COMMENTS
Patient seen and examined and agree with above.  75 year old male with metastatic pancreatic cancer on chemotherapy last received 2 weeks ago presents with left retroperitoneal hematoma. The patient was found to have a hb of 7 down from 10 recently. The patient is hemodynamically ok. His BP appears to be on the lower end of normal. He denies symptoms at the time I am examining him.   On exam his abdomen is soft nontender and nondistended.   There is no rebound or guarding.   Labs reviewed.   Recommend to monitor acute blood loss anemia secondary to reptroperitoneal hematoma.   If continued decompensation would recommend IR for embolization.   Will follow closely.

## 2021-10-06 NOTE — CONSULT NOTE ADULT - ASSESSMENT
76 yo male with pmhx pancreatic cancer (on chemo/immunotherapy, last was 2 weeks ago), CAD, CHF, DVT/PE on eliquis, presenting with anemia. Recently admitted to [facility] for fever/diarrhea, sent to rehab last week, In [facility], was noted to have Hb 6.4, was sent to ED.    Hematology/Oncology called to see patient who has newly dx pancreatic cancer with metastatic disease to the liver.He had CT scan at [facility] Dr. Yan Hodges in Aug 2021 with PE, pancreatic, and liver mets.Biopsy from Aug 21 confirmed pancreatic adenocarcinoma. Started treatment on 8/31/21 per protocol , randomized to gemcitabine/nab-paclitaxel and dual immunotherapy. 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 He received steroids and was discharged to [facility] on 9/30/2021 on tapering doses of steroids. H/H on discharge was 10.8/32.7. He also has a history of DVT/PE and has been on Eliquis. FOBT is currently positive.    Metastatic Pancreatic Cancer  --Under care at [facility] Dr. Yan Hodges  --Ongoing treatment after discharge    Anemia  --Sudden drop in H/H from 10.8-7.0 in 1 week  --Please transfuse PRBC's for Hgb <7.0 grams (but would transfuse now given sudden drop)  --Will check iron, B12, folate, Haptoglobin and LDH    GI Bleed  --FOBT Positive  --Need urgent GI consultation to find source of GI bleed    Thrombophilia  --History of DVT/PE  --Would hold anticoagulation until bleeding has been controlled    If admitted to Medicine, please readmit to Dr. Rolando Dowd who was patient's hospitalist on last admission.    We will continue to follow patient and coordinate with [facility].    Washington Townsend PA-C  Hematology/Oncology  [facility]   [phone] (cell)  [phone] (office)  [phone] (alt office)  Evenings and weekends please call MD on call or office       74 yo male with pmhx pancreatic cancer (on chemo/immunotherapy, last was 2 weeks ago), CAD, CHF, DVT/PE on eliquis, presenting with anemia. Recently admitted to [facility] for fever/diarrhea, sent to rehab last week, In [facility], was noted to have Hb 6.4, was sent to ED.    Hematology/Oncology called to see patient who has newly dx pancreatic cancer with metastatic disease to the liver.He had CT scan at [facility] Dr. Yan Hodges in Aug 2021 with PE, pancreatic, and liver mets.Biopsy from Aug 21 confirmed pancreatic adenocarcinoma. Started treatment on 8/31/21 per protocol , randomized to gemcitabine/nab-paclitaxel and dual immunotherapy. 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 He received steroids and was discharged to [facility] on 9/30/2021 on tapering doses of steroids. H/H on discharge was 10.8/32.7. He also has a history of DVT/PE and has been on Eliquis. FOBT is currently positive.    Metastatic Pancreatic Cancer  --Under care at [facility] Dr. Yan Hodges  --Ongoing treatment after discharge    Anemia  --Sudden drop in H/H from 10.8-7.0 in 1 week  --Please transfuse PRBC's for Hgb <7.0 grams (but would transfuse now given sudden drop)  --Will check iron, B12, folate, Haptoglobin and LDH    GI Bleed  --FOBT Positive  --May be secondary to steroid use or may be from colitis  --Need urgent GI consultation to find source of GI bleed    Thrombophilia  --History of DVT/PE  --Would hold anticoagulation until bleeding has been controlled    If admitted to Medicine, please readmit to Dr. Rolando Dowd who was patient's hospitalist on last admission.    We will continue to follow patient and coordinate with [facility].    Washington Townsend PA-C  Hematology/Oncology  [facility]   [phone] (cell)  [phone] (office)  [phone] (alt office)  Evenings and weekends please call MD on call or office       74 yo male with pmhx pancreatic cancer (on chemo/immunotherapy, last was 2 weeks ago), CAD, CHF, DVT/PE on eliquis, presenting with anemia. Recently admitted to [facility] for fever/diarrhea, sent to rehab last week, In [facility], was noted to have Hb 6.4, was sent to ED.    Hematology/Oncology called to see patient who has newly dx pancreatic cancer with metastatic disease to the liver.He had CT scan at [facility] Dr. Yan Hodges in Aug 2021 with PE, pancreatic, and liver mets.Biopsy from Aug 21 confirmed pancreatic adenocarcinoma. Started treatment on 8/31/21 per protocol , randomized to gemcitabine/nab-paclitaxel and dual immunotherapy. 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 He received steroids and was discharged to [facility] on 9/30/2021 on tapering doses of steroids. H/H on discharge was 10.8/32.7. He also has a history of DVT/PE and has been on Eliquis. FOBT is currently positive.    Metastatic Pancreatic Cancer  --Under care at [facility] Dr. Yan Hodges  --Ongoing treatment after discharge    Anemia  --Sudden drop in H/H from 10.8-7.0 in 1 week  --Retroperitoneal Hematoma  --Please transfuse PRBC's for Hgb <7.0 grams (but would transfuse now given sudden drop)  --Will check iron, B12, folate, Haptoglobin and LDH    GI Bleed  --FOBT Positive  --May be secondary to steroid use or may be from colitis  --Need urgent GI consultation to find source of GI bleed    Thrombophilia  --History of DVT/PE  --Would hold anticoagulation until bleeding has been controlled      We will continue to follow patient and coordinate with [facility].    Washington Townsend PA-C  Hematology/Oncology  [facility]   [phone] (cell)  [phone] (office)  [phone] (alt office)  Evenings and weekends please call MD on call or office       76 yo male with pmhx pancreatic cancer (on chemo/immunotherapy, last was 2 weeks ago), CAD, CHF, DVT/PE on eliquis, presenting with anemia. Recently admitted to [facility] for fever/diarrhea, sent to rehab last week, In [facility], was noted to have Hb 6.4, was sent to ED.    Hematology/Oncology called to see patient who has newly dx pancreatic cancer with metastatic disease to the liver.He had CT scan at [facility] Dr. Yan Hodges in Aug 2021 with PE, pancreatic, and liver mets.Biopsy from Aug 21 confirmed pancreatic adenocarcinoma. Started treatment on 8/31/21 per protocol , randomized to gemcitabine/nab-paclitaxel and dual immunotherapy. 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 He received steroids and was discharged to [facility] on 9/30/2021 on tapering doses of steroids. H/H on discharge was 10.8/32.7. He also has a history of DVT/PE and has been on Eliquis. FOBT is currently positive.    Metastatic Pancreatic Cancer  --Under care at [facility] Dr. Yan Hodges  --Ongoing treatment after discharge    Anemia  --Sudden drop in H/H from 10.8-7.0 in 1 week  --Retroperitoneal Hematoma on CT  --Please transfuse PRBC's for Hgb <7.0 grams (but would transfuse now given sudden drop)  --Will check iron, B12, folate, Haptoglobin and LDH    GI Bleed  --FOBT Positive  --May be secondary to steroid use or may be from colitis  --Need urgent GI consultation to find source of GI bleed    Thrombophilia  --History of DVT/PE  --Would hold anticoagulation until bleeding has been controlled  --If unable to control bleeding, patient may need IVC filter - will advise after bleeding workup     Retroperitoneal Hematoma  --Management per primary team    We will continue to follow patient and coordinate with [facility].    Washington Townsend PA-C  Hematology/Oncology  [facility]   [phone] (cell)  [phone] (office)  [phone] (alt office)  Evenings and weekends please call MD on call or office

## 2021-10-06 NOTE — ED PROVIDER NOTE - PROGRESS NOTE DETAILS
LENA michael, does not believe chemo has an effect on causing his anemia, rec looking for GI bleed etiology, transfuse as needed. hemoccult positive--> case endorsed to Dr. Dowd LENA Gardner MD  radiologist called, new RP hematoma, surgery consulted, will eval patient.

## 2021-10-06 NOTE — CONSULT NOTE ADULT - ASSESSMENT
75y year old Male who presents from [facility] due to anemia, CT at [facility] showing large L retroperitoneal hematoma, likely associated with L psoas muscle    - Reverse eliquis with K centra  - Transfuse as necessary  - Recommend IR consult  - Surgery to follow, no plan for acute surgical intervention at this time  - Plan discussed with attending    ACS  [id number]

## 2021-10-06 NOTE — H&P ADULT - NSHPSOURCEINFOTX_GEN_ALL_CORE
Reviewed [facility] from [facility] with family and reviewed with spouse in attendance and with patient's adult daughter by phone (pediatric cardiologist)

## 2021-10-06 NOTE — ED PROVIDER NOTE - CLINICAL SUMMARY MEDICAL DECISION MAKING FREE TEXT BOX
76 yo male with pmhx pancreatic cancer (on chemo/immunotherapy, last was 2 weeks ago), CAD, CHF, DVT/PE on eliquis, presenting with anemia. will eval for blood loss anemia with labs, type and screen, will d/w dr. michael, and will reassess.

## 2021-10-06 NOTE — CONSULT NOTE ADULT - SUBJECTIVE AND OBJECTIVE BOX
CC: Patient is a 75y old  Male who presents with a chief complaint of anemia found at [facility] rehab    HPI: Patient is a 75y year old male with PMHx pancreatic cancer (on chemo/immunotherapy, last was 2 weeks ago), CAD, CHF, DVT/PE on eliquis, presenting with anemia. Recently admitted to [facility] for fever/diarrhea, sent to rehab last week, In [facility] rehab, was noted to have Hb 6.4, was sent to ED. per patient does not recall any falls. Patient's wife is at bedside and says he has been bed bound since his admission at [facility]. Has not been getting OOB at rehab.     In ED patient found to be anemic to 7/22.8. BP soft. CT scan revealing large left retroperitoneal hematoma. received 1u pRBC.      PMH  Hypertension (ICD9 401.9)  Hyperlipidemia (ICD9 272.4)  Bacteremia (ICD9 790.7)  AF (Atrial Fibrillation) (ICD9 427.31)  Pneumonia (ICD9 486)  BPH (Benign Prostatic Hyperplasia)  Dyspepsia  Borderline diabetes mellitus  MI (myocardial infarction)  Systolic heart failure      PSH  Hernia  Biventricular ICD (implantable cardioverter-defibrillator) in place  Stented coronary artery    Allergies  No Known Allergies      Physical Exam  T(C): 36.8 (10-06-21 @ 16:35), Max: 36.8 (10-06-21 @ 16:20)  HR: 94 (10-06-21 @ 16:35) (80 - 94)  BP: 98/70 (10-06-21 @ 16:35) (98/70 - 102/83)  RR: 16 (10-06-21 @ 16:35) (16 - 20)  SpO2: 94% (10-06-21 @ 16:35) (94% - 98%)  Wt(kg): --  Tmax: T(C): , Max: 36.8 (10-06-21 @ 16:20)  Wt(kg): --    Gen: NAD  HEENT: normocephalic, atraumatic, no scleral icterus  CV: appears well perfused  Pulm: unlabored respirations  Abd: Soft, ND, NTP, no rebound, no guarding, no palpable organomegaly/masses  L flank ecchymosis  Ext: warm, no edema, palp dp/pt      Labs:                        7.0    16.11 )-----------( 233      ( 06 Oct 2021 13:50 )             22.8     10-06    125<L>  |  86<L>  |  31<H>  ----------------------------<  193<H>  3.5   |  26  |  0.70    Ca    8.2<L>      06 Oct 2021 13:50    TPro  5.1<L>  /  Alb  2.8<L>  /  TBili  0.8  /  DBili  x   /  AST  50<H>  /  ALT  80<H>  /  AlkPhos  111  10-06    PT/INR - ( 06 Oct 2021 13:50 )   PT: 17.6 sec;   INR: 1.50 ratio         PTT - ( 06 Oct 2021 13:50 )  PTT:26.7 sec      Imaging  < from: CT Abdomen and Pelvis No Cont (10.06.21 @ 14:47) >  IMPRESSION:  Large left retroperitoneal hematoma measuring 7.2 x 5.4 x 10.8 cm, likely associated with theleft psoas muscle.  Similar-appearing metastatic pancreatic disease, better evaluated on prior contrast-enhanced imaging.    Stercoral colitis with rectal pneumatosis.    < end of copied text >

## 2021-10-06 NOTE — ED ADULT NURSE NOTE - NSIMPLEMENTINTERV_GEN_ALL_ED
Implemented All Fall Risk Interventions:  [place] to call system. Call bell, personal items and telephone within reach. Instruct patient to call for assistance. Room bathroom lighting operational. Non-slip footwear when patient is off stretcher. Physically safe environment: no spills, clutter or unnecessary equipment. Stretcher in lowest position, wheels locked, appropriate side rails in place. Provide visual cue, wrist band, yellow gown, etc. Monitor gait and stability. Monitor for mental status changes and reorient to person, place, and time. Review medications for side effects contributing to fall risk. Reinforce activity limits and safety measures with patient and family.

## 2021-10-06 NOTE — H&P ADULT - NSHPADDITIONALINFOADULT_GEN_ALL_CORE
NIGHT HOSPITALIST:   Patient/ spouse/ adult daughter aware of course and agrees with plan/care as above.    Given patient's comorbidities, patient's long term prognosis is guarded.   Patient/ family are not yet ready to discuss advance directives.   Emotional support provided to patient/family.    Care reviewed with covering NP/PA for endorsement to Dr. Dowd.    Marshal Billy MD  [phone] NIGHT HOSPITALIST:   Patient/ spouse/ adult daughter aware of course and agrees with plan/care as above.    Given patient's comorbidities, patient's long term prognosis is guarded.   Patient/ family are not yet ready to discuss advance directives.   Emotional support provided to patient/family.    Care reviewed with covering NP/Betito LAIRD for endorsement to Dr. Dowd.    Marshal Billy MD  [phone]

## 2021-10-07 NOTE — PHYSICAL THERAPY INITIAL EVALUATION ADULT - STRENGTHENING, PT EVAL
GOAL: Pt will improve bilateral LE strength to _, for increased limb stability, to improve gait and facilitate stair negotiation in 2 weeks.

## 2021-10-07 NOTE — CONSULT NOTE ADULT - ASSESSMENT
75 year old  75 year old male recently discharged from [facility] on 9/30/21 for oral/ GI mucositis on tapering steroids, history of CAD with MI PCI/ stent in 2009, PAF on Eliquis, HTN, BIV-ICD with HF with reduced EF, PE, Pancreatic ca on chemotherapy was at [facility] when he developed anemia secondary to retroperitoneal bleed in the left flank, CT with large left retroperitoneal hematoma measuring 7.2 x 5.4 x 10.8cm, likely associated with left psoas muscle.     Has remained afebrile. Remains on steroid taper for mucositis (GI and Oral). Remains with lesions near the upper tonsil and on his lower lip. Large left flank hematoma.    Recommend:  #Leukocytosis  -Suspect multifactorial given left sided retroperitoneal hematoma, steroid use.  -No fevers, would monitor off abx  -Nontoxic appearing    #Left retroperitoneal hematoma  -Surgery evaluated - no surgical intervention  -IR evaluating for embolizatoin  -Trend hgb/ hct    #Mucositis  -Suspect chemo related  -Will swab for HSV  -Continue oral care    Matt Stokes MD  Pager [phone]  After 5pm/weekends call [phone]    Discussed plan with NP.  Discussed

## 2021-10-07 NOTE — PROGRESS NOTE ADULT - SUBJECTIVE AND OBJECTIVE BOX
SUBJECTIVE / OVERNIGHT EVENTS:pt seen and examined      MEDICATIONS  (STANDING):  calcium carbonate   1250 mG (OsCal) 1 Tablet(s) Oral two times a day  dexAMETHasone    Solution 1 milliGRAM(s) Oral four times a day  dextrose 40% Gel 15 Gram(s) Oral once  dextrose 5%. 1000 milliLiter(s) (50 mL/Hr) IV Continuous <Continuous>  dextrose 5%. 1000 milliLiter(s) (100 mL/Hr) IV Continuous <Continuous>  dextrose 50% Injectable 25 Gram(s) IV Push once  dextrose 50% Injectable 12.5 Gram(s) IV Push once  dextrose 50% Injectable 25 Gram(s) IV Push once  dronabinol 2.5 milliGRAM(s) Oral at bedtime  finasteride 5 milliGRAM(s) Oral daily  gabapentin 100 milliGRAM(s) Oral at bedtime  glucagon  Injectable 1 milliGRAM(s) IntraMuscular once  influenza   Vaccine 0.5 milliLiter(s) IntraMuscular once  insulin lispro (ADMELOG) corrective regimen sliding scale   SubCutaneous three times a day before meals  insulin lispro (ADMELOG) corrective regimen sliding scale   SubCutaneous at bedtime  losartan 25 milliGRAM(s) Oral daily  metoprolol succinate ER 12.5 milliGRAM(s) Oral daily  multivitamin/minerals 1 Tablet(s) Oral daily  nystatin    Suspension 230459 Unit(s) Oral two times a day  pancrelipase  (CREON 12,000 Lipase Units) 2 Capsule(s) Oral four times a day with meals  pantoprazole    Tablet 40 milliGRAM(s) Oral before breakfast  predniSONE   Tablet 20 milliGRAM(s) Oral daily  urea Oral Powder 30 Gram(s) Oral two times a day    MEDICATIONS  (PRN):  acetaminophen   Tablet .. 650 milliGRAM(s) Oral every 6 hours PRN Temp greater or equal to 38C (100.4F), Mild Pain (1 - 3)  melatonin 3 milliGRAM(s) Oral at bedtime PRN Insomnia  sodium chloride 0.65% Nasal 1 Spray(s) Both Nostrils three times a day PRN Nasal Congestion    Vital Signs Last 24 Hrs  T(C): 36.5 (07 Oct 2021 20:33), Max: 36.6 (07 Oct 2021 12:09)  T(F): 97.7 (07 Oct 2021 20:33), Max: 97.9 (07 Oct 2021 12:09)  HR: 84 (07 Oct 2021 20:33) (74 - 98)  BP: 102/67 (07 Oct 2021 20:33) (102/67 - 137/78)  BP(mean): --  RR: 20 (07 Oct 2021 20:33) (20 - 20)  SpO2: 96% (07 Oct 2021 20:33) (95% - 96%)    CAPILLARY BLOOD GLUCOSE      POCT Blood Glucose.: 192 mg/dL (07 Oct 2021 20:56)  POCT Blood Glucose.: 189 mg/dL (07 Oct 2021 16:31)  POCT Blood Glucose.: 222 mg/dL (07 Oct 2021 12:51)  POCT Blood Glucose.: 153 mg/dL (07 Oct 2021 07:43)    I&O's Summary    07 Oct 2021 07:01  -  08 Oct 2021 00:07  --------------------------------------------------------  IN: 240 mL / OUT: 0 mL / NET: 240 mL        Constitutional: No fever, fatigue  Skin: No rash.  Eyes: No recent vision problems or eye pain.  ENT: No congestion, ear pain, or sore throat.  Cardiovascular: No chest pain or palpation.  Respiratory: No cough, shortness of breath, congestion, or wheezing.  Gastrointestinal: No abdominal pain, nausea, vomiting, or diarrhea.  Genitourinary: No dysuria.  Musculoskeletal: No joint swelling.  Neurologic: No headache.    PHYSICAL EXAM:  GENERAL: NAD  EYES: EOMI, PERRLA  NECK: Supple, No JVD  CHEST/LUNG: dec breath sounds at bases  HEART:  S1 , S2 +  ABDOMEN: soft , bs+  EXTREMITIES: no edema   NEUROLOGY:alert awake      LABS:                        9.7    15.85 )-----------( 222      ( 07 Oct 2021 18:55 )             30.0     10-07    127<L>  |  91<L>  |  28<H>  ----------------------------<  174<H>  3.6   |  24  |  0.81    Ca    8.1<L>      07 Oct 2021 18:55  Mg     2.2     10-06    TPro  5.1<L>  /  Alb  2.8<L>  /  TBili  0.8  /  DBili  x   /  AST  50<H>  /  ALT  80<H>  /  AlkPhos  111  10-06    PT/INR - ( 06 Oct 2021 13:50 )   PT: 17.6 sec;   INR: 1.50 ratio         PTT - ( 06 Oct 2021 13:50 )  PTT:26.7 sec  CARDIAC MARKERS ( 06 Oct 2021 21:37 )  x     / x     / 232 U/L / x     / x              RADIOLOGY & ADDITIONAL TESTS:    Imaging Personally Reviewed:    Consultant(s) Notes Reviewed:      Care Discussed with Consultants/Other Providers:

## 2021-10-07 NOTE — PROGRESS NOTE ADULT - ASSESSMENT
76 yo male with pmhx pancreatic cancer (on chemo/immunotherapy, last was 2 weeks ago), CAD, CHF, DVT/PE on eliquis, presenting with anemia. Recently admitted to [facility] for fever/diarrhea, sent to rehab last week, In [facility] re[facility], was noted to have Hb 6.4, was sent to ED.    Hematology/Oncology called to see patient who has newly dx pancreatic cancer with metastatic disease to the liver.He had CT scan at [facility] Dr. Yan Hodges in Aug 2021 with PE, pancreatic, and liver mets.Biopsy from Aug 21 confirmed pancreatic adenocarcinoma. Started treatment on 8/31/21 per protocol , randomized to gemcitabine/nab-paclitaxel and dual immunotherapy. 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 He received steroids and was discharged to [facility] on 9/30/2021 on tapering doses of steroids. H/H on discharge was 10.8/32.7. He also has a history of DVT/PE and has been on Eliquis. FOBT is currently positive.    Metastatic Pancreatic Cancer  --Under care at [facility] Dr. Yan Hodges  --Ongoing treatment after discharge    Anemia  --Sudden drop in H/H from 10.8-7.0 in 1 week  --Retroperitoneal Hematoma on CT  --Please transfuse PRBC's for Hgb <7.0 grams (but would transfuse now given sudden drop)  --Iron, B12, folate, Haptoglobin and LDH all checked. No hemolysis or deficiencies    GI Bleed  --FOBT Positive  --May be secondary to steroid use or may be from colitis  --Recommend GI consultation to find source of GI bleed if remains FOBT positive    Thrombophilia  --History of DVT/PE  --Would hold anticoagulation until bleeding has been controlled  --If unable to control bleeding, patient may need IVC filter - will advise after bleeding workup     Retroperitoneal Hematoma  --Management per primary team    Leukocytosis  --Most likely secondary to steroids patient is taking for prior bout of colitis - tapering dose  --ID consult appreciated  --Antibiotics on hold pending infectious source    We will continue to follow patient and coordinate with [facility].    Washington Townsend PA-C  Hematology/Oncology  [facility]   [phone] (cell)  [phone] (office)  [phone] (alt office)  Evenings and weekends please call MD on call or office       74 yo male with pmhx pancreatic cancer (on chemo/immunotherapy, last was 2 weeks ago), CAD, CHF, DVT/PE on eliquis, presenting with anemia. Recently admitted to [facility] for fever/diarrhea, sent to rehab last week, In [facility], was noted to have Hb 6.4, was sent to ED.    Hematology/Oncology called to see patient who has newly dx pancreatic cancer with metastatic disease to the liver.He had CT scan at [facility] Dr. Yan Hodges in Aug 2021 with PE, pancreatic, and liver mets.Biopsy from Aug 21 confirmed pancreatic adenocarcinoma. Started treatment on 8/31/21 per protocol , randomized to gemcitabine/nab-paclitaxel and dual immunotherapy. His last chemotherapy treatment was 9/14/2021 and he was admitted to 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 He received steroids and was discharged to [facility] on 9/30/2021 on tapering doses of steroids. H/H on discharge was 10.8/32.7. He also has a history of DVT/PE and has been on Eliquis. FOBT is currently positive.    Metastatic Pancreatic Cancer  --Under care at [facility] Dr. Yan Hodges  --Ongoing treatment after discharge    Anemia  --Sudden drop in H/H from 10.8-7.0 in 1 week  --Retroperitoneal Hematoma on CT  --Please transfuse PRBC's for Hgb <7.0 grams (but would transfuse now given sudden drop)  --Iron, B12, folate, Haptoglobin and LDH all checked. No hemolysis or deficiencies    GI Bleed  --FOBT Positive  --May be secondary to steroid use or may be from colitis  --Recommend GI consultation to find source of GI bleed if remains FOBT positive    Thrombophilia  --History of DVT/PE  --Would hold anticoagulation until bleeding has been controlled  --If unable to control bleeding, patient may need IVC filter - will advise after bleeding workup     Retroperitoneal Hematoma  --Management per primary team  --Consider CTA/IR evaluation to look for source of bleed  --Will need to monitor with serial CTs    Leukocytosis  --Most likely secondary to steroids patient is taking for prior bout of colitis - tapering dose  --ID consult appreciated  --Antibiotics on hold pending infectious source    We will continue to follow patient and coordinate with [facility].    Washington Townsend PA-C  Hematology/Oncology  [facility]   [phone] (cell)  [phone] (office)  [phone] (alt office)  Evenings and weekends please call MD on call or office       74 yo male with pmhx pancreatic cancer (on chemo/immunotherapy, last was 2 weeks ago), CAD, CHF, DVT/PE on eliquis, presenting with anemia. Recently admitted to [facility] for fever/diarrhea, sent to rehab last week, In [facility], was noted to have Hb 6.4, was sent to ED.    Hematology/Oncology called to see patient who has newly dx pancreatic cancer with metastatic disease to the liver.He [place] [facility] [place] Hod[place] in Aug 2021 with PE, pancreatic, and liver mets.Biopsy from Aug 21 confirmed pancreatic adenocarcinoma. Started treatment on 8/31/21 per protocol , randomized to gemcitabine/nab-paclitaxel and dual immunotherapy. 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 He received steroids and was discharged to [facility] on 9/30/2021 on tapering doses of steroids. H/H on discharge was 10.8/32.7. He also has a history of DVT/PE and has been on Eliquis. FOBT is currently positive.    Metastatic Pancreatic Cancer  --Under care at [facility] [place] Hodges  --Ongoing treatment after discharge    Anemia  --Sudden drop in H/H from 10.8-7.0 in 1 week  --Retroperitoneal Hematoma on CT  --Please transfuse PRBC's for Hgb <7.0 grams (but would transfuse now given sudden drop)  --Iron, B12, folate, Haptoglobin and LDH all checked. No hemolysis or deficiencies    GI Bleed  --FOBT Positive  --May be secondary to steroid use or may be from colitis  --Recommend GI consultation to find source of GI bleed if remains FOBT positive    Thrombophilia  --History of DVT/PE  --Would hold anticoagulation until bleeding has been controlled  --If unable to control bleeding, patient may need IVC filter - will advise after bleeding workup     Retroperitoneal Hematoma  --Management per primary team  --Surgery has seen patient  - IR has been consulted to assess for source of bleed  - Recommend CTA abdomen  --Will need to monitor with serial CTs    Leukocytosis  --Most likely secondary to steroids patient is taking for prior bout of colitis - tapering dose  --ID consult appreciated  --Antibiotics on hold pending infectious source    Patient on Prednisone 20 mg PO daily  --Please taper to 10mg PO daily X 3 days then DC  --Patient needs PPI prophylaxis - already on it    We will continue to follow patient and coordinate with [facility].    Washington Townsend PA-C  Hematology/Oncology  [facility]   [phone] (cell)  [phone] (office)  [phone] (alt office)  Evenings and weekends please call MD on call or office

## 2021-10-07 NOTE — CONSULT NOTE ADULT - SUBJECTIVE AND OBJECTIVE BOX
Vascular & Interventional Radiology Brief Consult Note    Evaluate for Procedure: possible embolization for left RP hematoma    HPI: 76 y/o M with PMH of CAD, past MI and PCI/stent in 2009, PAF maintained on Eliquis, essential HTN on a beta blocker and ARB, BIV-AICD with HF with reduced EF%, additional indication for Eliquis for a PE with apparently recent diagnosis at [facility] for pancreatic CA on chemotherapy presented with left flank ecchymosis and low H/H from [facility].     Allergies:   Medications (Abx/Cardiac/Anticoagulation/Blood Products)  losartan: 25 milliGRAM(s) Oral (10-07 @ 06:40)  metoprolol succinate ER: 12.5 milliGRAM(s) Oral (10-07 @ 06:40)  nystatin    Suspension: 176012 Unit(s) Oral (10-07 @ 06:39)    Data:  177.8  95.6  T(C): 36.6  HR: 98  BP: 103/68  RR: 20  SpO2: 96%    -WBC 14.47 / HgB 9.6 / Hct 29.2 / Plt 218  -Na 129 / Cl 91 / BUN 29 / Glucose 135  -K 3.7 / CO2 27 / Cr 0.65  -ALT -- / Alk Phos -- / T.Bili --  -INR 1.50 / PTT 26.7    Imaging: CT A/P 10/6 - mod/large left RP hematoma    Assessment/Plan:   -75y Male admitted with low H/H secondary to large left RP hematoma (on eliquis). IR consulted for possible embolization. On admission Hb 7.0 (previously 10.8 on 9/30). After 2uPRBC repeat Hb 8.1 --> 9.0 --> 9.6. SBP 110s. Pt is responding appropriately to transfusion. Would continue to monitor. If patient becomes unstable or c/f active bleed, please page [phone] and obtain CT angio abdomen (bleeding protocol) to evaluate source.     -d/w team    Please contact IR with any questions or concerns

## 2021-10-07 NOTE — PROGRESS NOTE ADULT - ASSESSMENT
NIGHT HOSPITALIST:  Referral of patient a history of pancreatic CA with large LEFT retroperitoneal hematoma with no trauma in the setting of patient on Eliquis for PAF and a history of PE--seen by general surgery with no acute surgical intervention recommended with IR referral>>patient with stable hemodynamics but with prolonged QTc may be from patient's K+>>will supplement to 4.0 and check Mg++  will HOLD Zofran and upgraded to telemetry.  Reviewed with patient/ spouse and adult daughter>>will have to temporarily HOLD the Eliquis given retroperitoneal hematoma>>will transfuse one additional unit red blood cells with Hgb at 8.1 given extent of hematoma and CAD.  Patient/ spouse agree.    Unclear if the COVID-19 PCR is a false positive.   Will repeat and will obtain inflammatory indices.   Presently no indication for remdesivir or titration of patient's steroid taper.   This was discussed with patient and spouse.    Will continue with FS S/S but may consider review of bedtime Lantus in the AM for improved glycemic control.    Would consider EPS check of AICD if not done recently.

## 2021-10-07 NOTE — PROGRESS NOTE ADULT - SUBJECTIVE AND OBJECTIVE BOX
SUBJECTIVE: Patient seen and examined. Denies pain at this time.     OBJECTIVE:  Vital Signs Last 24 Hrs  T(C): 36.6 (07 Oct 2021 12:09), Max: 36.8 (06 Oct 2021 16:20)  T(F): 97.9 (07 Oct 2021 12:09), Max: 98.2 (06 Oct 2021 16:20)  HR: 98 (07 Oct 2021 12:09) (73 - 101)  BP: 103/68 (07 Oct 2021 12:09) (98/70 - 137/78)  BP(mean): 84 (06 Oct 2021 19:38) (84 - 84)  RR: 20 (07 Oct 2021 12:09) (14 - 22)  SpO2: 96% (07 Oct 2021 12:09) (94% - 100%)    Physical Examination:  Gen: NAD  HEENT: normocephalic, atraumatic, no scleral icterus  CV: appears well perfused  Pulm: unlabored respirations  Abd: Soft, ND, NTP, no rebound, no guarding, no palpable organomegaly/masses  L flank ecchymosis improving  Ext: warm, no edema, palp dp/pt      LABS:                        9.6    14.47 )-----------( 218      ( 07 Oct 2021 11:40 )             29.2       10-07    129<L>  |  91<L>  |  29<H>  ----------------------------<  135<H>  3.7   |  27  |  0.65    Ca    8.0<L>      07 Oct 2021 06:34  Mg     2.2     10-06    TPro  5.1<L>  /  Alb  2.8<L>  /  TBili  0.8  /  DBili  x   /  AST  50<H>  /  ALT  80<H>  /  AlkPhos  111  10-06

## 2021-10-07 NOTE — PHYSICAL THERAPY INITIAL EVALUATION ADULT - ADDITIONAL COMMENTS
Recently admitted to [facility] for fever/diarrhea, sent to rehab last week, In [facility], was noted to have Hb 6.4, was sent to ED. per patient does not recall any falls. Patient's wife is at bedside and says he has been bed bound since his admission at [facility]. Recently admitted to [facility] for fever/diarrhea, sent to rehab last week, In [facility], was noted to have Hb 6.4, was sent to ED. per patient does not recall any falls. Patient's wife is at bedside and says he has been bed bound since his admission at [facility]. Prior to [facility], pt resided in [facility], no steps to enter, pt was independent with ALL ADL's and functional mobility prior.

## 2021-10-07 NOTE — PROGRESS NOTE ADULT - ATTENDING COMMENTS
D/w pt, wife, PA, reviewed. Hgb stable, IR and surg eval noted. If pt remains stable over the next few days, would repeat CT to document stability or improvement of hematoma before dc. Will also need to eval if and when can resume AC for h/o VTE. Pt still at risk for VTE since he is with decreased mobility, active malignancy. Total time spent 35min, >50% spent in discussion and coordination of care D/w pt, wife, PA, reviewed. Hgb stable, IR and surg eval noted. Cause for hematoma unclear but on CT, his lesion is extending to the stomach and the retroperitoneum, ? cause. If pt remains stable over the next few days, would repeat CT to document stability or improvement of hematoma before dc. Will also need to eval if and when can resume AC for h/o VTE. Pt still at risk for VTE since he is with decreased mobility, active malignancy. Total time spent 35min, >50% spent in discussion and coordination of care

## 2021-10-07 NOTE — CONSULT NOTE ADULT - ASSESSMENT
75 year old male recently discharged from [facility] on 9/30/21 for oral/ GI mucositis on tapering steroids, history of CAD with MI PCI/ stent in 2009, PAF on Eliquis, HTN, BIV-ICD with HF with reduced EF, PE, Pancreatic ca on chemotherapy was at [facility] when he developed anemia secondary to  left retroperitoneal hematoma c/o bilaterally hand numbess on PE right facial , RUE weakness, evidence of Neuropathy weakness in Left femoral distribution NIHSS      R Facial weakness  -r/o CVA r/o leptomeningeal disease. Patient off AC/antithrombotic due to hemorrhage  -PPMi is not MRI compatible CTH w/w/o con    R triceps weakness with pain radiating down both arms  r/o cervical process r/o CVA   -CTH and CT C-spine w/w/o con    Left lower extremity weakness  -In distribution of the left femoral nerve  likely compressed by retroperitoneal hemorrhage, manegemnt per IR, primary team    -PT           75 year old male recently discharged from [facility] on 9/30/21 for oral/ GI mucositis on tapering steroids, history of CAD with MI PCI/ stent in 2009, PAF on Eliquis, HTN, BIV-ICD with HF with reduced EF, PE, Pancreatic ca on chemotherapy was at [facility] when he developed anemia secondary to  left retroperitoneal hematoma c/o bilaterally hand numbess on PE right facial , RUE weakness, evidence of Neuropathy weakness in Left femoral distribution NIHSS      R Facial weakness. per daughter chronic facial asymmetry likely chronic R [place] r/o central lesion  . Patient off AC/antithrombotic due to hemorrhage  -PPMi is not MRI compatible CTH w/w/o con    R triceps weakness with pain radiating down both arms  r/o cervical process r/o CVA   -CTH and CT C-spine w/w/o con    Left lower extremity weakness  -In distribution of the left femoral nerve  likely compressed by retroperitoneal hemorrhage, manegemnt per IR, primary team    Neuropathy  -in setting of chemo and chronic Neuropathy    -PT

## 2021-10-07 NOTE — CONSULT NOTE ADULT - SUBJECTIVE AND OBJECTIVE BOX
Neurology consult    REGGIE PUENTEyMale     Patient is a 75y old  Male who presents with a chief complaint of Sent in from [facility] for LEFT flank haematoma and low H/H. (07 Oct 2021 14:18)      HPI:  NIGHT HOSPITALIST:   Patient UNKNOWN to me previously, assigned to me at this point via the ER and by Dr. Dowd to admit this 74 y/o M--patient seen with spouse in attendance and reviewed with patient's daughter by phone with patient's permission (daughter is a paediatric cardiologist--patient with a discharge from [facility] on Sept 30 2021 and referred to [facility] in the setting of this patient with a history of CAD with past MI and PCI/stent in 2009, PAF maintained on Eliquis, essential HTN on a beta blocker and ARB, BIV-AICD with HF with reduced EF%. with patient on additional indication for Eliquis for a PE with apparently recent diagnosis at [facility] for pancreatic CA on chemotherapy, with the admission at [facility] for oral mucositis with patient on a Prednisone taper and Decadron swish and spit therapy, with apparent steroid induced type 2 DM, patient maintained on a dysphagia diet from [facility], with the patient at [facility] with slow improvement in functional ability but noted with LEFT flank haematoma today with a low H/H and sent to the [facility].   Patient offers no complaint, although slightly reliant upon spouse in attendance for interview.   Mild general weakness and fatigue.  No back pain, no tearing back pain.  NO fever, no chills, no rigors.  NO abdominal pain, no red blood per rectum or melena.  NO diarrhoea.   NO cough, no dyspnoea.  NO chest pain/pressure.  NO N/V.   NO diaphoresis.  NO palliative or exacerbating factors.    Patient reports receiving Moderna COVID-19 vaccine x 2. (06 Oct 2021 19:38)      Patient  c/o paresthesia down both UEs, denies new focal weakness, endorses LLE> RLE weakness since at [facility]      REVIEW OF SYSTEMS:    see HPI    MEDICATIONS    acetaminophen   Tablet .. 650 milliGRAM(s) Oral every 6 hours PRN  calcium carbonate   1250 mG (OsCal) 1 Tablet(s) Oral two times a day  dexAMETHasone    Solution 1 milliGRAM(s) Oral four times a day  dextrose 40% Gel 15 Gram(s) Oral once  dextrose 5%. 1000 milliLiter(s) IV Continuous <Continuous>  dextrose 5%. 1000 milliLiter(s) IV Continuous <Continuous>  dextrose 50% Injectable 25 Gram(s) IV Push once  dextrose 50% Injectable 12.5 Gram(s) IV Push once  dextrose 50% Injectable 25 Gram(s) IV Push once  dronabinol 2.5 milliGRAM(s) Oral at bedtime  finasteride 5 milliGRAM(s) Oral daily  gabapentin 100 milliGRAM(s) Oral at bedtime  glucagon  Injectable 1 milliGRAM(s) IntraMuscular once  influenza   Vaccine 0.5 milliLiter(s) IntraMuscular once  insulin lispro (ADMELOG) corrective regimen sliding scale   SubCutaneous three times a day before meals  insulin lispro (ADMELOG) corrective regimen sliding scale   SubCutaneous at bedtime  losartan 25 milliGRAM(s) Oral daily  melatonin 3 milliGRAM(s) Oral at bedtime PRN  metoprolol succinate ER 12.5 milliGRAM(s) Oral daily  multivitamin/minerals 1 Tablet(s) Oral daily  nystatin    Suspension 313167 Unit(s) Oral two times a day  pancrelipase  (CREON 12,000 Lipase Units) 2 Capsule(s) Oral four times a day with meals  pantoprazole    Tablet 40 milliGRAM(s) Oral before breakfast  sodium chloride 1 Gram(s) Oral two times a day      PMH: Hypertension (ICD9 401.9)    Hyperlipidemia (ICD9 272.4)    Bacteremia (ICD9 790.7)    AF (Atrial Fibrillation) (ICD9 427.31)    Pneumonia (ICD9 486)    BPH (Benign Prostatic Hyperplasia)    Dyspepsia    Borderline diabetes mellitus    MI (myocardial infarction)    Systolic heart failure    Pancreatic cancer         PSH: Hernia    Biventricular ICD (implantable cardioverter-defibrillator) in place    Stented coronary artery        Family history: No history of dementia, strokes, or seizures   FAMILY HISTORY:  No pertinent family history in first degree relatives        SOCIAL HISTORY:  No history of tobacco or alcohol use     Allergies    No Known Allergies    Intolerances        Height (cm): 177.8 (10-07 @ 04:58)  Weight (kg): 95.6 (10-07 @ 04:58)  BMI (kg/m2): 30.2 (10-07 @ 04:58)    Vital Signs Last 24 Hrs  T(C): 36.6 (07 Oct 2021 12:09), Max: 36.8 (06 Oct 2021 16:20)  T(F): 97.9 (07 Oct 2021 12:09), Max: 98.2 (06 Oct 2021 16:20)  HR: 98 (07 Oct 2021 12:09) (73 - 101)  BP: 103/68 (07 Oct 2021 12:09) (98/70 - 137/78)  BP(mean): 84 (06 Oct 2021 19:38) (84 - 84)  RR: 20 (07 Oct 2021 12:09) (14 - 22)  SpO2: 96% (07 Oct 2021 12:09) (94% - 100%)      On Neurological Examination:    Mental Status - Patient is alert, awake, oriented X3. fluent, names, no dysarthria no aphasia Follows commands well and able to answer questions appropriately. Mood and affect  normal    Cranial Nerves - PERRL, EOMI, VFF, V1-V3 intact, mild right facial, tongue/uvula midline    Motor Exam -   Right upper biceps deltoid 5/5 triceps 3/5 distal 4-/5  Left upper 5/5  lowers abduction adduction 4+/5 IP on right 4/5 left 3/5 knee extension on left 3/5 right 4-/5 distal 4/5   nml bulk/tone    Sensory    Intact to light touch and pinprick bilaterally    Coord: FTN intact bilaterally     Gait -  normal      Reflexes:       0        NIHSS 7       LABS:  CBC Full  -  ( 07 Oct 2021 11:40 )  WBC Count : 14.47 K/uL  RBC Count : 3.39 M/uL  Hemoglobin : 9.6 g/dL  Hematocrit : 29.2 %  Platelet Count - Automated : 218 K/uL  Mean Cell Volume : 86.1 fl  Mean Cell Hemoglobin : 28.3 pg  Mean Cell Hemoglobin Concentration : 32.9 gm/dL  Auto Neutrophil # : x  Auto Lymphocyte # : x  Auto Monocyte # : x  Auto Eosinophil # : x  Auto Basophil # : x  Auto Neutrophil % : x  Auto Lymphocyte % : x  Auto Monocyte % : x  Auto Eosinophil % : x  Auto Basophil % : x      10-07    129<L>  |  91<L>  |  29<H>  ----------------------------<  135<H>  3.7   |  27  |  0.65    Ca    8.0<L>      07 Oct 2021 06:34  Mg     2.2     10-06    TPro  5.1<L>  /  Alb  2.8<L>  /  TBili  0.8  /  DBili  x   /  AST  50<H>  /  ALT  80<H>  /  AlkPhos  111  10-06    LIVER FUNCTIONS - ( 06 Oct 2021 13:50 )  Alb: 2.8 g/dL / Pro: 5.1 g/dL / ALK PHOS: 111 U/L / ALT: 80 U/L / AST: 50 U/L / GGT: x           Hemoglobin A1C:     Vitamin B12, Serum: >2000 pg/mL (10-07 @ 12:06)    PT/INR - ( 06 Oct 2021 13:50 )   PT: 17.6 sec;   INR: 1.50 ratio         PTT - ( 06 Oct 2021 13:50 )  PTT:26.7 sec

## 2021-10-07 NOTE — PROGRESS NOTE ADULT - ASSESSMENT
A/P:   75y year old Male who presents from [facility] due to anemia, CT at [facility] showing large L retroperitoneal hematoma, likely associated with L psoas muscle    - Transfuse as necessary, s/p 2u pRBC  - Continue to trend H/H, improving  - Surgery to follow, no plan for acute surgical intervention at this time  - If continued decompensation, recommend IR consult for embolization  - Plan discussed with attending    Geena Dubose MD PGY2  Surgery   [id number]

## 2021-10-07 NOTE — CONSULT NOTE ADULT - SUBJECTIVE AND OBJECTIVE BOX
HPI:  74 y/o was recently discharged from [facility] on Sept 30 2021 and referred to [facility] in the setting of this patient with a history of CAD with past MI and PCI/stent in 2009, PAF maintained on Eliquis, essential HTN on a beta blocker and ARB, BIV-AICD with HF with reduced EF%. with patient on additional indication for Eliquis for a PE with apparently recent diagnosis at [facility] for pancreatic CA on chemotherapy, with the admission at [facility] for oral mucositis with patient on a Prednisone taper and Decadron swish and spit therapy, with apparent steroid induced type 2 DM, patient maintained on a dysphagia diet from [facility], with the patient at [facility] with slow improvement in functional ability but noted with LEFT flank haematoma today with a low H/H and sent to the [place] ER.   Patient offers no complaint, although slightly reliant upon spouse in attendance for interview.   Mild general weakness and fatigue.  No back pain, no tearing back pain.  NO fever, no chills, no rigors.  NO abdominal pain, no red blood per rectum or melena.  NO diarrhoea.   NO cough, no dyspnoea.  NO chest pain/pressure.  NO N/V.   NO diaphoresis.  NO palliative or exacerbating factors.    Patient reports receiving Moderna COVID-19 vaccine x 2. (06 Oct 2021 19:38)    PAST MEDICAL & SURGICAL HISTORY:  Hypertension (ICD9 401.9)    Hyperlipidemia (ICD9 272.4)    BPH (Benign Prostatic Hyperplasia)    Borderline diabetes mellitus    MI (myocardial infarction)  2009    Systolic heart failure    Pancreatic cancer    Hernia    Biventricular ICD (implantable cardioverter-defibrillator) in place  2010    Stented coronary artery  X 2, 2009        Allergies    No Known Allergies    Intolerances        ANTIMICROBIALS:  nystatin    Suspension 097862 two times a day      OTHER MEDS:  acetaminophen   Tablet .. 650 milliGRAM(s) Oral every 6 hours PRN  calcium carbonate   1250 mG (OsCal) 1 Tablet(s) Oral two times a day  dexAMETHasone    Solution 1 milliGRAM(s) Oral four times a day  dextrose 40% Gel 15 Gram(s) Oral once  dextrose 5%. 1000 milliLiter(s) IV Continuous <Continuous>  dextrose 5%. 1000 milliLiter(s) IV Continuous <Continuous>  dextrose 50% Injectable 25 Gram(s) IV Push once  dextrose 50% Injectable 12.5 Gram(s) IV Push once  dextrose 50% Injectable 25 Gram(s) IV Push once  dronabinol 2.5 milliGRAM(s) Oral at bedtime  finasteride 5 milliGRAM(s) Oral daily  gabapentin 100 milliGRAM(s) Oral at bedtime  glucagon  Injectable 1 milliGRAM(s) IntraMuscular once  influenza   Vaccine 0.5 milliLiter(s) IntraMuscular once  insulin lispro (ADMELOG) corrective regimen sliding scale   SubCutaneous three times a day before meals  insulin lispro (ADMELOG) corrective regimen sliding scale   SubCutaneous at bedtime  losartan 25 milliGRAM(s) Oral daily  melatonin 3 milliGRAM(s) Oral at bedtime PRN  metoprolol succinate ER 12.5 milliGRAM(s) Oral daily  multivitamin/minerals 1 Tablet(s) Oral daily  pancrelipase  (CREON 12,000 Lipase Units) 2 Capsule(s) Oral four times a day with meals  pantoprazole    Tablet 40 milliGRAM(s) Oral before breakfast  sodium chloride 1 Gram(s) Oral two times a day      SOCIAL HISTORY:    FAMILY HISTORY:  No pertinent family history in first degree relatives        Drug Dosing Weight  Height (cm): 177.8 (07 Oct 2021 04:58)  Weight (kg): 95.6 (07 Oct 2021 04:58)  BMI (kg/m2): 30.2 (07 Oct 2021 04:58)  BSA (m2): 2.13 (07 Oct 2021 04:58)    PE:    Vital Signs Last 24 Hrs  T(C): 36.3 (07 Oct 2021 04:58), Max: 36.8 (06 Oct 2021 16:20)  T(F): 97.4 (07 Oct 2021 04:58), Max: 98.2 (06 Oct 2021 16:20)  HR: 74 (07 Oct 2021 04:58) (73 - 101)  BP: 137/78 (07 Oct 2021 04:58) (98/70 - 137/78)  BP(mean): 84 (06 Oct 2021 19:38) (84 - 84)  RR: 20 (07 Oct 2021 04:58) (14 - 22)  SpO2: 95% (07 Oct 2021 04:58) (94% - 100%)    Gen: AOx3, NAD, non-toxic, pleasant  CV: S1+S2 normal, no murmurs, nontachycardic  Resp: Clear bilat, no resp distress, no crackles/wheezes  Abd: Soft, nontender, +BS  Ext: No LE edema, no wounds  : No Bowman  IV/Skin: No thrombophlebitis  Msk: No low back pain, no arthralgias, no joint swelling  Neuro: No sensory deficits, no motor deficits    LABS:                          9.0    14.16 )-----------( 191      ( 07 Oct 2021 06:34 )             26.6       10-07    129<L>  |  91<L>  |  29<H>  ----------------------------<  135<H>  3.7   |  27  |  0.65    Ca    8.0<L>      07 Oct 2021 06:34  Mg     2.2     10-06    TPro  5.1<L>  /  Alb  2.8<L>  /  TBili  0.8  /  DBili  x   /  AST  50<H>  /  ALT  80<H>  /  AlkPhos  111  10-06          MICROBIOLOGY:  v  .Stool Feces  09-18-21   No enteric pathogens isolated.  (Stool culture examined for Salmonella,  Shigella, Campylobacter, Aeromonas, Plesiomonas,  Vibrio, E.coli O157 and Yersinia)  No enteric gram negative rods isolated  --  --      .Blood  09-16-21   No Blood Parasites observed by giemsa stain  One negative set of blood smears does not rule out  the possibility of a parasitic infection.  A minimum of 3  specimens should be collected, at least 12-24 hours apart,  over a 36 hour time period.  --  --      .Blood Blood  09-16-21   No Blood Parasites observed by giemsa stain  One negative set of blood smears does not rule out  the possibility of a parasitic infection.  A minimum of 3  specimens should be collected, at least 12-24 hours apart,  over a 36 hour time period.  --  --      .Stool Feces  09-15-21   No Protozoa seen by trichrome stain  No Helminths or Protozoa seen in formalin concentrate  performed by iodine stain  (routine O+P not evaluated for Microsporidia,  Cryptosporidia, Cyclospora, or Isospora.)  One negative sample does not necessarily rule  out the presence of a parasitic infection.  Moderate WBC's  Moderate Red blood cells  --  --      Clean Catch Clean Catch (Midstream)  09-15-21   <10,000 CFU/mL Normal Urogenital Shelia  --  --      .Blood Blood-Peripheral  09-14-21   No Growth Final  --  --      .Blood Blood-Peripheral  09-14-21   No Growth Final  --  --    RADIOLOGY:    < from: CT Abdomen and Pelvis No Cont (10.06.21 @ 14:47) >  IMPRESSION:  Large left retroperitoneal hematoma measuring 7.2 x 5.4 x 10.8 cm, likely associated with theleft psoas muscle.  Similar-appearing metastatic pancreatic disease, better evaluated on prior contrast-enhanced imaging.    Stercoral colitis with rectal pneumatosis.    < end of copied text >     HPI:  74 y/o male was recently discharged from [facility] on Sept 30 2021 and referred to [facility] in the setting of this patient with a history of CAD with past MI and PCI/stent in 2009, PAF maintained on Eliquis, essential HTN on a beta blocker and ARB, BIV-AICD with HF with reduced EF%. with patient on additional indication for Eliquis for a PE with apparently recent diagnosis at [facility] for pancreatic CA on chemotherapy, with the admission at [facility] for oral mucositis with patient on a Prednisone taper and Decadron swish and spit therapy, with apparent steroid induced type 2 DM, patient maintained on a dysphagia diet from [facility], with the patient at [facility] with slow improvement in functional ability but noted with LEFT flank haematoma today with a low H/H and sent to the [place] ER.   Patient offers no complaint, although slightly reliant upon spouse in attendance for interview.   Mild general weakness and fatigue.  No back pain, no tearing back pain.  NO fever, no chills, no rigors.  NO abdominal pain, no red blood per rectum or melena.  NO diarrhoea.   NO cough, no dyspnoea.  NO chest pain/pressure.  NO N/V.   NO diaphoresis.  NO palliative or exacerbating factors.    Patient reports receiving Moderna COVID-19 vaccine x 2.     Remains on steroid taper for mucositis. Has lesions near the upper tonsils and on his lower lip. No cough, no dysuria, no abd pain. Remains afebrile.     PAST MEDICAL & SURGICAL HISTORY:  Hypertension (ICD9 401.9)    Hyperlipidemia (ICD9 272.4)    BPH (Benign Prostatic Hyperplasia)    Borderline diabetes mellitus    MI (myocardial infarction)  2009    Systolic heart failure    Pancreatic cancer    Hernia    Biventricular ICD (implantable cardioverter-defibrillator) in place  2010    Stented coronary artery  X 2, 2009    Allergies    No Known Allergies    Intolerances    ANTIMICROBIALS:  nystatin    Suspension 456205 two times a day    OTHER MEDS:  acetaminophen   Tablet .. 650 milliGRAM(s) Oral every 6 hours PRN  calcium carbonate   1250 mG (OsCal) 1 Tablet(s) Oral two times a day  dexAMETHasone    Solution 1 milliGRAM(s) Oral four times a day  dextrose 40% Gel 15 Gram(s) Oral once  dextrose 5%. 1000 milliLiter(s) IV Continuous <Continuous>  dextrose 5%. 1000 milliLiter(s) IV Continuous <Continuous>  dextrose 50% Injectable 25 Gram(s) IV Push once  dextrose 50% Injectable 12.5 Gram(s) IV Push once  dextrose 50% Injectable 25 Gram(s) IV Push once  dronabinol 2.5 milliGRAM(s) Oral at bedtime  finasteride 5 milliGRAM(s) Oral daily  gabapentin 100 milliGRAM(s) Oral at bedtime  glucagon  Injectable 1 milliGRAM(s) IntraMuscular once  influenza   Vaccine 0.5 milliLiter(s) IntraMuscular once  insulin lispro (ADMELOG) corrective regimen sliding scale   SubCutaneous three times a day before meals  insulin lispro (ADMELOG) corrective regimen sliding scale   SubCutaneous at bedtime  losartan 25 milliGRAM(s) Oral daily  melatonin 3 milliGRAM(s) Oral at bedtime PRN  metoprolol succinate ER 12.5 milliGRAM(s) Oral daily  multivitamin/minerals 1 Tablet(s) Oral daily  pancrelipase  (CREON 12,000 Lipase Units) 2 Capsule(s) Oral four times a day with meals  pantoprazole    Tablet 40 milliGRAM(s) Oral before breakfast  sodium chloride 1 Gram(s) Oral two times a day    SOCIAL HISTORY: Denies smoking, alcohol, drug use.    FAMILY HISTORY:  Mother with Breast Ca  Father with cardiac disease    Drug Dosing Weight  Height (cm): 177.8 (07 Oct 2021 04:58)  Weight (kg): 95.6 (07 Oct 2021 04:58)  BMI (kg/m2): 30.2 (07 Oct 2021 04:58)  BSA (m2): 2.13 (07 Oct 2021 04:58)    PE:    Vital Signs Last 24 Hrs  T(C): 36.3 (07 Oct 2021 04:58), Max: 36.8 (06 Oct 2021 16:20)  T(F): 97.4 (07 Oct 2021 04:58), Max: 98.2 (06 Oct 2021 16:20)  HR: 74 (07 Oct 2021 04:58) (73 - 101)  BP: 137/78 (07 Oct 2021 04:58) (98/70 - 137/78)  BP(mean): 84 (06 Oct 2021 19:38) (84 - 84)  RR: 20 (07 Oct 2021 04:58) (14 - 22)  SpO2: 95% (07 Oct 2021 04:58) (94% - 100%)    Gen: AOx3, NAD  CV: S1+S2 normal, no murmurs  Resp: Clear bilat, no resp distress  Abd: Soft, nontender, +BS  Ext: No LE edema, no wounds  : No Bowman  IV/Skin: No thrombophlebitis, left flank hematoma  Msk: No low back pain, no arthralgias, no joint swelling  Neuro: No sensory deficits, no motor deficits    LABS:                          9.0    14.16 )-----------( 191      ( 07 Oct 2021 06:34 )             26.6       10-07    129<L>  |  91<L>  |  29<H>  ----------------------------<  135<H>  3.7   |  27  |  0.65    Ca    8.0<L>      07 Oct 2021 06:34  Mg     2.2     10-06    TPro  5.1<L>  /  Alb  2.8<L>  /  TBili  0.8  /  DBili  x   /  AST  50<H>  /  ALT  80<H>  /  AlkPhos  111  10-06    MICROBIOLOGY:  v  .Stool Feces  09-18-21   No enteric pathogens isolated.  (Stool culture examined for Salmonella,  Shigella, Campylobacter, Aeromonas, Plesiomonas,  Vibrio, E.coli O157 and Yersinia)  No enteric gram negative rods isolated  --  --      .Blood  09-16-21   No Blood Parasites observed by giemsa stain  One negative set of blood smears does not rule out  the possibility of a parasitic infection.  A minimum of 3  specimens should be collected, at least 12-24 hours apart,  over a 36 hour time period.  --  --      .Blood Blood  09-16-21   No Blood Parasites observed by giemsa stain  One negative set of blood smears does not rule out  the possibility of a parasitic infection.  A minimum of 3  specimens should be collected, at least 12-24 hours apart,  over a 36 hour time period.  --  --      .Stool Feces  09-15-21   No Protozoa seen by trichrome stain  No Helminths or Protozoa seen in formalin concentrate  performed by iodine stain  (routine O+P not evaluated for Microsporidia,  Cryptosporidia, Cyclospora, or Isospora.)  One negative sample does not necessarily rule  out the presence of a parasitic infection.  Moderate WBC's  Moderate Red blood cells  --  --      Clean Catch Clean Catch (Midstream)  09-15-21   <10,000 CFU/mL Normal Urogenital Shelia  --  --      .Blood Blood-Peripheral  09-14-21   No Growth Final  --  --      .Blood Blood-Peripheral  09-14-21   No Growth Final  --  --    RADIOLOGY:    < from: CT Abdomen and Pelvis No Cont (10.06.21 @ 14:47) >  IMPRESSION:  Large left retroperitoneal hematoma measuring 7.2 x 5.4 x 10.8 cm, likely associated with theleft psoas muscle.  Similar-appearing metastatic pancreatic disease, better evaluated on prior contrast-enhanced imaging.    Stercoral colitis with rectal pneumatosis.    < end of copied text >     HPI:  76 y/o male was recently discharged from [facility] on Sept 30 2021 and referred to [facility] in the setting of this patient with a history of CAD with past MI and PCI/stent in 2009, PAF maintained on Eliquis, essential HTN on a beta blocker and ARB, BIV-AICD with HF with reduced EF%. with patient on additional indication for Eliquis for a PE with apparently recent diagnosis at [facility] for pancreatic CA on chemotherapy, with the admission at [facility] for oral mucositis with patient on a Prednisone taper and Decadron swish and spit therapy, with apparent steroid induced type 2 DM, patient maintained on a dysphagia diet from [facility], with the patient at [facility] with slow improvement in functional ability but noted with LEFT flank haematoma today with a low H/H and sent to the [place] ER.   Patient offers no complaint, although slightly reliant upon spouse in attendance for interview.   Mild general weakness and fatigue.  No back pain, no tearing back pain.  NO fever, no chills, no rigors.  NO abdominal pain, no red blood per rectum or melena.  NO diarrhoea.   NO cough, no dyspnoea.  NO chest pain/pressure.  NO N/V.   NO diaphoresis.  NO palliative or exacerbating factors.    CT with large left retroperitoneal hematoma measuring 7.2 x 5.4 x 10.8 cm, likely associated with the left psoas muscle. Similar-appearing metastatic pancreatic disease, better evaluated on prior contrast-enhanced imaging.    Stercoral colitis with rectal pneumatosis.    Patient reports receiving Moderna COVID-19 vaccine x 2.     Remains on steroid taper for mucositis. Has lesions near the upper tonsils and on his lower lip. No cough, no dysuria, no abd pain. Remains afebrile.     PAST MEDICAL & SURGICAL HISTORY:  Hypertension (ICD9 401.9)    Hyperlipidemia (ICD9 272.4)    BPH (Benign Prostatic Hyperplasia)    Borderline diabetes mellitus    MI (myocardial infarction)  2009    Systolic heart failure    Pancreatic cancer    Hernia    Biventricular ICD (implantable cardioverter-defibrillator) in place  2010    Stented coronary artery  X 2, 2009    Allergies    No Known Allergies    Intolerances    ANTIMICROBIALS:  nystatin    Suspension 907184 two times a day    OTHER MEDS:  acetaminophen   Tablet .. 650 milliGRAM(s) Oral every 6 hours PRN  calcium carbonate   1250 mG (OsCal) 1 Tablet(s) Oral two times a day  dexAMETHasone    Solution 1 milliGRAM(s) Oral four times a day  dextrose 40% Gel 15 Gram(s) Oral once  dextrose 5%. 1000 milliLiter(s) IV Continuous <Continuous>  dextrose 5%. 1000 milliLiter(s) IV Continuous <Continuous>  dextrose 50% Injectable 25 Gram(s) IV Push once  dextrose 50% Injectable 12.5 Gram(s) IV Push once  dextrose 50% Injectable 25 Gram(s) IV Push once  dronabinol 2.5 milliGRAM(s) Oral at bedtime  finasteride 5 milliGRAM(s) Oral daily  gabapentin 100 milliGRAM(s) Oral at bedtime  glucagon  Injectable 1 milliGRAM(s) IntraMuscular once  influenza   Vaccine 0.5 milliLiter(s) IntraMuscular once  insulin lispro (ADMELOG) corrective regimen sliding scale   SubCutaneous three times a day before meals  insulin lispro (ADMELOG) corrective regimen sliding scale   SubCutaneous at bedtime  losartan 25 milliGRAM(s) Oral daily  melatonin 3 milliGRAM(s) Oral at bedtime PRN  metoprolol succinate ER 12.5 milliGRAM(s) Oral daily  multivitamin/minerals 1 Tablet(s) Oral daily  pancrelipase  (CREON 12,000 Lipase Units) 2 Capsule(s) Oral four times a day with meals  pantoprazole    Tablet 40 milliGRAM(s) Oral before breakfast  sodium chloride 1 Gram(s) Oral two times a day    SOCIAL HISTORY: Denies smoking, alcohol, drug use.    FAMILY HISTORY:  Mother with Breast Ca  Father with cardiac disease    Drug Dosing Weight  Height (cm): 177.8 (07 Oct 2021 04:58)  Weight (kg): 95.6 (07 Oct 2021 04:58)  BMI (kg/m2): 30.2 (07 Oct 2021 04:58)  BSA (m2): 2.13 (07 Oct 2021 04:58)    PE:    Vital Signs Last 24 Hrs  T(C): 36.3 (07 Oct 2021 04:58), Max: 36.8 (06 Oct 2021 16:20)  T(F): 97.4 (07 Oct 2021 04:58), Max: 98.2 (06 Oct 2021 16:20)  HR: 74 (07 Oct 2021 04:58) (73 - 101)  BP: 137/78 (07 Oct 2021 04:58) (98/70 - 137/78)  BP(mean): 84 (06 Oct 2021 19:38) (84 - 84)  RR: 20 (07 Oct 2021 04:58) (14 - 22)  SpO2: 95% (07 Oct 2021 04:58) (94% - 100%)    Gen: AOx3, NAD  CV: S1+S2 normal, no murmurs  Resp: Clear bilat, no resp distress  Abd: Soft, nontender, +BS  Ext: No LE edema, no wounds  : No Bowman  IV/Skin: No thrombophlebitis, left flank hematoma  Msk: No low back pain, no arthralgias, no joint swelling  Neuro: No sensory deficits, no motor deficits    LABS:                          9.0    14.16 )-----------( 191      ( 07 Oct 2021 06:34 )             26.6       10-07    129<L>  |  91<L>  |  29<H>  ----------------------------<  135<H>  3.7   |  27  |  0.65    Ca    8.0<L>      07 Oct 2021 06:34  Mg     2.2     10-06    TPro  5.1<L>  /  Alb  2.8<L>  /  TBili  0.8  /  DBili  x   /  AST  50<H>  /  ALT  80<H>  /  AlkPhos  111  10-06    MICROBIOLOGY:  v  .Stool Feces  09-18-21   No enteric pathogens isolated.  (Stool culture examined for Salmonella,  Shigella, Campylobacter, Aeromonas, Plesiomonas,  Vibrio, E.coli O157 and Yersinia)  No enteric gram negative rods isolated  --  --      .Blood  09-16-21   No Blood Parasites observed by giemsa stain  One negative set of blood smears does not rule out  the possibility of a parasitic infection.  A minimum of 3  specimens should be collected, at least 12-24 hours apart,  over a 36 hour time period.  --  --      .Blood Blood  09-16-21   No Blood Parasites observed by giemsa stain  One negative set of blood smears does not rule out  the possibility of a parasitic infection.  A minimum of 3  specimens should be collected, at least 12-24 hours apart,  over a 36 hour time period.  --  --      .Stool Feces  09-15-21   No Protozoa seen by trichrome stain  No Helminths or Protozoa seen in formalin concentrate  performed by iodine stain  (routine O+P not evaluated for Microsporidia,  Cryptosporidia, Cyclospora, or Isospora.)  One negative sample does not necessarily rule  out the presence of a parasitic infection.  Moderate WBC's  Moderate Red blood cells  --  --      Clean Catch Clean Catch (Midstream)  09-15-21   <10,000 CFU/mL Normal Urogenital Shelia  --  --      .Blood Blood-Peripheral  09-14-21   No Growth Final  --  --      .Blood Blood-Peripheral  09-14-21   No Growth Final  --  --    RADIOLOGY:    < from: CT Abdomen and Pelvis No Cont (10.06.21 @ 14:47) >  IMPRESSION:  Large left retroperitoneal hematoma measuring 7.2 x 5.4 x 10.8 cm, likely associated with theleft psoas muscle.  Similar-appearing metastatic pancreatic disease, better evaluated on prior contrast-enhanced imaging.    Stercoral colitis with rectal pneumatosis.    < end of copied text >

## 2021-10-07 NOTE — PROGRESS NOTE ADULT - ASSESSMENT
74 y/o male was recently discharged from [facility] on Sept 30 2021 and referred to [facility] in the setting of this patient with a history of CAD with past MI and PCI/stent in 2009, PAF maintained on Eliquis, essential HTN on a beta blocker and ARB, BIV-AICD with HF with reduced EF  with patient on additional indication for Eliquis for a PE with apparently recent diagnosis at [facility] for pancreatic CA on chemotherapy, steroid induced type 2 DM, here for LEFT flank hamartoma with a low H/H and sent to the [place] ER. Nephrology called for hyponatremia.       Mild Hyponatremia- likely ADH dependant  Pt. with chronic asymptomatic hyponatremia. Serum Na was 139 on 9/28-->132 on 9/29-->125 on 10/6-->129 today.  Unclear etiology but appears hypervolemic on exam likely from low albumin, 3rd spacing & intravascular volume depletion. Check Posm, uric acid, Uosm & Maria G. Recommend obtaining bladder scan to rule out urinary retention. Discontinue salt tabs in lieu of volume status. Start Urena 30 grams PO bid. Continue with fluid restriction to <1L for now. Avoid over-correction by >6mEQ in 24 hours. Encourage solute (protein) intake. Monitor SNa Q6 hours.         If you have any questions, please feel free to contact me  Wendy Lenz  Nephrology Fellow  Pager NS: [phone]/ [facility]: [phone]    (After 5 pm or on weekends please page the on-call fellow, can check [email or web address] for schedule. Login is stephane gordon, schedule under [facility] medicine, psych, derm)      74 y/o male was recently discharged from [facility] on Sept 30 2021 and referred to [facility] in the setting of this patient with a history of CAD with past MI and PCI/stent in 2009, PAF maintained on Eliquis, essential HTN on a beta blocker and ARB, BIV-AICD with HF with reduced EF  with patient on additional indication for Eliquis for a PE with apparently recent diagnosis at [facility] for pancreatic CA on chemotherapy, steroid induced type 2 DM, here for LEFT flank hamartoma with a low H/H and sent to the [place] ER. Nephrology called for hyponatremia.       Mild Hyponatremia- likely ADH dependant  Pt. with chronic asymptomatic hyponatremia. Serum Na was 139 on 9/28-->132 on 9/29-->125 on 10/6-->129 today.  Low urine sodium indicating poor solute intake/ intravascular volume depletion. Check Posm, uric acid, Uosm & Maria G. Recommend obtaining bladder scan to rule out urinary retention. Discontinue salt tabs and Start Urena 30 grams PO bid. Continue with fluid restriction to <1L for now. Avoid over-correction by >6mEQ in 24 hours. Encourage solute (protein) intake. Monitor SNa Q6 hours.         If you have any questions, please feel free to contact me  Wendy Lenz  Nephrology Fellow  Pager NS: [phone]/ [facility]: [phone]    (After 5 pm or on weekends please page the on-call fellow, can check [email or web address] for schedule. Login is stephane gordon, schedule under [facility] medicine, psych, derm)

## 2021-10-07 NOTE — PROGRESS NOTE ADULT - SUBJECTIVE AND OBJECTIVE BOX
Patient is a 75y old  Male who presents with a chief complaint of Sent in from [facility] for LEFT flank haematoma and low H/H. (07 Oct 2021 09:14)    Patient seen this morning. S/P 2 units PRBCs. No diarrhea.    MEDICATIONS  (STANDING):  calcium carbonate   1250 mG (OsCal) 1 Tablet(s) Oral two times a day  dexAMETHasone    Solution 1 milliGRAM(s) Oral four times a day  dextrose 40% Gel 15 Gram(s) Oral once  dextrose 5%. 1000 milliLiter(s) (50 mL/Hr) IV Continuous <Continuous>  dextrose 5%. 1000 milliLiter(s) (100 mL/Hr) IV Continuous <Continuous>  dextrose 50% Injectable 25 Gram(s) IV Push once  dextrose 50% Injectable 12.5 Gram(s) IV Push once  dextrose 50% Injectable 25 Gram(s) IV Push once  dronabinol 2.5 milliGRAM(s) Oral at bedtime  finasteride 5 milliGRAM(s) Oral daily  gabapentin 100 milliGRAM(s) Oral at bedtime  glucagon  Injectable 1 milliGRAM(s) IntraMuscular once  influenza   Vaccine 0.5 milliLiter(s) IntraMuscular once  insulin lispro (ADMELOG) corrective regimen sliding scale   SubCutaneous three times a day before meals  insulin lispro (ADMELOG) corrective regimen sliding scale   SubCutaneous at bedtime  losartan 25 milliGRAM(s) Oral daily  metoprolol succinate ER 12.5 milliGRAM(s) Oral daily  multivitamin/minerals 1 Tablet(s) Oral daily  nystatin    Suspension 938227 Unit(s) Oral two times a day  pancrelipase  (CREON 12,000 Lipase Units) 2 Capsule(s) Oral four times a day with meals  pantoprazole    Tablet 40 milliGRAM(s) Oral before breakfast  sodium chloride 1 Gram(s) Oral two times a day    MEDICATIONS  (PRN):  acetaminophen   Tablet .. 650 milliGRAM(s) Oral every 6 hours PRN Temp greater or equal to 38C (100.4F), Mild Pain (1 - 3)  melatonin 3 milliGRAM(s) Oral at bedtime PRN Insomnia      ROS  No fever, sweats, chills  No epistaxis, HA, sore throat  No CP, SOB, cough, sputum  No n/v/d, abd pain, melena, hematochezia  No edema  No rash  No anxiety  No back pain, joint pain  No bleeding, bruising  No dysuria, hematuria    Vital Signs Last 24 Hrs  T(C): 36.3 (07 Oct 2021 04:58), Max: 36.8 (06 Oct 2021 16:20)  T(F): 97.4 (07 Oct 2021 04:58), Max: 98.2 (06 Oct 2021 16:20)  HR: 74 (07 Oct 2021 04:58) (73 - 101)  BP: 137/78 (07 Oct 2021 04:58) (98/70 - 137/78)  BP(mean): 84 (06 Oct 2021 19:38) (84 - 84)  RR: 20 (07 Oct 2021 04:58) (14 - 22)  SpO2: 95% (07 Oct 2021 04:58) (94% - 100%)    PE  NAD  Awake, alert  Anicteric, MMM  Abd soft, mildly distended  Bilateral pedal edema - stable  No rash grossly                            9.0    14.16 )-----------( 191      ( 07 Oct 2021 06:34 )             26.6       10-07    129<L>  |  91<L>  |  29<H>  ----------------------------<  135<H>  3.7   |  27  |  0.65    Ca    8.0<L>      07 Oct 2021 06:34  Mg     2.2     10-06    TPro  5.1<L>  /  Alb  2.8<L>  /  TBili  0.8  /  DBili  x   /  AST  50<H>  /  ALT  80<H>  /  AlkPhos  111  10-06

## 2021-10-07 NOTE — CHART NOTE - NSCHARTNOTEFT_GEN_A_CORE
Pt seen for verbal consult regarding diet complaints. Initial assessment to follow tomorrow.     Chart reviewed, events noted. Pt with hx of poor PO intake, currently ordered for Dys 1 thin liquid diet, Consistent Carbohydrate, DASH. Pt requesting regular textures, Ensure Clear and removal of Consistent Carbohydrate/salt restrictions due to poor PO intake, noted pt with pancreatic cancer.     Swallow eval took place on 9/24 with recommendations for mechanical soft thin liquids. Spoke with pt's daughter over the phone (she is an MD), who states pt was struggling with mouth sores which was the reason for the mechanical soft diet. Mouth sores have since resolved and pt tolerated a turkey sandwich brought from home yesterday. Pt requests regular diet textures, reports he can choose softer items off this menu if needed.    Pt requests Ensure Clear supplement. Discussed elevated BG with pt due to steroids, pt insists. Will have pt try Promote supplement (less sugar). He dislikes Ensure/Glucerna at this time 2/2 consistency. Pt with poor PO intake and requires calories at this time considering condition.    Recommend:   1) Spoke with EITAN Malone about diet advancement, defer texture changes to team. Remove Consistent Carbohydrate, DASH restriction at this time considering poor PO intake.   2) Add Ensure Clear BID, LPS x1 daily. Will trial Promote. Added magic cup supplement as requested.  3) Monitor BG, consider adjustment of insulin regimen.  4) Will continue to follow pt.     Anai Cordova RD, CDN. Pager: [phone]

## 2021-10-07 NOTE — PHYSICAL THERAPY INITIAL EVALUATION ADULT - PRECAUTIONS/LIMITATIONS, REHAB EVAL
CONT: CT abdomen & pelvis: Large left retroperitoneal hematoma measuring 7.2 x 5.4 x 10.8 cm, likely associated with the left psoas muscle. Similar-appearing metastatic pancreatic disease, better evaluated on prior contrast-enhanced imaging. Stercoral colitis with rectal pneumatosis.

## 2021-10-07 NOTE — PHYSICAL THERAPY INITIAL EVALUATION ADULT - PERTINENT HX OF CURRENT PROBLEM, REHAB EVAL
74 y/o M, with PMHx pancreatic cancer (on chemo/immunotherapy, last was 2 weeks ago), CAD, CHF, DVT/PE on eliquis, presenting with anemia. Recently admitted to [facility] for fever/diarrhea, sent to rehab last week, In [facility] re[facility], was noted to have Hb 6.4, was sent to ED. per patient does not recall any falls. Patient's wife is at bedside and says he has been bed bound since his admission at [facility].

## 2021-10-07 NOTE — PHYSICAL THERAPY INITIAL EVALUATION ADULT - TRANSFER TRAINING, PT EVAL
GOAL: Pt will perform ALL transfers with maxA, w/use of appropriate assistive device as needed, in 2 weeks.

## 2021-10-07 NOTE — PROGRESS NOTE ADULT - SUBJECTIVE AND OBJECTIVE BOX
[facility] DIVISION OF KIDNEY DISEASES AND HYPERTENSION -- [phone]  -- INITIAL CONSULT NOTE  --------------------------------------------------------------------------------  HPI: 76 y/o male was recently discharged from [facility] on Sept 30 2021 and referred to [facility] in the setting of this patient with a history of CAD with past MI and PCI/stent in 2009, PAF maintained on Eliquis, essential HTN on a beta blocker and ARB, BIV-AICD with HF with reduced EF  with patient on additional indication for Eliquis for a PE with apparently recent diagnosis at [facility] for pancreatic CA on chemotherapy, with the admission at [facility] for oral mucositis with patient on a Prednisone taper and Decadron swish and spit therapy, with apparent steroid induced type 2 DM, patient maintained on a dysphagia diet from [facility], with the patient at [facility] with slow improvement in functional ability but noted with LEFT flank haematoma today with a low H/H and sent to the [place] ER.   Patient offers no complaint, although slightly reliant upon spouse in attendance for interview.   Mild general weakness and fatigue.  No back pain, no tearing back pain.  NO fever, no chills, no rigors.  NO abdominal pain, no red blood per rectum or melena.  NO diarrhoea.   NO cough, no dyspnoea.  NO chest pain/pressure.  NO N/V.   NO diaphoresis.  NO palliative or exacerbating factors.  Nephrology called for hyponatremia.           Patient seen & examined. Denies chest pain, fever, chills, increased frequency, dysuria, hematuria, pus in urine, frothy urine, SOB, leg edema, loss of appetite, pruritis, N/V.      PAST HISTORY  --------------------------------------------------------------------------------  PAST MEDICAL & SURGICAL HISTORY:  Hypertension (ICD9 401.9)    Hyperlipidemia (ICD9 272.4)    BPH (Benign Prostatic Hyperplasia)    Borderline diabetes mellitus    MI (myocardial infarction)  2009    Systolic heart failure    Pancreatic cancer    Hernia    Biventricular ICD (implantable cardioverter-defibrillator) in place  2010    Stented coronary artery  X 2, 2009      FAMILY HISTORY:  No pertinent family history in first degree relatives      PAST SOCIAL HISTORY:    ALLERGIES & MEDICATIONS  --------------------------------------------------------------------------------  Allergies    No Known Allergies    Intolerances      Standing Inpatient Medications  calcium carbonate   1250 mG (OsCal) 1 Tablet(s) Oral two times a day  dexAMETHasone    Solution 1 milliGRAM(s) Oral four times a day  dextrose 40% Gel 15 Gram(s) Oral once  dextrose 5%. 1000 milliLiter(s) IV Continuous <Continuous>  dextrose 5%. 1000 milliLiter(s) IV Continuous <Continuous>  dextrose 50% Injectable 25 Gram(s) IV Push once  dextrose 50% Injectable 12.5 Gram(s) IV Push once  dextrose 50% Injectable 25 Gram(s) IV Push once  dronabinol 2.5 milliGRAM(s) Oral at bedtime  finasteride 5 milliGRAM(s) Oral daily  gabapentin 100 milliGRAM(s) Oral at bedtime  glucagon  Injectable 1 milliGRAM(s) IntraMuscular once  influenza   Vaccine 0.5 milliLiter(s) IntraMuscular once  insulin lispro (ADMELOG) corrective regimen sliding scale   SubCutaneous three times a day before meals  insulin lispro (ADMELOG) corrective regimen sliding scale   SubCutaneous at bedtime  losartan 25 milliGRAM(s) Oral daily  metoprolol succinate ER 12.5 milliGRAM(s) Oral daily  multivitamin/minerals 1 Tablet(s) Oral daily  nystatin    Suspension 578981 Unit(s) Oral two times a day  pancrelipase  (CREON 12,000 Lipase Units) 2 Capsule(s) Oral four times a day with meals  pantoprazole    Tablet 40 milliGRAM(s) Oral before breakfast  sodium chloride 1 Gram(s) Oral two times a day    PRN Inpatient Medications  acetaminophen   Tablet .. 650 milliGRAM(s) Oral every 6 hours PRN  melatonin 3 milliGRAM(s) Oral at bedtime PRN      REVIEW OF SYSTEMS  --------------------------------------------------------------------------------  Gen: No  fevers/chills  Respiratory: No dyspnea, cough  CV: No chest pain  GI: No abdominal pain, diarrhea,  nausea, vomiting  : No increased frequency, dysuria, hematuria  MSK:  no edema  Neuro: No dizziness/lightheadedness    All other systems were reviewed and are negative, except as noted.    VITALS/PHYSICAL EXAM  --------------------------------------------------------------------------------  T(C): 36.6 (10-07-21 @ 12:09), Max: 36.8 (10-06-21 @ 16:20)  HR: 98 (10-07-21 @ 12:09) (73 - 101)  BP: 103/68 (10-07-21 @ 12:09) (98/70 - 137/78)  RR: 20 (10-07-21 @ 12:09) (14 - 22)  SpO2: 96% (10-07-21 @ 12:09) (94% - 100%)  Wt(kg): --  Height (cm): 177.8 (10-07-21 @ 04:58)  Weight (kg): 95.6 (10-07-21 @ 04:58)  BMI (kg/m2): 30.2 (10-07-21 @ 04:58)  BSA (m2): 2.13 (10-07-21 @ 04:58)      10-07-21 @ 07:01  -  10-07-21 @ 14:18  --------------------------------------------------------  IN: 240 mL / OUT: 0 mL / NET: 240 mL      Physical Exam:  	Gen: NAD  	HEENT: MMM  	Pulm: CTA B/L  	CV: S1S2  	Abd: Soft, +BS   	Ext: No LE edema B/L, no asterixis  	Neuro: Awake, alert  	Skin: Warm and dry  	Vascular access:    LABS/STUDIES  --------------------------------------------------------------------------------              9.6    14.47 >-----------<  218      [10-07-21 @ 11:40]              29.2     129  |  91  |  29  ----------------------------<  135      [10-07-21 @ 06:34]  3.7   |  27  |  0.65        Ca     8.0     [10-07-21 @ 06:34]      Mg     2.2     [10-06-21 @ 21:37]    TPro  5.1  /  Alb  2.8  /  TBili  0.8  /  DBili  x   /  AST  50  /  ALT  80  /  AlkPhos  111  [10-06-21 @ 13:50]    PT/INR: PT 17.6 , INR 1.50       [10-06-21 @ 13:50]  PTT: 26.7       [10-06-21 @ 13:50]          [10-06-21 @ 21:37]        [10-07-21 @ 06:34]    Creatinine Trend:  SCr 0.65 [10-07 @ 06:34]  SCr 0.70 [10-06 @ 13:50]  SCr 0.77 [09-30 @ 08:01]  SCr 0.82 [09-29 @ 18:52]  SCr 0.91 [09-28 @ 07:43]    Urinalysis - [09-15-21 @ 03:16]      Color Jennifer / Appearance Slightly Turbid / SG 1.028 / pH 6.0      Gluc Negative / Ketone Negative  / Bili Negative / Urobili 3 mg/dL       Blood Negative / Protein 30 mg/dL / Leuk Est Negative / Nitrite Negative      RBC <5 / WBC <5 / Hyaline 2-4 / Gran  / Sq Epi  / Non Sq Epi FEW / Bacteria Few      Iron 60, TIBC 196, %sat 31      [10-07-21 @ 08:54]  Ferritin 1819      [10-07-21 @ 06:19]    HBsAg Nonreact      [09-15-21 @ 13:29]  HCV 0.07, Nonreact      [09-15-21 @ 13:29]     [facility] DIVISION OF KIDNEY DISEASES AND HYPERTENSION -- [phone]  -- INITIAL CONSULT NOTE  --------------------------------------------------------------------------------  HPI: 76 y/o male was recently discharged from [facility] on Sept 30 2021 and referred to [facility] in the setting of this patient with a history of CAD with past MI and PCI/stent in 2009, PAF maintained on Eliquis, essential HTN on a beta blocker and ARB, BIV-AICD with HF with reduced EF  with patient on additional indication for Eliquis for a PE with apparently recent diagnosis at [facility] for pancreatic CA on chemotherapy, with the admission at [facility] for oral mucositis with patient on a Prednisone taper and Decadron swish and spit therapy, with apparent steroid induced type 2 DM, patient maintained on a dysphagia diet from [facility], with the patient at [facility] with slow improvement in functional ability but noted with LEFT flank haematoma today with a low H/H and sent to the [place] ER.   Patient offers no complaint, although slightly reliant upon spouse in attendance for interview.   Mild general weakness and fatigue.  No back pain, no tearing back pain.  NO fever, no chills, no rigors.  NO abdominal pain, no red blood per rectum or melena.  NO diarrhoea.   NO cough, no dyspnoea.  NO chest pain/pressure.  NO N/V.   NO diaphoresis.    Nephrology called for hyponatremia. Serum Na was 139 on 9/28-->132 on 9/29-->125 on 10/6-->129 today.          Patient seen & examined. Denies resports leg edema, general weakness and fatigue.      PAST HISTORY  --------------------------------------------------------------------------------  PAST MEDICAL & SURGICAL HISTORY:  Hypertension (ICD9 401.9)    Hyperlipidemia (ICD9 272.4)    BPH (Benign Prostatic Hyperplasia)    Borderline diabetes mellitus    MI (myocardial infarction)  2009    Systolic heart failure    Pancreatic cancer    Hernia    Biventricular ICD (implantable cardioverter-defibrillator) in place  2010    Stented coronary artery  X 2, 2009      FAMILY HISTORY:  No pertinent family history in first degree relatives      PAST SOCIAL HISTORY:    ALLERGIES & MEDICATIONS  --------------------------------------------------------------------------------  Allergies    No Known Allergies    Intolerances      Standing Inpatient Medications  calcium carbonate   1250 mG (OsCal) 1 Tablet(s) Oral two times a day  dexAMETHasone    Solution 1 milliGRAM(s) Oral four times a day  dextrose 40% Gel 15 Gram(s) Oral once  dextrose 5%. 1000 milliLiter(s) IV Continuous <Continuous>  dextrose 5%. 1000 milliLiter(s) IV Continuous <Continuous>  dextrose 50% Injectable 25 Gram(s) IV Push once  dextrose 50% Injectable 12.5 Gram(s) IV Push once  dextrose 50% Injectable 25 Gram(s) IV Push once  dronabinol 2.5 milliGRAM(s) Oral at bedtime  finasteride 5 milliGRAM(s) Oral daily  gabapentin 100 milliGRAM(s) Oral at bedtime  glucagon  Injectable 1 milliGRAM(s) IntraMuscular once  influenza   Vaccine 0.5 milliLiter(s) IntraMuscular once  insulin lispro (ADMELOG) corrective regimen sliding scale   SubCutaneous three times a day before meals  insulin lispro (ADMELOG) corrective regimen sliding scale   SubCutaneous at bedtime  losartan 25 milliGRAM(s) Oral daily  metoprolol succinate ER 12.5 milliGRAM(s) Oral daily  multivitamin/minerals 1 Tablet(s) Oral daily  nystatin    Suspension 053468 Unit(s) Oral two times a day  pancrelipase  (CREON 12,000 Lipase Units) 2 Capsule(s) Oral four times a day with meals  pantoprazole    Tablet 40 milliGRAM(s) Oral before breakfast  sodium chloride 1 Gram(s) Oral two times a day    PRN Inpatient Medications  acetaminophen   Tablet .. 650 milliGRAM(s) Oral every 6 hours PRN  melatonin 3 milliGRAM(s) Oral at bedtime PRN      REVIEW OF SYSTEMS  --------------------------------------------------------------------------------  Gen: No  fevers/chills, +fatigue  Respiratory: No dyspnea, cough  CV: No chest pain  GI: No abdominal pain, diarrhea,  nausea, vomiting  : No increased frequency, dysuria, hematuria  MSK:  + edema  Neuro: No dizziness/lightheadedness    All other systems were reviewed and are negative, except as noted.    VITALS/PHYSICAL EXAM  --------------------------------------------------------------------------------  T(C): 36.6 (10-07-21 @ 12:09), Max: 36.8 (10-06-21 @ 16:20)  HR: 98 (10-07-21 @ 12:09) (73 - 101)  BP: 103/68 (10-07-21 @ 12:09) (98/70 - 137/78)  RR: 20 (10-07-21 @ 12:09) (14 - 22)  SpO2: 96% (10-07-21 @ 12:09) (94% - 100%)  Wt(kg): --  Height (cm): 177.8 (10-07-21 @ 04:58)  Weight (kg): 95.6 (10-07-21 @ 04:58)  BMI (kg/m2): 30.2 (10-07-21 @ 04:58)  BSA (m2): 2.13 (10-07-21 @ 04:58)      10-07-21 @ 07:01  -  10-07-21 @ 14:18  --------------------------------------------------------  IN: 240 mL / OUT: 0 mL / NET: 240 mL      Physical Exam:  	Gen: NAD  	HEENT: MMM  	Pulm: CTA B/L  	CV: S1S2  	Abd: Soft, +BS   	Ext: ++ LE edema B/L  	Neuro: Awake, alert  	Skin: Warm and dry  	Vascular access:    LABS/STUDIES  --------------------------------------------------------------------------------              9.6    14.47 >-----------<  218      [10-07-21 @ 11:40]              29.2     129  |  91  |  29  ----------------------------<  135      [10-07-21 @ 06:34]  3.7   |  27  |  0.65        Ca     8.0     [10-07-21 @ 06:34]      Mg     2.2     [10-06-21 @ 21:37]    TPro  5.1  /  Alb  2.8  /  TBili  0.8  /  DBili  x   /  AST  50  /  ALT  80  /  AlkPhos  111  [10-06-21 @ 13:50]    PT/INR: PT 17.6 , INR 1.50       [10-06-21 @ 13:50]  PTT: 26.7       [10-06-21 @ 13:50]          [10-06-21 @ 21:37]        [10-07-21 @ 06:34]    Creatinine Trend:  SCr 0.65 [10-07 @ 06:34]  SCr 0.70 [10-06 @ 13:50]  SCr 0.77 [09-30 @ 08:01]  SCr 0.82 [09-29 @ 18:52]  SCr 0.91 [09-28 @ 07:43]    Urinalysis - [09-15-21 @ 03:16]      Color Jennifer / Appearance Slightly Turbid / SG 1.028 / pH 6.0      Gluc Negative / Ketone Negative  / Bili Negative / Urobili 3 mg/dL       Blood Negative / Protein 30 mg/dL / Leuk Est Negative / Nitrite Negative      RBC <5 / WBC <5 / Hyaline 2-4 / Gran  / Sq Epi  / Non Sq Epi FEW / Bacteria Few      Iron 60, TIBC 196, %sat 31      [10-07-21 @ 08:54]  Ferritin 1819      [10-07-21 @ 06:19]    HBsAg Nonreact      [09-15-21 @ 13:29]  HCV 0.07, Nonreact      [09-15-21 @ 13:29]

## 2021-10-08 NOTE — DIETITIAN INITIAL EVALUATION ADULT. - PERTINENT MEDS FT
MEDICATIONS  (STANDING):  calcium carbonate   1250 mG (OsCal) 1 Tablet(s) Oral two times a day  dexAMETHasone    Solution 1 milliGRAM(s) Oral four times a day  dextrose 40% Gel 15 Gram(s) Oral once  dextrose 5%. 1000 milliLiter(s) (50 mL/Hr) IV Continuous <Continuous>  dextrose 5%. 1000 milliLiter(s) (100 mL/Hr) IV Continuous <Continuous>  dextrose 50% Injectable 25 Gram(s) IV Push once  dextrose 50% Injectable 12.5 Gram(s) IV Push once  dextrose 50% Injectable 25 Gram(s) IV Push once  dronabinol 2.5 milliGRAM(s) Oral at bedtime  finasteride 5 milliGRAM(s) Oral daily  gabapentin 100 milliGRAM(s) Oral at bedtime  glucagon  Injectable 1 milliGRAM(s) IntraMuscular once  influenza   Vaccine 0.5 milliLiter(s) IntraMuscular once  insulin lispro (ADMELOG) corrective regimen sliding scale   SubCutaneous three times a day before meals  insulin lispro (ADMELOG) corrective regimen sliding scale   SubCutaneous at bedtime  losartan 25 milliGRAM(s) Oral daily  metoprolol succinate ER 12.5 milliGRAM(s) Oral daily  multivitamin/minerals 1 Tablet(s) Oral daily  nystatin    Suspension 442141 Unit(s) Oral two times a day  pancrelipase  (CREON 12,000 Lipase Units) 2 Capsule(s) Oral four times a day with meals  pantoprazole    Tablet 40 milliGRAM(s) Oral before breakfast  predniSONE   Tablet 20 milliGRAM(s) Oral daily  urea Oral Powder 30 Gram(s) Oral two times a day

## 2021-10-08 NOTE — CONSULT NOTE ADULT - CONSULT REQUESTED DATE/TIME
06-Oct-2021 17:50
06-Oct-2021 14:53
08-Oct-2021 14:39
07-Oct-2021 09:14
07-Oct-2021 14:55
07-Oct-2021 13:41

## 2021-10-08 NOTE — PROGRESS NOTE ADULT - SUBJECTIVE AND OBJECTIVE BOX
CC: Patient is a 75y old  Male who presents with a chief complaint of Sent in from [facility] for LEFT flank haematoma and low H/H. (08 Oct 2021 14:39)    ID following for leukocytosis    Interval History/ROS: Patient with improvement in hematoma on left flank. Hgb/ hct stable. Remains with mucositis. No fevers.    Rest of ROS negative.    Allergies  No Known Allergies    ANTIMICROBIALS:  nystatin    Suspension 910079 two times a day    OTHER MEDS:  acetaminophen   Tablet .. 650 milliGRAM(s) Oral every 6 hours PRN  ascorbic acid 500 milliGRAM(s) Oral daily  calcium carbonate   1250 mG (OsCal) 1 Tablet(s) Oral two times a day  dexAMETHasone    Solution 1 milliGRAM(s) Oral four times a day  dextrose 40% Gel 15 Gram(s) Oral once  dextrose 5%. 1000 milliLiter(s) IV Continuous <Continuous>  dextrose 5%. 1000 milliLiter(s) IV Continuous <Continuous>  dextrose 50% Injectable 25 Gram(s) IV Push once  dextrose 50% Injectable 12.5 Gram(s) IV Push once  dextrose 50% Injectable 25 Gram(s) IV Push once  dronabinol 2.5 milliGRAM(s) Oral at bedtime  finasteride 5 milliGRAM(s) Oral daily  gabapentin 100 milliGRAM(s) Oral at bedtime  glucagon  Injectable 1 milliGRAM(s) IntraMuscular once  influenza   Vaccine 0.5 milliLiter(s) IntraMuscular once  insulin lispro (ADMELOG) corrective regimen sliding scale   SubCutaneous three times a day before meals  insulin lispro (ADMELOG) corrective regimen sliding scale   SubCutaneous at bedtime  losartan 25 milliGRAM(s) Oral daily  melatonin 3 milliGRAM(s) Oral at bedtime PRN  metoprolol succinate ER 12.5 milliGRAM(s) Oral daily  multivitamin 1 Tablet(s) Oral daily  multivitamin/minerals 1 Tablet(s) Oral daily  pancrelipase  (CREON 12,000 Lipase Units) 2 Capsule(s) Oral four times a day with meals  pantoprazole    Tablet 40 milliGRAM(s) Oral before breakfast  sodium chloride 0.65% Nasal 1 Spray(s) Both Nostrils three times a day PRN  urea Oral Powder 30 Gram(s) Oral two times a day    PE:    Vital Signs Last 24 Hrs  T(C): 37.1 (08 Oct 2021 13:41), Max: 37.1 (08 Oct 2021 13:41)  T(F): 98.7 (08 Oct 2021 13:41), Max: 98.7 (08 Oct 2021 13:41)  HR: 95 (08 Oct 2021 13:41) (84 - 95)  BP: 110/74 (08 Oct 2021 13:41) (102/67 - 110/74)  BP(mean): --  RR: 18 (08 Oct 2021 13:41) (18 - 20)  SpO2: 94% (08 Oct 2021 13:41) (94% - 96%)    Gen: AOx3, NAD  HEENT: ulcers by the tonsils and on the lower lip  CV: S1+S2 normal, no murmurs  Resp: Clear bilat, no resp distress  Abd: Soft, nontender, +BS  Ext: + LE edema, no wounds  : No Bowman  IV/Skin: No thrombophlebitis, left flank hematoma improving  Neuro: no focal deficits    LABS:                          9.6    14.15 )-----------( 203      ( 08 Oct 2021 06:38 )             28.8       10-08    128<L>  |  91<L>  |  37<H>  ----------------------------<  262<H>  3.3<L>   |  25  |  0.78    Ca    7.6<L>      08 Oct 2021 06:38  Mg     2.2     10-06            MICROBIOLOGY:  v  .Stool Feces  09-18-21   No enteric pathogens isolated.  (Stool culture examined for Salmonella,  Shigella, Campylobacter, Aeromonas, Plesiomonas,  Vibrio, E.coli O157 and Yersinia)  No enteric gram negative rods isolated  --  --      .Blood  09-16-21   No Blood Parasites observed by giemsa stain  One negative set of blood smears does not rule out  the possibility of a parasitic infection.  A minimum of 3  specimens should be collected, at least 12-24 hours apart,  over a 36 hour time period.  --  --      .Blood Blood  09-16-21   No Blood Parasites observed by giemsa stain  One negative set of blood smears does not rule out  the possibility of a parasitic infection.  A minimum of 3  specimens should be collected, at least 12-24 hours apart,  over a 36 hour time period.  --  --      .Stool Feces  09-15-21   No Protozoa seen by trichrome stain  No Helminths or Protozoa seen in formalin concentrate  performed by iodine stain  (routine O+P not evaluated for Microsporidia,  Cryptosporidia, Cyclospora, or Isospora.)  One negative sample does not necessarily rule  out the presence of a parasitic infection.  Moderate WBC's  Moderate Red blood cells  --  --      Clean Catch Clean Catch (Midstream)  09-15-21   <10,000 CFU/mL Normal Urogenital Shelia  --  --      .Blood Blood-Peripheral  09-14-21   No Growth Final  --  --      .Blood Blood-Peripheral  09-14-21   No Growth Final  --  --    RADIOLOGY:    < from: Xray Chest 1 View- PORTABLE-Urgent (Xray Chest 1 View- PORTABLE-Urgent .) (10.06.21 @ 16:20) >  IMPRESSION:    The heart is enlarged. Inspiratory effort. Small left pleural effusion. The right lung appears to be clear. A pacer is in good position. Correlate with CT scan that was performed on October 6, 2021.    < end of copied text >

## 2021-10-08 NOTE — DIETITIAN INITIAL EVALUATION ADULT. - ADD RECOMMEND
Diet textures deferred to SLP. Please remove Consistent Carbohydrate and DASH restriction to encourage PO intake and per pt's request. Diet textures deferred to SLP. Please remove Consistent Carbohydrate and DASH restriction to encourage PO intake and per pt's request. Add Vitamin C and Multivitamin if no contraindications. Monitor PO intake, GI tolerance, skin integrity and labs. RD remains available if needed, pt is aware.

## 2021-10-08 NOTE — CONSULT NOTE ADULT - REASON FOR ADMISSION
Sent in from [facility] for LEFT flank haematoma and low H/H.

## 2021-10-08 NOTE — PROGRESS NOTE ADULT - SUBJECTIVE AND OBJECTIVE BOX
Patient is a 75y old  Male who presents with a chief complaint of Sent in from [facility] for LEFT flank haematoma and low H/H. (08 Oct 2021 10:32)    Patient seen this morning. Nephrology was at bedside addressing hyponatremia/SIADH. Spoke with patient's daughter as well on phone.    MEDICATIONS  (STANDING):  calcium carbonate   1250 mG (OsCal) 1 Tablet(s) Oral two times a day  dexAMETHasone    Solution 1 milliGRAM(s) Oral four times a day  dextrose 40% Gel 15 Gram(s) Oral once  dextrose 5%. 1000 milliLiter(s) (50 mL/Hr) IV Continuous <Continuous>  dextrose 5%. 1000 milliLiter(s) (100 mL/Hr) IV Continuous <Continuous>  dextrose 50% Injectable 25 Gram(s) IV Push once  dextrose 50% Injectable 12.5 Gram(s) IV Push once  dextrose 50% Injectable 25 Gram(s) IV Push once  dronabinol 2.5 milliGRAM(s) Oral at bedtime  finasteride 5 milliGRAM(s) Oral daily  gabapentin 100 milliGRAM(s) Oral at bedtime  glucagon  Injectable 1 milliGRAM(s) IntraMuscular once  influenza   Vaccine 0.5 milliLiter(s) IntraMuscular once  insulin lispro (ADMELOG) corrective regimen sliding scale   SubCutaneous three times a day before meals  insulin lispro (ADMELOG) corrective regimen sliding scale   SubCutaneous at bedtime  losartan 25 milliGRAM(s) Oral daily  metoprolol succinate ER 12.5 milliGRAM(s) Oral daily  multivitamin/minerals 1 Tablet(s) Oral daily  nystatin    Suspension 565271 Unit(s) Oral two times a day  pancrelipase  (CREON 12,000 Lipase Units) 2 Capsule(s) Oral four times a day with meals  pantoprazole    Tablet 40 milliGRAM(s) Oral before breakfast  predniSONE   Tablet 20 milliGRAM(s) Oral daily  urea Oral Powder 30 Gram(s) Oral two times a day    MEDICATIONS  (PRN):  acetaminophen   Tablet .. 650 milliGRAM(s) Oral every 6 hours PRN Temp greater or equal to 38C (100.4F), Mild Pain (1 - 3)  melatonin 3 milliGRAM(s) Oral at bedtime PRN Insomnia  sodium chloride 0.65% Nasal 1 Spray(s) Both Nostrils three times a day PRN Nasal Congestion      ROS  No fever, sweats, chills  No epistaxis, HA, sore throat  No CP, SOB, cough, sputum  No n/v/d, abd pain, melena, hematochezia  No edema  No rash  No anxiety  No back pain, joint pain  No bleeding, bruising  No dysuria, hematuria    Vital Signs Last 24 Hrs  T(C): 36.9 (08 Oct 2021 05:49), Max: 36.9 (08 Oct 2021 05:49)  T(F): 98.4 (08 Oct 2021 05:49), Max: 98.4 (08 Oct 2021 05:49)  HR: 84 (08 Oct 2021 05:49) (84 - 98)  BP: 109/66 (08 Oct 2021 05:49) (102/67 - 109/66)  BP(mean): --  RR: 18 (08 Oct 2021 05:49) (18 - 20)  SpO2: 95% (08 Oct 2021 05:49) (95% - 96%)    PE  NAD  Awake, alert  Anicteric, MMM  Stable bilateral pedal edema  No rash grossly                            9.6    14.15 )-----------( 203      ( 08 Oct 2021 06:38 )             28.8       10-08    128<L>  |  91<L>  |  37<H>  ----------------------------<  262<H>  3.3<L>   |  25  |  0.78    Ca    7.6<L>      08 Oct 2021 06:38  Mg     2.2     10-06    TPro  5.1<L>  /  Alb  2.8<L>  /  TBili  0.8  /  DBili  x   /  AST  50<H>  /  ALT  80<H>  /  AlkPhos  111  10-06

## 2021-10-08 NOTE — DIETITIAN INITIAL EVALUATION ADULT. - OTHER INFO
This admission: RD also saw pt yesterday, please see chart note. After discussion with EITAN Malone, pt was to be place on mechanical soft diet until swallow eval takes place, despite pt's adamant request for regular textures. Noted pt and daughter report mucositis resolved and pt can tolerate textures better. Pt requests Ensure Clear, will honor considering poor PO intake and clinical condition.     Endo: Ordered for SSI     Pt confirms NKFA, no nausea/vomiting, no GI distress.    Weight Hx: Dosing wt is 210 lbs however note pt's wt hx: 210 lbs (9/24), 197.5 lbs (9/27). This admission: RD also saw pt yesterday, please see chart note. SLP recommended soft diet with thin liquids today. Noted pt and daughter report mucositis resolved and pt can tolerate textures better. Pt requests Ensure Clear, will honor considering poor PO intake and clinical condition. Increasing PO intake in-house, 75% of lunch eaten today.     Endo: Ordered for SSI     Pt confirms NKFA, no nausea/vomiting, no GI distress.    Weight Hx: Dosing wt is 210 lbs however note pt's wt hx: 210 lbs (9/24), 197.5 lbs (9/27). UBW is 207 lbs. Will need new weight to assess wt changes. Noted edema present. Family reports wt loss however, due to prolonged inadequate protein energy intake.

## 2021-10-08 NOTE — DIETITIAN INITIAL EVALUATION ADULT. - PERSON TAUGHT/METHOD
Discussed protein sources and importance of adequate protein-energy intake. Pt and wife are aware. Pt and spouse aware of need to monitor sugar intake while on steroids./verbal instruction/patient instructed/spouse instructed

## 2021-10-08 NOTE — CONSULT NOTE ADULT - ASSESSMENT
Impression:    Sacral/bilateral Buttocks deep tissue injury present on admission  Incontinence of bowel and bladder  Incontinence Dermatitis    Recommend:  1.) topical therapy: Sacral/bilateral buttocks injury - cleanse with incontinence cleanser, pat dry, apply cavilon advanced to entire bilateral buttocks skin. then apply allevyn foam dressing over the open area  2.) Incontinence Management - incontinence cleanser, pads, pericare BID  3.) Maintain on an alternating air with low air loss surface  4.) Turn and reposition Q 2 hours  5.) Nutrition optimization  6.) Offload heels/feet with complete cair air fluidized; ensure that the soles of the feet are not resting on the foot board of the bed.  7.) Chair cushion for chair sitting  8.) Limit chair sitting to 2 hours at a time    Care as per medicine. Will not actively follow but will remain available. Please recall for new issues or deterioration.  Upon discharge [facility] [address] [place] [address] [phone]  Discussed with clinical nurse  Thank you for this consult  Alejandrina Ball, NP-C, CWOCN [id number]

## 2021-10-08 NOTE — CHART NOTE - NSCHARTNOTEFT_GEN_A_CORE
PA medicine Event Note    Patient adamantly refusing CT H/N that was ordered yesterday.  Neurology aware. Pt A&O x 3. Also spoke to patient's wife who also wants ordered discontinued.  Dr. Dowd aware and per attending pt to be cleared for d/c tomorrow.  Above orders discontinued.    Edwina Ureña PA-C  Dept of Medicine  #[id number]

## 2021-10-08 NOTE — CONSULT NOTE ADULT - CONSULT REASON
possible embolization for left RP hematoma
Leukocytosis
sacral/buttocks pressure ulcer
L retroperitoneal hematoma
UE pain
Metastatic Pancreatic Cancer, Anemia

## 2021-10-08 NOTE — CONSULT NOTE ADULT - SUBJECTIVE AND OBJECTIVE BOX
Wound SURGERY CONSULT NOTE    HPI:  NIGHT HOSPITALIST:   Patient UNKNOWN to me previously, assigned to me at this point via the ER and by Dr. Dowd to admit this 74 y/o M--patient seen with spouse in attendance and reviewed with patient's daughter by phone with patient's permission (daughter is a paediatric cardiologist--patient with a discharge from [facility] on Sept 30 2021 and referred to [facility] in the setting of this patient with a history of CAD with past MI and PCI/stent in 2009, PAF maintained on Eliquis, essential HTN on a beta blocker and ARB, BIV-AICD with HF with reduced EF%. with patient on additional indication for Eliquis for a PE with apparently recent diagnosis at [facility] for pancreatic CA on chemotherapy, with the admission at [facility] for oral mucositis with patient on a Prednisone taper and Decadron swish and spit therapy, with apparent steroid induced type 2 DM, patient maintained on a dysphagia diet from [facility], with the patient at [facility] with slow improvement in functional ability but noted with LEFT flank haematoma today with a low H/H and sent to the [place] ER.   Patient offers no complaint, although slightly reliant upon spouse in attendance for interview.   Mild general weakness and fatigue.  No back pain, no tearing back pain.  NO fever, no chills, no rigors.  NO abdominal pain, no red blood per rectum or melena.  NO diarrhoea.   NO cough, no dyspnoea.  NO chest pain/pressure.  NO N/V.   NO diaphoresis.  NO palliative or exacerbating factors.    Patient reports receiving Moderna COVID-19 vaccine x 2. (06 Oct 2021 19:38)    Request for wound care consult for sacral/bilateral buttocks skin breakdown received from nursing. Mr. Joy was encountered on an alternating air with low air loss surface with his wife at the bedside. He and his wife stated that he has has a "bedsore" for a while and it hasn't gotten any better. They state that it is because he has been in bed so much recently. They reported that he cannot stand or walk currently. He is occasionally incontinent of stool and urine. He was able turn and reposition self in bed although he is weak. His extreme immobility, inactivity, occasional incontinence of urine and stool as well as poor nutritional status all contribute to his high risk for pressure injury development and hinder healing. Identification of the initial signs of deep tissue damage at the level of the skin within 72 hours of admission make this an injury that occurred prior to admission.    PAST MEDICAL & SURGICAL HISTORY:  Hypertension (ICD9 401.9)  Hyperlipidemia (ICD9 272.4)  BPH (Benign Prostatic Hyperplasia)  Borderline diabetes mellitus  MI (myocardial infarction), 2009  Systolic heart failure  Pancreatic cancer  Hernia  Biventricular ICD (implantable cardioverter-defibrillator) in place, 2010  Stented coronary artery, X 2, 2009      MEDICATIONS  (STANDING):  calcium carbonate   1250 mG (OsCal) 1 Tablet(s) Oral two times a day  dexAMETHasone    Solution 1 milliGRAM(s) Oral four times a day  dextrose 40% Gel 15 Gram(s) Oral once  dextrose 5%. 1000 milliLiter(s) (50 mL/Hr) IV Continuous <Continuous>  dextrose 5%. 1000 milliLiter(s) (100 mL/Hr) IV Continuous <Continuous>  dextrose 50% Injectable 25 Gram(s) IV Push once  dextrose 50% Injectable 12.5 Gram(s) IV Push once  dextrose 50% Injectable 25 Gram(s) IV Push once  dronabinol 2.5 milliGRAM(s) Oral at bedtime  finasteride 5 milliGRAM(s) Oral daily  gabapentin 100 milliGRAM(s) Oral at bedtime  glucagon  Injectable 1 milliGRAM(s) IntraMuscular once  influenza   Vaccine 0.5 milliLiter(s) IntraMuscular once  insulin lispro (ADMELOG) corrective regimen sliding scale   SubCutaneous three times a day before meals  insulin lispro (ADMELOG) corrective regimen sliding scale   SubCutaneous at bedtime  losartan 25 milliGRAM(s) Oral daily  metoprolol succinate ER 12.5 milliGRAM(s) Oral daily  multivitamin/minerals 1 Tablet(s) Oral daily  nystatin    Suspension 074097 Unit(s) Oral two times a day  pancrelipase  (CREON 12,000 Lipase Units) 2 Capsule(s) Oral four times a day with meals  pantoprazole    Tablet 40 milliGRAM(s) Oral before breakfast  urea Oral Powder 30 Gram(s) Oral two times a day    MEDICATIONS  (PRN):  acetaminophen   Tablet .. 650 milliGRAM(s) Oral every 6 hours PRN Temp greater or equal to 38C (100.4F), Mild Pain (1 - 3)  melatonin 3 milliGRAM(s) Oral at bedtime PRN Insomnia  sodium chloride 0.65% Nasal 1 Spray(s) Both Nostrils three times a day PRN Nasal Congestion    Allergies    No Known Allergies    Intolerances    Vital Signs Last 24 Hrs  T(C): 37.1 (08 Oct 2021 13:41), Max: 37.1 (08 Oct 2021 13:41)  T(F): 98.7 (08 Oct 2021 13:41), Max: 98.7 (08 Oct 2021 13:41)  HR: 95 (08 Oct 2021 13:41) (84 - 95)  BP: 110/74 (08 Oct 2021 13:41) (102/67 - 110/74)  BP(mean): --  RR: 18 (08 Oct 2021 13:41) (18 - 20)  SpO2: 94% (08 Oct 2021 13:41) (94% - 96%)    Physical Exam:  General: Alert, frail, weak  Respiratory: no SOB on room air  Gastrointestinal: soft NT/ND  Neurology: weakened strength & sensation grossly intact  Musculoskeletal: no contractures  Vascular: BLE edema equal  Skin:  Sacral/bilateral buttocks with erythema, periphery of wound is deep maroon due and center is superficially denuded skin L 9cm X W 9cm xD 0.1cm with pink wound bed with epithelial islets, no necrotic tissue, and scant serosanguinous drainage  No odor, increased warmth, tenderness, induration, fluctuance    LABS:  10-08    128<L>  |  91<L>  |  37<H>  ----------------------------<  262<H>  3.3<L>   |  25  |  0.78    Ca    7.6<L>      08 Oct 2021 06:38  Mg     2.2     10-06                            9.6    14.15 )-----------( 203      ( 08 Oct 2021 06:38 )             28.8

## 2021-10-08 NOTE — DIETITIAN INITIAL EVALUATION ADULT. - CHIEF COMPLAINT
"76 yo male with pmhx pancreatic cancer (on chemo/immunotherapy, last was 2 weeks ago), CAD, CHF, DVT/PE on eliquis, presenting with anemia. Recently admitted to [facility] for fever/diarrhea, sent to rehab last week, In [facility], was noted to have Hb 6.4, was sent to ED"

## 2021-10-08 NOTE — DIETITIAN INITIAL EVALUATION ADULT. - ORAL INTAKE PTA/DIET HISTORY
Pt and family report pt with poor PO intake PTA 2/2 mucositis, was admitted to [facility] and SLP recommended pt for mechanical soft, thin liquid diet. Per prior RD note, PO intake was <50% for two weeks prior to [facility] admission (9/21). Pt also was taking steroids PTA, with steroid-induced diabetes. A1c is 6.8%. Pt took Multivitamin PTA, pancrelipase.

## 2021-10-08 NOTE — PROGRESS NOTE ADULT - ASSESSMENT
76 y/o male was recently discharged from [facility] on Sept 30 2021 and referred to [facility] in the setting of this patient with a history of CAD with past MI and PCI/stent in 2009, PAF maintained on Eliquis, essential HTN on a beta blocker and ARB, BIV-AICD with HF with reduced EF  with patient on additional indication for Eliquis for a PE with apparently recent diagnosis at [facility] for pancreatic CA on chemotherapy, steroid induced type 2 DM, here for LEFT flank hamartoma with a low H/H and sent to the [place] ER. Nephrology called for hyponatremia.        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

## 2021-10-08 NOTE — PROGRESS NOTE ADULT - SUBJECTIVE AND OBJECTIVE BOX
SUBJECTIVE / OVERNIGHT EVENTS:pt seen and examined    MEDICATIONS  (STANDING):  ascorbic acid 500 milliGRAM(s) Oral daily  calcium carbonate   1250 mG (OsCal) 1 Tablet(s) Oral two times a day  dexAMETHasone    Solution 1 milliGRAM(s) Oral four times a day  dextrose 40% Gel 15 Gram(s) Oral once  dextrose 5%. 1000 milliLiter(s) (50 mL/Hr) IV Continuous <Continuous>  dextrose 5%. 1000 milliLiter(s) (100 mL/Hr) IV Continuous <Continuous>  dextrose 50% Injectable 25 Gram(s) IV Push once  dextrose 50% Injectable 12.5 Gram(s) IV Push once  dextrose 50% Injectable 25 Gram(s) IV Push once  dronabinol 2.5 milliGRAM(s) Oral at bedtime  finasteride 5 milliGRAM(s) Oral daily  gabapentin 100 milliGRAM(s) Oral at bedtime  glucagon  Injectable 1 milliGRAM(s) IntraMuscular once  influenza   Vaccine 0.5 milliLiter(s) IntraMuscular once  insulin lispro (ADMELOG) corrective regimen sliding scale   SubCutaneous three times a day before meals  insulin lispro (ADMELOG) corrective regimen sliding scale   SubCutaneous at bedtime  losartan 25 milliGRAM(s) Oral daily  metoprolol succinate ER 12.5 milliGRAM(s) Oral daily  multivitamin 1 Tablet(s) Oral daily  multivitamin/minerals 1 Tablet(s) Oral daily  nystatin    Suspension 105287 Unit(s) Oral two times a day  pancrelipase  (CREON 12,000 Lipase Units) 2 Capsule(s) Oral four times a day with meals  pantoprazole    Tablet 40 milliGRAM(s) Oral before breakfast  urea Oral Powder 30 Gram(s) Oral two times a day    MEDICATIONS  (PRN):  acetaminophen   Tablet .. 650 milliGRAM(s) Oral every 6 hours PRN Temp greater or equal to 38C (100.4F), Mild Pain (1 - 3)  melatonin 3 milliGRAM(s) Oral at bedtime PRN Insomnia  sodium chloride 0.65% Nasal 1 Spray(s) Both Nostrils three times a day PRN Nasal Congestion    Vital Signs Last 24 Hrs  T(C): 36.5 (10-08-21 @ 20:08), Max: 37.1 (10-08-21 @ 13:41)  T(F): 97.7 (10-08-21 @ 20:08), Max: 98.7 (10-08-21 @ 13:41)  HR: 85 (10-08-21 @ 20:08) (84 - 95)  BP: 95/61 (10-08-21 @ 20:08) (95/61 - 110/74)  BP(mean): --  RR: 18 (10-08-21 @ 20:08) (18 - 18)  SpO2: 94% (10-08-21 @ 20:08) (94% - 95%)        Constitutional: No fever, fatigue  Skin: No rash.  Eyes: No recent vision problems or eye pain.  ENT: No congestion, ear pain, or sore throat.  Cardiovascular: No chest pain or palpation.  Respiratory: No cough, shortness of breath, congestion, or wheezing.  Gastrointestinal: No abdominal pain, nausea, vomiting, or diarrhea.  Genitourinary: No dysuria.  Musculoskeletal: No joint swelling.  Neurologic: No headache.    PHYSICAL EXAM:  GENERAL: NAD  EYES: EOMI, PERRLA  NECK: Supple, No JVD  CHEST/LUNG: dec breath sounds at bases  HEART:  S1 , S2 +  ABDOMEN: soft , bs+  EXTREMITIES: no edema   NEUROLOGY:alert awake    LABS:  10-08    129<L>  |  93<L>  |  30<H>  ----------------------------<  198<H>  4.2   |  25  |  0.74    Ca    7.7<L>      08 Oct 2021 20:18      Creatinine Trend: 0.74 <--, 0.78 <--, 0.81 <--, 0.65 <--, 0.70 <--                        9.6    14.15 )-----------( 203      ( 08 Oct 2021 06:38 )             28.8     Urine Studies:

## 2021-10-08 NOTE — CHART NOTE - NSCHARTNOTEFT_GEN_A_CORE
Internal Medicine PA Note    Basic Metabolic Panel - STAT (10.07.21 @ 18:55)    Sodium, Serum: 127 mmol/L    Potassium, Serum: 3.6 mmol/L    Chloride, Serum: 91 mmol/L    Carbon Dioxide, Serum: 24 mmol/L    Anion Gap, Serum: 12 mmol/L    Blood Urea Nitrogen, Serum: 28 mg/dL    Creatinine, Serum: 0.81 mg/dL    Glucose, Serum: 174 mg/dL    Calcium, Total Serum: 8.1 mg/dL    eGFR if Non : 86    F/u BMP noted, renal following.  Patient did not receive Ure- Na, earlier during day. Urena given at about 11PM. Will monitor BNP q6hr.   Post Void bladder scan performed, PVR about 140cc  Will continue with 1L fluid restrictions, urine/sodium labs.   Plan d/w overnight renal fellow.   Will follow renal recommendations.   Plan d/w SHAYAN LAIRD  Dept of Medicine   [id number] Internal Medicine PA Note    Basic Metabolic Panel - STAT (10.07.21 @ 18:55)    Sodium, Serum: 127 mmol/L    Potassium, Serum: 3.6 mmol/L    Chloride, Serum: 91 mmol/L    Carbon Dioxide, Serum: 24 mmol/L    Anion Gap, Serum: 12 mmol/L    Blood Urea Nitrogen, Serum: 28 mg/dL    Creatinine, Serum: 0.81 mg/dL    Glucose, Serum: 174 mg/dL    Calcium, Total Serum: 8.1 mg/dL    eGFR if Non : 86    F/u BMP noted, renal following.  Patient did not receive Ure- Na, earlier during day. Urena given at about 11PM. Will monitor BNP q6hr.   Post Void bladder scan performed, PVR about 168cc  Will continue with 1L fluid restrictions, urine/sodium labs.   Plan d/w overnight renal fellow.   Will follow renal recommendations.   Plan d/w RN  Will endorse to AM team.   Attending to follow in AM.     Sirena LAIRD  Dept of Medicine   [id number]

## 2021-10-08 NOTE — PROGRESS NOTE ADULT - SUBJECTIVE AND OBJECTIVE BOX
Neurology consult    REGGIE PUENTEyMale     Patient is a 75y old  Male who presents with a chief complaint of Sent in from [facility] for LEFT flank haematoma and low H/H. (07 Oct 2021 14:18)      HPI:  NIGHT HOSPITALIST:   Patient UNKNOWN to me previously, assigned to me at this point via the ER and by Dr. Dowd to admit this 76 y/o M--patient seen with spouse in attendance and reviewed with patient's daughter by phone with patient's permission (daughter is a paediatric cardiologist--patient with a discharge from [facility] on Sept 30 2021 and referred to [facility] in the setting of this patient with a history of CAD with past MI and PCI/stent in 2009, PAF maintained on Eliquis, essential HTN on a beta blocker and ARB, BIV-AICD with HF with reduced EF%. with patient on additional indication for Eliquis for a PE with apparently recent diagnosis at [facility] for pancreatic CA on chemotherapy, with the admission at [facility] for oral mucositis with patient on a Prednisone taper and Decadron swish and spit therapy, with apparent steroid induced type 2 DM, patient maintained on a dysphagia diet from [facility], with the patient at [facility] with slow improvement in functional ability but noted with LEFT flank haematoma today with a low H/H and sent to the [place] ER.   Patient offers no complaint, although slightly reliant upon spouse in attendance for interview.   Mild general weakness and fatigue.  No back pain, no tearing back pain.  NO fever, no chills, no rigors.  NO abdominal pain, no red blood per rectum or melena.  NO diarrhoea.   NO cough, no dyspnoea.  NO chest pain/pressure.  NO N/V.   NO diaphoresis.  NO palliative or exacerbating factors.    Patient reports receiving Moderna COVID-19 vaccine x 2. (06 Oct 2021 19:38)      Patient  c/o paresthesia down both UEs, denies new focal weakness, endorses LLE> RLE weakness since at [facility]      acetaminophen   Tablet .. 650 milliGRAM(s) Oral every 6 hours PRN  calcium carbonate   1250 mG (OsCal) 1 Tablet(s) Oral two times a day  dexAMETHasone    Solution 1 milliGRAM(s) Oral four times a day  dextrose 40% Gel 15 Gram(s) Oral once  dextrose 5%. 1000 milliLiter(s) IV Continuous <Continuous>  dextrose 5%. 1000 milliLiter(s) IV Continuous <Continuous>  dextrose 50% Injectable 25 Gram(s) IV Push once  dextrose 50% Injectable 12.5 Gram(s) IV Push once  dextrose 50% Injectable 25 Gram(s) IV Push once  dronabinol 2.5 milliGRAM(s) Oral at bedtime  finasteride 5 milliGRAM(s) Oral daily  gabapentin 100 milliGRAM(s) Oral at bedtime  glucagon  Injectable 1 milliGRAM(s) IntraMuscular once  influenza   Vaccine 0.5 milliLiter(s) IntraMuscular once  insulin lispro (ADMELOG) corrective regimen sliding scale   SubCutaneous three times a day before meals  insulin lispro (ADMELOG) corrective regimen sliding scale   SubCutaneous at bedtime  losartan 25 milliGRAM(s) Oral daily  melatonin 3 milliGRAM(s) Oral at bedtime PRN  metoprolol succinate ER 12.5 milliGRAM(s) Oral daily  multivitamin/minerals 1 Tablet(s) Oral daily  nystatin    Suspension 969458 Unit(s) Oral two times a day  pancrelipase  (CREON 12,000 Lipase Units) 2 Capsule(s) Oral four times a day with meals  pantoprazole    Tablet 40 milliGRAM(s) Oral before breakfast  potassium chloride    Tablet ER 40 milliEquivalent(s) Oral once  sodium chloride 0.65% Nasal 1 Spray(s) Both Nostrils three times a day PRN  urea Oral Powder 30 Gram(s) Oral two times a day                            9.6    14.15 )-----------( 203      ( 08 Oct 2021 06:38 )             28.8     10-08    128<L>  |  91<L>  |  37<H>  ----------------------------<  262<H>  3.3<L>   |  25  |  0.78    Ca    7.6<L>      08 Oct 2021 06:38  Mg     2.2     10-06    TPro  5.1<L>  /  Alb  2.8<L>  /  TBili  0.8  /  DBili  x   /  AST  50<H>  /  ALT  80<H>  /  AlkPhos  111  10-06    CAPILLARY BLOOD GLUCOSE      POCT Blood Glucose.: 140 mg/dL (08 Oct 2021 08:41)  POCT Blood Glucose.: 192 mg/dL (07 Oct 2021 20:56)  POCT Blood Glucose.: 189 mg/dL (07 Oct 2021 16:31)  POCT Blood Glucose.: 222 mg/dL (07 Oct 2021 12:51)    PT/INR - ( 06 Oct 2021 13:50 )   PT: 17.6 sec;   INR: 1.50 ratio         PTT - ( 06 Oct 2021 13:50 )  PTT:26.7 sec    I&O's Summary    07 Oct 2021 07:01  -  08 Oct 2021 07:00  --------------------------------------------------------  IN: 240 mL / OUT: 450 mL / NET: -210 mL      Vital Signs Last 24 Hrs  T(C): 36.9 (08 Oct 2021 05:49), Max: 36.9 (08 Oct 2021 05:49)  T(F): 98.4 (08 Oct 2021 05:49), Max: 98.4 (08 Oct 2021 05:49)  HR: 84 (08 Oct 2021 05:49) (84 - 98)  BP: 109/66 (08 Oct 2021 05:49) (102/67 - 109/66)  BP(mean): --  RR: 18 (08 Oct 2021 05:49) (18 - 20)  SpO2: 95% (08 Oct 2021 05:49) (95% - 96%)    On Neurological Examination:    Mental Status - Patient is alert, awake, oriented X3. fluent, names, no dysarthria no aphasia Follows commands well and able to answer questions appropriately. Mood and affect  normal    Cranial Nerves - PERRL, EOMI, VFF, V1-V3 intact, mild right facial, tongue/uvula midline    Motor Exam -   Right upper biceps deltoid 5/5 triceps 3/5 distal 4-/5  Left upper 5/5  lowers abduction adduction 4+/5 IP on right 4/5 left 3/5 knee extension on left 3/5 right 4-/5 distal 4/5   nml bulk/tone    Sensory    Intact to light touch and pinprick bilaterally    Coord: FTN intact bilaterally     Gait -  normal      Reflexes:       0        NIHSS 7

## 2021-10-08 NOTE — DIETITIAN NUTRITION RISK NOTIFICATION - TREATMENT: THE FOLLOWING DIET HAS BEEN RECOMMENDED
Diet, Soft:   1000mL Fluid Restriction ([id number])  Supplement Feeding Modality:  Oral  Ensure Clear Cans or Servings Per Day:  2       Frequency:  Daily (10-08-21 @ 14:16) [Pending Verification By Attending]  Diet, Soft:   Consistent Carbohydrate {No Snacks} (CSTCHO)  DASH/TLC {Sodium & Cholesterol Restricted} (DASH)  1000mL Fluid Restriction ([id number])  Supplement Feeding Modality:  Oral  Ensure Enlive Cans or Servings Per Day:  3       Frequency:  Daily (10-08-21 @ 11:57) [Active]

## 2021-10-08 NOTE — DIETITIAN INITIAL EVALUATION ADULT. - SIGNS/SYMPTOMS
Stage II pressure injury <75% estimated nutrient needs >1 month, <75% estimated nutrient needs >1 month, wt loss masked by edema

## 2021-10-08 NOTE — PROGRESS NOTE ADULT - ASSESSMENT
retropreitoneal hematoma, doubt actibve gib  will follow with you  [facility]  [facility]  not canddiate for endoscpic eval  pt states tiny dark bm yesterday

## 2021-10-08 NOTE — SWALLOW BEDSIDE ASSESSMENT ADULT - SLP PERTINENT HISTORY OF CURRENT PROBLEM
75 year old male recently discharged from [facility] on 9/30/21 for oral/ GI mucositis on tapering steroids, history of CAD with MI PCI/ stent in 2009, PAF on Eliquis, HTN, BIV-ICD with HF with reduced EF, PE, Pancreatic ca on chemotherapy was at [facility] when he developed anemia secondary to  left retroperitoneal hematoma c/o bilaterally hand numbess on PE right facial , RUE weakness, evidence of Neuropathy weakness in Left femoral distribution NIHSS. Patient known to this service, had BSE at [facility] 9/24/21 with rec for mechanical soft consistencies and thin liquids. 75 year old male recently discharged from [facility] on 9/30/21 for oral/ GI mucositis on tapering steroids, history of CAD with MI PCI/ stent in 2009, PAF on Eliquis, HTN, BIV-ICD with HF with reduced EF, PE, Pancreatic ca on chemotherapy was at [facility] when he developed anemia secondary to  left retroperitoneal hematoma c/o bilaterally hand numbess on PE right facial , RUE weakness, evidence of Neuropathy weakness in Left femoral distribution NIHSS. Patient known to this service, had BSE at [facility] 9/24/21 with rec for mechanical soft consistencies and thin liquids. Orders received with notation that patient failed dysphagia screen, however this was not found documented in chart review by this clinician.

## 2021-10-08 NOTE — PROGRESS NOTE ADULT - ASSESSMENT
75 year old male recently discharged from [facility] on 9/30/21 for oral/ GI mucositis on tapering steroids, history of CAD with MI PCI/ stent in 2009, PAF on Eliquis, HTN, BIV-ICD with HF with reduced EF, PE, Pancreatic ca on chemotherapy was at [facility] when he developed anemia secondary to  left retroperitoneal hematoma c/o bilaterally hand numbess on PE right facial , RUE weakness, evidence of Neuropathy weakness in Left femoral distribution NIHSS      R Facial weakness. per daughter chronic facial asymmetry likely chronic R [place] r/o central lesion  R triceps weakness with pain radiating down both arms  r/o cervical process r/o CVA     . Patient off AC/antithrombotic due to hemorrhage  PPMi is not MRI compatible CTH w/w/o con, family and Patient would like to wait until he left retroperitoneal hematoma is deal with      Left lower extremity weakness  -In distribution of the left femoral nerve  likely compressed by retroperitoneal hemorrhage, management per IR/surgery/primary team    Neuropathy  -in setting of chemo and chronic Neuropathy  Increase Gabapentin to 300 qhs    -PT

## 2021-10-08 NOTE — DIETITIAN INITIAL EVALUATION ADULT. - PERTINENT LABORATORY DATA
10-08 Na128 mmol/L<L> Glu 262 mg/dL<H> K+ 3.3 mmol/L<L> Cr  0.78 mg/dL BUN 37 mg/dL<H> Phos n/a   Alb n/a   PAB n/a    A1C with Estimated Average Glucose Result: 6.8 % (09-21-21 @ 07:22)  POCT Blood Glucose.: 140 mg/dL (10-08-21 @ 08:41)  POCT Blood Glucose.: 192 mg/dL (10-07-21 @ 20:56)  POCT Blood Glucose.: 189 mg/dL (10-07-21 @ 16:31)  POCT Blood Glucose.: 222 mg/dL (10-07-21 @ 12:51)

## 2021-10-08 NOTE — PROGRESS NOTE ADULT - ASSESSMENT
74 yo male with pmhx pancreatic cancer (on chemo/immunotherapy, last was 2 weeks ago), CAD, CHF, DVT/PE on eliquis, presenting with anemia. Recently admitted to [facility] for fever/diarrhea, sent to rehab last week, In [facility], was noted to have Hb 6.4, was sent to ED.    Hematology/Oncology called to see patient who has newly dx pancreatic cancer with metastatic disease to the liver.He had CT scan at [facility] [place] Dr. Yan Hodges in Aug 2021 with PE, pancreatic, and liver mets.Biopsy from Aug 21 confirmed pancreatic adenocarcinoma. Started treatment on 8/31/21 per protocol , randomized to gemcitabine/nab-paclitaxel and dual immunotherapy. 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 He received steroids and was discharged to [facility] on 9/30/2021 on tapering doses of steroids. H/H on discharge was 10.8/32.7. He also has a history of DVT/PE and has been on Eliquis. FOBT is currently positive.    Metastatic Pancreatic Cancer  --Under care at [facility] [place] Dr. Yan Hodges  --Ongoing treatment after discharge    Anemia  --Sudden drop in H/H from 10.8-7.0 in 1 week  --Retroperitoneal Hematoma on CT  --Please transfuse PRBC's for Hgb <7.0 grams (but would transfuse now given sudden drop)  --Iron, B12, folate, Haptoglobin and LDH all checked. No hemolysis or deficiencies    GI Bleed  --FOBT Positive  --May be secondary to steroid use or may be from colitis  --Recommend GI consultation to find source of GI bleed if remains FOBT positive    Thrombophilia  --History of DVT/PE  --Would hold anticoagulation until bleeding has been controlled  --Patient at high risk for thromboembolic event - will need to address anticoagulation prior to discharge  --If unable to control bleeding, patient may need IVC filter - will advise after bleeding workup     Retroperitoneal Hematoma  --Management per primary team  - IR and Surgery have been consulted to assess for source of bleed as H/H improving, no acute intervention recommended at this time  - Recommend CTA abdomen  --Will need to monitor with serial CTs    Leukocytosis  --Most likely secondary to steroids patient is taking for prior bout of colitis - tapering dose  --ID consult appreciated  --Antibiotics on hold pending infectious source    Patient on Prednisone 20 mg PO daily  --Please taper to 10mg PO daily X 3 days then DC  --Patient needs PPI prophylaxis - already on it    RUE weakness  --Seen by Neuro  --Pending CT head and c spine    We will continue to follow patient and coordinate with [facility] [place].    Washington Townsend PA-C  Hematology/Oncology  [facility]   [phone] (cell)  [phone] (office)  [phone] (alt office)  Evenings and weekends please call MD on call or office

## 2021-10-08 NOTE — PROGRESS NOTE ADULT - SUBJECTIVE AND OBJECTIVE BOX
Patient is a 75y Male     Patient is a 75y old  Male who presents with a chief complaint of Sent in from [facility] for LEFT flank haematoma and low H/H. (07 Oct 2021 16:11)      HPI:  NIGHT HOSPITALIST:   Patient UNKNOWN to me previously, assigned to me at this point via the ER and by Dr. Dowd to admit this 74 y/o M--patient seen with spouse in attendance and reviewed with patient's daughter by phone with patient's permission (daughter is a paediatric cardiologist--patient with a discharge from [facility] on Sept 30 2021 and referred to [facility] in the setting of this patient with a history of CAD with past MI and PCI/stent in 2009, PAF maintained on Eliquis, essential HTN on a beta blocker and ARB, BIV-AICD with HF with reduced EF%. with patient on additional indication for Eliquis for a PE with apparently recent diagnosis [place] [facility] [place] on chemotherapy, with the admission at [facility] for oral mucositis with patient on a Prednisone taper and Decadron swish and spit therapy, with apparent steroid induced type 2 DM, patient maintained on a dysphagia diet from [facility], with the patient at [facility] with slow improvement in functional ability but noted with LEFT flank haematoma today with a low H/H and sent to the [place] ER.   Patient offers no complaint, although slightly reliant upon spouse in attendance for interview.   Mild general weakness and fatigue.  No back pain, no tearing back pain.  NO fever, no chills, no rigors.  NO abdominal pain, no red blood per rectum or melena.  NO diarrhoea.   NO cough, no dyspnoea.  NO chest pain/pressure.  NO N/V.   NO diaphoresis.  NO palliative or exacerbating factors.    Patient reports receiving Moderna COVID-19 vaccine x 2. (06 Oct 2021 19:38)      PAST MEDICAL & SURGICAL HISTORY:  Hypertension (ICD9 401.9)    Hyperlipidemia (ICD9 272.4)    BPH (Benign Prostatic Hyperplasia)    Borderline diabetes mellitus    MI (myocardial infarction)  2009    Systolic heart failure    Pancreatic cancer    Hernia    Biventricular ICD (implantable cardioverter-defibrillator) in place  2010    Stented coronary artery  X 2, 2009        MEDICATIONS  (STANDING):  calcium carbonate   1250 mG (OsCal) 1 Tablet(s) Oral two times a day  dexAMETHasone    Solution 1 milliGRAM(s) Oral four times a day  dextrose 40% Gel 15 Gram(s) Oral once  dextrose 5%. 1000 milliLiter(s) (50 mL/Hr) IV Continuous <Continuous>  dextrose 5%. 1000 milliLiter(s) (100 mL/Hr) IV Continuous <Continuous>  dextrose 50% Injectable 25 Gram(s) IV Push once  dextrose 50% Injectable 12.5 Gram(s) IV Push once  dextrose 50% Injectable 25 Gram(s) IV Push once  dronabinol 2.5 milliGRAM(s) Oral at bedtime  finasteride 5 milliGRAM(s) Oral daily  gabapentin 100 milliGRAM(s) Oral at bedtime  glucagon  Injectable 1 milliGRAM(s) IntraMuscular once  influenza   Vaccine 0.5 milliLiter(s) IntraMuscular once  insulin lispro (ADMELOG) corrective regimen sliding scale   SubCutaneous three times a day before meals  insulin lispro (ADMELOG) corrective regimen sliding scale   SubCutaneous at bedtime  losartan 25 milliGRAM(s) Oral daily  metoprolol succinate ER 12.5 milliGRAM(s) Oral daily  multivitamin/minerals 1 Tablet(s) Oral daily  nystatin    Suspension 740640 Unit(s) Oral two times a day  pancrelipase  (CREON 12,000 Lipase Units) 2 Capsule(s) Oral four times a day with meals  pantoprazole    Tablet 40 milliGRAM(s) Oral before breakfast  predniSONE   Tablet 20 milliGRAM(s) Oral daily  urea Oral Powder 30 Gram(s) Oral two times a day      Allergies    No Known Allergies    Intolerances        SOCIAL HISTORY:  Denies ETOh,Smoking,     FAMILY HISTORY:  No pertinent family history in first degree relatives        REVIEW OF SYSTEMS:    CONSTITUTIONAL: No weakness, fevers or chills  EYES/ENT: No visual changes;  No vertigo or throat pain   NECK: No pain or stiffness  RESPIRATORY: No cough, wheezing, hemoptysis; No shortness of breath  CARDIOVASCULAR: No chest pain or palpitations  GASTROINTESTINAL: No abdominal or epigastric pain. No nausea, vomiting, or hematemesis; No diarrhea or constipation. No melena or hematochezia.  GENITOURINARY: No dysuria, frequency or hematuria  NEUROLOGICAL: No numbness or weakness  SKIN: No itching, burning, rashes, or lesions   All other review of systems is negative unless indicated above.    VITAL:  T(C): , Max: 36.9 (10-08-21 @ 05:49)  T(F): , Max: 98.4 (10-08-21 @ 05:49)  HR: 84 (10-08-21 @ 05:49)  BP: 109/66 (10-08-21 @ 05:49)  BP(mean): --  RR: 18 (10-08-21 @ 05:49)  SpO2: 95% (10-08-21 @ 05:49)  Wt(kg): --    I and O's:    10-07 @ 07:01  -  10-08 @ 07:00  --------------------------------------------------------  IN: 240 mL / OUT: 450 mL / NET: -210 mL          PHYSICAL EXAM:    Constitutional: NAD  HEENT: PERRLA,   Neck: No JVD  Respiratory: CTA B/L  Cardiovascular: S1 and S2  Gastrointestinal: BS+, soft, NT/ND  Extremities: No peripheral edema  Neurological: A/O x 3, no focal deficits  Psychiatric: Normal mood, normal affect  : No Bowman  Skin: No rashes  Access: Not applicable  Back: No CVA tenderness    LABS:                        9.6    14.15 )-----------( 203      ( 08 Oct 2021 06:38 )             28.8     10-08    128<L>  |  91<L>  |  37<H>  ----------------------------<  262<H>  3.3<L>   |  25  |  0.78    Ca    7.6<L>      08 Oct 2021 06:38  Mg     2.2     10-06    TPro  5.1<L>  /  Alb  2.8<L>  /  TBili  0.8  /  DBili  x   /  AST  50<H>  /  ALT  80<H>  /  AlkPhos  111  10-06          RADIOLOGY & ADDITIONAL STUDIES:

## 2021-10-08 NOTE — SWALLOW BEDSIDE ASSESSMENT ADULT - COMMENTS
Per Neuro 10/7:   R Facial weakness. per daughter chronic facial asymmetry likely chronic R [place] r/o central lesion  . Patient off AC/antithrombotic due to hemorrhage  -PPMi is not MRI compatible CTH w/w/o con  R triceps weakness with pain radiating down both arms  r/o cervical process r/o CVA   -CTH and CT C-spine w/w/o con  Left lower extremity weakness  -In distribution of the left femoral nerve  likely compressed by retroperitoneal hemorrhage, manegemnt per IR, primary team  Neuropathy  -in setting of chemo and chronic Neuropathy    Per GI 10/8:  retropreitoneal hematoma, doubt actibve gib  will follow with you  spoke to oncology at [facility]  lfts improved  not canddiate for endoscpic eval  pt states tiny dark bm yesterday

## 2021-10-08 NOTE — PROGRESS NOTE ADULT - ASSESSMENT
75 year old male recently discharged from [facility] on 9/30/21 for oral/ GI mucositis on tapering steroids, history of CAD with MI PCI/ stent in 2009, PAF on Eliquis, HTN, BIV-ICD with HF with reduced EF, PE, Pancreatic ca on chemotherapy was at [facility] when he developed anemia secondary to retroperitoneal bleed in the left flank, CT with large left retroperitoneal hematoma measuring 7.2 x 5.4 x 10.8cm, likely associated with left psoas muscle.     Has remained afebrile. Remains on steroid taper for mucositis (GI and Oral). Remains with lesions near the upper tonsil and on his lower lip. Large left flank hematoma improving. Hgb/ hgct stable. IR/ surgery following.    Recommend:  #Leukocytosis  -Suspect multifactorial given left sided retroperitoneal hematoma, steroid use.  -No fevers, would monitor off abx  -Nontoxic appearing    #Left retroperitoneal hematoma  -Surgery evaluated - no surgical intervention  -IR evaluating for embolizatoin, currently monitoring given response to PRBC  -Trend hgb/ hct    #Mucositis  -Suspect chemo related  -Will swab for HSV  -Continue oral care    Matt Stokes MD  Pager [phone]  After 5pm/weekends call [phone]

## 2021-10-08 NOTE — PROGRESS NOTE ADULT - SUBJECTIVE AND OBJECTIVE BOX
[facility] Division of Kidney Diseases & Hypertension  FOLLOW UP NOTE  --------------------------------------------------------------------------------  Chief Complaint:    24 hour events/subjective:    feels okay   eating marginally better   drinking ensure        PAST HISTORY  --------------------------------------------------------------------------------  No significant changes to PMH, PSH, FHx, SHx, unless otherwise noted    ALLERGIES & MEDICATIONS  --------------------------------------------------------------------------------  Allergies    No Known Allergies    Intolerances      Standing Inpatient Medications  calcium carbonate   1250 mG (OsCal) 1 Tablet(s) Oral two times a day  dexAMETHasone    Solution 1 milliGRAM(s) Oral four times a day  dextrose 40% Gel 15 Gram(s) Oral once  dextrose 5%. 1000 milliLiter(s) IV Continuous <Continuous>  dextrose 5%. 1000 milliLiter(s) IV Continuous <Continuous>  dextrose 50% Injectable 25 Gram(s) IV Push once  dextrose 50% Injectable 12.5 Gram(s) IV Push once  dextrose 50% Injectable 25 Gram(s) IV Push once  dronabinol 2.5 milliGRAM(s) Oral at bedtime  finasteride 5 milliGRAM(s) Oral daily  gabapentin 100 milliGRAM(s) Oral at bedtime  glucagon  Injectable 1 milliGRAM(s) IntraMuscular once  influenza   Vaccine 0.5 milliLiter(s) IntraMuscular once  insulin lispro (ADMELOG) corrective regimen sliding scale   SubCutaneous three times a day before meals  insulin lispro (ADMELOG) corrective regimen sliding scale   SubCutaneous at bedtime  losartan 25 milliGRAM(s) Oral daily  metoprolol succinate ER 12.5 milliGRAM(s) Oral daily  multivitamin/minerals 1 Tablet(s) Oral daily  nystatin    Suspension 771046 Unit(s) Oral two times a day  pancrelipase  (CREON 12,000 Lipase Units) 2 Capsule(s) Oral four times a day with meals  pantoprazole    Tablet 40 milliGRAM(s) Oral before breakfast  urea Oral Powder 30 Gram(s) Oral two times a day    PRN Inpatient Medications  acetaminophen   Tablet .. 650 milliGRAM(s) Oral every 6 hours PRN  melatonin 3 milliGRAM(s) Oral at bedtime PRN  sodium chloride 0.65% Nasal 1 Spray(s) Both Nostrils three times a day PRN        VITALS/PHYSICAL EXAM  --------------------------------------------------------------------------------  T(C): 37.1 (10-08-21 @ 13:41), Max: 37.1 (10-08-21 @ 13:41)  HR: 95 (10-08-21 @ 13:41) (84 - 95)  BP: 110/74 (10-08-21 @ 13:41) (102/67 - 110/74)  RR: 18 (10-08-21 @ 13:41) (18 - 20)  SpO2: 94% (10-08-21 @ 13:41) (94% - 96%)  Wt(kg): --  Height (cm): 177.8 (10-07-21 @ 04:58)  Weight (kg): 95.6 (10-07-21 @ 04:58)  BMI (kg/m2): 30.2 (10-07-21 @ 04:58)  BSA (m2): 2.13 (10-07-21 @ 04:58)      10-07-21 @ 07:01  -  10-08-21 @ 07:00  --------------------------------------------------------  IN: 240 mL / OUT: 450 mL / NET: -210 mL    10-08-21 @ 07:01  -  10-08-21 @ 14:04  --------------------------------------------------------  IN: 0 mL / OUT: 600 mL / NET: -600 mL      Physical Exam:  	Gen: NAD, well-appearing  	HEENT: PERRL, supple neck, clear oropharynx  	Pulm: CTA B/L  	CV: RRR, S1S2; no rub  	Back: No spinal or CVA tenderness; no sacral edema  	Abd: +BS, soft, nontender/nondistended  	: No suprapubic tenderness  	UE: Warm, FROM, no clubbing, intact strength; no edema; no asterixis  	LE: Warm, FROM, no clubbing, intact strength; no edema  	Neuro: No focal deficits, intact gait  	Psych: Normal affect and mood  	Skin: Warm, without rashes  	Vascular access: none    LABS/STUDIES  --------------------------------------------------------------------------------              9.6    14.15 >-----------<  203      [10-08-21 @ 06:38]              28.8     128  |  91  |  37  ----------------------------<  262      [10-08-21 @ 06:38]  3.3   |  25  |  0.78        Ca     7.6     [10-08-21 @ 06:38]      Mg     2.2     [10-06-21 @ 21:37]          Uric acid 5.6      [10-07-21 @ 18:55]        [10-06-21 @ 21:37]        [10-07-21 @ 06:34]    Creatinine Trend:  SCr 0.78 [10-08 @ 06:38]  SCr 0.81 [10-07 @ 18:55]  SCr 0.65 [10-07 @ 06:34]  SCr 0.70 [10-06 @ 13:50]  SCr 0.77 [09-30 @ 08:01]        Iron 60, TIBC 196, %sat 31      [10-07-21 @ 08:54]  Ferritin 1819      [10-07-21 @ 06:19]

## 2021-10-09 NOTE — PROGRESS NOTE ADULT - SUBJECTIVE AND OBJECTIVE BOX
SUBJECTIVE / OVERNIGHT EVENTS:pt seen and examined    MEDICATIONS  (STANDING):  ascorbic acid 500 milliGRAM(s) Oral daily  calcium carbonate   1250 mG (OsCal) 1 Tablet(s) Oral two times a day  dexAMETHasone    Solution 1 milliGRAM(s) Oral four times a day  dextrose 40% Gel 15 Gram(s) Oral once  dextrose 5%. 1000 milliLiter(s) (50 mL/Hr) IV Continuous <Continuous>  dextrose 5%. 1000 milliLiter(s) (100 mL/Hr) IV Continuous <Continuous>  dextrose 50% Injectable 25 Gram(s) IV Push once  dextrose 50% Injectable 12.5 Gram(s) IV Push once  dextrose 50% Injectable 25 Gram(s) IV Push once  dronabinol 2.5 milliGRAM(s) Oral at bedtime  finasteride 5 milliGRAM(s) Oral daily  gabapentin 300 milliGRAM(s) Oral at bedtime  glucagon  Injectable 1 milliGRAM(s) IntraMuscular once  influenza   Vaccine 0.5 milliLiter(s) IntraMuscular once  insulin lispro (ADMELOG) corrective regimen sliding scale   SubCutaneous at bedtime  insulin lispro (ADMELOG) corrective regimen sliding scale   SubCutaneous three times a day before meals  losartan 25 milliGRAM(s) Oral daily  metoprolol succinate ER 12.5 milliGRAM(s) Oral daily  multivitamin 1 Tablet(s) Oral daily  multivitamin/minerals 1 Tablet(s) Oral daily  nystatin    Suspension 895655 Unit(s) Oral two times a day  pancrelipase  (CREON 12,000 Lipase Units) 2 Capsule(s) Oral four times a day with meals  pantoprazole    Tablet 40 milliGRAM(s) Oral before breakfast  predniSONE   Tablet 10 milliGRAM(s) Oral daily  urea Oral Powder 30 Gram(s) Oral two times a day    MEDICATIONS  (PRN):  acetaminophen   Tablet .. 650 milliGRAM(s) Oral every 6 hours PRN Temp greater or equal to 38C (100.4F), Mild Pain (1 - 3)  melatonin 3 milliGRAM(s) Oral at bedtime PRN Insomnia  sodium chloride 0.65% Nasal 1 Spray(s) Both Nostrils three times a day PRN Nasal Congestion    Vital Signs Last 24 Hrs  T(C): 36.8 (10-09-21 @ 20:43), Max: 37.3 (10-09-21 @ 17:23)  T(F): 98.3 (10-09-21 @ 20:43), Max: 99.1 (10-09-21 @ 17:23)  HR: 92 (10-09-21 @ 20:43) (84 - 102)  BP: 93/63 (10-09-21 @ 20:43) (93/59 - 101/70)  BP(mean): --  RR: 18 (10-09-21 @ 20:43) (18 - 18)  SpO2: 95% (10-09-21 @ 20:43) (94% - 96%)        Constitutional: No fever, fatigue  Skin: No rash.  Eyes: No recent vision problems or eye pain.  ENT: No congestion, ear pain, or sore throat.  Cardiovascular: No chest pain or palpation.  Respiratory: No cough, shortness of breath, congestion, or wheezing.  Gastrointestinal: No abdominal pain, nausea, vomiting, or diarrhea.  Genitourinary: No dysuria.  Musculoskeletal: No joint swelling.  Neurologic: No headache.    PHYSICAL EXAM:  GENERAL: NAD  EYES: EOMI, PERRLA  NECK: Supple, No JVD  CHEST/LUNG: dec breath sounds at bases  HEART:  S1 , S2 +  ABDOMEN: soft , bs+  EXTREMITIES: no edema   NEUROLOGY:alert awake    LABS:  10-09    129<L>  |  94<L>  |  26<H>  ----------------------------<  156<H>  4.1   |  25  |  0.58    Ca    7.9<L>      09 Oct 2021 14:48      Creatinine Trend: 0.58 <--, 0.62 <--, 0.74 <--, 0.78 <--, 0.81 <--, 0.65 <--, 0.70 <--                        9.7    14.32 )-----------( 183      ( 09 Oct 2021 06:43 )             30.9     Urine Studies:    Sodium, Random Urine: 34 mmol/L (10-09 @ 12:16)  Osmolality, Random Urine: 625 mos/kg (10-09 @ 12:16)                Urine Studies:

## 2021-10-09 NOTE — PROGRESS NOTE ADULT - ASSESSMENT
Metastatic Pancreatic Cancer    -- was on clinical trial  --Under care at [facility] [place] Dr. Yan Hodges  --Ongoing treatment after discharge    Anemia  --H/H Stable  --Retroperitoneal Hematoma on CT s/p PRBCs now stable  -- goal to keep> 7  --Iron, B12, folate, Haptoglobin and LDH all checked. No hemolysis or deficiencies    GI Bleed    --FOBT Positive  --May be secondary to steroid use or may be from colitis  --seen by GI. No plan for intervention    Thrombophilia    --History of DVT/PE  --Would hold anticoagulation until bleeding has been controlled  --consider restarting if H/H stable and no objection from surgery    Retroperitoneal Hematoma    -- f/u CT stable      Patient on Prednisone 10 mg PO daily    --D/C After tomorrows dose  --Patient needs PPI prophylaxis - already on it    Lucas Pisano MD

## 2021-10-09 NOTE — PROGRESS NOTE ADULT - SUBJECTIVE AND OBJECTIVE BOX
Patient is a 75y Male     Patient is a 75y old  Male who presents with a chief complaint of Sent in from [facility] for LEFT flank haematoma and low H/H. (08 Oct 2021 15:34)      HPI:  NIGHT HOSPITALIST:   Patient UNKNOWN to me previously, assigned to me at this point via the ER and by Dr. Dowd to admit this 74 y/o M--patient seen with spouse in attendance and reviewed with patient's daughter by phone with patient's permission (daughter is a paediatric cardiologist--patient with a discharge from [facility] on Sept 30 2021 and referred to [facility] in the setting of this patient with a history of CAD with past MI and PCI/stent in 2009, PAF maintained on Eliquis, essential HTN on a beta blocker and ARB, BIV-AICD with HF with reduced EF%. with patient on additional indication for Eliquis for a PE with apparently recent diagnosis at [facility] for pancreatic CA on chemotherapy, with the admission at [facility] for oral mucositis with patient on a Prednisone taper and Decadron swish and spit therapy, with apparent steroid induced type 2 DM, patient maintained on a dysphagia diet from [facility], with the patient at [facility] with slow improvement in functional ability but noted with LEFT flank haematoma today with a low H/H and sent to the [place] ER.   Patient offers no complaint, although slightly reliant upon spouse in attendance for interview.   Mild general weakness and fatigue.  No back pain, no tearing back pain.  NO fever, no chills, no rigors.  NO abdominal pain, no red blood per rectum or melena.  NO diarrhoea.   NO cough, no dyspnoea.  NO chest pain/pressure.  NO N/V.   NO diaphoresis.  NO palliative or exacerbating factors.    Patient reports receiving Moderna COVID-19 vaccine x 2. (06 Oct 2021 19:38)      PAST MEDICAL & SURGICAL HISTORY:  Hypertension (ICD9 401.9)    Hyperlipidemia (ICD9 272.4)    BPH (Benign Prostatic Hyperplasia)    Borderline diabetes mellitus    MI (myocardial infarction)  2009    Systolic heart failure    Pancreatic cancer    Hernia    Biventricular ICD (implantable cardioverter-defibrillator) in place  2010    Stented coronary artery  X 2, 2009        MEDICATIONS  (STANDING):  ascorbic acid 500 milliGRAM(s) Oral daily  calcium carbonate   1250 mG (OsCal) 1 Tablet(s) Oral two times a day  dexAMETHasone    Solution 1 milliGRAM(s) Oral four times a day  dextrose 40% Gel 15 Gram(s) Oral once  dextrose 5%. 1000 milliLiter(s) (50 mL/Hr) IV Continuous <Continuous>  dextrose 5%. 1000 milliLiter(s) (100 mL/Hr) IV Continuous <Continuous>  dextrose 50% Injectable 25 Gram(s) IV Push once  dextrose 50% Injectable 12.5 Gram(s) IV Push once  dextrose 50% Injectable 25 Gram(s) IV Push once  dronabinol 2.5 milliGRAM(s) Oral at bedtime  finasteride 5 milliGRAM(s) Oral daily  gabapentin 300 milliGRAM(s) Oral at bedtime  glucagon  Injectable 1 milliGRAM(s) IntraMuscular once  influenza   Vaccine 0.5 milliLiter(s) IntraMuscular once  insulin lispro (ADMELOG) corrective regimen sliding scale   SubCutaneous three times a day before meals  insulin lispro (ADMELOG) corrective regimen sliding scale   SubCutaneous at bedtime  losartan 25 milliGRAM(s) Oral daily  metoprolol succinate ER 12.5 milliGRAM(s) Oral daily  multivitamin 1 Tablet(s) Oral daily  multivitamin/minerals 1 Tablet(s) Oral daily  nystatin    Suspension 900702 Unit(s) Oral two times a day  pancrelipase  (CREON 12,000 Lipase Units) 2 Capsule(s) Oral four times a day with meals  pantoprazole    Tablet 40 milliGRAM(s) Oral before breakfast  predniSONE   Tablet 10 milliGRAM(s) Oral daily  urea Oral Powder 30 Gram(s) Oral two times a day      Allergies    No Known Allergies    Intolerances        SOCIAL HISTORY:  Denies ETOh,Smoking,     FAMILY HISTORY:  No pertinent family history in first degree relatives        REVIEW OF SYSTEMS:    CONSTITUTIONAL: No weakness, fevers or chills  EYES/ENT: No visual changes;  No vertigo or throat pain   NECK: No pain or stiffness  RESPIRATORY: No cough, wheezing, hemoptysis; No shortness of breath  CARDIOVASCULAR: No chest pain or palpitations  GASTROINTESTINAL: No abdominal or epigastric pain. No nausea, vomiting, or hematemesis; No diarrhea or constipation. No melena or hematochezia.  GENITOURINARY: No dysuria, frequency or hematuria  NEUROLOGICAL: No numbness or weakness  SKIN: No itching, burning, rashes, or lesions   All other review of systems is negative unless indicated above.    VITAL:  T(C): , Max: 37.1 (10-08-21 @ 13:41)  T(F): , Max: 98.7 (10-08-21 @ 13:41)  HR: 84 (10-09-21 @ 05:22)  BP: 97/65 (10-09-21 @ 05:22)  BP(mean): --  RR: 18 (10-09-21 @ 05:22)  SpO2: 94% (10-09-21 @ 05:22)  Wt(kg): --    I and O's:    10-07 @ 07:01  -  10-08 @ 07:00  --------------------------------------------------------  IN: 240 mL / OUT: 450 mL / NET: -210 mL    10-08 @ 07:01  -  10-09 @ 07:00  --------------------------------------------------------  IN: 0 mL / OUT: 600 mL / NET: -600 mL    10-09 @ 07:01  -  10-09 @ 10:30  --------------------------------------------------------  IN: 240 mL / OUT: 625 mL / NET: -385 mL          PHYSICAL EXAM:    Constitutional: NAD  HEENT: PERRLA,   Neck: No JVD  Respiratory: CTA B/L  Cardiovascular: S1 and S2  Gastrointestinal: BS+, soft, NT/ND  Extremities: No peripheral edema  Neurological: A/O x 3, no focal deficits  Psychiatric: Normal mood, normal affect  : No Bowman  Skin: No rashes  Access: Not applicable  Back: No CVA tenderness    LABS:                        9.7    14.32 )-----------( 183      ( 09 Oct 2021 06:43 )             30.9     10-09    130<L>  |  94<L>  |  30<H>  ----------------------------<  140<H>  4.0   |  25  |  0.62    Ca    7.9<L>      09 Oct 2021 06:42            RADIOLOGY & ADDITIONAL STUDIES:

## 2021-10-09 NOTE — CHART NOTE - NSCHARTNOTEFT_GEN_A_CORE
75 year old male recently discharged from [facility] on 9/30/21 for oral/ GI mucositis on tapering steroids, history of CAD with MI PCI/ stent in 2009, PAF on Eliquis, HTN, BIV-ICD with HF with reduced EF, PE, Pancreatic ca on chemotherapy was at [facility] when he developed anemia secondary to retroperitoneal bleed in the left flank, CT with large left retroperitoneal hematoma measuring 7.2 x 5.4 x 10.8cm, likely associated with left psoas muscle.  Has remained afebrile. Remains on steroid taper for mucositis (GI and Oral). Remains with lesions near the upper tonsil and on his lower lip. Large left flank hematoma improving. Hgb/ hgct stable. IR/ surgery following. Stable Leukocytosis thought to be multifactorial given left sided retroperitoneal hematoma, steroid use.    Pt planned for DC to [facility], however had a COVID positive (10/6) on admission with negative COVID PCR (10/7, 10/6, 9/29 and 9/23) with documented spike antibody and without fevers, hypoxia or CXR changes. Likely COVID PCR from 10/6 was false positive. Low suspicion for acute COVID infection    Case d/w

## 2021-10-09 NOTE — DISCHARGE NOTE PROVIDER - CARE PROVIDER_API CALL
Dima Dong  INTERNAL MEDICINE  [address] [place], [address]  [place] [address]  Phone: [phone]  Fax: [phone]  Follow Up Time:     Dr Yan Hodges,   Hematology/Oncology  [facility]   [phone] (office)  Phone: (   )    -  Fax: (   )    -  Follow Up Time:     RICCO Caceres)  Clinical Neurophysiology; Neurology  [address] [place] [address], [address]  [place] [address]  Phone: [phone]  Fax: [phone]  Follow Up Time:     Benjamin Cavanaugh (MD)  Cardiac Electrophysiology; Cardiovascular Disease; Internal Medicine  [address] [place] [address], [address]  [place] [address]  Phone: [phone]  Fax: [phone]  Follow Up Time:

## 2021-10-09 NOTE — DISCHARGE NOTE PROVIDER - HOSPITAL COURSE
newly dx pancreatic cancer with metastatic disease to the liver. He had CT scan at [facility] Dr. Yan Hodges in Aug 2021 with PE, pancreatic, and liver mets. Biopsy from Aug 21 confirmed pancreatic adenocarcinoma. Started treatment on 8/31/21 per protocol , randomized to gemcitabine/nab-paclitaxel and dual immunotherapy. 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 He received steroids and was discharged to [facility] on 9/30/2021 on tapering doses of steroids. H/H on discharge was 10.8/32.7. He also has a history of DVT/PE and has been on Eliquis  Was at [facility] when he developed anemia secondary to retroperitoneal bleed in the left flank, CT with large left retroperitoneal hematoma measuring 7.2 x 5.4 x 10.8cm, likely associated with left psoas muscle.   Transferred to [facility], PRBC transfusion x 2, Eliquis on hold.  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 per daughter chronic facial asymmetry likely chronic R [place] r/o central lesion  -R triceps weakness with pain radiating down both arms  -. Patient off AC/antithrombotic due to hemorrhage - PPMi is not MRI compatible CTH w/w/o con, family and Patient would like to wait until he left retroperitoneal hematoma is deal with  Left lower extremity weakness-In distribution of the left femoral nerve  likely compressed by retroperitoneal hemorrhage,   Neuropathy  -in setting of chemo and chronic Neuropathy Increased Gabapentin to 300 QHS  Infectious Disease: Has remained afebrile. Remains on steroid taper for mucositis (GI and Oral). Remains with lesions near the upper tonsil and on his lower lip.   -Suspect multifactorial given left sided retroperitoneal hematoma, steroid use.  -No fevers, would monitor off abx  -Nontoxic appearing  #Mucositis  -Suspect chemo related  -swabbed for HSV - negative  -Continue oral care  newly dx pancreatic cancer with metastatic disease to the liver. He had CT scan at [facility] Dr. Yan Hodges in Aug 2021 with PE, pancreatic, and liver mets. Biopsy from Aug 21 confirmed pancreatic adenocarcinoma. Started treatment on 8/31/21 per protocol , randomized to gemcitabine/nab-paclitaxel and dual immunotherapy. 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 He received steroids and was discharged to [facility] on 9/30/2021 on tapering doses of steroids. H/H on discharge was 10.8/32.7. He also has a history of DVT/PE and has been on Eliquis  Was at [facility] when he developed anemia secondary to retroperitoneal bleed in the left flank, CT with large left retroperitoneal hematoma measuring 7.2 x 5.4 x 10.8cm, likely associated with left psoas muscle.   Transferred to [facility], PRBC transfusion x 2, Eliquis on hold.  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 per daughter chronic facial asymmetry likely chronic R [place] r/o central lesion  -R triceps weakness with pain radiating down both arms  -. Patient off AC/antithrombotic due to hemorrhage - PPMi is not MRI compatible CTH w/w/o con, family and Patient would like to wait until he left retroperitoneal hematoma is deal with  Left lower extremity weakness-In distribution of the left femoral nerve  likely compressed by retroperitoneal hemorrhage,   Neuropathy  -in setting of chemo and chronic Neuropathy Increased Gabapentin to 300 QHS  Infectious Disease: Has remained afebrile. Remains on steroid taper for mucositis (GI and Oral). Remains with lesions near the upper tonsil and on his lower lip.   -Suspect multifactorial given left sided retroperitoneal hematoma, steroid use.  -No fevers, would monitor off abx  -Nontoxic appearing  #Mucositis  -Suspect chemo related  -swabbed for HSV - negative  -Continue oral care, s/p prednisone taper, s/p dexamethasone swish and spit , improved  Resumed Eliquis on 10/10, H & H stable  Medically cleared for discharge as per Dr. Dowd   Discharge to [facility]  pt to follow up with PCP and oncology

## 2021-10-09 NOTE — DISCHARGE NOTE PROVIDER - CARE PROVIDERS DIRECT ADDRESSES
,DirectAddress_Unknown,DirectAddress_[email or web address]

## 2021-10-09 NOTE — PROGRESS NOTE ADULT - SUBJECTIVE AND OBJECTIVE BOX
Pt is seen and examined  pt is awake and lying in bed   Daughter at bedside  No complaints Feels well  Awaiting d/c to rehab      PAST MEDICAL & SURGICAL HISTORY:  Hypertension (ICD9 401.9)    Hyperlipidemia (ICD9 272.4)    BPH (Benign Prostatic Hyperplasia)    Borderline diabetes mellitus    MI (myocardial infarction)  2009    Systolic heart failure    Pancreatic cancer    Hernia    Biventricular ICD (implantable cardioverter-defibrillator) in place  2010    Stented coronary artery  X 2, 2009        ROS:  Negative except for:    MEDICATIONS  (STANDING):  ascorbic acid 500 milliGRAM(s) Oral daily  calcium carbonate   1250 mG (OsCal) 1 Tablet(s) Oral two times a day  dexAMETHasone    Solution 1 milliGRAM(s) Oral four times a day  dextrose 40% Gel 15 Gram(s) Oral once  dextrose 5%. 1000 milliLiter(s) (50 mL/Hr) IV Continuous <Continuous>  dextrose 5%. 1000 milliLiter(s) (100 mL/Hr) IV Continuous <Continuous>  dextrose 50% Injectable 25 Gram(s) IV Push once  dextrose 50% Injectable 12.5 Gram(s) IV Push once  dextrose 50% Injectable 25 Gram(s) IV Push once  dronabinol 2.5 milliGRAM(s) Oral at bedtime  finasteride 5 milliGRAM(s) Oral daily  gabapentin 300 milliGRAM(s) Oral at bedtime  glucagon  Injectable 1 milliGRAM(s) IntraMuscular once  influenza   Vaccine 0.5 milliLiter(s) IntraMuscular once  insulin lispro (ADMELOG) corrective regimen sliding scale   SubCutaneous three times a day before meals  insulin lispro (ADMELOG) corrective regimen sliding scale   SubCutaneous at bedtime  losartan 25 milliGRAM(s) Oral daily  metoprolol succinate ER 12.5 milliGRAM(s) Oral daily  multivitamin 1 Tablet(s) Oral daily  multivitamin/minerals 1 Tablet(s) Oral daily  nystatin    Suspension 442874 Unit(s) Oral two times a day  pancrelipase  (CREON 12,000 Lipase Units) 2 Capsule(s) Oral four times a day with meals  pantoprazole    Tablet 40 milliGRAM(s) Oral before breakfast  predniSONE   Tablet 10 milliGRAM(s) Oral daily  urea Oral Powder 30 Gram(s) Oral two times a day    MEDICATIONS  (PRN):  acetaminophen   Tablet .. 650 milliGRAM(s) Oral every 6 hours PRN Temp greater or equal to 38C (100.4F), Mild Pain (1 - 3)  melatonin 3 milliGRAM(s) Oral at bedtime PRN Insomnia  sodium chloride 0.65% Nasal 1 Spray(s) Both Nostrils three times a day PRN Nasal Congestion      Allergies    No Known Allergies    Intolerances        Vital Signs Last 24 Hrs  T(C): 36.5 (09 Oct 2021 10:57), Max: 36.5 (08 Oct 2021 20:08)  T(F): 97.7 (09 Oct 2021 10:57), Max: 97.7 (08 Oct 2021 20:08)  HR: 89 (09 Oct 2021 10:57) (84 - 89)  BP: 93/59 (09 Oct 2021 10:57) (93/59 - 97/65)  BP(mean): --  RR: 18 (09 Oct 2021 10:57) (18 - 18)  SpO2: 95% (09 Oct 2021 10:57) (94% - 95%)    PHYSICAL EXAM  General: adult in NAD  HEENT: clear oropharynx, anicteric sclera, pink conjunctiva  Neck: supple  CV: normal S1/S2 with no murmur rubs or gallops  Lungs: positive air movement b/l ant lungs,clear to auscultation, no wheezes, no rales  Abdomen: soft non-tender non-distended, no hepatosplenomegaly  Ext: 2-3+ LE Edema  Skin: no rashes and no petechiae  Neuro: alert and oriented X 4, no focal deficits  LABS:                          9.7    14.32 )-----------( 183      ( 09 Oct 2021 06:43 )             30.9     Serial CBC's  10-09 @ 06:43  Hct-30.9 / Hgb-9.7 / Plat-183 / RBC-3.40 / WBC-14.32          Serial CBC's  10-08 @ 06:38  Hct-28.8 / Hgb-9.6 / Plat-203 / RBC-3.28 / WBC-14.15            10-09    130<L>  |  94<L>  |  30<H>  ----------------------------<  140<H>  4.0   |  25  |  0.62    Ca    7.9<L>      09 Oct 2021 06:42            WBC Count: 14.32 K/uL (10-09 @ 06:43)  Hemoglobin: 9.7 g/dL (10-09 @ 06:43)            RADIOLOGY & ADDITIONAL STUDIES:

## 2021-10-09 NOTE — DISCHARGE NOTE PROVIDER - DETAILS OF MALNUTRITION DIAGNOSIS/DIAGNOSES
This patient has been assessed with a concern for Malnutrition and was treated during this hospitalization for the following Nutrition diagnosis/diagnoses:     -  10/08/2021: Moderate protein-calorie malnutrition

## 2021-10-09 NOTE — ED PROCEDURE NOTE - PROCEDURE ADDITIONAL DETAILS
Ultrasound-guided cannulation of a peripheral vein in the upper extremity 18g rue piv    Dynamic gray scale imaging of the target vessel was obtained using a high frequency linear transducer.   Both the target vein and surrounding arterial structures were visualized and identified.   The patency of the targeted vein was confirmed with compression and lack of internal echoes.   There was direct visualization of needle entry into the vein followed by successful catheter placement    ~Marlon Mendoza D.O. -SHAYY attending

## 2021-10-09 NOTE — DISCHARGE NOTE PROVIDER - NSDCCPCAREPLAN_GEN_ALL_CORE_FT
PRINCIPAL DISCHARGE DIAGNOSIS  Diagnosis: Acute anemia  Assessment and Plan of Treatment:       SECONDARY DISCHARGE DIAGNOSES  Diagnosis: Retroperitoneal bleed  Assessment and Plan of Treatment:     Diagnosis: Hyponatremia  Assessment and Plan of Treatment:     Diagnosis: Primary pancreatic cancer  Assessment and Plan of Treatment:      PRINCIPAL DISCHARGE DIAGNOSIS  Diagnosis: Acute anemia  Assessment and Plan of Treatment: YOu were evaluated by Gastroenterology and hematology   CT showed stable retroperitoneal bleed.  Stool occult blood positive  After 2 units of PRBC transfusion, CBC monitored, stable  No acute intervention recommended by GI at this time   Blood count to bel monitored at at rehab  Please follow up with your primary care physician after rehab         SECONDARY DISCHARGE DIAGNOSES  Diagnosis: History of pulmonary embolism  Assessment and Plan of Treatment: Please continue Eliquis aS directed  Monitor for bleeding    Diagnosis: Retroperitoneal bleed  Assessment and Plan of Treatment: Stable on CT scan    Diagnosis: Hyponatremia  Assessment and Plan of Treatment: You were seen by nephrologist   Continue Urea as directed    Diagnosis: Primary pancreatic cancer  Assessment and Plan of Treatment: Metastatic Pancreatic Cancer  Please follow up with your oncologist at [facility]

## 2021-10-09 NOTE — DISCHARGE NOTE PROVIDER - NSDCMRMEDTOKEN_GEN_ALL_CORE_FT
Admelog 100 units/mL injectable solution: 3 unit(s) injectable 3 times a day with meals  Hold if NPO   STOP ONCE Steroid COURSE is completed  Admelog 100 units/mL injectable solution: 1 unit if glucose 151 - 200  2 units if glucose 201 - 250  3 units if glucose  251-300  4 units if glucose  301-350  5 units if glucose 351 - 400  6 units if glucose greater than 400    aluminum hydroxide-magnesium hydroxide 200 mg-200 mg/5 mL oral suspension: 30 milliliter(s) orally every 4 hours, As needed, Dyspepsia  aspirin 81 mg oral tablet: 1 tab(s) orally once a day  baclofen 5 mg oral tablet: 1 tab(s) orally 3 times a day, As Needed  calcium carbonate 1250 mg (500 mg elemental calcium) oral tablet: 1 tab(s) orally 2 times a day  Centrum Silver oral tablet: 1 tab(s) orally once a day  dexamethasone 0.5 mg/5 mL oral liquid: swish and spit 10 milliliter(s) orally 4 times a day  dronabinol 2.5 mg oral capsule: 1 cap(s) orally once a day (at bedtime). MDD 1 capsule    Eliquis 5 mg oral tablet: 1 tab(s) orally 2 times a day  finasteride 5 mg oral tablet: 1 tab(s) orally once a day  gabapentin 100 mg oral capsule: 1 cap(s) orally once a day (at bedtime)  lidocaine 2% mucous membrane solution: 15 milliliter(s) mucous membrane swish and spit every 4 hours, As Needed for mouth sores  losartan 25 mg oral tablet: 1 tab(s) orally once a day  melatonin 3 mg oral tablet: 1 tab(s) orally once a day (at bedtime), As needed, Insomnia  nystatin 100,000 units/mL oral suspension: 5 milliliter(s) orally 2 times a day  pancrelipase 24,000 units-76,000 units-120,000 units oral delayed release capsule: 1 cap(s) orally 4 times a day (with meals and at bedtime)  predniSONE: 75 milligram(s) orally once a day on 10/1  then 60 mg oral Daily starting 10/2 x 3 days  **Then 40 mg oral Daily x 3 days**  Then 20mgoral Daily x 3 days  Then STOP     Patient needs last dose 40 mg (day 3) for 10/7/2021  Protonix 40 mg oral delayed release tablet: 1 tab(s) orally once a day  Semglee Prefilled Pen 100 units/mL subcutaneous solution: 5 unit(s) subcutaneous once a day (at bedtime)  sodium chloride 0.65% nasal spray: 2 spray(s) nasal 4 times a day, As Needed  Sodium Chloride 1 g oral tablet: 1 gram(s) orally 2 times a day  Toprol-XL 25 mg oral tablet, extended release: 0.5 tab(s) orally once a day  Zofran 4 mg oral tablet: 1 tab(s) orally every 6 hours, As Needed   Admelog 100 units/mL injectable solution: 3 unit(s) injectable 3 times a day with meals  Hold if NPO   STOP ONCE Steroid COURSE is completed  aluminum hydroxide-magnesium hydroxide 200 mg-200 mg/5 mL oral suspension: 30 milliliter(s) orally every 4 hours, As needed, Dyspepsia  ascorbic acid 500 mg oral tablet: 1 tab(s) orally once a day  aspirin 81 mg oral tablet: 1 tab(s) orally once a day  baclofen 5 mg oral tablet: 1 tab(s) orally 3 times a day, As Needed  calcium carbonate 1250 mg (500 mg elemental calcium) oral tablet: 1 tab(s) orally 2 times a day  Centrum Silver oral tablet: 1 tab(s) orally once a day  dronabinol 2.5 mg oral capsule: 1 cap(s) orally once a day (at bedtime). MDD 1 capsule    Eliquis 5 mg oral tablet: 1 tab(s) orally 2 times a day  finasteride 5 mg oral tablet: 1 tab(s) orally once a day  gabapentin 300 mg oral capsule: 1 cap(s) orally once a day (at bedtime)  melatonin 3 mg oral tablet: 1 tab(s) orally once a day (at bedtime), As needed, Insomnia  nystatin 100,000 units/mL oral suspension: 5 milliliter(s) orally 2 times a day  pancrelipase 24,000 units-76,000 units-120,000 units oral delayed release capsule: 1 cap(s) orally 4 times a day (with meals and at bedtime)  Protonix 40 mg oral delayed release tablet: 1 tab(s) orally once a day  Semglee Prefilled Pen 100 units/mL subcutaneous solution: 5 unit(s) subcutaneous once a day (at bedtime)  sodium chloride 0.65% nasal spray: 2 spray(s) nasal 4 times a day, As Needed  Toprol-XL 25 mg oral tablet, extended release: 0.5 tab(s) orally once a day  urea 15 g oral powder for reconstitution: 2 packet(s) orally 2 times a day  Zofran 4 mg oral tablet: 1 tab(s) orally every 6 hours, As Needed

## 2021-10-09 NOTE — DISCHARGE NOTE PROVIDER - PROVIDER TOKENS
PROVIDER:[TOKEN:[[id number]]],FREE:[LAST:[Dr Yan Hodges],PHONE:[(   )    -],FAX:[(   )    -],ADDRESS:[Hematology/Oncology  [facility]   [phone] (office)]],PROVIDER:[TOKEN:[[id number]]],PROVIDER:[TOKEN:[[id number]]]

## 2021-10-09 NOTE — DISCHARGE NOTE PROVIDER - REASON FOR ADMISSION
Sent in from [facility] for LEFT flank hematoma and low H/H. Sent in from [facility] for LEFT flank haematoma and low H/H.

## 2021-10-09 NOTE — DISCHARGE NOTE PROVIDER - NSDCFUADDINST_GEN_ALL_CORE_FT
Discharge to Rehab: Follow with the providers at rehab.  Fluid Restriction 1 liter/24hrs, High Protein Diet, Ensure Clear 2 cans PO daily  Sacral/bilateral Buttocks deep tissue injury present on admission, Incontinence of bowel and bladder with Incontinence Dermatitis  Recommended by Wound Care:   1.) topical therapy: Sacral/bilateral buttocks injury - cleanse with incontinence cleanser, pat dry, apply cavilon advanced to entire bilateral buttocks skin. then apply allevyn foam dressing over the open area  2.) Incontinence Management - incontinence cleanser, pads, pericare BID  3.) Maintain on an alternating air with low air loss surface  4.) Turn and reposition Q 2 hours  5.) Nutrition optimization  6.) Offload heels/feet with complete cair air fluidized; ensure that the soles of the feet are not resting on the foot board of the bed.  7.) Chair cushion for chair sitting  8.) Limit chair sitting to 2 hours at a time  Upon discharge f/u as outpatient at [facility] [address] [facility] [phone]  AGGRESSIVE ORAL CARE

## 2021-10-10 NOTE — PROGRESS NOTE ADULT - SUBJECTIVE AND OBJECTIVE BOX
SUBJECTIVE / OVERNIGHT EVENTS:pt seen and examined    MEDICATIONS  (STANDING):  apixaban 5 milliGRAM(s) Oral every 12 hours  ascorbic acid 500 milliGRAM(s) Oral daily  calcium carbonate   1250 mG (OsCal) 1 Tablet(s) Oral two times a day  dexAMETHasone    Solution 1 milliGRAM(s) Oral four times a day  dextrose 40% Gel 15 Gram(s) Oral once  dextrose 5%. 1000 milliLiter(s) (50 mL/Hr) IV Continuous <Continuous>  dextrose 5%. 1000 milliLiter(s) (100 mL/Hr) IV Continuous <Continuous>  dextrose 50% Injectable 25 Gram(s) IV Push once  dextrose 50% Injectable 12.5 Gram(s) IV Push once  dextrose 50% Injectable 25 Gram(s) IV Push once  dronabinol 2.5 milliGRAM(s) Oral at bedtime  finasteride 5 milliGRAM(s) Oral daily  gabapentin 300 milliGRAM(s) Oral at bedtime  glucagon  Injectable 1 milliGRAM(s) IntraMuscular once  influenza   Vaccine 0.5 milliLiter(s) IntraMuscular once  insulin lispro (ADMELOG) corrective regimen sliding scale   SubCutaneous three times a day before meals  insulin lispro (ADMELOG) corrective regimen sliding scale   SubCutaneous at bedtime  metoprolol succinate ER 12.5 milliGRAM(s) Oral daily  multivitamin 1 Tablet(s) Oral daily  multivitamin/minerals 1 Tablet(s) Oral daily  nystatin    Suspension 673125 Unit(s) Oral two times a day  pancrelipase  (CREON 12,000 Lipase Units) 2 Capsule(s) Oral four times a day with meals  pantoprazole    Tablet 40 milliGRAM(s) Oral before breakfast  predniSONE   Tablet 10 milliGRAM(s) Oral daily  urea Oral Powder 30 Gram(s) Oral two times a day    MEDICATIONS  (PRN):  acetaminophen   Tablet .. 650 milliGRAM(s) Oral every 6 hours PRN Temp greater or equal to 38C (100.4F), Mild Pain (1 - 3)  melatonin 3 milliGRAM(s) Oral at bedtime PRN Insomnia  sodium chloride 0.65% Nasal 1 Spray(s) Both Nostrils three times a day PRN Nasal Congestion    Vital Signs Last 24 Hrs  T(C): 37.1 (10-10-21 @ 20:56), Max: 37.1 (10-10-21 @ 20:56)  T(F): 98.7 (10-10-21 @ 20:56), Max: 98.7 (10-10-21 @ 20:56)  HR: 100 (10-10-21 @ 20:56) (91 - 100)  BP: 101/67 (10-10-21 @ 20:56) (86/59 - 101/67)  BP(mean): --  RR: 18 (10-10-21 @ 20:56) (18 - 18)  SpO2: 96% (10-10-21 @ 20:56) (93% - 96%)            Constitutional: No fever, fatigue  Skin: No rash.  Eyes: No recent vision problems or eye pain.  ENT: No congestion, ear pain, or sore throat.  Cardiovascular: No chest pain or palpation.  Respiratory: No cough, shortness of breath, congestion, or wheezing.  Gastrointestinal: No abdominal pain, nausea, vomiting, or diarrhea.  Genitourinary: No dysuria.  Musculoskeletal: No joint swelling.  Neurologic: No headache.    PHYSICAL EXAM:  GENERAL: NAD  EYES: EOMI, PERRLA  NECK: Supple, No JVD  CHEST/LUNG: dec breath sounds at bases  HEART:  S1 , S2 +  ABDOMEN: soft , bs+  EXTREMITIES: no edema   NEUROLOGY:alert awake    LABS:  10-10    133<L>  |  95<L>  |  31<H>  ----------------------------<  125<H>  4.4   |  25  |  0.61    Ca    8.6      10 Oct 2021 07:10      Creatinine Trend: 0.61 <--, 0.58 <--, 0.62 <--, 0.74 <--, 0.78 <--, 0.81 <--, 0.65 <--, 0.70 <--                        11.2   14.15 )-----------( 177      ( 10 Oct 2021 07:27 )             35.9     Urine Studies:    Sodium, Random Urine: 34 mmol/L (10-09 @ 12:16)  Osmolality, Random Urine: 625 mos/kg (10-09 @ 12:16)                Urine Studies:

## 2021-10-10 NOTE — PROGRESS NOTE ADULT - PROBLEM SELECTOR PROBLEM 7
Lab test positive for detection of COVID-19 virus

## 2021-10-10 NOTE — PROGRESS NOTE ADULT - PROBLEM SELECTOR PLAN 4
See above.  FS S/S and would review in the AM bedtime requirements for Lantus.

## 2021-10-10 NOTE — PROGRESS NOTE ADULT - PROBLEM SELECTOR PLAN 3
See above.  Zofran discontinued.  K+ supplemented to 4.0.  Mg++ checked.   Upgraded to telemetry.

## 2021-10-10 NOTE — PROGRESS NOTE ADULT - PROBLEM SELECTOR PROBLEM 5
AICD (automatic cardioverter/defibrillator) present

## 2021-10-10 NOTE — PROGRESS NOTE ADULT - SUBJECTIVE AND OBJECTIVE BOX
Patient is a 75y Male     Patient is a 75y old  Male who presents with a chief complaint of Sent in from [facility] for LEFT flank haematoma and low H/H. (09 Oct 2021 14:13)      HPI:  NIGHT HOSPITALIST:   Patient UNKNOWN to me previously, assigned to me at this point via the ER and by Dr. Dowd to admit this 74 y/o M--patient seen with spouse in attendance and reviewed with patient's daughter by phone with patient's permission (daughter is a paediatric cardiologist--patient with a discharge from [facility] on Sept 30 2021 and referred to [facility] in the setting of this patient with a history of CAD with past MI and PCI/stent in 2009, PAF maintained on Eliquis, essential HTN on a beta blocker and ARB, BIV-AICD with HF with reduced EF%. with patient on additional indication for Eliquis for a PE with apparently recent diagnosis at [facility] for pancreatic CA on chemotherapy, with the admission at [facility] for oral mucositis with patient on a Prednisone taper and Decadron swish and spit therapy, with apparent steroid induced type 2 DM, patient maintained on a dysphagia diet from [facility], with the patient at [facility] with slow improvement in functional ability but noted with LEFT flank haematoma today with a low H/H and sent to the [place] ER.   Patient offers no complaint, although slightly reliant upon spouse in attendance for interview.   Mild general weakness and fatigue.  No back pain, no tearing back pain.  NO fever, no chills, no rigors.  NO abdominal pain, no red blood per rectum or melena.  NO diarrhoea.   NO cough, no dyspnoea.  NO chest pain/pressure.  NO N/V.   NO diaphoresis.  NO palliative or exacerbating factors.    Patient reports receiving Moderna COVID-19 vaccine x 2. (06 Oct 2021 19:38)      PAST MEDICAL & SURGICAL HISTORY:  Hypertension (ICD9 401.9)    Hyperlipidemia (ICD9 272.4)    BPH (Benign Prostatic Hyperplasia)    Borderline diabetes mellitus    MI (myocardial infarction)  2009    Systolic heart failure    Pancreatic cancer    Hernia    Biventricular ICD (implantable cardioverter-defibrillator) in place  2010    Stented coronary artery  X 2, 2009        MEDICATIONS  (STANDING):  ascorbic acid 500 milliGRAM(s) Oral daily  calcium carbonate   1250 mG (OsCal) 1 Tablet(s) Oral two times a day  dexAMETHasone    Solution 1 milliGRAM(s) Oral four times a day  dextrose 40% Gel 15 Gram(s) Oral once  dextrose 5%. 1000 milliLiter(s) (50 mL/Hr) IV Continuous <Continuous>  dextrose 5%. 1000 milliLiter(s) (100 mL/Hr) IV Continuous <Continuous>  dextrose 50% Injectable 25 Gram(s) IV Push once  dextrose 50% Injectable 12.5 Gram(s) IV Push once  dextrose 50% Injectable 25 Gram(s) IV Push once  dronabinol 2.5 milliGRAM(s) Oral at bedtime  finasteride 5 milliGRAM(s) Oral daily  gabapentin 300 milliGRAM(s) Oral at bedtime  glucagon  Injectable 1 milliGRAM(s) IntraMuscular once  influenza   Vaccine 0.5 milliLiter(s) IntraMuscular once  insulin lispro (ADMELOG) corrective regimen sliding scale   SubCutaneous three times a day before meals  insulin lispro (ADMELOG) corrective regimen sliding scale   SubCutaneous at bedtime  losartan 25 milliGRAM(s) Oral daily  metoprolol succinate ER 12.5 milliGRAM(s) Oral daily  multivitamin 1 Tablet(s) Oral daily  multivitamin/minerals 1 Tablet(s) Oral daily  nystatin    Suspension 347089 Unit(s) Oral two times a day  pancrelipase  (CREON 12,000 Lipase Units) 2 Capsule(s) Oral four times a day with meals  pantoprazole    Tablet 40 milliGRAM(s) Oral before breakfast  predniSONE   Tablet 10 milliGRAM(s) Oral daily  urea Oral Powder 30 Gram(s) Oral two times a day      Allergies    No Known Allergies    Intolerances        SOCIAL HISTORY:  Denies ETOh,Smoking,     FAMILY HISTORY:  No pertinent family history in first degree relatives        REVIEW OF SYSTEMS:    CONSTITUTIONAL: No weakness, fevers or chills  EYES/ENT: No visual changes;  No vertigo or throat pain   NECK: No pain or stiffness  RESPIRATORY: No cough, wheezing, hemoptysis; No shortness of breath  CARDIOVASCULAR: No chest pain or palpitations  GASTROINTESTINAL: No abdominal or epigastric pain. No nausea, vomiting, or hematemesis; No diarrhea or constipation. No melena or hematochezia.  GENITOURINARY: No dysuria, frequency or hematuria  NEUROLOGICAL: No numbness or weakness  SKIN: No itching, burning, rashes, or lesions   All other review of systems is negative unless indicated above.    VITAL:  T(C): , Max: 37.3 (10-09-21 @ 17:23)  T(F): , Max: 99.1 (10-09-21 @ 17:23)  HR: 91 (10-10-21 @ 05:52)  BP: 90/60 (10-10-21 @ 07:09)  BP(mean): --  RR: 18 (10-10-21 @ 05:52)  SpO2: 93% (10-10-21 @ 05:52)  Wt(kg): --    I and O's:    10-08 @ 07:01  -  10-09 @ 07:00  --------------------------------------------------------  IN: 0 mL / OUT: 600 mL / NET: -600 mL    10-09 @ 07:01  -  10-10 @ 07:00  --------------------------------------------------------  IN: 840 mL / OUT: 925 mL / NET: -85 mL          PHYSICAL EXAM:    Constitutional: NAD  HEENT: PERRLA,   Neck: No JVD  Respiratory: CTA B/L  Cardiovascular: S1 and S2  Gastrointestinal: BS+, soft, NT/ND  Extremities: No peripheral edema  Neurological: A/O x 3, no focal deficits  Psychiatric: Normal mood, normal affect  : No Bowman  Skin: No rashes  Access: Not applicable  Back: No CVA tenderness    LABS:                        11.2   14.15 )-----------( 177      ( 10 Oct 2021 07:27 )             35.9     10-10    133<L>  |  95<L>  |  31<H>  ----------------------------<  125<H>  4.4   |  25  |  0.61    Ca    8.6      10 Oct 2021 07:10            RADIOLOGY & ADDITIONAL STUDIES:

## 2021-10-10 NOTE — CHART NOTE - NSCHARTNOTEFT_GEN_A_CORE
Spoke with attending Dr. Dowd regarding Anticoagulation   Currently off AC for severe anemia, s/p 2 units PRBC, H & H stable since blood transfusion on 10/6, SBPs 86- low 100s, currently on Losartan 25 mg and Metoprolol succinate 12.5 daily ( held this am)  Low BPs and off AC discussed with Dr. Dowd  - To resume Eliquis 5 mg BID today  - Hold Losartan 25 mg daily, continue Toprol 12.5 mg daily  - Medically clear for discharge as per Dr. Nile Fairchild NP-C  #[id number]

## 2021-10-10 NOTE — PROGRESS NOTE ADULT - ASSESSMENT
Metastatic Pancreatic Cancer  -- was on clinical trial  --Under care at [facility] Dr. Yan Hodges  --Ongoing treatment after discharge    Anemia  --H/H Stable  --Retroperitoneal Hematoma on CT s/p PRBCs now IMPROVED  -- goal to keep> 7  --Iron, B12, folate, Haptoglobin and LDH all checked. No hemolysis or deficiencies    GI Bleed  --FOBT Positive  --May be secondary to steroid use or may be from colitis  --seen by GI. No plan for intervention    Thrombophilia  --History of DVT/PE  --Held anticoagulation until bleeding had been controlled; now back on Apixaban      Retroperitoneal Hematoma  -- f/u CT stable      Patient on Prednisone 10 mg PO daily  --D/C After today's dose  --Patient needs PPI prophylaxis - already on it    We will be happy to answer any onc questions on behalf of [facility] and will be in touch with them.    Cole Johnson MD  Hematology/Oncology  Weekends and nights please call [phone] for MD on call  Cell:  [phone]  Office Phone: [phone]  Office Fax:  [phone]  [address] [place] [address]

## 2021-10-10 NOTE — PROGRESS NOTE ADULT - PROBLEM SELECTOR PLAN 2
See above.   Eliquis temporarily HELD due to above.  DAYTIME provider to review resumption of AC.

## 2021-10-10 NOTE — PROGRESS NOTE ADULT - SUBJECTIVE AND OBJECTIVE BOX
no new events    acetaminophen   Tablet .. 650 milliGRAM(s) Oral every 6 hours PRN  apixaban 5 milliGRAM(s) Oral every 12 hours  ascorbic acid 500 milliGRAM(s) Oral daily  calcium carbonate   1250 mG (OsCal) 1 Tablet(s) Oral two times a day  dexAMETHasone    Solution 1 milliGRAM(s) Oral four times a day  dronabinol 2.5 milliGRAM(s) Oral at bedtime  finasteride 5 milliGRAM(s) Oral daily  gabapentin 300 milliGRAM(s) Oral at bedtime  glucagon  Injectable 1 milliGRAM(s) IntraMuscular once  influenza   Vaccine 0.5 milliLiter(s) IntraMuscular once  insulin lispro (ADMELOG) corrective regimen sliding scale   SubCutaneous three times a day before meals  insulin lispro (ADMELOG) corrective regimen sliding scale   SubCutaneous at bedtime  melatonin 3 milliGRAM(s) Oral at bedtime PRN  metoprolol succinate ER 12.5 milliGRAM(s) Oral daily  multivitamin 1 Tablet(s) Oral daily  multivitamin/minerals 1 Tablet(s) Oral daily  nystatin    Suspension 702167 Unit(s) Oral two times a day  pancrelipase  (CREON 12,000 Lipase Units) 2 Capsule(s) Oral four times a day with meals  pantoprazole    Tablet 40 milliGRAM(s) Oral before breakfast  predniSONE   Tablet 10 milliGRAM(s) Oral daily  sodium chloride 0.65% Nasal 1 Spray(s) Both Nostrils three times a day PRN  urea Oral Powder 30 Gram(s) Oral two times a day      No Known Allergies      ROS otherwise negative     T(C): 36.3 (10-10-21 @ 10:58), Max: 37.3 (10-09-21 @ 17:23)  HR: 100 (10-10-21 @ 10:58) (91 - 102)  BP: 90/52 (10-10-21 @ 11:10) (86/59 - 101/70)  RR: 18 (10-10-21 @ 10:58) (18 - 18)  SpO2: 95% (10-10-21 @ 10:58) (93% - 96%)  PHYSICAL EXAM  Gen:  laying in bed, nad  H:  anicteric, eomi  Ext:  no edema                          11.2   14.15 )-----------( 177      ( 10 Oct 2021 07:27 )             35.9                         9.7    14.32 )-----------( 183      ( 09 Oct 2021 06:43 )             30.9                         9.6    14.15 )-----------( 203      ( 08 Oct 2021 06:38 )             28.8   10-10    133<L>  |  95<L>  |  31<H>  ----------------------------<  125<H>  4.4   |  25  |  0.61    Ca    8.6      10 Oct 2021 07:10

## 2021-10-10 NOTE — PROGRESS NOTE ADULT - PROBLEM SELECTOR PLAN 7
Repeat test to exclude false positive.  Inflammatory indices ordered.

## 2021-10-10 NOTE — PROVIDER CONTACT NOTE (OTHER) - ASSESSMENT
Pt continues to be hypotensive asymptomatic  manually 90/52 denies dizzines chest pain palpiations - pt AO4

## 2021-10-10 NOTE — PROGRESS NOTE ADULT - PROBLEM SELECTOR PROBLEM 1
Nontraumatic retroperitoneal hematoma

## 2021-10-10 NOTE — CHART NOTE - NSCHARTNOTESELECT_GEN_ALL_CORE
Nutrition Services
COVID PCR/Event Note
Event Note
Event Note
anticoagulation and low BPs/Event Note

## 2021-10-11 PROBLEM — C25.9 MALIGNANT NEOPLASM OF PANCREAS, UNSPECIFIED: Chronic | Status: ACTIVE | Noted: 2021-01-01

## 2021-10-11 NOTE — PROGRESS NOTE ADULT - SUBJECTIVE AND OBJECTIVE BOX
Neurology consult    REGGIE PUENTEyMale     Patient is a 75y old  Male who presents with a chief complaint of Sent in from [facility] for LEFT flank haematoma and low H/H. (07 Oct 2021 14:18)      HPI:  NIGHT HOSPITALIST:   Patient UNKNOWN to me previously, assigned to me at this point via the ER and by Dr. Dowd to admit this 76 y/o M--patient seen with spouse in attendance and reviewed with patient's daughter by phone with patient's permission (daughter is a paediatric cardiologist--patient with a discharge from [facility] on Sept 30 2021 and referred to [facility] in the setting of this patient with a history of CAD with past MI and PCI/stent in 2009, PAF maintained on Eliquis, essential HTN on a beta blocker and ARB, BIV-AICD with HF with reduced EF%. with patient on additional indication for Eliquis for a PE with apparently recent diagnosis [place] [facility] [place] on chemotherapy, with the admission at [facility] for oral mucositis with patient on a Prednisone taper and Decadron swish and spit therapy, with apparent steroid induced type 2 DM, patient maintained on a dysphagia diet from [facility], with the patient at [facility] with slow improvement in functional ability but noted with LEFT flank haematoma today with a low H/H and sent to the [place] ER.   Patient offers no complaint, although slightly reliant upon spouse in attendance for interview.   Mild general weakness and fatigue.  No back pain, no tearing back pain.  NO fever, no chills, no rigors.  NO abdominal pain, no red blood per rectum or melena.  NO diarrhoea.   NO cough, no dyspnoea.  NO chest pain/pressure.  NO N/V.   NO diaphoresis.  NO palliative or exacerbating factors.    Patient reports receiving Moderna COVID-19 vaccine x 2. (06 Oct 2021 19:38)      Patient  c/o paresthesia down both UEs, denies new focal weakness, endorses LLE> RLE weakness since at [facility]      acetaminophen   Tablet .. 650 milliGRAM(s) Oral every 6 hours PRN  apixaban 5 milliGRAM(s) Oral every 12 hours  ascorbic acid 500 milliGRAM(s) Oral daily  calcium carbonate   1250 mG (OsCal) 1 Tablet(s) Oral two times a day  dexAMETHasone    Solution 1 milliGRAM(s) Oral four times a day  dextrose 40% Gel 15 Gram(s) Oral once  dextrose 5%. 1000 milliLiter(s) IV Continuous <Continuous>  dextrose 5%. 1000 milliLiter(s) IV Continuous <Continuous>  dextrose 50% Injectable 25 Gram(s) IV Push once  dextrose 50% Injectable 12.5 Gram(s) IV Push once  dextrose 50% Injectable 25 Gram(s) IV Push once  dronabinol 2.5 milliGRAM(s) Oral at bedtime  finasteride 5 milliGRAM(s) Oral daily  gabapentin 300 milliGRAM(s) Oral at bedtime  glucagon  Injectable 1 milliGRAM(s) IntraMuscular once  influenza   Vaccine 0.5 milliLiter(s) IntraMuscular once  insulin lispro (ADMELOG) corrective regimen sliding scale   SubCutaneous three times a day before meals  insulin lispro (ADMELOG) corrective regimen sliding scale   SubCutaneous at bedtime  melatonin 3 milliGRAM(s) Oral at bedtime PRN  metoprolol succinate ER 12.5 milliGRAM(s) Oral daily  multivitamin 1 Tablet(s) Oral daily  multivitamin/minerals 1 Tablet(s) Oral daily  nystatin    Suspension 236655 Unit(s) Oral two times a day  pancrelipase  (CREON 12,000 Lipase Units) 2 Capsule(s) Oral four times a day with meals  pantoprazole    Tablet 40 milliGRAM(s) Oral before breakfast  sodium chloride 0.65% Nasal 1 Spray(s) Both Nostrils three times a day PRN  urea Oral Powder 30 Gram(s) Oral two times a day                            10.3   12.94 )-----------( 143      ( 11 Oct 2021 05:38 )             31.4     10-11    131<L>  |  95<L>  |  29<H>  ----------------------------<  136<H>  4.1   |  25  |  0.68    Ca    8.3<L>      11 Oct 2021 05:38      CAPILLARY BLOOD GLUCOSE      POCT Blood Glucose.: 148 mg/dL (11 Oct 2021 07:40)  POCT Blood Glucose.: 211 mg/dL (10 Oct 2021 21:43)  POCT Blood Glucose.: 155 mg/dL (10 Oct 2021 16:26)  POCT Blood Glucose.: 222 mg/dL (10 Oct 2021 11:41)        I&O's Summary    10 Oct 2021 07:01  -  11 Oct 2021 07:00  --------------------------------------------------------  IN: 720 mL / OUT: 600 mL / NET: 120 mL    11 Oct 2021 07:01  -  11 Oct 2021 11:16  --------------------------------------------------------  IN: 240 mL / OUT: 200 mL / NET: 40 mL      Vital Signs Last 24 Hrs  T(C): 36.4 (11 Oct 2021 04:04), Max: 37.1 (10 Oct 2021 20:56)  T(F): 97.6 (11 Oct 2021 04:04), Max: 98.7 (10 Oct 2021 20:56)  HR: 98 (11 Oct 2021 06:00) (98 - 100)  BP: 88/50 (11 Oct 2021 06:00) (88/50 - 101/67)  BP(mean): --  RR: 18 (11 Oct 2021 04:04) (18 - 18)  SpO2: 96% (11 Oct 2021 04:04) (96% - 96%)      On Neurological Examination:    Mental Status - Patient is alert, awake, oriented X3. fluent, names, no dysarthria no aphasia Follows commands well and able to answer questions appropriately. Mood and affect  normal    Cranial Nerves - PERRL, EOMI, VFF, V1-V3 intact, mild right facial, tongue/uvula midline    Motor Exam -   Right upper biceps deltoid 5/5 triceps 3/5 distal 4-/5  Left upper 5/5  lowers abduction adduction 4+/5 IP on right 4/5 left 3/5 knee extension on left 3/5 right 4-/5 distal 4/5   nml bulk/tone    Sensory    Intact to light touch and pinprick bilaterally    Coord: FTN intact bilaterally     Gait -  normal      Reflexes:       0        NIHSS 7

## 2021-10-11 NOTE — PROGRESS NOTE ADULT - SUBJECTIVE AND OBJECTIVE BOX
Patient is a 75y Male     Patient is a 75y old  Male who presents with a chief complaint of Sent in from [facility] for LEFT flank haematoma and low H/H. (10 Oct 2021 14:54)      HPI:  NIGHT HOSPITALIST:   Patient UNKNOWN to me previously, assigned to me at this point via the ER and by Dr. Dowd to admit this 76 y/o M--patient seen with spouse in attendance and reviewed with patient's daughter by phone with patient's permission (daughter is a paediatric cardiologist--patient with a discharge from [facility] on Sept 30 2021 and referred to [facility] in the setting of this patient with a history of CAD with past MI and PCI/stent in 2009, PAF maintained on Eliquis, essential HTN on a beta blocker and ARB, BIV-AICD with HF with reduced EF%. with patient on additional indication for Eliquis for a PE with apparently recent diagnosis at [facility] for pancreatic CA on chemotherapy, with the admission at [facility] for oral mucositis with patient on a Prednisone taper and Decadron swish and spit therapy, with apparent steroid induced type 2 DM, patient maintained on a dysphagia diet from [facility], with the patient at [facility] with slow improvement in functional ability but noted with LEFT flank haematoma today with a low H/H and sent to the [place] ER.   Patient offers no complaint, although slightly reliant upon spouse in attendance for interview.   Mild general weakness and fatigue.  No back pain, no tearing back pain.  NO fever, no chills, no rigors.  NO abdominal pain, no red blood per rectum or melena.  NO diarrhoea.   NO cough, no dyspnoea.  NO chest pain/pressure.  NO N/V.   NO diaphoresis.  NO palliative or exacerbating factors.    Patient reports receiving Moderna COVID-19 vaccine x 2. (06 Oct 2021 19:38)      PAST MEDICAL & SURGICAL HISTORY:  Hypertension (ICD9 401.9)    Hyperlipidemia (ICD9 272.4)    BPH (Benign Prostatic Hyperplasia)    Borderline diabetes mellitus    MI (myocardial infarction)  2009    Systolic heart failure    Pancreatic cancer    Hernia    Biventricular ICD (implantable cardioverter-defibrillator) in place  2010    Stented coronary artery  X 2, 2009        MEDICATIONS  (STANDING):  apixaban 5 milliGRAM(s) Oral every 12 hours  ascorbic acid 500 milliGRAM(s) Oral daily  calcium carbonate   1250 mG (OsCal) 1 Tablet(s) Oral two times a day  dexAMETHasone    Solution 1 milliGRAM(s) Oral four times a day  dextrose 40% Gel 15 Gram(s) Oral once  dextrose 5%. 1000 milliLiter(s) (50 mL/Hr) IV Continuous <Continuous>  dextrose 5%. 1000 milliLiter(s) (100 mL/Hr) IV Continuous <Continuous>  dextrose 50% Injectable 25 Gram(s) IV Push once  dextrose 50% Injectable 12.5 Gram(s) IV Push once  dextrose 50% Injectable 25 Gram(s) IV Push once  dronabinol 2.5 milliGRAM(s) Oral at bedtime  finasteride 5 milliGRAM(s) Oral daily  gabapentin 300 milliGRAM(s) Oral at bedtime  glucagon  Injectable 1 milliGRAM(s) IntraMuscular once  influenza   Vaccine 0.5 milliLiter(s) IntraMuscular once  insulin lispro (ADMELOG) corrective regimen sliding scale   SubCutaneous three times a day before meals  insulin lispro (ADMELOG) corrective regimen sliding scale   SubCutaneous at bedtime  metoprolol succinate ER 12.5 milliGRAM(s) Oral daily  multivitamin 1 Tablet(s) Oral daily  multivitamin/minerals 1 Tablet(s) Oral daily  nystatin    Suspension 852619 Unit(s) Oral two times a day  pancrelipase  (CREON 12,000 Lipase Units) 2 Capsule(s) Oral four times a day with meals  pantoprazole    Tablet 40 milliGRAM(s) Oral before breakfast  urea Oral Powder 30 Gram(s) Oral two times a day      Allergies    No Known Allergies    Intolerances        SOCIAL HISTORY:  Denies ETOh,Smoking,     FAMILY HISTORY:  No pertinent family history in first degree relatives        REVIEW OF SYSTEMS:    CONSTITUTIONAL: No weakness, fevers or chills  EYES/ENT: No visual changes;  No vertigo or throat pain   NECK: No pain or stiffness  RESPIRATORY: No cough, wheezing, hemoptysis; No shortness of breath  CARDIOVASCULAR: No chest pain or palpitations  GASTROINTESTINAL: No abdominal or epigastric pain. No nausea, vomiting, or hematemesis; No diarrhea or constipation. No melena or hematochezia.  GENITOURINARY: No dysuria, frequency or hematuria  NEUROLOGICAL: No numbness or weakness  SKIN: No itching, burning, rashes, or lesions   All other review of systems is negative unless indicated above.    VITAL:  T(C): , Max: 37.1 (10-10-21 @ 20:56)  T(F): , Max: 98.7 (10-10-21 @ 20:56)  HR: 98 (10-11-21 @ 06:00)  BP: 88/50 (10-11-21 @ 06:00)  BP(mean): --  RR: 18 (10-11-21 @ 04:04)  SpO2: 96% (10-11-21 @ 04:04)  Wt(kg): --    I and O's:    10-09 @ 07:01  -  10-10 @ 07:00  --------------------------------------------------------  IN: 840 mL / OUT: 925 mL / NET: -85 mL    10-10 @ 07:01  -  10-11 @ 07:00  --------------------------------------------------------  IN: 720 mL / OUT: 600 mL / NET: 120 mL          PHYSICAL EXAM:    Constitutional: NAD  HEENT: PERRLA,   Neck: No JVD  Respiratory: CTA B/L  Cardiovascular: S1 and S2  Gastrointestinal: BS+, soft, NT/ND  Extremities: No peripheral edema  Neurological: A/O x 3, no focal deficits  Psychiatric: Normal mood, normal affect  : No Bowman  Skin: No rashes  Access: Not applicable  Back: No CVA tenderness    LABS:                        10.3   12.94 )-----------( 143      ( 11 Oct 2021 05:38 )             31.4     10-11    131<L>  |  95<L>  |  29<H>  ----------------------------<  136<H>  4.1   |  25  |  0.68    Ca    8.3<L>      11 Oct 2021 05:38            RADIOLOGY & ADDITIONAL STUDIES:

## 2021-10-11 NOTE — DISCHARGE NOTE NURSING/CASE MANAGEMENT/SOCIAL WORK - PATIENT PORTAL LINK FT
You can access the FollowMyHealth Patient Portal offered by [facility] by registering at the following website: [email or web address]. By joining [facility]’s FollowMyHealth portal, you will also be able to view your health information using other applications (apps) compatible with our system.

## 2021-10-11 NOTE — PROGRESS NOTE ADULT - REASON FOR ADMISSION
Sent in from [facility] for LEFT flank haematoma and low H/H.

## 2021-10-11 NOTE — PROGRESS NOTE ADULT - PROVIDER SPECIALTY LIST ADULT
Heme/Onc
Gastroenterology
Gastroenterology
Heme/Onc
Nephrology
Gastroenterology
Gastroenterology
Heme/Onc
Nephrology
Neurology
Surgery
Heme/Onc
Heme/Onc
Infectious Disease
Neurology
Internal Medicine

## 2021-10-11 NOTE — PROGRESS NOTE ADULT - SUBJECTIVE AND OBJECTIVE BOX
Patient is a 75y old  Male who presents with a chief complaint of Sent in from [facility] for LEFT flank haematoma and low H/H. (11 Oct 2021 10:01)    Patient seen this morning. No new complaints. Hoping for DC to [facility] today.    MEDICATIONS  (STANDING):  apixaban 5 milliGRAM(s) Oral every 12 hours  ascorbic acid 500 milliGRAM(s) Oral daily  calcium carbonate   1250 mG (OsCal) 1 Tablet(s) Oral two times a day  dexAMETHasone    Solution 1 milliGRAM(s) Oral four times a day  dextrose 40% Gel 15 Gram(s) Oral once  dextrose 5%. 1000 milliLiter(s) (50 mL/Hr) IV Continuous <Continuous>  dextrose 5%. 1000 milliLiter(s) (100 mL/Hr) IV Continuous <Continuous>  dextrose 50% Injectable 25 Gram(s) IV Push once  dextrose 50% Injectable 12.5 Gram(s) IV Push once  dextrose 50% Injectable 25 Gram(s) IV Push once  dronabinol 2.5 milliGRAM(s) Oral at bedtime  finasteride 5 milliGRAM(s) Oral daily  gabapentin 300 milliGRAM(s) Oral at bedtime  glucagon  Injectable 1 milliGRAM(s) IntraMuscular once  influenza   Vaccine 0.5 milliLiter(s) IntraMuscular once  insulin lispro (ADMELOG) corrective regimen sliding scale   SubCutaneous three times a day before meals  insulin lispro (ADMELOG) corrective regimen sliding scale   SubCutaneous at bedtime  metoprolol succinate ER 12.5 milliGRAM(s) Oral daily  multivitamin 1 Tablet(s) Oral daily  multivitamin/minerals 1 Tablet(s) Oral daily  nystatin    Suspension [id number] Unit(s) Oral two times a day  pancrelipase  (CREON 12,000 Lipase Units) 2 Capsule(s) Oral four times a day with meals  pantoprazole    Tablet 40 milliGRAM(s) Oral before breakfast  urea Oral Powder 30 Gram(s) Oral two times a day    MEDICATIONS  (PRN):  acetaminophen   Tablet .. 650 milliGRAM(s) Oral every 6 hours PRN Temp greater or equal to 38C (100.4F), Mild Pain (1 - 3)  melatonin 3 milliGRAM(s) Oral at bedtime PRN Insomnia  sodium chloride 0.65% Nasal 1 Spray(s) Both Nostrils three times a day PRN Nasal Congestion      ROS  No fever, sweats, chills  No epistaxis, HA, sore throat  No CP, SOB, cough, sputum  No n/v/d, abd pain, melena, hematochezia  No edema  No rash  No anxiety  No back pain, joint pain  No bleeding, bruising  No dysuria, hematuria    Vital Signs Last 24 Hrs  T(C): 36.4 (11 Oct 2021 04:04), Max: 37.1 (10 Oct 2021 20:56)  T(F): 97.6 (11 Oct 2021 04:04), Max: 98.7 (10 Oct 2021 20:56)  HR: 98 (11 Oct 2021 06:00) (98 - 100)  BP: 88/50 (11 Oct 2021 06:00) (88/50 - 101/67)  BP(mean): --  RR: 18 (11 Oct 2021 04:04) (18 - 18)  SpO2: 96% (11 Oct 2021 04:04) (95% - 96%)    PE  NAD  Awake, alert  Anicteric, MMM  RRR  CTAB  Abd soft, NT, ND  Bilateral stable pedal edema  No rash grossly  FROM                          10.3   12.94 )-----------( 143      ( 11 Oct 2021 05:38 )             31.4       10-11    131<L>  |  95<L>  |  29<H>  ----------------------------<  136<H>  4.1   |  25  |  0.68    Ca    8.3<L>      11 Oct 2021 05:38

## 2021-10-11 NOTE — PROGRESS NOTE ADULT - ASSESSMENT
75 year old male recently discharged from [facility] on 9/30/21 for oral/ GI mucositis on tapering steroids, history of CAD with MI PCI/ stent in 2009, PAF on Eliquis, HTN, BIV-ICD with HF with reduced EF, PE, Pancreatic ca on chemotherapy was at [facility] when he developed anemia secondary to  left retroperitoneal hematoma c/o bilaterally hand numbess on PE right facial , RUE weakness, evidence of Neuropathy weakness in Left femoral distribution NIHSS      R Facial weakness. per daughter chronic facial asymmetry likely chronic R [place] r/o central lesion  R triceps weakness with pain radiating down both arms  r/o cervical process r/o CVA     . Patient off AC/antithrombotic due to hemorrhage  PPMi is not MRI compatible CTH w/w/o con, family and Patient would like to wait until he left retroperitoneal hematoma is deal with      Left lower extremity weakness  -In distribution of the left femoral nerve  likely compressed by retroperitoneal hemorrhage, management per IR/surgery/primary team    Neuropathy  -in setting of chemo and chronic Neuropathy  Increase Gabapentin to 300 qhs    -PT

## 2021-10-11 NOTE — PROGRESS NOTE ADULT - ASSESSMENT
Metastatic Pancreatic Cancer  -- was on clinical trial  --Under care at [facility] Dr. Yan Hodges  --Ongoing treatment after discharge from [facility]    Anemia  --H/H Stable  --Retroperitoneal Hematoma on CT s/p PRBCs now IMPROVED  -- goal to keep> 7  --Iron, B12, folate, Haptoglobin and LDH all checked. No hemolysis or deficiencies    GI Bleed  --FOBT Positive  --May be secondary to steroid use or may be from colitis  --seen by GI. No plan for intervention    Thrombophilia  --History of DVT/PE  --Held anticoagulation until bleeding had been controlled; now back on Apixaban      Retroperitoneal Hematoma  -- f/u CT stable      Patient on Prednisone 10 mg PO daily  --D/Cd After yesterday's dose  --Patient needs PPI prophylaxis - already on it    We will be happy to answer any onc questions on behalf of [facility] and will be in touch with them.    Washington Townsend PA-C  Hematology/Oncology  [facility]   [phone] (cell)  [phone] (office)  [phone] (alt office)  Evenings and weekends please call MD on call or office

## 2021-10-11 NOTE — PROGRESS NOTE ADULT - NUTRITIONAL ASSESSMENT
This patient has been assessed with a concern for Malnutrition and has been determined to have a diagnosis/diagnoses of Moderate protein-calorie malnutrition.    This patient is being managed with:   Diet Soft-  1000mL Fluid Restriction ([id number])  Supplement Feeding Modality:  Oral  Ensure Clear Cans or Servings Per Day:  2       Frequency:  Daily  Entered: Oct  8 2021  2:43PM    
This patient has been assessed with a concern for Malnutrition and has been determined to have a diagnosis/diagnoses of Moderate protein-calorie malnutrition.    This patient is being managed with:   Diet Soft-  1000mL Fluid Restriction ([id number])  Supplement Feeding Modality:  Oral  Ensure Clear Cans or Servings Per Day:  2       Frequency:  Daily  Entered: Oct  8 2021  2:43PM    
This patient has been assessed with a concern for Malnutrition and has been determined to have a diagnosis/diagnoses of Moderate protein-calorie malnutrition.    This patient is being managed with:   Diet Soft-  1000mL Fluid Restriction ([id number])  Supplement Feeding Modality:  Oral  Ensure Clear Cans or Servings Per Day:  2       Frequency:  Daily  Entered: Oct  8 2021  2:43PM    
This patient has been assessed with a concern for Malnutrition and has been determined to have a diagnosis/diagnoses of Moderate protein-calorie malnutrition.    This patient is being managed with:   Diet Soft-  1000mL Fluid Restriction ([id number])  Supplement Feeding Modality:  Oral  Ensure Clear Cans or Servings Per Day:  2       Frequency:  Daily  Entered: Oct  8 2021  2:43PM    
This patient has been assessed with a concern for Malnutrition and has been determined to have a diagnosis/diagnoses of Moderate protein-calorie malnutrition.    This patient is being managed with:   Diet Soft-  1000mL Fluid Restriction ([id number])  Supplement Feeding Modality:  Oral  Ensure Clear Cans or Servings Per Day:  2       Frequency:  Daily  Entered: Oct  8 2021  2:43PM    
This patient has been assessed with a concern for Malnutrition and has been determined to have a diagnosis/diagnoses of Moderate protein-calorie malnutrition.    This patient is being managed with:   Diet Soft-  1000mL Fluid Restriction ([id number])  Supplement Feeding Modality:  Oral  Ensure Clear Cans or Servings Per Day:  2       Frequency:  Daily  Entered: Oct  8 2021  2:43PM    
This patient has been assessed with a concern for Malnutrition and has been determined to have a diagnosis/diagnoses of Moderate protein-calorie malnutrition.    This patient is being managed with:   Diet Soft-  1000mL Fluid Restriction ([id number])  Supplement Feeding Modality:  Oral  Ensure Clear Cans or Servings Per Day:  2       Frequency:  Daily  Entered: Oct  8 2021  2:43PM    
This patient has been assessed with a concern for Malnutrition and has been determined to have a diagnosis/diagnoses of Moderate protein-calorie malnutrition.    This patient is being managed with:   Diet Soft-  1000mL Fluid Restriction ([id number])  Supplement Feeding Modality:  Oral  Ensure Clear Cans or Servings Per Day:  2       Frequency:  Daily  Entered: Oct  8 2021  2:43PM

## 2021-11-10 NOTE — CHART NOTE - NSCHARTNOTEFT_GEN_A_CORE
: Dr Zarate and Dr Loredo    INDICATION: Acute Hypoxic Hypercapnic Respiratory Failure, Acute Renal Failure and Cardiogenic Shock     PROCEDURE:  [X] LIMITED ECHO  [X] LIMITED CHEST  [ ] LIMITED RETROPERITONEAL  [X] LIMITED ABDOMINAL  [ ] LIMITED DVT  [ ] NEEDLE GUIDANCE VASCULAR  [ ] NEEDLE GUIDANCE THORACENTESIS  [ ] NEEDLE GUIDANCE PARACENTESIS  [ ] NEEDLE GUIDANCE PERICARDIOCENTESIS  [ ] OTHER    FINDINGS: Echo- moderate to severe LV systolic dysfunction, unable to fully visualize RV, no pericardial effusion noted, LVOT VTI 8.  Chest- A lines predominant bilaterally with scattered B lines bilaterally L > R, bilateral atelectasis   Abdomen- diffuse ascites       INTERPRETATION: moderate to severe LV systolic dysfunction compatible with known HFrEF, low cardiac output in the setting of cardiogenic shock.  Normal lung aeration. Abdominal ascites.     Images stored on [facility]. : Dr Zarate and Dr Loredo    INDICATION: Acute Hypoxic Hypercapnic Respiratory Failure, Acute Renal Failure and Cardiogenic Shock     PROCEDURE:  [X] LIMITED ECHO  [X] LIMITED CHEST  [ ] LIMITED RETROPERITONEAL  [X] LIMITED ABDOMINAL  [ ] LIMITED DVT  [ ] NEEDLE GUIDANCE VASCULAR  [ ] NEEDLE GUIDANCE THORACENTESIS  [ ] NEEDLE GUIDANCE PARACENTESIS  [ ] NEEDLE GUIDANCE PERICARDIOCENTESIS  [ ] OTHER    FINDINGS: Echo- moderate to severe LV systolic dysfunction, unable to fully visualize RV, no pericardial effusion noted, LVOT VTI 8 cm.  Chest- A lines predominant bilaterally with scattered B lines bilaterally L > R, bilateral atelectasis Small bilateral PLEFF  Abdomen- diffuse ascites       INTERPRETATION: moderate to severe LV systolic dysfunction compatible with known HFrEF, low cardiac output in the setting of cardiogenic shock.  Normal lung aeration. Abdominal ascites.     Images stored on [employer].    I was personally present throughout the procedure   Elliot PENDLETON

## 2021-11-10 NOTE — ED PROVIDER NOTE - ATTENDING CONTRIBUTION TO CARE
Afebrile. Awake and Alert. Lungs CTA with tachypnea. Heart RRR. Abdomen distended and compressing diaphragm. CN II-XII grossly intact. Severe bilateral LE edema.    Tachypnea:  r/o PE progression  r/o PNA  r/o CHF  r/o mechanical interference from distended abdomen  r/o respiratory compensation for metabolic acidosis    Abdominal distension with h/o metastatic pancreatic cancer  r/o SBO  r/o ascites  r/o SBP

## 2021-11-10 NOTE — H&P ADULT - NSHPREVIEWOFSYSTEMS_GEN_ALL_CORE
GENERAL: No fever or chills  EYES: no change in vision  HEENT: no trouble swallowing or speaking  CARDIAC: + chest tightness  PULMONARY: + sob, do  GI: + abd pain  and distention  : anuric  SKIN: no rashes  NEURO: no headache, numbness, or weakness.  MSK: No joint pain. + worsening lower extremity edema

## 2021-11-10 NOTE — ED ADULT NURSE NOTE - OBJECTIVE STATEMENT
75 year old male presents to ED from [facility] c/o shortness of breath, abdominal pain for "a while". Pt is alert, oriented, speaking coherently. Pt arrives on non-rebreather, working to breath, tachypneic, heart rate ranging from 50s-100s per EMS. On exam, pt denies headache, dizziness, lightheadedness. Chest rise is equal and symmetrical, lung sounds clear. Denies chest pain, +shortness of breath, tachypnea. Abdomen very distended, hard, and tender. Denies diarrhea, +nausea and vomiting. Denies flank pain or shortness of breath. +4 pitting edema in bilateral lower extremities. Per pt and wife at bedside, pt was at [facility] for decreased ability to ambulate at home. Skin cool to the touch, dry, intact. Skin color appears pale.

## 2021-11-10 NOTE — ED PROCEDURE NOTE - NS ED PROCEDURE ASSISTED BY
Supervision was available
The procedure was performed independently
Supervision was available
The procedure was performed independently

## 2021-11-10 NOTE — ED ADULT NURSE REASSESSMENT NOTE - NS ED NURSE REASSESS COMMENT FT1
ED Resident Dr. Robin Contreras notified of pt's increased work of breathing, decreased oxygen saturation. Will continue to monitor.

## 2021-11-10 NOTE — ED PROVIDER NOTE - CARE PLAN
Principal Discharge DX:	Dyspnea   1 Principal Discharge DX:	Abdominal pain, acute  Secondary Diagnosis:	Septic shock  Secondary Diagnosis:	Acute pneumonia

## 2021-11-10 NOTE — ED ADULT NURSE REASSESSMENT NOTE - NS ED NURSE REASSESS COMMENT FT1
received report from Mariam DOWNEY. pt being intubated during report. pt on levo and fentanyl drip as per MD orders. central line in place. emotional support provided to pt. awaiting MICU bed and transport. received report from Mariam DOWNEY. pt being intubated during report. pt on levo and fentanyl drip as per MD orders. central line in place. 10ml urine noted in vasquez bag. emotional support provided to pt. awaiting MICU bed and transport.

## 2021-11-10 NOTE — ED PROCEDURE NOTE - CPROC ED INDICATIONS1
Chest pain, shortness of breath and ascites/Specify the Indication for Ultrasonography
Routine Access
respiratory failure
emergency venous access

## 2021-11-10 NOTE — ED PROVIDER NOTE - OBJECTIVE STATEMENT
75 year old male recently discharged from [facility] on 9/30/21 for oral/ GI mucositis on tapering steroids, history of CAD with MI PCI/ stent in 2009, PAF on Eliquis, HTN, BIV-ICD with HF with reduced EF, PE, Pancreatic ca on chemotherapy, recent PE coming from [facility] after developing chest tightness, shortness of breath and abdominal distention. Reports gradual onset SOB. AS per EMS, patient satting in the mid 70s on RA, mid 90s on NRB. EKG non-ischemic. Compliant with diuretics. Reports diffuse abd pain for weeks. Reports vomiting. Passing gas.

## 2021-11-10 NOTE — H&P ADULT - HISTORY OF PRESENT ILLNESS
75 year old male recently discharged from [facility] on 9/30/21 for oral/ GI mucositis on tapering steroids, history of CAD with MI PCI/ stent in 2009, PAF on Eliquis, HTN, BIV-ICD with HF with reduced EF, PE, Pancreatic ca on chemotherapy, recent PE coming from [facility] after developing chest tightness, shortness of breath and abdominal distention. Reports gradual onset SOB. AS per EMS, patient satting in the mid 70s on RA, mid 90s on NRB. EKG non-ischemic. Compliant with diuretics. Reports diffuse abd pain for weeks. Reports vomiting. Passing gas.    · Review of Systems: GENERAL: No fever or chills  	EYES: no change in vision  	HEENT: no trouble swallowing or speaking  	CARDIAC: see hpi   	PULMONARY: see hpi  	GI: seen hpi  	: No changes in urination  	SKIN: no rashes  	NEURO: no headache, numbness, or weakness.  MSK: No joint pain    PHYSICAL EXAM:   · Physical Examination: Gen: AAOx3, with increased work of breathing   	Head: NCAT  	HEENT: EOMI, oral mucosa moist, normal conjunctiva  	Lung: CTAB, increase WOB, , speaking in full sentences  	CV: RRR, no murmurs, rubs or gallops. Blt pitting edema.   	Abd: Significant abdominal distention. Diffuse ttp   	MSK: no visible deformities  	Neuro: No focal sensory or motor deficits, normal CN exam   	Skin: Warm, well perfused, no rash  Psych: normal affect.      LABS:                        14.4   16.97 )-----------( 299      ( 10 Nov 2021 02:45 )             46.4     Hgb Trend: 14.4<--  11-10    128<L>  |  90<L>  |  41<H>  ----------------------------<  207<H>  6.4<HH>   |  19<L>  |  1.41<H>    Ca    9.7      10 Nov 2021 02:45    TPro  6.6  /  Alb  2.6<L>  /  TBili  0.8  /  DBili  x   /  AST  52<H>  /  ALT  75<H>  /  AlkPhos  341<H>  11-10    Creatinine Trend: 1.41<--        Venous Blood Gas:  11-10 @ 05:51  7.08/64/31/19/36.6  VBG Lactate: 8.6          #Neuro    #Resp    #CV    #GI    #ID    #Renal    #Heme    #Endo    #DVT PPx    #Ethics      75 year old male recently discharged from [facility] on 9/30/21 for oral/ GI mucositis on tapering steroids, history of CAD with MI PCI/ stent in 2009, PAF on Eliquis, HTN, BIV-ICD with HF with reduced EF (35%), PE (on eliquis), Pancreatic ca on chemotherapy, recent PE coming from [facility] for worsening lower extremity swelling and shortness of breath. SInce discharge to [facility], he has been having worsening LE edema, and more recently has been having worsening abdominal distention. His EP doctor attempted to give lasix (initially 40 qD then 20 BID) but hsi condition did not improve. He then developed shortness ofbreath and chest tightness which then brought him to the ED. Daughter and wife at bedside providing most of the story and rOS as patient is short of breath. He denies any fevers, chills, nausea, vomiting, headaches. Reports decreased urinary output.     In the ED, patient had leukocytosis, and CT scan showing chronic PE, unchanged from prior. Was 70% on RA, placed on NRB then BIPAP 14/6, escalating to 100% FIO2. Patient continued to be hypotensive, placed on levophed. Patient was hypercarbic and acidotic (7.08) and was intubated for airway support.

## 2021-11-10 NOTE — H&P ADULT - NSHPPHYSICALEXAM_GEN_ALL_CORE
Gen: AAOx3, with increased work of breathing   Head: NCAT  	HEENT: EOMI, oral mucosa moist, normal conjunctiva  	Lung: CTAB, increase WOB, , speaking in full sentences  	CV: RRR, no murmurs, rubs or gallops. Blt pitting edema.   	Abd: Significant abdominal distention. Diffuse ttp   	MSK: no visible deformities  	Neuro: No focal sensory or motor deficits, normal CN exam   	Skin: Warm, well perfused, no rash  Psych: normal affect. (prior to intubation)  Gen: AAOx3, with increased work of breathing   HEENT: EOMI, oral mucosa moist, normal conjunctiva  Lung: increased WOB, not speaking in full sentences, struggling, crackles b/l   CV: RRR, no murmurs, rubs or gallops. Blt pitting edema 3+ to knees   Abd: Significant abdominal distention. Diffuse ttp   MSK: no visible deformities  Neuro: No focal sensory or motor deficits, normal CN exam   Skin: Warm, well perfused, no rash  Psych: normal affect.

## 2021-11-10 NOTE — CONSULT NOTE ADULT - SUBJECTIVE AND OBJECTIVE BOX
Reason for consult: Pancreatic cancer    HPI: 75 year old male recently discharged from [facility] on 9/30/21 for oral/ GI mucositis on tapering steroids, history of CAD with MI PCI/ stent in 2009, PAF on Eliquis, HTN, BIV-ICD with HF with reduced EF, PE, Pancreatic ca on chemotherapy, recent PE coming from [facility] after developing chest tightness, shortness of breath and abdominal distention. Reports gradual onset SOB. AS per EMS, patient satting in the mid 70s on RA, mid 90s on NRB. EKG non-ischemic. Compliant with diuretics. Reports diffuse abd pain for weeks. Reports vomiting. Passing gas    Hematology/Oncology called to see patient who is under treatment at [facility] for newly dx pancreatic cancer with metastatic disease to the liver.He had CT scan at [facility] Dr. Yan Hodges in Aug 2021 with PE, pancreatic, and liver mets.Biopsy from Aug 21 confirmed pancreatic adenocarcinoma. Started treatment on 8/31/21 per protocol , randomized to gemcitabine/nab-paclitaxel and dual immunotherapy. 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 He received steroids and was discharged to [facility] on 9/30/2021 on tapering doses of steroids. H/H on discharge was 10.8/32.7. He also has a history of DVT/PE and has been on Eliquis. He was readmitted to [facility] 10/6/2021 with a retroperitoneal hematoma and was discharged back to [facility] on 10/11/2021 - steroids were to have been completed.      PAST MEDICAL & SURGICAL HISTORY:  Hypertension (ICD9 401.9)    Hyperlipidemia (ICD9 272.4)    BPH (Benign Prostatic Hyperplasia)    Borderline diabetes mellitus    MI (myocardial infarction)  2009    Systolic heart failure    Pancreatic cancer    Hernia    Biventricular ICD (implantable cardioverter-defibrillator) in place  2010    Stented coronary artery  X 2, 2009        FAMILY HISTORY:  No pertinent family history in first degree relatives        Alcohol Denied  Smoking: Nonsmoker  Drug Use: Denied  Marital Status:         Allergies    No Known Allergies    Intolerances        MEDICATIONS  (STANDING):  acetaminophen   IVPB .. 1000 milliGRAM(s) IV Intermittent once  vancomycin  IVPB. 1000 milliGRAM(s) IV Intermittent once    MEDICATIONS  (PRN):      ROS  No fever, sweats, chills  No epistaxis, HA, sore throat  Chest tightness, SOB  No n/v/d, abd pain, melena, hematochezia  No edema  No rash  No anxiety  No back pain, joint pain  No bleeding, bruising  No dysuria, hematuria    T(C): 36.6 (11-10-21 @ 03:45), Max: 37.1 (11-10-21 @ 03:00)  HR: 99 (11-10-21 @ 06:30) (99 - 107)  BP: 102/84 (11-10-21 @ 06:30) (70/53 - 128/98)  RR: 46 (11-10-21 @ 06:30) (10 - 50)  SpO2: 100% (11-10-21 @ 06:30) (91% - 100%)  Wt(kg): --    PE  NAD  Awake, alert  Anicteric, MMM  RRR  CTAB  Abd soft, NT, ND  No c/c/e  No rash grossly  FROM      EXAM:  CT ABDOMEN AND PELVIS IC                          [facility]                            PROCEDURE DATE:  11/10/2021            INTERPRETATION:  CLINICAL INFORMATION: Abdominal pain and hypoxia. History of metastatic    A cancer.    COMPARISON: CT abdomen pelvis 10/6/2021, CT angiography chest 8/5/2021.    CONTRAST/COMPLICATIONS:  IV Contrast: Omnipaque 350 90 cc administered   10 cc discarded  Oral Contrast: NONE  Complications: None reported at time of study completion    PROCEDURE:  CT Angiography of the Chest was performed followed by portal venous phase imaging of the Abdomen and Pelvis.  Sagittal and coronal reformats were performed as well as 3D (MIP) reconstructions.    FINDINGS:  CHEST:  LUNGS AND LARGE AIRWAYS: New right middle lobe airspace opacity. Bilateral lower lung partial compressive atelectasis.  PLEURA: New small bilateral pleural effusions.  VESSELS: Pulmonary emboli within the bilateral upper, right middle, and right lower lobar arteries with extension into numerous segmental and subsegmental branches, overall similar in appearance to 8/5/2021.  HEART: Heart size is normal. No pericardial effusion.  MEDIASTINUM AND SARATH: No lymphadenopathy.  CHEST WALL AND LOWER NECK: Left chest cardiac device.    ABDOMEN AND PELVIS:  LIVER: Similar-appearing nodular liver with numerous bilobar hepatic metastases.  BILE DUCTS: Normal caliber.  GALLBLADDER: Cholelithiasis.  SPLEEN: Within normal limits.  PANCREAS: Similar-appearing ill-defined pancreatic mass measuring at least 6.2 cm involving the body and tail with extension into the stomach and retroperitoneum. The mass encases the splenic artery.  ADRENALS: Within normal limits.  KIDNEYS/URETERS: Bilateral renal atrophy, new since 10/6/2021.    BLADDER: Minimally distended.  REPRODUCTIVE ORGANS: Prostate within normal limits.    BOWEL: Pancreatic mass extension into the stomach which is dilated. No bowel obstruction. Appendix is normal.  PERITONEUM: Moderate ascites, increased since 10/6/2021. Ill-defined peritoneal stranding and nodularity concerning for carcinomatosis.  VESSELS: Filling defects within the bilateral iliac veins extending to the femoral veins concerning for thrombus. Again noted is encasement of the splenic artery and involvement of the rest of the celiac axis secondary to hepatic mass.  RETROPERITONEUM/LYMPH NODES: Decreased size and attenuation of the prior noted left retroperitoneal hematoma, now measuring 8.1 x 4.0 cm. Unchanged periaortic lymphadenopathy.  ABDOMINAL WALL: Soft tissue infiltration.  BONES: Degenerative changes.    IMPRESSION:  Pulmonary emboli within the bilateral upper, right middle, and right lower lobar arteries with extension into numerous segmental and subsegmental branches, overall similar in appearance to 8/5/2021. No overt evidence of right heart strain.    New small bilateral pleural effusions. New right middle lobe airspace opacity.    Redemonstrated pancreatic mass involving the body and tail with extension to the stomach and upper abdominal vasculature as described above. Again noted is extensive metastatic disease.    Decreased/evolving left retroperitoneal hematoma. 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 MELISSA YANES on 11/10/2021 at 3:33 AM by Dr. Alexander with read back confirmation.    --- End of Report ---                            14.4   16.97 )-----------( 299      ( 10 Nov 2021 02:45 )             46.4       11-10    128<L>  |  90<L>  |  41<H>  ----------------------------<  207<H>  6.4<HH>   |  19<L>  |  1.41<H>    Ca    9.7      10 Nov 2021 02:45    TPro  6.6  /  Alb  2.6<L>  /  TBili  0.8  /  DBili  x   /  AST  52<H>  /  ALT  75<H>  /  AlkPhos  341<H>  11-10   Reason for consult: Pancreatic cancer    HPI: 75 year old male recently discharged from [facility] on 9/30/21 for oral/ GI mucositis on tapering steroids, history of CAD with MI PCI/ stent in 2009, PAF on Eliquis, HTN, BIV-ICD with HF with reduced EF, PE, Pancreatic ca on chemotherapy, recent PE coming from [facility] after developing chest tightness, shortness of breath and abdominal distention. Reports gradual onset SOB. AS per EMS, patient satting in the mid 70s on RA, mid 90s on NRB. EKG non-ischemic. Compliant with diuretics. Reports diffuse abd pain for weeks. Reports vomiting. Passing gas    Hematology/Oncology called to see patient who is under treatment at [facility] for newly dx pancreatic cancer with metastatic disease to the liver.He had CT scan at [facility] Dr. Yan Hodges in Aug 2021 with PE, pancreatic, and liver mets.Biopsy from Aug 21 confirmed pancreatic adenocarcinoma. Started treatment on 8/31/21 per protocol , randomized to gemcitabine/nab-paclitaxel and dual immunotherapy. 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 He received steroids and was discharged to [facility] on 9/30/2021 on tapering doses of steroids. H/H on discharge was 10.8/32.7. He also has a history of DVT/PE and has been on Eliquis. He was readmitted to [facility] 10/6/2021 with a retroperitoneal hematoma and was discharged back to [facility] on 10/11/2021 - steroids were to have been completed.    Patient is currently intubated secondary to hypoxia. Spoke with patient's daughter who is an MD at [facility] [facility] [employer].      PAST MEDICAL & SURGICAL HISTORY:  Hypertension (ICD9 401.9)    Hyperlipidemia (ICD9 272.4)    BPH (Benign Prostatic Hyperplasia)    Borderline diabetes mellitus    MI (myocardial infarction)  2009    Systolic heart failure    Pancreatic cancer    Hernia    Biventricular ICD (implantable cardioverter-defibrillator) in place  2010    Stented coronary artery  X 2, 2009        FAMILY HISTORY:  No pertinent family history in first degree relatives        Alcohol Denied  Smoking: Nonsmoker  Drug Use: Denied  Marital Status:         Allergies    No Known Allergies    Intolerances        MEDICATIONS  (STANDING):  acetaminophen   IVPB .. 1000 milliGRAM(s) IV Intermittent once  vancomycin  IVPB. 1000 milliGRAM(s) IV Intermittent once    MEDICATIONS  (PRN):      ROS - based on ED note - patient is now intubated  No fever, sweats, chills  No epistaxis, HA, sore throat  Chest tightness, SOB  No n/v/d, abd pain, melena, hematochezia  No edema  No rash  No anxiety  No back pain, joint pain  No bleeding, bruising  No dysuria, hematuria    T(C): 36.6 (11-10-21 @ 03:45), Max: 37.1 (11-10-21 @ 03:00)  HR: 99 (11-10-21 @ 06:30) (99 - 107)  BP: 102/84 (11-10-21 @ 06:30) (70/53 - 128/98)  RR: 46 (11-10-21 @ 06:30) (10 - 50)  SpO2: 100% (11-10-21 @ 06:30) (91% - 100%)  Wt(kg): --    PE  NAD  Intubated  RRR  CTAB  Abdomen grossly distended.  No c/c/e  No rash grossly        EXAM:  CT ABDOMEN AND PELVIS IC                          EXAM:  CT ANGIO CHEST PULM [facility]                            PROCEDURE DATE:  11/10/2021            INTERPRETATION:  CLINICAL INFORMATION: Abdominal pain and hypoxia. History of metastatic    A cancer.    COMPARISON: CT abdomen pelvis 10/6/2021, CT angiography chest 8/5/2021.    CONTRAST/COMPLICATIONS:  IV Contrast: Omnipaque 350 90 cc administered   10 cc discarded  Oral Contrast: NONE  Complications: None reported at time of study completion    PROCEDURE:  CT Angiography of the Chest was performed followed by portal venous phase imaging of the Abdomen and Pelvis.  Sagittal and coronal reformats were performed as well as 3D (MIP) reconstructions.    FINDINGS:  CHEST:  LUNGS AND LARGE AIRWAYS: New right middle lobe airspace opacity. Bilateral lower lung partial compressive atelectasis.  PLEURA: New small bilateral pleural effusions.  VESSELS: Pulmonary emboli within the bilateral upper, right middle, and right lower lobar arteries with extension into numerous segmental and subsegmental branches, overall similar in appearance to 8/5/2021.  HEART: Heart size is normal. No pericardial effusion.  MEDIASTINUM AND SARATH: No lymphadenopathy.  CHEST WALL AND LOWER NECK: Left chest cardiac device.    ABDOMEN AND PELVIS:  LIVER: Similar-appearing nodular liver with numerous bilobar hepatic metastases.  BILE DUCTS: Normal caliber.  GALLBLADDER: Cholelithiasis.  SPLEEN: Within normal limits.  PANCREAS: Similar-appearing ill-defined pancreatic mass measuring at least 6.2 cm involving the body and tail with extension into the stomach and retroperitoneum. The mass encases the splenic artery.  ADRENALS: Within normal limits.  KIDNEYS/URETERS: Bilateral renal atrophy, new since 10/6/2021.    BLADDER: Minimally distended.  REPRODUCTIVE ORGANS: Prostate within normal limits.    BOWEL: Pancreatic mass extension into the stomach which is dilated. No bowel obstruction. Appendix is normal.  PERITONEUM: Moderate ascites, increased since 10/6/2021. Ill-defined peritoneal stranding and nodularity concerning for carcinomatosis.  VESSELS: Filling defects within the bilateral iliac veins extending to the femoral veins concerning for thrombus. Again noted is encasement of the splenic artery and involvement of the rest of the celiac axis secondary to hepatic mass.  RETROPERITONEUM/LYMPH NODES: Decreased size and attenuation of the prior noted left retroperitoneal hematoma, now measuring 8.1 x 4.0 cm. Unchanged periaortic lymphadenopathy.  ABDOMINAL WALL: Soft tissue infiltration.  BONES: Degenerative changes.    IMPRESSION:  Pulmonary emboli within the bilateral upper, right middle, and right lower lobar arteries with extension into numerous segmental and subsegmental branches, overall similar in appearance to 8/5/2021. No overt evidence of right heart strain.    New small bilateral pleural effusions. New right middle lobe airspace opacity.    Redemonstrated pancreatic mass involving the body and tail with extension to the stomach and upper abdominal vasculature as described above. Again noted is extensive metastatic disease.    Decreased/evolving left retroperitoneal hematoma. 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 MELISSA YANES on 11/10/2021 at 3:33 AM by Dr. Alexander with read back confirmation.    --- End of Report ---                            14.4   16.97 )-----------( 299      ( 10 Nov 2021 02:45 )             46.4       11-10    128<L>  |  90<L>  |  41<H>  ----------------------------<  207<H>  6.4<HH>   |  19<L>  |  1.41<H>    Ca    9.7      10 Nov 2021 02:45    TPro  6.6  /  Alb  2.6<L>  /  TBili  0.8  /  DBili  x   /  AST  52<H>  /  ALT  75<H>  /  AlkPhos  341<H>  11-10

## 2021-11-10 NOTE — ED PROVIDER NOTE - PROGRESS NOTE DETAILS
received sign out from Dr Jacobsen pending MICU admission. pt was intubated after failed bipap, hx of PE and metastatic disease. metabolic acidosis on gas. sedated w fent. PCR need to be reordered. DJ

## 2021-11-10 NOTE — ED PROCEDURE NOTE - PROCEDURE NAME, MLM
Point of Care Ultrasound Other
Point of Care Ultrasound Vascular Access
Tracheal Intubation
Central Line Insertion

## 2021-11-10 NOTE — CONSULT NOTE ADULT - ASSESSMENT
75 year old male recently discharged from [facility] on 9/30/21 for oral/ GI mucositis on tapering steroids, history of CAD with MI PCI/ stent in 2009, PAF on Eliquis, HTN, BIV-ICD with HF with reduced EF, PE, Pancreatic ca on chemotherapy, recent PE coming from [facility] after developing chest tightness, shortness of breath and abdominal distention. Reports gradual onset SOB. AS per EMS, patient satting in the mid 70s on RA, mid 90s on NRB. EKG non-ischemic. Compliant with diuretics. Reports diffuse abd pain for weeks. Reports vomiting. Passing gas    Hematology/Oncology called to see patient who is under treatment at [facility] for newly dx pancreatic cancer with metastatic disease to the liver.He had CT scan at [facility] Dr. Yan Hodges in Aug 2021 with PE, pancreatic, and liver mets.Biopsy from Aug 21 confirmed pancreatic adenocarcinoma. Started treatment on 8/31/21 per protocol , randomized to gemcitabine/nab-paclitaxel and dual immunotherapy. 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 He received steroids and was discharged to [facility] on 9/30/2021 on tapering doses of steroids. H/H on discharge was 10.8/32.7. He also has a history of DVT/PE and has been on Eliquis. He was readmitted to [facility] 10/6/2021 with a retroperitoneal hematoma and was discharged back to [facility] on 10/11/2021 - steroids were to have been completed.    Metastatic Pancreatic Cancer  --Under care at [facility] Dr. Yan Hodges  --Last chemo was 9/14/2021  --Ongoing care after discharge from hospital and rehab    Desaturation, SOB, chest tightness  --CT chest redomenstrates known Pulmonary emboli  --Elevated Troponins  --Ongoing workup per ED/primary team    Leukocytosis  --Patient has received vancomycin, cefepime and zosyn in ED  --WBC was elevated on previous admission as well    Abdominal Distension  --Moderate ascites seen on CT  --Patient would likely benefit from paracentesis    If admitted to medicine, please readmit to Dr. Rolando Dowd, who had patient during his last admissions.    We will continue to follow patient and coordinate with [facility]    Washington Townsend PA-C  Hematology/Oncology  [facility]   [phone] (cell)  [phone] (office)  [phone] (alt office)  Evenings and weekends please call MD on call or office     75 year old male recently discharged from [facility] on 9/30/21 for oral/ GI mucositis on tapering steroids, history of CAD with MI PCI/ stent in 2009, PAF on Eliquis, HTN, BIV-ICD with HF with reduced EF, PE, Pancreatic ca on chemotherapy, recent PE coming from [facility] after developing chest tightness, shortness of breath and abdominal distention. Reports gradual onset SOB. AS per EMS, patient satting in the mid 70s on RA, mid 90s on NRB. EKG non-ischemic. Compliant with diuretics. Reports diffuse abd pain for weeks. Reports vomiting. Passing gas    Hematology/Oncology called to see patient who is under treatment at [facility] for newly dx pancreatic cancer with metastatic disease to the liver.He had CT scan at [facility] Dr. Yan Hodges in Aug 2021 with PE, pancreatic, and liver mets.Biopsy from Aug 21 confirmed pancreatic adenocarcinoma. Started treatment on 8/31/21 per protocol , randomized to gemcitabine/nab-paclitaxel and dual immunotherapy. 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 He received steroids and was discharged to [facility] on 9/30/2021 on tapering doses of steroids. H/H on discharge was 10.8/32.7. He also has a history of DVT/PE and has been on Eliquis. He was readmitted to [facility] 10/6/2021 with a retroperitoneal hematoma and was discharged back to [facility] on 10/11/2021 - steroids were to have been completed.    Metastatic Pancreatic Cancer  --Under care at [facility] Dr. Yan Hodges  --Last chemo was 9/14/2021  --Ongoing care after discharge from hospital and rehab    Desaturation, SOB, chest tightness  --CT chest redomenstrates known Pulmonary emboli  --Elevated Troponins  --Patient currently intubated in MICU  --Ongoing managment per MICU team    Leukocytosis  --Patient has received vancomycin, cefepime and zosyn in ED  --WBC was elevated on previous admission as well    Abdominal Distension  --Moderate ascites seen on CT  --Abdomen markedly distended - may be contributing to respiratory symptoms  --Patient needs paracentesis    Recent pulmonary embolus  --Stable appearance on most recent CT  --Patient currently on heparin gtt    If admitted to medicine, please readmit to Dr. Rolando Dowd, who had patient during his last admissions.    We will continue to follow patient and coordinate with [facility]    Washington Townsend PA-C  Hematology/Oncology  [facility]   [phone] (cell)  [phone] (office)  [phone] (alt office)  Evenings and weekends please call MD on call or office

## 2021-11-10 NOTE — ED PROVIDER NOTE - PHYSICAL EXAMINATION
Gen: AAOx3, with increased work of breathing   Head: NCAT  HEENT: EOMI, oral mucosa moist, normal conjunctiva  Lung: CTAB, increase WOB, , speaking in full sentences  CV: RRR, no murmurs, rubs or gallops. Blt pitting edema.   Abd: Significant abdominal distention. Diffuse ttp   MSK: no visible deformities  Neuro: No focal sensory or motor deficits, normal CN exam   Skin: Warm, well perfused, no rash  Psych: normal affect.

## 2021-11-10 NOTE — ED PROCEDURE NOTE - NS ED ATTENDING STATEMENT MOD
Attending with
work note provided as per MD/DC instructions

## 2021-11-10 NOTE — ED ADULT NURSE REASSESSMENT NOTE - NS ED NURSE REASSESS COMMENT FT1
Bowman catheter placed following hospital policy and procedure. Pt tolerated well. 2nd RN at bedside to assist in maintaining sterile technique. 10cc of yellow urine drained, MD made aware.

## 2021-11-10 NOTE — ED ADULT NURSE NOTE - NSIMPLEMENTINTERV_GEN_ALL_ED
Implemented All Universal Safety Interventions:  [place] to call system. Call bell, personal items and telephone within reach. Instruct patient to call for assistance. Room bathroom lighting operational. Non-slip footwear when patient is off stretcher. Physically safe environment: no spills, clutter or unnecessary equipment. Stretcher in lowest position, wheels locked, appropriate side rails in place.

## 2021-11-10 NOTE — PROCEDURE NOTE - ADDITIONAL PROCEDURE DETAILS
Indication: Asked by MICU team to deactivate ICD therapies per family wishes. Order in chart.     Estimated remaining battery longevity: 2.2 years    Changes made: ICD therapies deactivated.

## 2021-11-10 NOTE — H&P ADULT - ASSESSMENT
#Neuro    #Resp    #CV    #GI    #ID    #Renal    #Heme    #Endo    #DVT PPx    #Ethics      75 year old male recently discharged from [facility] on 9/30/21 for oral/ GI mucositis on tapering steroids, history of CAD with MI PCI/ stent in 2009, PAF on Eliquis, HTN, BIV-ICD with HF with reduced EF (35%), PE (on eliquis), Pancreatic ca on chemotherapy, recent PE coming from [facility] for worsening lower extremity swelling admitted to MICU for hypercarbic and hypoxic respiratory failure, heart failure exacerbation, acute renal failure with acidosis, and hypotension 2/2 to mixed septic and cardiogenic shock.     Neuro  -prior to intubation, patient was A&Ox3  -now sedated, wean as tolerated. currently on fentanyl gtt    Respiratory  Acute Hypercarbic and Hypoxic Respiratory Failure  -etiology multifactorial, including PNA, PE, and HF exacerbation  -on ventilator currently,     #CV    #GI    #ID    #Renal    #Heme    #Endo    #DVT PPx    #Ethics  -GOC discussion with family at bedside. Family would like a trial of intubation to see if we can fix/control some reversible processes (ie. infection, HF exacerbation) but would not want prolonged intubation, thus decision was made to intubate.    -contacts: Hannah dodson (daughter) [phone] Rosa Dodson (wife) [phone]       75 year old male recently discharged from [facility] on 9/30/21 for oral/ GI mucositis on tapering steroids, history of CAD with MI PCI/ stent in 2009, PAF on Eliquis, HTN, BIV-ICD with HF with reduced EF (35%), PE (on eliquis), Pancreatic ca on chemotherapy, recent PE coming from [facility] for worsening lower extremity swelling admitted to MICU for hypercarbic and hypoxic respiratory failure, heart failure exacerbation, acute renal failure with acidosis, and hypotension 2/2 to mixed septic and cardiogenic shock.     Neuro  -prior to intubation, patient was A&Ox3  -now sedated, wean as tolerated. currently on fentanyl gtt    Respiratory  Acute Hypercarbic and Hypoxic Respiratory Failure  -etiology multifactorial, including PNA, PE, and HF exacerbation  -on ventilator currently, trend ABGs    Pulmonary Embolism  -c/w heparin gtt    Cardiovascular   Hypotension 2/2 to shock  -likely septic shock (from PNA), but other etiologies include cardiogenic shock (given worsening LE edema) vs obstructive (PE)  -c/w levophed, map goal>65    HF with reduced EF (35%)  -volume overloaded (LE edema, ascites, and pulmonary edema)  -unresponsive to furosemide, will give bumex 2mg and assess need for bumex gtt  -may require inotropic support, previously on dobutamine    Gastrointestinal  -Keep NPO for now, start tube feeds once acute issues resolve  -transaminitis likely 2/2 congestive hepatopathy. trend LFTs daily  -may need therapeutic paracentesis    Infectious Disease   -CT scan with possibly PNA but other sources of infection include SBP (given ascites and immunocompromised state)  -c/w empiric cefepime and vanc by level  -f/u bcx and ua/ucx     Renal  Anuric/Acute Renal Failure with metabolic acidosis  -pH 7.08, with acidosis    -vasquez with no urine output, and unresponsive to lasix  -trial of bumex   -bicarb push for acidosis PRN. avoid bicarb gtt to avoid increased fluids    Heme  Metastatic Prostate Carcinoma  -f/u heme/onc recs    #Endo  -sugars okay, no DKA    #DVT PPx  -heparin gtt for PE    #Ethics  -GOC discussion with family at bedside. Family would like a trial of intubation to see if we can fix/control some reversible processes (ie. infection, HF exacerbation) but would not want prolonged intubation, thus decision was made to intubate.    -contacts: Hannah dodson (daughter) [phone] Rosa Dodson (wife) [phone]

## 2021-11-10 NOTE — H&P ADULT - ATTENDING COMMENTS
Patient examined and care reviewed in detail on bedside rounds  Critically ill and unstable vent/pressors with GLEN/hyperkalemia/advanced stage pancreatic ca/PE Long talk with patient's devoted family Decision is to forgo additional MICU interventions  Frequent bedside visits with therapy change today.   I have personally provided 35+ minutes of critical care time concurrently with the resident/fellow; this excludes time spent on separate procedures.  I have personally provided an additional 70+ minutes of critical care time concurrently with the resident/fellow; this excludes time spent on separate procedures.  Pt had goal directed echo within 24 hours of MICU admission  Goals of care discussion had with family within 24 hours of MICU admission  Patient on DVT prophylaxis within 24 hours of MICU admission

## 2021-11-10 NOTE — ED PROCEDURE NOTE - ATTENDING CONTRIBUTION TO CARE
TYRONE was present for the E/M service provided. STEFFANIE
TYRONE was present for the E/M service provided. STEFFANIE
I was physically present for the E/M service provided. I was physically present for the key portions of the service provided. TIFFANIE.
I was physically present for the E/M service provided. I was physically present for the key portions of the service provided. TIFFANIE.

## 2021-11-10 NOTE — ED PROVIDER NOTE - NS ED ROS FT
GENERAL: No fever or chills  EYES: no change in vision  HEENT: no trouble swallowing or speaking  CARDIAC: see hpi   PULMONARY: see hpi  GI: seen hpi  : No changes in urination  SKIN: no rashes  NEURO: no headache, numbness, or weakness.  MSK: No joint pain

## 2021-11-10 NOTE — CHART NOTE - NSCHARTNOTEFT_GEN_A_CORE
GOC conversation was had with the family regarding the patient's poor condition and overall poor prognosis. The family ultimately decided to hold all further care, make the patient DNR, and to have the patient's ICD turned off.     WALI is in chart. DNR order was placed.       Jai Lee, PGY-1   Internal Medicine  Pager: [phone] / [facility]: [phone]

## 2021-11-10 NOTE — ED PROVIDER NOTE - CLINICAL SUMMARY MEDICAL DECISION MAKING FREE TEXT BOX
75 year old male recently discharged from [facility] on 9/30/21 for oral/ GI mucositis on tapering steroids, history of CAD with MI PCI/ stent in 2009, PAF on Eliquis, HTN, BIV-ICD with HF with reduced EF, PE, Pancreatic ca on chemotherapy, recent PE coming from [facility] after developing chest tightness, shortness of breath and abdominal distention. Significant abdominal distention. Diffuse ttp on the abdomen. Symptoms possibly from worsening PE vs CHF vs ACS vs ascites pressinng on the thora 75 year old male recently discharged from [facility] on 9/30/21 for oral/ GI mucositis on tapering steroids, history of CAD with MI PCI/ stent in 2009, PAF on Eliquis, HTN, BIV-ICD with HF with reduced EF, PE, Pancreatic ca on chemotherapy, recent PE coming from [facility] after developing chest tightness, shortness of breath and abdominal distention. Significant abdominal distention. Diffuse ttp on the abdomen. Symptoms possibly from worsening PE vs CHF vs ACS vs ascites pressing on the thorax. CT abd / chest. Cardiac work up. Lasix, consider Bipap if vomiting resolves.

## 2021-11-10 NOTE — ED ADULT NURSE REASSESSMENT NOTE - NS ED NURSE REASSESS COMMENT FT1
ED resident Dr. Robin Contreras notified of pt's discomfort and agitation/ difficulty getting comfortable in bed. Pt and family requesting pain medication at this time.

## 2021-11-10 NOTE — H&P ADULT - NSHPLABSRESULTS_GEN_ALL_CORE
LABS:                        14.4   16.97 )-----------( 299      ( 10 Nov 2021 02:45 )             46.4     Hgb Trend: 14.4<--  11-10    128<L>  |  90<L>  |  41<H>  ----------------------------<  207<H>  6.4<HH>   |  19<L>  |  1.41<H>    Ca    9.7      10 Nov 2021 02:45    TPro  6.6  /  Alb  2.6<L>  /  TBili  0.8  /  DBili  x   /  AST  52<H>  /  ALT  75<H>  /  AlkPhos  341<H>  11-10    Creatinine Trend: 1.41<--        Venous Blood Gas:  11-10 @ 05:51  7.08/64/31/19/36.6  VBG Lactate: 8.6 LABS:                        14.4   16.97 )-----------( 299      ( 10 Nov 2021 02:45 )             46.4     Hgb Trend: 14.4<--  11-10    128<L>  |  90<L>  |  41<H>  ----------------------------<  207<H>  6.4<HH>   |  19<L>  |  1.41<H>    Ca    9.7      10 Nov 2021 02:45    TPro  6.6  /  Alb  2.6<L>  /  TBili  0.8  /  DBili  x   /  AST  52<H>  /  ALT  75<H>  /  AlkPhos  341<H>  11-10    Creatinine Trend: 1.41<--    Venous Blood Gas:  11-10 @ 05:51  7.08/64/31/19/36.6  VBG Lactate: 8.6    < from: CT Abdomen and Pelvis w/ IV Cont (11.10.21 @ 02:56) >    IMPRESSION:  Pulmonary emboli within the bilateral upper, right middle, and right lower lobar arteries with extension into numerous segmental and subsegmental branches, overall similar in appearance to 8/5/2021. No overt evidence of right heart strain.    New small bilateral pleural effusions. New right middle lobe airspace opacity.    Redemonstrated pancreatic mass involving the body and tail with extension to the stomach and upper abdominal vasculature as described above. Again noted is extensive metastatic disease.    Decreased/evolving left retroperitoneal hematoma. 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 MELISSA YANES on 11/10/2021 at 3:33 AM by Dr. Alexander with read back confirmation.    < end of copied text >

## 2021-11-10 NOTE — ED ADULT NURSE REASSESSMENT NOTE - NS ED NURSE REASSESS COMMENT FT1
Pt intubated at 0706 by MD Kelly and MD Jacobsen on first attempt. 22 at lip, size 7.0 tube. + color change. Stat portable chest xray called, after x-ray tube pulled back slightly to 21 at lip.

## 2021-11-11 NOTE — PROGRESS NOTE ADULT - SUBJECTIVE AND OBJECTIVE BOX
Patient is a 75y old  Male who presents with a chief complaint of     Patient seen this morning. Remains intubated. Daughter is at bedside.    MEDICATIONS  (STANDING):  cefepime   IVPB 1000 milliGRAM(s) IV Intermittent every 12 hours  chlorhexidine 0.12% Liquid 15 milliLiter(s) Oral Mucosa every 12 hours  chlorhexidine 4% Liquid 1 Application(s) Topical <User Schedule>  fentaNYL   Infusion... 0.5 MICROgram(s)/kG/Hr (2.63 mL/Hr) IV Continuous <Continuous>  influenza  Vaccine (HIGH DOSE) 0.7 milliLiter(s) IntraMuscular once  norepinephrine Infusion 0.1 MICROgram(s)/kG/Min (19.7 mL/Hr) IV Continuous <Continuous>    MEDICATIONS  (PRN):  fentaNYL    Injectable 50 MICROGram(s) IV Push every 6 hours PRN Severe Pain (7 - 10)      ROS  Unable to obtain - patient is intubated    Vital Signs Last 24 Hrs  T(C): 37.9 (11 Nov 2021 08:00), Max: 37.9 (11 Nov 2021 08:00)  T(F): 100.2 (11 Nov 2021 08:00), Max: 100.2 (11 Nov 2021 08:00)  HR: 97 (11 Nov 2021 08:51) (85 - 101)  BP: 75/60 (11 Nov 2021 08:00) (69/50 - 123/61)  BP(mean): 65 (11 Nov 2021 08:00) (54 - 80)  RR: 30 (11 Nov 2021 08:00) (11 - 32)  SpO2: 99% (11 Nov 2021 08:51) (87% - 100%)    PE  NAD  Intubated  Non-responsive  Anicteric, MMM  RRR  CTAB  Abd distended  No c/c/e  No rash grossly  No urine output in vasquez                          13.1   23.82 )-----------( 260      ( 10 Nov 2021 10:55 )             42.8       11-10    127<L>  |  90<L>  |  46<H>  ----------------------------<  211<H>  6.7<HH>   |  17<L>  |  1.58<H>    Ca    9.3      10 Nov 2021 10:55  Phos  8.6     11-10  Mg     2.4     11-10    TPro  5.9<L>  /  Alb  2.5<L>  /  TBili  1.0  /  DBili  x   /  AST  66<H>  /  ALT  78<H>  /  AlkPhos  305<H>  11-10

## 2021-11-11 NOTE — PROGRESS NOTE ADULT - ASSESSMENT
75 year old male recently discharged from [facility] on 9/30/21 for oral/ GI mucositis on tapering steroids, history of CAD with MI PCI/ stent in 2009, PAF on Eliquis, HTN, BIV-ICD with HF with reduced EF, PE, Pancreatic ca on chemotherapy, recent PE coming from [facility] after developing chest tightness, shortness of breath and abdominal distention. Reports gradual onset SOB. AS per EMS, patient satting in the mid 70s on RA, mid 90s on NRB. EKG non-ischemic. Compliant with diuretics. Reports diffuse abd pain for weeks. Reports vomiting. Passing gas    Hematology/Oncology called to see patient who is under treatment at [facility] for newly dx pancreatic cancer with metastatic disease to the liver.He had CT scan at [facility] Dr. Yan Hodges in Aug 2021 with PE, pancreatic, and liver mets.Biopsy from Aug 21 confirmed pancreatic adenocarcinoma. Started treatment on 8/31/21 per protocol , randomized to gemcitabine/nab-paclitaxel and dual immunotherapy. 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 He received steroids and was discharged to [facility] on 9/30/2021 on tapering doses of steroids. H/H on discharge was 10.8/32.7. He also has a history of DVT/PE and has been on Eliquis. He was readmitted to [facility] 10/6/2021 with a retroperitoneal hematoma and was discharged back to [facility] on 10/11/2021 - steroids were to have been completed.    Patient now in MICU hypotensive, intubated and in renal failure.    Metastatic Pancreatic Cancer  --Under care at [facility] Dr. Yan Hodges  --Last chemo was 9/14/2021    Hypotension, organ failure  --Patient's family met with MICU team  --although currently intubated, this was only a trial to see if he could improve clinically  --Prognosis is poor  --Family has decided to pursue comfort measures at this point  --Patient is currently DNR  --AICD has been disconnected  --Plan will be for a trial of DC'ing norepinephrine    We will continue to follow patient and coordinate with [facility]    Washington Townsend PA-C  Hematology/Oncology  [facility]   [phone] (cell)  [phone] (office)  [phone] (alt office)  Evenings and weekends please call MD on call or office

## 2021-11-11 NOTE — PROGRESS NOTE ADULT - ASSESSMENT
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 Patient has since been made DNR, no further treatment measures.      Neuro  -prior to intubation, patient was A&Ox3  -now sedated, wean as tolerated. currently on fentanyl gtt    Respiratory  Acute Hypercarbic and Hypoxic Respiratory Failure  -etiology multifactorial, including PNA, PE, and HF exacerbation  -on ventilator currently, trend ABGs    Pulmonary Embolism  -c/w heparin gtt    Cardiovascular   Hypotension 2/2 to shock  -likely septic shock (from PNA), but other etiologies include cardiogenic shock (given worsening LE edema) vs obstructive (PE)  -c/w levophed, map goal>65    HF with reduced EF (35%)  -volume overloaded (LE edema, ascites, and pulmonary edema)  -unresponsive to furosemide, will give bumex 2mg and assess need for bumex gtt  -may require inotropic support, previously on dobutamine    Gastrointestinal  -Keep NPO for now, start tube feeds once acute issues resolve  -transaminitis likely 2/2 congestive hepatopathy. trend LFTs daily  -may need therapeutic paracentesis    Infectious Disease   -CT scan with possibly PNA but other sources of infection include SBP (given ascites and immunocompromised state)  -c/w empiric cefepime and vanc by level  -f/u bcx and ua/ucx     Renal  Anuric/Acute Renal Failure with metabolic acidosis  -pH 7.08, with acidosis    -vasquez with no urine output, and unresponsive to lasix  -trial of bumex   -bicarb push for acidosis PRN. avoid bicarb gtt to avoid increased fluids    Heme  Metastatic Prostate Carcinoma  -f/u heme/onc recs    #Endo  -sugars okay, no DKA    #DVT PPx  -heparin gtt for PE    #Ethics  -GOC discussion with family at bedside. Patient was placed DNR. 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 Patient has since been made DNR, no further treatment measures.      Neuro  -prior to intubation, patient was A&Ox3  -now sedated, wean as tolerated. currently on fentanyl gtt for comfort    Respiratory  Acute Hypercarbic and Hypoxic Respiratory Failure  -etiology multifactorial, including PNA, PE, and HF exacerbation  -Continue vent     Pulmonary Embolism  -off heparin gtt per family wishes    Cardiovascular   Hypotension 2/2 to shock  -likely septic shock (from PNA), but other etiologies include cardiogenic shock (given worsening LE edema) vs obstructive (PE)  -c/w levophed, map goal>65    HF with reduced EF (35%)  -volume overloaded (LE edema, ascites, and pulmonary edema)  - Holding on dobutamine for ionotropic support given family's wishes for comfort    Gastrointestinal  -Keep NPO    Infectious Disease   -CT scan with possibly PNA but other sources of infection include SBP (given ascites and immunocompromised state)  - Off abx given stopping of additional care     Renal  Anuric/Acute Renal Failure with metabolic acidosis  -pH 7.08, with acidosis    - No longer trending labs    Heme  Metastatic Prostate Carcinoma  -supportive measures    Ethics:  -GOC discussion with family at bedside. Patient was placed DNR. Currently capping pressors, no regular lab draws . Will f/u with family if they are requesting any further changes in their GOC.

## 2021-11-11 NOTE — PROGRESS NOTE ADULT - SUBJECTIVE AND OBJECTIVE BOX
INTERVAL HPI/OVERNIGHT EVENTS:    SUBJECTIVE: Patient seen and examined at bedside.       VITAL SIGNS:  ICU Vital Signs Last 24 Hrs  T(C): 37.8 (11 Nov 2021 00:00), Max: 37.8 (11 Nov 2021 00:00)  T(F): 100 (11 Nov 2021 00:00), Max: 100 (11 Nov 2021 00:00)  HR: 90 (11 Nov 2021 03:43) (85 - 110)  BP: 95/68 (11 Nov 2021 00:00) (69/50 - 123/61)  BP(mean): 77 (11 Nov 2021 00:00) (54 - 86)  ABP: --  ABP(mean): --  RR: 32 (11 Nov 2021 00:00) (11 - 32)  SpO2: 100% (11 Nov 2021 03:43) (87% - 100%)    Mode: AC/ CMV (Assist Control/ Continuous Mandatory Ventilation), RR (machine): 30, TV (machine): 500, FiO2: 100, PEEP: 5, ITime: 1, MAP: 14, PIP: 27  Plateau pressure:   P/F ratio:     11-10 @ 07:01  -  11-11 @ 07:00  --------------------------------------------------------  IN: 2497.3 mL / OUT: 180 mL / NET: 2317.3 mL      CAPILLARY BLOOD GLUCOSE      POCT Blood Glucose.: 194 mg/dL (10 Nov 2021 04:29)    ECG:    PHYSICAL EXAM:    General:   HEENT:   Neck:   Respiratory:   Cardiovascular:   Abdomen:   Extremities:  Neurological:    MEDICATIONS:  MEDICATIONS  (STANDING):  cefepime   IVPB 1000 milliGRAM(s) IV Intermittent every 12 hours  chlorhexidine 0.12% Liquid 15 milliLiter(s) Oral Mucosa every 12 hours  chlorhexidine 4% Liquid 1 Application(s) Topical <User Schedule>  fentaNYL   Infusion... 0.5 MICROgram(s)/kG/Hr (2.63 mL/Hr) IV Continuous <Continuous>  influenza  Vaccine (HIGH DOSE) 0.7 milliLiter(s) IntraMuscular once  norepinephrine Infusion 0.1 MICROgram(s)/kG/Min (19.7 mL/Hr) IV Continuous <Continuous>    MEDICATIONS  (PRN):  fentaNYL    Injectable 50 MICROGram(s) IV Push every 6 hours PRN Severe Pain (7 - 10)      ALLERGIES:  Allergies    No Known Allergies    Intolerances        LABS:                        13.1   23.82 )-----------( 260      ( 10 Nov 2021 10:55 )             42.8     11-10    127<L>  |  90<L>  |  46<H>  ----------------------------<  211<H>  6.7<HH>   |  17<L>  |  1.58<H>    Ca    9.3      10 Nov 2021 10:55  Phos  8.6     11-10  Mg     2.4     11-10    TPro  5.9<L>  /  Alb  2.5<L>  /  TBili  1.0  /  DBili  x   /  AST  66<H>  /  ALT  78<H>  /  AlkPhos  305<H>  11-10    PTT - ( 10 Nov 2021 10:55 )  PTT:31.9 sec      RADIOLOGY & ADDITIONAL TESTS: Reviewed.   INTERVAL HPI/OVERNIGHT EVENTS:    No significant events overnight. Patient resting comfortably in bed.    SUBJECTIVE: Patient seen and examined at bedside.       VITAL SIGNS:  ICU Vital Signs Last 24 Hrs  T(C): 37.8 (11 Nov 2021 00:00), Max: 37.8 (11 Nov 2021 00:00)  T(F): 100 (11 Nov 2021 00:00), Max: 100 (11 Nov 2021 00:00)  HR: 90 (11 Nov 2021 03:43) (85 - 110)  BP: 95/68 (11 Nov 2021 00:00) (69/50 - 123/61)  BP(mean): 77 (11 Nov 2021 00:00) (54 - 86)  ABP: --  ABP(mean): --  RR: 32 (11 Nov 2021 00:00) (11 - 32)  SpO2: 100% (11 Nov 2021 03:43) (87% - 100%)    Mode: AC/ CMV (Assist Control/ Continuous Mandatory Ventilation), RR (machine): 30, TV (machine): 500, FiO2: 100, PEEP: 5, ITime: 1, MAP: 14, PIP: 27  Plateau pressure:   P/F ratio:     11-10 @ 07:01  -  11-11 @ 07:00  --------------------------------------------------------  IN: 2497.3 mL / OUT: 180 mL / NET: 2317.3 mL      CAPILLARY BLOOD GLUCOSE      POCT Blood Glucose.: 194 mg/dL (10 Nov 2021 04:29)    ECG:    PHYSICAL EXAM:    General: Resting comfortably in bed, intubated and sedated  Respiratory: Coarse breath sounds b/l  Cardiovascular: RRR, no murmurs auscultated   Abdomen: Severely distended abdomen  Extremities: Cold extremities, no signs of cyanosis  Neurological: Withdraws to pain.     MEDICATIONS:  MEDICATIONS  (STANDING):  cefepime   IVPB 1000 milliGRAM(s) IV Intermittent every 12 hours  chlorhexidine 0.12% Liquid 15 milliLiter(s) Oral Mucosa every 12 hours  chlorhexidine 4% Liquid 1 Application(s) Topical <User Schedule>  fentaNYL   Infusion... 0.5 MICROgram(s)/kG/Hr (2.63 mL/Hr) IV Continuous <Continuous>  influenza  Vaccine (HIGH DOSE) 0.7 milliLiter(s) IntraMuscular once  norepinephrine Infusion 0.1 MICROgram(s)/kG/Min (19.7 mL/Hr) IV Continuous <Continuous>    MEDICATIONS  (PRN):  fentaNYL    Injectable 50 MICROGram(s) IV Push every 6 hours PRN Severe Pain (7 - 10)      ALLERGIES:  Allergies    No Known Allergies    Intolerances        LABS:                        13.1   23.82 )-----------( 260      ( 10 Nov 2021 10:55 )             42.8     11-10    127<L>  |  90<L>  |  46<H>  ----------------------------<  211<H>  6.7<HH>   |  17<L>  |  1.58<H>    Ca    9.3      10 Nov 2021 10:55  Phos  8.6     11-10  Mg     2.4     11-10    TPro  5.9<L>  /  Alb  2.5<L>  /  TBili  1.0  /  DBili  x   /  AST  66<H>  /  ALT  78<H>  /  AlkPhos  305<H>  11-10    PTT - ( 10 Nov 2021 10:55 )  PTT:31.9 sec      RADIOLOGY & ADDITIONAL TESTS: Reviewed.

## 2021-11-11 NOTE — PROGRESS NOTE ADULT - ATTENDING COMMENTS
1. Acute hypercapnic and hypoxemic respiratory failure. Pt sedated on Fentanyl. Continue current AC vent settings.  2. Hypotension: both cardiogenic and septic. Continue on norepinephrine. H/O MI with stent and chronic systolic heart failure.  3.ID. Bacterial pneumonia: Continue vancomycin and cefepime.  4. H/O PE. Continue IV heparin.  5. Pancreatic cancer. Receiving chemotherapy outpatient.  6. GOC. DNR/ DNI: Comfort care.  Family  leaning towards comfort care extubation and stopping pressors.

## 2021-11-12 NOTE — DISCHARGE NOTE FOR THE EXPIRED PATIENT - HOSPITAL COURSE
75 year old male recently discharged from [facility] 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 Patient made DNR/DNI. Called by nurse to bedside at 5:13 am, patient w/ fixed and dilated pupils, no spontaneous respirations, no carotid pulse. Pronounced at 5:13 am.

## 2021-11-13 LAB
-  AMPICILLIN/SULBACTAM: SIGNIFICANT CHANGE UP
-  CEFAZOLIN: SIGNIFICANT CHANGE UP
-  CLINDAMYCIN: SIGNIFICANT CHANGE UP
-  ERYTHROMYCIN: SIGNIFICANT CHANGE UP
-  GENTAMICIN: SIGNIFICANT CHANGE UP
-  OXACILLIN: SIGNIFICANT CHANGE UP
-  PENICILLIN: SIGNIFICANT CHANGE UP
-  RIFAMPIN: SIGNIFICANT CHANGE UP
-  TETRACYCLINE: SIGNIFICANT CHANGE UP
-  TRIMETHOPRIM/SULFAMETHOXAZOLE: SIGNIFICANT CHANGE UP
-  VANCOMYCIN: SIGNIFICANT CHANGE UP
CULTURE RESULTS: SIGNIFICANT CHANGE UP
METHOD TYPE: SIGNIFICANT CHANGE UP
ORGANISM # SPEC MICROSCOPIC CNT: SIGNIFICANT CHANGE UP
ORGANISM # SPEC MICROSCOPIC CNT: SIGNIFICANT CHANGE UP
SPECIMEN SOURCE: SIGNIFICANT CHANGE UP

## 2021-11-19 ENCOUNTER — APPOINTMENT (OUTPATIENT)
Dept: ELECTROPHYSIOLOGY | Facility: CLINIC | Age: 76
End: 2021-11-19

## 2021-12-14 NOTE — DISCHARGE NOTE PROVIDER - CARE PROVIDERS DIRECT ADDRESSES
Occupational Therapy Treatment Attempt     Chart reviewed. Attempted Occupational Therapy Treatment, however, patient unable to be seen due to:  []  Nausea/vomiting  []  Eating  []  Pain  []  Patient too lethargic  []  Off Unit for testing/procedure  []  Dialysis treatment in progress  []  Telemetry Results  [x]  Other:  Pt refusing participation with therapy at this time. Will f/u later as patient's schedule allows.   Debbie Hernandez, OTR/L [email or web address]

## 2022-02-28 ENCOUNTER — APPOINTMENT (OUTPATIENT)
Dept: ELECTROPHYSIOLOGY | Facility: CLINIC | Age: 77
End: 2022-02-28

## 2022-04-08 ENCOUNTER — APPOINTMENT (OUTPATIENT)
Dept: NEUROLOGY | Facility: CLINIC | Age: 77
End: 2022-04-08

## 2022-10-28 NOTE — DIETITIAN INITIAL EVALUATION ADULT. - REASON INDICATOR FOR ASSESSMENT
contact guard
Nutrition consult received for pressure injury and "poor PO"  Source: Pt, EMR, pt's daughter over the phone, pt's wife at bedside.

## 2022-11-30 NOTE — PATIENT PROFILE ADULT - OVER THE PAST TWO WEEKS, HAVE YOU FELT LITTLE INTEREST OR PLEASURE IN DOING THINGS?
Topical Clindamycin Counseling: Patient counseled that this medication may cause skin irritation or allergic reactions.  In the event of skin irritation, the patient was advised to reduce the amount of the drug applied or use it less frequently.   The patient verbalized understanding of the proper use and possible adverse effects of clindamycin.  All of the patient's questions and concerns were addressed. no

## 2023-03-07 NOTE — H&P ADULT - PROBLEM SELECTOR PLAN 2
Past 24 hrs New diagnosis of pancreatic mass on CT chest at [facility]   -Report reviewed by me. It shows 6.4 pancreatic mass at the tail with lesions in the liver and nodules in the lungs, concerning for adenocarcinoma according to the report.   -MRI can not be done as pt's AICD is not compatible with it   -F/u with CT A/P with IV contrast   -possible biopsy by IR or GI   -pain control with tylenol for now

## 2023-04-19 NOTE — ED PROCEDURE NOTE - CPROC ED DIRECTIONAL
right Cimetidine Counseling:  I discussed with the patient the risks of Cimetidine including but not limited to gynecomastia, headache, diarrhea, nausea, drowsiness, arrhythmias, pancreatitis, skin rashes, psychosis, bone marrow suppression and kidney toxicity.

## 2023-08-22 NOTE — CONSULT NOTE ADULT - PROBLEM SELECTOR PROBLEM 2
Hypertension
Dysphagia
Erythromycin Pregnancy And Lactation Text: This medication is Pregnancy Category B and is considered safe during pregnancy. It is also excreted in breast milk.

## 2023-10-01 PROBLEM — K86.89 MASS OF PANCREAS: Status: ACTIVE | Noted: 2021-01-01

## 2023-10-08 NOTE — PROGRESS NOTE ADULT - PROBLEM SELECTOR PLAN 7
Stable. Continue ASA for now Bipolar affective disorder, current episode manic, current episode severity unspecified Schizoaffective disorder

## 2024-04-04 NOTE — ED ADULT TRIAGE NOTE - SPO2 (%)
100
Bed in low position, brakes on/Side rails x 2 or 4 up, assess large gaps, such that a patient could get extremity or other body part entrapped, use additional safety procedures/Call light is within reach, educate patient/family on its functionality/Environment clear of unused equipment, furniture's in place, clear of hazards/Patient and family education available to parents and patient

## 2024-04-17 NOTE — ED ADULT NURSE NOTE - NSFALLRSKUNASSIST_ED_ALL_ED
Contacted the patient to perform Medication Therapy Management review, specifically in reference to refilling metformin to improve PDC for HEDIS measurements.   Waiting for patient response to verify          no

## 2025-01-06 NOTE — PROVIDER CONTACT NOTE (MEDICATION) - SITUATION
Self Exam: Abdomen soft, non-tender to palpatation, non-distended
Heparin Infusion restarted at 13 gtt/hr. Last aPTT 65.0. Rate continued at 13gtt/hr as per nomogram.

## 2025-01-17 NOTE — ED ADULT NURSE NOTE - SUICIDE SCREENING DEPRESSION
Pain Service Progress Note      SUBJECTIVE  NRS-6  PRN opioid requirement in the last 24hrs-3 doses morphine 4mg IVP and 4 doses percocet 10/325  Pt reports IVP morphine provides no pain reduction  Pt unsure if percocet 10/325 is helping  This provider spoke with pt's MARIE Hale who inquired about the option for kyphoplasty and voiced her concern regarding pt's severe constipation    MEDICATIONS  Medications were reviewed and updated today       PHYSICAL EXAM  Vital Signs:  Visit Vitals  /73   Pulse (!) 103   Temp 98 °F (36.7 °C)   Resp 18   Ht 5' 1\" (1.549 m)   Wt 78.4 kg (172 lb 13.5 oz)   SpO2 94%   BMI 32.66 kg/m²     Temp (min-max last 24 hrs):  Temp  Min: 97.7 °F (36.5 °C)  Max: 98.6 °F (37 °C)    Physical Exam  Constitutional:       General: She is in acute distress (facial grimace with repositioning).      Appearance: Normal appearance.   Pulmonary:      Effort: Pulmonary effort is normal.   Skin:     General: Skin is warm and dry.   Neurological:      General: No focal deficit present.      Mental Status: She is alert and oriented to person, place, and time.   Psychiatric:         Mood and Affect: Mood is anxious.         Speech: Speech is rapid and pressured.          ASSESSMENT  Acute lumbar pain secondary to L3 compression fracture  Persistent BLE pain secondary to spasticity due to CP  Wheelchair/bed bound  Inadequate pain reduction  Severe constipation-present on admission  Anxiety likely impacting pain perception    PLAN  Discontinue IVP morphine as pt reports no benefit  Discontinue percocet 10/325  Start oxycodone 15mg q 8am and q 4 hrs PRN severe pain  Magnesium citrate x 1 today  Recommend consult IR for kyphoplasty evaluation  MARIE Hale informed primary provided would be informed of her concerns and request for kyphoplasty evaluation  Pt agreeable to plan, primary RN aware of same  Text page regarding plan sent to Jayesh Vazquez NP  Contact pain service with questions/concerns  or   55 minutes spent on encounter including face to face, chart review, documentation, coordination of care, education and counseling regarding the risks and benefits of the current pain management plan      Negative

## 2025-05-06 NOTE — PROGRESS NOTE ADULT - PROBLEM SELECTOR PROBLEM 2
History of pulmonary embolism
show
History of pulmonary embolism
